# Patient Record
Sex: MALE | Race: BLACK OR AFRICAN AMERICAN | NOT HISPANIC OR LATINO | URBAN - METROPOLITAN AREA
[De-identification: names, ages, dates, MRNs, and addresses within clinical notes are randomized per-mention and may not be internally consistent; named-entity substitution may affect disease eponyms.]

---

## 2016-05-28 RX ORDER — ESCITALOPRAM OXALATE 10 MG/1
0 TABLET, FILM COATED ORAL
Qty: 30 | Refills: 0 | COMMUNITY
Start: 2016-05-28

## 2016-11-16 RX ORDER — AMLODIPINE BESYLATE 2.5 MG/1
0 TABLET ORAL
Qty: 90 | Refills: 0 | DISCHARGE
Start: 2016-11-16

## 2016-12-21 RX ORDER — APIXABAN 2.5 MG/1
0 TABLET, FILM COATED ORAL
Qty: 60 | Refills: 0 | COMMUNITY
Start: 2016-12-21

## 2017-01-13 RX ORDER — ATORVASTATIN CALCIUM 80 MG/1
0 TABLET, FILM COATED ORAL
Qty: 30 | Refills: 0 | DISCHARGE
Start: 2017-01-13

## 2017-01-20 RX ORDER — CLONAZEPAM 1 MG
0 TABLET ORAL
Qty: 120 | Refills: 0 | DISCHARGE
Start: 2017-01-20

## 2017-01-22 ENCOUNTER — EMERGENCY (EMERGENCY)
Facility: HOSPITAL | Age: 77
LOS: 1 days | Discharge: HOME | End: 2017-01-22
Attending: EMERGENCY MEDICINE
Payer: MEDICARE

## 2017-01-22 VITALS
RESPIRATION RATE: 18 BRPM | DIASTOLIC BLOOD PRESSURE: 66 MMHG | WEIGHT: 195.11 LBS | HEART RATE: 84 BPM | SYSTOLIC BLOOD PRESSURE: 157 MMHG | TEMPERATURE: 98 F | HEIGHT: 71 IN | OXYGEN SATURATION: 99 %

## 2017-01-22 DIAGNOSIS — Z98.89 OTHER SPECIFIED POSTPROCEDURAL STATES: Chronic | ICD-10-CM

## 2017-01-22 DIAGNOSIS — E78.5 HYPERLIPIDEMIA, UNSPECIFIED: ICD-10-CM

## 2017-01-22 DIAGNOSIS — M79.604 PAIN IN RIGHT LEG: ICD-10-CM

## 2017-01-22 DIAGNOSIS — Z86.718 PERSONAL HISTORY OF OTHER VENOUS THROMBOSIS AND EMBOLISM: ICD-10-CM

## 2017-01-22 DIAGNOSIS — Z79.01 LONG TERM (CURRENT) USE OF ANTICOAGULANTS: ICD-10-CM

## 2017-01-22 PROCEDURE — 99284 EMERGENCY DEPT VISIT MOD MDM: CPT

## 2017-01-22 PROCEDURE — 93971 EXTREMITY STUDY: CPT | Mod: 26,RT

## 2017-01-22 PROCEDURE — 99284 EMERGENCY DEPT VISIT MOD MDM: CPT | Mod: 25

## 2017-01-22 PROCEDURE — 93971 EXTREMITY STUDY: CPT

## 2017-01-22 NOTE — ED PROVIDER NOTE - NS ED MD SCRIBE ATTENDING SCRIBE SECTIONS
VITAL SIGNS( Pullset)/HIV/PHYSICAL EXAM/HISTORY OF PRESENT ILLNESS/DISPOSITION/PAST MEDICAL/SURGICAL/SOCIAL HISTORY/REVIEW OF SYSTEMS

## 2017-01-22 NOTE — ED PROVIDER NOTE - MEDICAL DECISION MAKING DETAILS
Arterial bypass on L leg. Sx likely due to arterial insufficiency. Will f/u w/ Dr. Carrasco for further eval. Sx likely due to arterial insufficiency.  duplex negative for DVT. Will f/u w/ Dr. Carrasco for further eval.

## 2017-01-22 NOTE — ED PROVIDER NOTE - PHYSICAL EXAMINATION
1+ DP pulses on R leg. 1+ DP pulses on R leg. Vague tenderness to lateral aspect of R lower leg, no appreciable swelling.

## 2017-01-22 NOTE — ED PROVIDER NOTE - OBJECTIVE STATEMENT
77 y/o M w/ PMHx of PVD and DVT, taking Eliquis, and PSHx of bypass on L leg p/w R leg pain and stiffness onset 1 week, worsening at rest and lying down. Pt reports pain started in lower back and moved to R calf. Pt also reports R foot numbness. Pt states he feels less circulation when moving leg up. Pt has taken ibuprofen for pain. Pt denies injury/trauma, fever, tingling, or any other complaint. NKDA. Vasc Surgeon: Dr. Carrasco. 75 y/o M w/ PMHx of PVD and DVT, taking Eliquis, and PSHx of bypass on L leg p/w R leg pain and stiffness onset 1 week, worsening at rest and lying down. Pt reports pain started in lower back and moved to R calf. Pt also reports R foot tingling intermittent. Pt states he feels symptoms worse when he is laying down.  Pt has taken ibuprofen for pain. Pt denies injury/trauma, fever, tingling, or any other complaint. NKDA. Vasc Surgeon: Dr. Carrasco. history of DVTx2, on eliquis, also history arterial bypass on left leg.

## 2017-01-27 RX ORDER — TRAMADOL HYDROCHLORIDE 50 MG/1
0 TABLET ORAL
Qty: 100 | Refills: 0 | DISCHARGE
Start: 2017-01-27

## 2017-02-02 ENCOUNTER — INPATIENT (INPATIENT)
Facility: HOSPITAL | Age: 77
LOS: 5 days | Discharge: HOME | DRG: 552 | End: 2017-02-08
Attending: INTERNAL MEDICINE | Admitting: INTERNAL MEDICINE
Payer: MEDICARE

## 2017-02-02 VITALS
SYSTOLIC BLOOD PRESSURE: 181 MMHG | WEIGHT: 190.04 LBS | DIASTOLIC BLOOD PRESSURE: 82 MMHG | HEART RATE: 88 BPM | TEMPERATURE: 98 F | OXYGEN SATURATION: 98 % | RESPIRATION RATE: 18 BRPM | HEIGHT: 71 IN

## 2017-02-02 DIAGNOSIS — Z98.89 OTHER SPECIFIED POSTPROCEDURAL STATES: Chronic | ICD-10-CM

## 2017-02-02 DIAGNOSIS — I74.9 EMBOLISM AND THROMBOSIS OF UNSPECIFIED ARTERY: ICD-10-CM

## 2017-02-02 LAB
ALBUMIN SERPL ELPH-MCNC: 4.1 G/DL — SIGNIFICANT CHANGE UP (ref 3.5–5)
ALP SERPL-CCNC: 62 U/L — SIGNIFICANT CHANGE UP (ref 40–120)
ALT FLD-CCNC: 34 U/L DA — SIGNIFICANT CHANGE UP (ref 10–60)
ANION GAP SERPL CALC-SCNC: 6 MMOL/L — SIGNIFICANT CHANGE UP (ref 5–17)
AST SERPL-CCNC: 37 U/L — SIGNIFICANT CHANGE UP (ref 10–40)
BILIRUB SERPL-MCNC: 0.6 MG/DL — SIGNIFICANT CHANGE UP (ref 0.2–1.2)
BUN SERPL-MCNC: 19 MG/DL — HIGH (ref 7–18)
CALCIUM SERPL-MCNC: 9.3 MG/DL — SIGNIFICANT CHANGE UP (ref 8.4–10.5)
CHLORIDE SERPL-SCNC: 107 MMOL/L — SIGNIFICANT CHANGE UP (ref 96–108)
CO2 SERPL-SCNC: 28 MMOL/L — SIGNIFICANT CHANGE UP (ref 22–31)
CREAT SERPL-MCNC: 1.45 MG/DL — HIGH (ref 0.5–1.3)
D DIMER BLD IA.RAPID-MCNC: 290 NG/ML DDU — HIGH
GLUCOSE SERPL-MCNC: 97 MG/DL — SIGNIFICANT CHANGE UP (ref 70–99)
HCT VFR BLD CALC: 49.7 % — SIGNIFICANT CHANGE UP (ref 39–50)
HGB BLD-MCNC: 16.2 G/DL — SIGNIFICANT CHANGE UP (ref 13–17)
INR BLD: 0.96 RATIO — SIGNIFICANT CHANGE UP (ref 0.88–1.16)
MCHC RBC-ENTMCNC: 30.9 PG — SIGNIFICANT CHANGE UP (ref 27–34)
MCHC RBC-ENTMCNC: 32.6 GM/DL — SIGNIFICANT CHANGE UP (ref 32–36)
MCV RBC AUTO: 94.7 FL — SIGNIFICANT CHANGE UP (ref 80–100)
PLATELET # BLD AUTO: 192 K/UL — SIGNIFICANT CHANGE UP (ref 150–400)
POTASSIUM SERPL-MCNC: 4.2 MMOL/L — SIGNIFICANT CHANGE UP (ref 3.5–5.3)
POTASSIUM SERPL-SCNC: 4.2 MMOL/L — SIGNIFICANT CHANGE UP (ref 3.5–5.3)
PROT SERPL-MCNC: 7.8 G/DL — SIGNIFICANT CHANGE UP (ref 6–8.3)
PROTHROM AB SERPL-ACNC: 10.8 SEC — SIGNIFICANT CHANGE UP (ref 10–13.1)
RBC # BLD: 5.25 M/UL — SIGNIFICANT CHANGE UP (ref 4.2–5.8)
RBC # FLD: 12.4 % — SIGNIFICANT CHANGE UP (ref 10.3–14.5)
SODIUM SERPL-SCNC: 141 MMOL/L — SIGNIFICANT CHANGE UP (ref 135–145)
WBC # BLD: 12.2 K/UL — HIGH (ref 3.8–10.5)
WBC # FLD AUTO: 12.2 K/UL — HIGH (ref 3.8–10.5)

## 2017-02-02 PROCEDURE — 71020: CPT | Mod: 26

## 2017-02-02 PROCEDURE — 99284 EMERGENCY DEPT VISIT MOD MDM: CPT

## 2017-02-02 RX ORDER — IBUPROFEN 200 MG
600 TABLET ORAL ONCE
Qty: 0 | Refills: 0 | Status: COMPLETED | OUTPATIENT
Start: 2017-02-02 | End: 2017-02-02

## 2017-02-02 RX ORDER — AMLODIPINE BESYLATE 2.5 MG/1
10 TABLET ORAL ONCE
Qty: 0 | Refills: 0 | Status: COMPLETED | OUTPATIENT
Start: 2017-02-02 | End: 2017-02-03

## 2017-02-02 RX ADMIN — Medication 600 MILLIGRAM(S): at 20:32

## 2017-02-02 RX ADMIN — Medication 600 MILLIGRAM(S): at 20:02

## 2017-02-02 NOTE — ED PROVIDER NOTE - PMH
Anxiety    Claudication in peripheral vascular disease    DVT (deep venous thrombosis)  11/2013  Dyslipidemia    ED (erectile dysfunction)    Pedal edema    PVD (peripheral vascular disease)    Seasonal allergies

## 2017-02-02 NOTE — ED PROVIDER NOTE - NS ED MD SCRIBE ATTENDING SCRIBE SECTIONS
DISPOSITION/PAST MEDICAL/SURGICAL/SOCIAL HISTORY/VITAL SIGNS( Pullset)/HIV/PHYSICAL EXAM/HISTORY OF PRESENT ILLNESS/REVIEW OF SYSTEMS

## 2017-02-02 NOTE — ED ADULT NURSE NOTE - OBJECTIVE STATEMENT
AOX3 +ambulatory patient reports R lower leg pain x 3 days. Patient denies any shortness of breath no chest pain no numbness or tingling no trauma

## 2017-02-02 NOTE — ED PROVIDER NOTE - OBJECTIVE STATEMENT
77 y/o male with presents to the ED c/o R leg pain onset 3 days. Pt reports he has been having shoot R low leg pain, was seen by PMD, diagnosed with radiculopathy. Pt comes to ED for worsening pain. Pt denies numbness, tingling, weakness, or any other complaints. NKDA.

## 2017-02-02 NOTE — ED ADULT TRIAGE NOTE - CHIEF COMPLAINT QUOTE
C/o worsening right leg pain radiating from right waiste down outer right leg, seen by PMD 3 days ago and diagnosed with radiculopathy. Pt states pain is " twice as bad since then".

## 2017-02-03 DIAGNOSIS — Z41.8 ENCOUNTER FOR OTHER PROCEDURES FOR PURPOSES OTHER THAN REMEDYING HEALTH STATE: ICD-10-CM

## 2017-02-03 DIAGNOSIS — I73.9 PERIPHERAL VASCULAR DISEASE, UNSPECIFIED: ICD-10-CM

## 2017-02-03 DIAGNOSIS — I10 ESSENTIAL (PRIMARY) HYPERTENSION: ICD-10-CM

## 2017-02-03 DIAGNOSIS — I82.409 ACUTE EMBOLISM AND THROMBOSIS OF UNSPECIFIED DEEP VEINS OF UNSPECIFIED LOWER EXTREMITY: ICD-10-CM

## 2017-02-03 LAB
ANION GAP SERPL CALC-SCNC: 8 MMOL/L — SIGNIFICANT CHANGE UP (ref 5–17)
APTT BLD: 31 SEC — SIGNIFICANT CHANGE UP (ref 27.5–37.4)
BUN SERPL-MCNC: 18 MG/DL — SIGNIFICANT CHANGE UP (ref 7–18)
CALCIUM SERPL-MCNC: 9 MG/DL — SIGNIFICANT CHANGE UP (ref 8.4–10.5)
CHLORIDE SERPL-SCNC: 107 MMOL/L — SIGNIFICANT CHANGE UP (ref 96–108)
CHOLEST SERPL-MCNC: 124 MG/DL — SIGNIFICANT CHANGE UP (ref 10–199)
CO2 SERPL-SCNC: 27 MMOL/L — SIGNIFICANT CHANGE UP (ref 22–31)
CREAT SERPL-MCNC: 1.34 MG/DL — HIGH (ref 0.5–1.3)
GLUCOSE SERPL-MCNC: 98 MG/DL — SIGNIFICANT CHANGE UP (ref 70–99)
HBA1C BLD-MCNC: 5.6 % — SIGNIFICANT CHANGE UP (ref 4–5.6)
HCT VFR BLD CALC: 46.6 % — SIGNIFICANT CHANGE UP (ref 39–50)
HDLC SERPL-MCNC: 48 MG/DL — SIGNIFICANT CHANGE UP (ref 40–125)
HGB BLD-MCNC: 15.8 G/DL — SIGNIFICANT CHANGE UP (ref 13–17)
INR BLD: 1.07 RATIO — SIGNIFICANT CHANGE UP (ref 0.88–1.16)
LIPID PNL WITH DIRECT LDL SERPL: 62 MG/DL — SIGNIFICANT CHANGE UP
MAGNESIUM SERPL-MCNC: 2.2 MG/DL — SIGNIFICANT CHANGE UP (ref 1.8–2.4)
MCHC RBC-ENTMCNC: 31.4 PG — SIGNIFICANT CHANGE UP (ref 27–34)
MCHC RBC-ENTMCNC: 34 GM/DL — SIGNIFICANT CHANGE UP (ref 32–36)
MCV RBC AUTO: 92.3 FL — SIGNIFICANT CHANGE UP (ref 80–100)
PHOSPHATE SERPL-MCNC: 2.2 MG/DL — LOW (ref 2.5–4.5)
PLATELET # BLD AUTO: 207 K/UL — SIGNIFICANT CHANGE UP (ref 150–400)
POTASSIUM SERPL-MCNC: 3.7 MMOL/L — SIGNIFICANT CHANGE UP (ref 3.5–5.3)
POTASSIUM SERPL-SCNC: 3.7 MMOL/L — SIGNIFICANT CHANGE UP (ref 3.5–5.3)
PROTHROM AB SERPL-ACNC: 12 SEC — SIGNIFICANT CHANGE UP (ref 10–13.1)
RBC # BLD: 5.05 M/UL — SIGNIFICANT CHANGE UP (ref 4.2–5.8)
RBC # FLD: 12.1 % — SIGNIFICANT CHANGE UP (ref 10.3–14.5)
SODIUM SERPL-SCNC: 142 MMOL/L — SIGNIFICANT CHANGE UP (ref 135–145)
TOTAL CHOLESTEROL/HDL RATIO MEASUREMENT: 2.6 RATIO — LOW (ref 3.4–9.6)
TRIGL SERPL-MCNC: 70 MG/DL — SIGNIFICANT CHANGE UP (ref 10–149)
TSH SERPL-MCNC: 1.15 UU/ML — SIGNIFICANT CHANGE UP (ref 0.34–4.82)
WBC # BLD: 11.8 K/UL — HIGH (ref 3.8–10.5)
WBC # FLD AUTO: 11.8 K/UL — HIGH (ref 3.8–10.5)

## 2017-02-03 PROCEDURE — 72110 X-RAY EXAM L-2 SPINE 4/>VWS: CPT | Mod: 26

## 2017-02-03 PROCEDURE — 93925 LOWER EXTREMITY STUDY: CPT | Mod: 26

## 2017-02-03 PROCEDURE — 73502 X-RAY EXAM HIP UNI 2-3 VIEWS: CPT | Mod: 26,RT

## 2017-02-03 PROCEDURE — 73610 X-RAY EXAM OF ANKLE: CPT | Mod: 26,RT

## 2017-02-03 RX ORDER — MIRTAZAPINE 45 MG/1
7.5 TABLET, ORALLY DISINTEGRATING ORAL DAILY
Qty: 0 | Refills: 0 | Status: DISCONTINUED | OUTPATIENT
Start: 2017-02-03 | End: 2017-02-08

## 2017-02-03 RX ORDER — METOPROLOL TARTRATE 50 MG
50 TABLET ORAL
Qty: 0 | Refills: 0 | Status: DISCONTINUED | OUTPATIENT
Start: 2017-02-03 | End: 2017-02-03

## 2017-02-03 RX ORDER — ASPIRIN/CALCIUM CARB/MAGNESIUM 324 MG
81 TABLET ORAL DAILY
Qty: 0 | Refills: 0 | Status: DISCONTINUED | OUTPATIENT
Start: 2017-02-03 | End: 2017-02-08

## 2017-02-03 RX ORDER — ESCITALOPRAM OXALATE 10 MG/1
5 TABLET, FILM COATED ORAL DAILY
Qty: 0 | Refills: 0 | Status: DISCONTINUED | OUTPATIENT
Start: 2017-02-03 | End: 2017-02-08

## 2017-02-03 RX ORDER — APIXABAN 2.5 MG/1
5 TABLET, FILM COATED ORAL
Qty: 0 | Refills: 0 | Status: DISCONTINUED | OUTPATIENT
Start: 2017-02-03 | End: 2017-02-03

## 2017-02-03 RX ORDER — ACETAMINOPHEN 500 MG
650 TABLET ORAL EVERY 6 HOURS
Qty: 0 | Refills: 0 | Status: DISCONTINUED | OUTPATIENT
Start: 2017-02-03 | End: 2017-02-08

## 2017-02-03 RX ORDER — SODIUM CHLORIDE 9 MG/ML
1000 INJECTION INTRAMUSCULAR; INTRAVENOUS; SUBCUTANEOUS
Qty: 0 | Refills: 0 | Status: DISCONTINUED | OUTPATIENT
Start: 2017-02-03 | End: 2017-02-07

## 2017-02-03 RX ORDER — HYDRALAZINE HCL 50 MG
100 TABLET ORAL THREE TIMES A DAY
Qty: 0 | Refills: 0 | Status: DISCONTINUED | OUTPATIENT
Start: 2017-02-03 | End: 2017-02-03

## 2017-02-03 RX ORDER — PANTOPRAZOLE SODIUM 20 MG/1
40 TABLET, DELAYED RELEASE ORAL
Qty: 0 | Refills: 0 | Status: DISCONTINUED | OUTPATIENT
Start: 2017-02-03 | End: 2017-02-08

## 2017-02-03 RX ORDER — ATORVASTATIN CALCIUM 80 MG/1
40 TABLET, FILM COATED ORAL AT BEDTIME
Qty: 0 | Refills: 0 | Status: DISCONTINUED | OUTPATIENT
Start: 2017-02-03 | End: 2017-02-08

## 2017-02-03 RX ORDER — ACETAMINOPHEN WITH CODEINE 300MG-30MG
1 TABLET ORAL EVERY 4 HOURS
Qty: 0 | Refills: 0 | Status: DISCONTINUED | OUTPATIENT
Start: 2017-02-03 | End: 2017-02-08

## 2017-02-03 RX ORDER — LOSARTAN POTASSIUM 100 MG/1
100 TABLET, FILM COATED ORAL DAILY
Qty: 0 | Refills: 0 | Status: DISCONTINUED | OUTPATIENT
Start: 2017-02-03 | End: 2017-02-08

## 2017-02-03 RX ORDER — HYDRALAZINE HCL 50 MG
12.5 TABLET ORAL DAILY
Qty: 0 | Refills: 0 | Status: DISCONTINUED | OUTPATIENT
Start: 2017-02-03 | End: 2017-02-03

## 2017-02-03 RX ORDER — HEPARIN SODIUM 5000 [USP'U]/ML
5000 INJECTION INTRAVENOUS; SUBCUTANEOUS EVERY 8 HOURS
Qty: 0 | Refills: 0 | Status: DISCONTINUED | OUTPATIENT
Start: 2017-02-03 | End: 2017-02-03

## 2017-02-03 RX ORDER — MORPHINE SULFATE 50 MG/1
2 CAPSULE, EXTENDED RELEASE ORAL EVERY 6 HOURS
Qty: 0 | Refills: 0 | Status: DISCONTINUED | OUTPATIENT
Start: 2017-02-03 | End: 2017-02-08

## 2017-02-03 RX ORDER — AMLODIPINE BESYLATE 2.5 MG/1
5 TABLET ORAL DAILY
Qty: 0 | Refills: 0 | Status: DISCONTINUED | OUTPATIENT
Start: 2017-02-03 | End: 2017-02-04

## 2017-02-03 RX ORDER — TRAMADOL HYDROCHLORIDE 50 MG/1
50 TABLET ORAL AT BEDTIME
Qty: 0 | Refills: 0 | Status: DISCONTINUED | OUTPATIENT
Start: 2017-02-03 | End: 2017-02-08

## 2017-02-03 RX ORDER — CLONAZEPAM 1 MG
1 TABLET ORAL AT BEDTIME
Qty: 0 | Refills: 0 | Status: DISCONTINUED | OUTPATIENT
Start: 2017-02-03 | End: 2017-02-08

## 2017-02-03 RX ORDER — APIXABAN 2.5 MG/1
5 TABLET, FILM COATED ORAL DAILY
Qty: 0 | Refills: 0 | Status: DISCONTINUED | OUTPATIENT
Start: 2017-02-03 | End: 2017-02-08

## 2017-02-03 RX ORDER — MORPHINE SULFATE 50 MG/1
2 CAPSULE, EXTENDED RELEASE ORAL EVERY 6 HOURS
Qty: 0 | Refills: 0 | Status: DISCONTINUED | OUTPATIENT
Start: 2017-02-03 | End: 2017-02-03

## 2017-02-03 RX ADMIN — AMLODIPINE BESYLATE 10 MILLIGRAM(S): 2.5 TABLET ORAL at 00:30

## 2017-02-03 RX ADMIN — APIXABAN 5 MILLIGRAM(S): 2.5 TABLET, FILM COATED ORAL at 12:12

## 2017-02-03 RX ADMIN — MIRTAZAPINE 7.5 MILLIGRAM(S): 45 TABLET, ORALLY DISINTEGRATING ORAL at 22:31

## 2017-02-03 RX ADMIN — LOSARTAN POTASSIUM 100 MILLIGRAM(S): 100 TABLET, FILM COATED ORAL at 13:07

## 2017-02-03 RX ADMIN — Medication 1 TABLET(S): at 04:22

## 2017-02-03 RX ADMIN — TRAMADOL HYDROCHLORIDE 50 MILLIGRAM(S): 50 TABLET ORAL at 23:18

## 2017-02-03 RX ADMIN — Medication 81 MILLIGRAM(S): at 12:12

## 2017-02-03 RX ADMIN — ESCITALOPRAM OXALATE 5 MILLIGRAM(S): 10 TABLET, FILM COATED ORAL at 12:12

## 2017-02-03 RX ADMIN — MORPHINE SULFATE 2 MILLIGRAM(S): 50 CAPSULE, EXTENDED RELEASE ORAL at 15:42

## 2017-02-03 RX ADMIN — AMLODIPINE BESYLATE 5 MILLIGRAM(S): 2.5 TABLET ORAL at 06:48

## 2017-02-03 RX ADMIN — APIXABAN 5 MILLIGRAM(S): 2.5 TABLET, FILM COATED ORAL at 06:10

## 2017-02-03 RX ADMIN — Medication 1 TABLET(S): at 03:38

## 2017-02-03 RX ADMIN — ATORVASTATIN CALCIUM 40 MILLIGRAM(S): 80 TABLET, FILM COATED ORAL at 22:32

## 2017-02-03 RX ADMIN — MORPHINE SULFATE 2 MILLIGRAM(S): 50 CAPSULE, EXTENDED RELEASE ORAL at 10:47

## 2017-02-03 RX ADMIN — SODIUM CHLORIDE 75 MILLILITER(S): 9 INJECTION INTRAMUSCULAR; INTRAVENOUS; SUBCUTANEOUS at 07:06

## 2017-02-03 RX ADMIN — Medication 1 MILLIGRAM(S): at 22:31

## 2017-02-03 RX ADMIN — PANTOPRAZOLE SODIUM 40 MILLIGRAM(S): 20 TABLET, DELAYED RELEASE ORAL at 06:10

## 2017-02-03 RX ADMIN — TRAMADOL HYDROCHLORIDE 50 MILLIGRAM(S): 50 TABLET ORAL at 22:32

## 2017-02-03 RX ADMIN — MORPHINE SULFATE 2 MILLIGRAM(S): 50 CAPSULE, EXTENDED RELEASE ORAL at 16:34

## 2017-02-03 RX ADMIN — MORPHINE SULFATE 2 MILLIGRAM(S): 50 CAPSULE, EXTENDED RELEASE ORAL at 09:59

## 2017-02-03 NOTE — H&P ADULT. - PROBLEM SELECTOR PLAN 1
-f/u arterial doppler, lipid profile and TSH  -Venous doppler negative  -on aspirin and statin. and BP control.   -vascular Dr Goodwin -f/u arterial doppler, lipid profile and TSH  -Venous doppler negative  -f/u lumbosacral, hip and ankle XR.  -on aspirin and statin. and BP control.   -vascular Dr Goodwin

## 2017-02-03 NOTE — H&P ADULT. - PROBLEM SELECTOR PLAN 2
-continue Eliquis 5mg daily home dose as per pt  -lower extremities doppler repeated and negative for DVT

## 2017-02-03 NOTE — H&P ADULT. - PROBLEM SELECTOR PLAN 3
-unknown home medication  -pt denied taking medication on record and said he was taking lisinopril unknown dose  -please check with pharmacy on record. at  [facility] pharmacy  -will continue on amlodipine 5mg daily home medication as per pt -continue amlodipine, HCTZ and losartan home dose  -BP monitor

## 2017-02-03 NOTE — H&P ADULT. - HISTORY OF PRESENT ILLNESS
76 year old male live with family walk independently at baseline with PMH of PVD s/p bypass on lt leg 5yr ago, DVT on Eliquis, HTN, came to ED due to right leg cramping pain for 2 week. Pt stated that the pain is 9/10, progressive, not relief with Tylenol started while walking and relief with rest. Pt stated that now the pain is getting better with Tylenol #3 and only located at right ankle. Pt also stated he came in today cos he can not get out of bed due to severe pain in AM after he woke up.   Social Hx; Pt quit smoking since 31 year old. Denied alcohol and illegal drug use.   On PE b/l LE pulse positive, no skin color change or no loss of hair seen but dry.

## 2017-02-04 LAB
FOLATE SERPL-MCNC: 9.6 NG/ML — SIGNIFICANT CHANGE UP (ref 4.8–24.2)
VIT B12 SERPL-MCNC: 436 PG/ML — SIGNIFICANT CHANGE UP (ref 243–894)

## 2017-02-04 RX ORDER — HYDRALAZINE HCL 50 MG
10 TABLET ORAL THREE TIMES A DAY
Qty: 0 | Refills: 0 | Status: DISCONTINUED | OUTPATIENT
Start: 2017-02-04 | End: 2017-02-05

## 2017-02-04 RX ORDER — AMLODIPINE BESYLATE 2.5 MG/1
5 TABLET ORAL ONCE
Qty: 0 | Refills: 0 | Status: DISCONTINUED | OUTPATIENT
Start: 2017-02-04 | End: 2017-02-04

## 2017-02-04 RX ORDER — MORPHINE SULFATE 50 MG/1
1 CAPSULE, EXTENDED RELEASE ORAL ONCE
Qty: 0 | Refills: 0 | Status: DISCONTINUED | OUTPATIENT
Start: 2017-02-04 | End: 2017-02-04

## 2017-02-04 RX ORDER — AMLODIPINE BESYLATE 2.5 MG/1
10 TABLET ORAL DAILY
Qty: 0 | Refills: 0 | Status: DISCONTINUED | OUTPATIENT
Start: 2017-02-04 | End: 2017-02-08

## 2017-02-04 RX ADMIN — Medication 650 MILLIGRAM(S): at 08:56

## 2017-02-04 RX ADMIN — MORPHINE SULFATE 2 MILLIGRAM(S): 50 CAPSULE, EXTENDED RELEASE ORAL at 08:30

## 2017-02-04 RX ADMIN — ESCITALOPRAM OXALATE 5 MILLIGRAM(S): 10 TABLET, FILM COATED ORAL at 13:20

## 2017-02-04 RX ADMIN — Medication 10 MILLIGRAM(S): at 22:06

## 2017-02-04 RX ADMIN — MORPHINE SULFATE 1 MILLIGRAM(S): 50 CAPSULE, EXTENDED RELEASE ORAL at 05:41

## 2017-02-04 RX ADMIN — TRAMADOL HYDROCHLORIDE 50 MILLIGRAM(S): 50 TABLET ORAL at 21:55

## 2017-02-04 RX ADMIN — Medication 1 MILLIGRAM(S): at 22:06

## 2017-02-04 RX ADMIN — MORPHINE SULFATE 2 MILLIGRAM(S): 50 CAPSULE, EXTENDED RELEASE ORAL at 01:01

## 2017-02-04 RX ADMIN — MORPHINE SULFATE 2 MILLIGRAM(S): 50 CAPSULE, EXTENDED RELEASE ORAL at 18:59

## 2017-02-04 RX ADMIN — MORPHINE SULFATE 1 MILLIGRAM(S): 50 CAPSULE, EXTENDED RELEASE ORAL at 06:03

## 2017-02-04 RX ADMIN — AMLODIPINE BESYLATE 5 MILLIGRAM(S): 2.5 TABLET ORAL at 05:41

## 2017-02-04 RX ADMIN — Medication 81 MILLIGRAM(S): at 13:20

## 2017-02-04 RX ADMIN — PANTOPRAZOLE SODIUM 40 MILLIGRAM(S): 20 TABLET, DELAYED RELEASE ORAL at 05:41

## 2017-02-04 RX ADMIN — MORPHINE SULFATE 2 MILLIGRAM(S): 50 CAPSULE, EXTENDED RELEASE ORAL at 19:16

## 2017-02-04 RX ADMIN — ATORVASTATIN CALCIUM 40 MILLIGRAM(S): 80 TABLET, FILM COATED ORAL at 22:05

## 2017-02-04 RX ADMIN — MORPHINE SULFATE 2 MILLIGRAM(S): 50 CAPSULE, EXTENDED RELEASE ORAL at 01:03

## 2017-02-04 RX ADMIN — LOSARTAN POTASSIUM 100 MILLIGRAM(S): 100 TABLET, FILM COATED ORAL at 05:41

## 2017-02-04 RX ADMIN — MIRTAZAPINE 7.5 MILLIGRAM(S): 45 TABLET, ORALLY DISINTEGRATING ORAL at 13:20

## 2017-02-04 RX ADMIN — APIXABAN 5 MILLIGRAM(S): 2.5 TABLET, FILM COATED ORAL at 13:20

## 2017-02-04 RX ADMIN — Medication 650 MILLIGRAM(S): at 09:25

## 2017-02-04 RX ADMIN — MORPHINE SULFATE 2 MILLIGRAM(S): 50 CAPSULE, EXTENDED RELEASE ORAL at 07:38

## 2017-02-05 PROCEDURE — 73700 CT LOWER EXTREMITY W/O DYE: CPT | Mod: 26,RT

## 2017-02-05 RX ORDER — HYDRALAZINE HCL 50 MG
25 TABLET ORAL THREE TIMES A DAY
Qty: 0 | Refills: 0 | Status: DISCONTINUED | OUTPATIENT
Start: 2017-02-05 | End: 2017-02-08

## 2017-02-05 RX ADMIN — MIRTAZAPINE 7.5 MILLIGRAM(S): 45 TABLET, ORALLY DISINTEGRATING ORAL at 11:14

## 2017-02-05 RX ADMIN — APIXABAN 5 MILLIGRAM(S): 2.5 TABLET, FILM COATED ORAL at 11:14

## 2017-02-05 RX ADMIN — TRAMADOL HYDROCHLORIDE 50 MILLIGRAM(S): 50 TABLET ORAL at 02:13

## 2017-02-05 RX ADMIN — ATORVASTATIN CALCIUM 40 MILLIGRAM(S): 80 TABLET, FILM COATED ORAL at 21:44

## 2017-02-05 RX ADMIN — MORPHINE SULFATE 2 MILLIGRAM(S): 50 CAPSULE, EXTENDED RELEASE ORAL at 06:32

## 2017-02-05 RX ADMIN — LOSARTAN POTASSIUM 100 MILLIGRAM(S): 100 TABLET, FILM COATED ORAL at 06:46

## 2017-02-05 RX ADMIN — AMLODIPINE BESYLATE 10 MILLIGRAM(S): 2.5 TABLET ORAL at 06:46

## 2017-02-05 RX ADMIN — MORPHINE SULFATE 2 MILLIGRAM(S): 50 CAPSULE, EXTENDED RELEASE ORAL at 02:21

## 2017-02-05 RX ADMIN — Medication 1 TABLET(S): at 17:53

## 2017-02-05 RX ADMIN — Medication 81 MILLIGRAM(S): at 11:14

## 2017-02-05 RX ADMIN — Medication 25 MILLIGRAM(S): at 21:45

## 2017-02-05 RX ADMIN — Medication 1 TABLET(S): at 08:51

## 2017-02-05 RX ADMIN — Medication 1 MILLIGRAM(S): at 21:44

## 2017-02-05 RX ADMIN — TRAMADOL HYDROCHLORIDE 50 MILLIGRAM(S): 50 TABLET ORAL at 21:45

## 2017-02-05 RX ADMIN — MORPHINE SULFATE 2 MILLIGRAM(S): 50 CAPSULE, EXTENDED RELEASE ORAL at 11:05

## 2017-02-05 RX ADMIN — Medication 1 TABLET(S): at 16:26

## 2017-02-05 RX ADMIN — Medication 10 MILLIGRAM(S): at 06:46

## 2017-02-05 RX ADMIN — ESCITALOPRAM OXALATE 5 MILLIGRAM(S): 10 TABLET, FILM COATED ORAL at 11:14

## 2017-02-05 RX ADMIN — PANTOPRAZOLE SODIUM 40 MILLIGRAM(S): 20 TABLET, DELAYED RELEASE ORAL at 06:47

## 2017-02-05 RX ADMIN — Medication 1 TABLET(S): at 08:00

## 2017-02-05 RX ADMIN — MORPHINE SULFATE 2 MILLIGRAM(S): 50 CAPSULE, EXTENDED RELEASE ORAL at 10:31

## 2017-02-06 ENCOUNTER — APPOINTMENT (OUTPATIENT)
Dept: VASCULAR SURGERY | Facility: CLINIC | Age: 77
End: 2017-02-06

## 2017-02-06 PROCEDURE — 72148 MRI LUMBAR SPINE W/O DYE: CPT | Mod: 26

## 2017-02-06 PROCEDURE — 99222 1ST HOSP IP/OBS MODERATE 55: CPT

## 2017-02-06 RX ORDER — PREGABALIN 225 MG/1
500 CAPSULE ORAL DAILY
Qty: 0 | Refills: 0 | Status: DISCONTINUED | OUTPATIENT
Start: 2017-02-06 | End: 2017-02-08

## 2017-02-06 RX ORDER — FOLIC ACID 0.8 MG
1 TABLET ORAL DAILY
Qty: 0 | Refills: 0 | Status: DISCONTINUED | OUTPATIENT
Start: 2017-02-06 | End: 2017-02-08

## 2017-02-06 RX ADMIN — Medication 81 MILLIGRAM(S): at 12:12

## 2017-02-06 RX ADMIN — Medication 1 TABLET(S): at 12:12

## 2017-02-06 RX ADMIN — Medication 1 TABLET(S): at 12:45

## 2017-02-06 RX ADMIN — Medication 1 MILLIGRAM(S): at 12:15

## 2017-02-06 RX ADMIN — APIXABAN 5 MILLIGRAM(S): 2.5 TABLET, FILM COATED ORAL at 12:16

## 2017-02-06 RX ADMIN — Medication 25 MILLIGRAM(S): at 21:07

## 2017-02-06 RX ADMIN — Medication 1 MILLIGRAM(S): at 21:07

## 2017-02-06 RX ADMIN — PANTOPRAZOLE SODIUM 40 MILLIGRAM(S): 20 TABLET, DELAYED RELEASE ORAL at 05:44

## 2017-02-06 RX ADMIN — ATORVASTATIN CALCIUM 40 MILLIGRAM(S): 80 TABLET, FILM COATED ORAL at 21:07

## 2017-02-06 RX ADMIN — ESCITALOPRAM OXALATE 5 MILLIGRAM(S): 10 TABLET, FILM COATED ORAL at 12:12

## 2017-02-06 RX ADMIN — TRAMADOL HYDROCHLORIDE 50 MILLIGRAM(S): 50 TABLET ORAL at 01:12

## 2017-02-06 RX ADMIN — PREGABALIN 500 MICROGRAM(S): 225 CAPSULE ORAL at 12:15

## 2017-02-06 RX ADMIN — MIRTAZAPINE 7.5 MILLIGRAM(S): 45 TABLET, ORALLY DISINTEGRATING ORAL at 12:12

## 2017-02-06 RX ADMIN — TRAMADOL HYDROCHLORIDE 50 MILLIGRAM(S): 50 TABLET ORAL at 21:09

## 2017-02-06 RX ADMIN — MORPHINE SULFATE 2 MILLIGRAM(S): 50 CAPSULE, EXTENDED RELEASE ORAL at 09:00

## 2017-02-06 RX ADMIN — LOSARTAN POTASSIUM 100 MILLIGRAM(S): 100 TABLET, FILM COATED ORAL at 05:43

## 2017-02-06 RX ADMIN — MORPHINE SULFATE 2 MILLIGRAM(S): 50 CAPSULE, EXTENDED RELEASE ORAL at 09:16

## 2017-02-06 RX ADMIN — Medication 1 TABLET(S): at 05:43

## 2017-02-06 RX ADMIN — TRAMADOL HYDROCHLORIDE 50 MILLIGRAM(S): 50 TABLET ORAL at 21:07

## 2017-02-06 RX ADMIN — Medication 25 MILLIGRAM(S): at 05:43

## 2017-02-06 NOTE — PHYSICAL THERAPY INITIAL EVALUATION ADULT - LEVEL OF INDEPENDENCE: STAIR NEGOTIATION, REHAB EVAL
Stairs not assessed at this time as pt. does not require stairs to access/negotiate home environment

## 2017-02-06 NOTE — PHYSICAL THERAPY INITIAL EVALUATION ADULT - GENERAL OBSERVATIONS, REHAB EVAL
Consult received, chart reviewed. Patient received supine in bed, NAD. Patient agreed to EVALUATION from Physical Therapist.

## 2017-02-06 NOTE — PHYSICAL THERAPY INITIAL EVALUATION ADULT - RANGE OF MOTION EXAMINATION, REHAB EVAL
Right UE ROM was WFL (within functional limits)/Left LE ROM was WFL (within functional limits)/Right LE ROM was WFL (within functional limits)/Left UE ROM was WFL (within functional limits)

## 2017-02-07 ENCOUNTER — TRANSCRIPTION ENCOUNTER (OUTPATIENT)
Age: 77
End: 2017-02-07

## 2017-02-07 LAB
ANION GAP SERPL CALC-SCNC: 7 MMOL/L — SIGNIFICANT CHANGE UP (ref 5–17)
ANION GAP SERPL CALC-SCNC: 8 MMOL/L — SIGNIFICANT CHANGE UP (ref 5–17)
BUN SERPL-MCNC: 22 MG/DL — HIGH (ref 7–18)
BUN SERPL-MCNC: 25 MG/DL — HIGH (ref 7–18)
CALCIUM SERPL-MCNC: 8.4 MG/DL — SIGNIFICANT CHANGE UP (ref 8.4–10.5)
CALCIUM SERPL-MCNC: 8.4 MG/DL — SIGNIFICANT CHANGE UP (ref 8.4–10.5)
CHLORIDE SERPL-SCNC: 105 MMOL/L — SIGNIFICANT CHANGE UP (ref 96–108)
CHLORIDE SERPL-SCNC: 106 MMOL/L — SIGNIFICANT CHANGE UP (ref 96–108)
CO2 SERPL-SCNC: 28 MMOL/L — SIGNIFICANT CHANGE UP (ref 22–31)
CO2 SERPL-SCNC: 29 MMOL/L — SIGNIFICANT CHANGE UP (ref 22–31)
CREAT SERPL-MCNC: 1.72 MG/DL — HIGH (ref 0.5–1.3)
CREAT SERPL-MCNC: 1.84 MG/DL — HIGH (ref 0.5–1.3)
GLUCOSE SERPL-MCNC: 104 MG/DL — HIGH (ref 70–99)
GLUCOSE SERPL-MCNC: 84 MG/DL — SIGNIFICANT CHANGE UP (ref 70–99)
HCT VFR BLD CALC: 45.1 % — SIGNIFICANT CHANGE UP (ref 39–50)
HGB BLD-MCNC: 14.8 G/DL — SIGNIFICANT CHANGE UP (ref 13–17)
MAGNESIUM SERPL-MCNC: 2.3 MG/DL — SIGNIFICANT CHANGE UP (ref 1.8–2.4)
MCHC RBC-ENTMCNC: 30.8 PG — SIGNIFICANT CHANGE UP (ref 27–34)
MCHC RBC-ENTMCNC: 32.8 GM/DL — SIGNIFICANT CHANGE UP (ref 32–36)
MCV RBC AUTO: 94.2 FL — SIGNIFICANT CHANGE UP (ref 80–100)
PHOSPHATE SERPL-MCNC: 4.5 MG/DL — SIGNIFICANT CHANGE UP (ref 2.5–4.5)
PLATELET # BLD AUTO: 164 K/UL — SIGNIFICANT CHANGE UP (ref 150–400)
POTASSIUM SERPL-MCNC: 3.7 MMOL/L — SIGNIFICANT CHANGE UP (ref 3.5–5.3)
POTASSIUM SERPL-MCNC: 4.1 MMOL/L — SIGNIFICANT CHANGE UP (ref 3.5–5.3)
POTASSIUM SERPL-SCNC: 3.7 MMOL/L — SIGNIFICANT CHANGE UP (ref 3.5–5.3)
POTASSIUM SERPL-SCNC: 4.1 MMOL/L — SIGNIFICANT CHANGE UP (ref 3.5–5.3)
RBC # BLD: 4.79 M/UL — SIGNIFICANT CHANGE UP (ref 4.2–5.8)
RBC # FLD: 12.1 % — SIGNIFICANT CHANGE UP (ref 10.3–14.5)
SODIUM SERPL-SCNC: 141 MMOL/L — SIGNIFICANT CHANGE UP (ref 135–145)
SODIUM SERPL-SCNC: 142 MMOL/L — SIGNIFICANT CHANGE UP (ref 135–145)
WBC # BLD: 11.2 K/UL — HIGH (ref 3.8–10.5)
WBC # FLD AUTO: 11.2 K/UL — HIGH (ref 3.8–10.5)

## 2017-02-07 RX ORDER — LOSARTAN POTASSIUM 100 MG/1
1 TABLET, FILM COATED ORAL
Qty: 0 | Refills: 0 | DISCHARGE
Start: 2017-02-07

## 2017-02-07 RX ORDER — ACETAMINOPHEN WITH CODEINE 300MG-30MG
1 TABLET ORAL
Qty: 24 | Refills: 0
Start: 2017-02-07 | End: 2017-02-11

## 2017-02-07 RX ORDER — SODIUM CHLORIDE 9 MG/ML
1000 INJECTION INTRAMUSCULAR; INTRAVENOUS; SUBCUTANEOUS
Qty: 0 | Refills: 0 | Status: DISCONTINUED | OUTPATIENT
Start: 2017-02-07 | End: 2017-02-08

## 2017-02-07 RX ORDER — HYDRALAZINE HCL 50 MG
1 TABLET ORAL
Qty: 0 | Refills: 0 | DISCHARGE
Start: 2017-02-07

## 2017-02-07 RX ORDER — ACETAMINOPHEN WITH CODEINE 300MG-30MG
1 TABLET ORAL
Qty: 60 | Refills: 0 | OUTPATIENT
Start: 2017-02-07 | End: 2017-02-17

## 2017-02-07 RX ADMIN — AMLODIPINE BESYLATE 10 MILLIGRAM(S): 2.5 TABLET ORAL at 05:25

## 2017-02-07 RX ADMIN — Medication 1 MILLIGRAM(S): at 22:03

## 2017-02-07 RX ADMIN — ATORVASTATIN CALCIUM 40 MILLIGRAM(S): 80 TABLET, FILM COATED ORAL at 22:03

## 2017-02-07 RX ADMIN — Medication 25 MILLIGRAM(S): at 05:25

## 2017-02-07 RX ADMIN — ESCITALOPRAM OXALATE 5 MILLIGRAM(S): 10 TABLET, FILM COATED ORAL at 11:32

## 2017-02-07 RX ADMIN — PANTOPRAZOLE SODIUM 40 MILLIGRAM(S): 20 TABLET, DELAYED RELEASE ORAL at 05:25

## 2017-02-07 RX ADMIN — Medication 25 MILLIGRAM(S): at 22:03

## 2017-02-07 RX ADMIN — MIRTAZAPINE 7.5 MILLIGRAM(S): 45 TABLET, ORALLY DISINTEGRATING ORAL at 11:32

## 2017-02-07 RX ADMIN — TRAMADOL HYDROCHLORIDE 50 MILLIGRAM(S): 50 TABLET ORAL at 22:03

## 2017-02-07 RX ADMIN — Medication 25 MILLIGRAM(S): at 14:16

## 2017-02-07 RX ADMIN — SODIUM CHLORIDE 75 MILLILITER(S): 9 INJECTION INTRAMUSCULAR; INTRAVENOUS; SUBCUTANEOUS at 10:20

## 2017-02-07 RX ADMIN — Medication 1 MILLIGRAM(S): at 11:32

## 2017-02-07 RX ADMIN — PREGABALIN 500 MICROGRAM(S): 225 CAPSULE ORAL at 11:32

## 2017-02-07 RX ADMIN — APIXABAN 5 MILLIGRAM(S): 2.5 TABLET, FILM COATED ORAL at 11:31

## 2017-02-07 RX ADMIN — Medication 1 TABLET(S): at 16:36

## 2017-02-07 RX ADMIN — Medication 1 TABLET(S): at 11:36

## 2017-02-07 RX ADMIN — Medication 1 TABLET(S): at 10:16

## 2017-02-07 RX ADMIN — LOSARTAN POTASSIUM 100 MILLIGRAM(S): 100 TABLET, FILM COATED ORAL at 05:25

## 2017-02-07 RX ADMIN — Medication 81 MILLIGRAM(S): at 11:32

## 2017-02-07 RX ADMIN — Medication 1 TABLET(S): at 15:21

## 2017-02-07 NOTE — DISCHARGE NOTE ADULT - HOSPITAL COURSE
76 year old male live with family walk independently at baseline with PMH of PVD s/p bypass on lt leg 5yr ago, DVT on Eliquis, HTN, came to ED due to right leg cramping pain for 2 week. Pt stated that the pain is 9/10, progressive, not relief with Tylenol started while walking and relief with rest. Pt stated that now the pain is getting better with Tylenol #3 and only located at right ankle. Pt also stated he came in today cos he can not get out of bed due to severe pain in AM after he woke up.   Social Hx; Pt quit smoking since 31 year old. Denied alcohol and illegal drug use.   On PE b/l LE pulse positive, no skin color change or no loss of hair seen but dry.    Patient had diagnostic NIKKI to rule out vascular insufficiency as the cause of pain. Vascular saw the patient and ruled out vascular insufficiency as the cause. A hip xray showed Osteoarthritis to the hip with joint space narrowing. An MRI was done of the lumbar spine and showed degenerative disk disease. An Ortho consult was obtained and outpatient therapy was recommended. Patient received a psych consult and it was recommended that he discontinue taking the lexapro and f/u up with a psychiatrist outpatient. Patient will need outpatient physical therapy as per PT. 76 year old male live with family walk independently at baseline with PMH of PVD s/p bypass on lt leg 5yr ago, DVT on Eliquis, HTN, came to ED due to right leg cramping pain for 2 week. Pt stated that the pain is 9/10, progressive, not relief with Tylenol started while walking and relief with rest. Pt stated that now the pain is getting better with Tylenol #3 and only located at right ankle. Pt also stated he came in today cos he can not get out of bed due to severe pain in AM after he woke up.   Social Hx; Pt quit smoking since 31 year old. Denied alcohol and illegal drug use.   On PE b/l LE pulse positive, no skin color change or no loss of hair seen but dry.    Patient had diagnostic NIKKI to rule out vascular insufficiency as the cause of pain. Vascular saw the patient and ruled out vascular insufficiency as the cause. A hip xray showed Osteoarthritis to the hip with joint space narrowing. An MRI was done of the lumbar spine and showed degenerative disk disease. An Ortho consult was obtained and outpatient therapy was recommended. Patient received a psych consult and it was recommended that he discontinue taking the lexapro and f/u up with a psychiatrist outpatient. Patient will need outpatient physical therapy as per PT.     med attend addendum on 2/8/17   pts creat about 1.8  i called pt and explained him about and ask to D?C hctz, plenty of fluidsand see pcp in 2 days and repeat BMP and see nephrology.

## 2017-02-07 NOTE — DISCHARGE NOTE ADULT - MEDICATION SUMMARY - MEDICATIONS TO TAKE
I will START or STAY ON the medications listed below when I get home from the hospital:    aspirin 81 mg oral delayed release tablet  -- 1 tab(s) by mouth once a day  -- Indication: For anticoag    TRAMADOL HCL 50 MG TABLET  -- Indication: For Pain    Tylenol with Codeine #3 oral tablet  -- 1 tab(s) by mouth every 4 hours, As Needed MDD:please do not take more than 6 tabs per day  -- Indication: For Prn pain    losartan 100 mg oral tablet  -- 1 tab(s) by mouth once a day  -- Indication: For HTN (hypertension)    CLONAZEPAM 1 MG TABLET  -- Indication: For Depression    mirtazapine 7.5 mg oral tablet  -- 1 tab(s) by mouth once a day (before a meal)  -- Indication: For Depression    ATORVASTATIN 40 MG TABLET  -- Indication: For Hld    AMLODIPINE BESYLATE 5 MG TAB  -- Indication: For HTN (hypertension)    hydroCHLOROthiazide 12.5 mg oral capsule  -- 1 cap(s) by mouth every 48 hours  -- Indication: For HTN (hypertension)    hydrALAZINE 25 mg oral tablet  -- 1 tab(s) by mouth 3 times a day  -- Indication: For HTN (hypertension)

## 2017-02-07 NOTE — DISCHARGE NOTE ADULT - PLAN OF CARE
resolution of pain and return to baseline activity patient to c/w meds and f/u with Dr. Carrasco c/w meds and f/u with pcp manage painful ambulation patient will need to f/u with ortho doctor outpatient and f/u with a neurologist to rule out nerve pain from the lumbar spine

## 2017-02-07 NOTE — DISCHARGE NOTE ADULT - PATIENT PORTAL LINK FT
“You can access the Follow[facility] Patient Portal, offered by [facility], by registering with the following website: [email or web address]”

## 2017-02-07 NOTE — DISCHARGE NOTE ADULT - CARE PLAN
Principal Discharge DX:	Claudication of right lower extremity  Goal:	resolution of pain and return to baseline activity  Instructions for follow-up, activity and diet:	patient to c/w meds and f/u with Dr. Carrasco  Secondary Diagnosis:	PVD (peripheral vascular disease)  Goal:	patient to c/w meds and f/u with Dr. Carrasco  Secondary Diagnosis:	HTN (hypertension)  Goal:	c/w meds and f/u with pcp  Secondary Diagnosis:	Primary osteoarthritis of right hip  Goal:	manage painful ambulation  Instructions for follow-up, activity and diet:	patient will need to f/u with ortho doctor outpatient and f/u with a neurologist to rule out nerve pain from the lumbar spine

## 2017-02-08 VITALS
TEMPERATURE: 98 F | DIASTOLIC BLOOD PRESSURE: 61 MMHG | HEART RATE: 67 BPM | RESPIRATION RATE: 18 BRPM | SYSTOLIC BLOOD PRESSURE: 150 MMHG | OXYGEN SATURATION: 97 %

## 2017-02-08 PROCEDURE — 83735 ASSAY OF MAGNESIUM: CPT

## 2017-02-08 PROCEDURE — 99285 EMERGENCY DEPT VISIT HI MDM: CPT | Mod: 25

## 2017-02-08 PROCEDURE — 82746 ASSAY OF FOLIC ACID SERUM: CPT

## 2017-02-08 PROCEDURE — 80061 LIPID PANEL: CPT

## 2017-02-08 PROCEDURE — 82607 VITAMIN B-12: CPT

## 2017-02-08 PROCEDURE — 85610 PROTHROMBIN TIME: CPT

## 2017-02-08 PROCEDURE — 84100 ASSAY OF PHOSPHORUS: CPT

## 2017-02-08 PROCEDURE — 93925 LOWER EXTREMITY STUDY: CPT

## 2017-02-08 PROCEDURE — 73610 X-RAY EXAM OF ANKLE: CPT

## 2017-02-08 PROCEDURE — 83036 HEMOGLOBIN GLYCOSYLATED A1C: CPT

## 2017-02-08 PROCEDURE — 73700 CT LOWER EXTREMITY W/O DYE: CPT

## 2017-02-08 PROCEDURE — 80048 BASIC METABOLIC PNL TOTAL CA: CPT

## 2017-02-08 PROCEDURE — 71046 X-RAY EXAM CHEST 2 VIEWS: CPT

## 2017-02-08 PROCEDURE — 85027 COMPLETE CBC AUTOMATED: CPT

## 2017-02-08 PROCEDURE — 80053 COMPREHEN METABOLIC PANEL: CPT

## 2017-02-08 PROCEDURE — 85730 THROMBOPLASTIN TIME PARTIAL: CPT

## 2017-02-08 PROCEDURE — 72148 MRI LUMBAR SPINE W/O DYE: CPT

## 2017-02-08 PROCEDURE — 85379 FIBRIN DEGRADATION QUANT: CPT

## 2017-02-08 PROCEDURE — 93005 ELECTROCARDIOGRAM TRACING: CPT

## 2017-02-08 PROCEDURE — 72110 X-RAY EXAM L-2 SPINE 4/>VWS: CPT

## 2017-02-08 PROCEDURE — 84443 ASSAY THYROID STIM HORMONE: CPT

## 2017-02-08 PROCEDURE — 73502 X-RAY EXAM HIP UNI 2-3 VIEWS: CPT

## 2017-02-08 RX ADMIN — LOSARTAN POTASSIUM 100 MILLIGRAM(S): 100 TABLET, FILM COATED ORAL at 05:29

## 2017-02-08 RX ADMIN — APIXABAN 5 MILLIGRAM(S): 2.5 TABLET, FILM COATED ORAL at 11:57

## 2017-02-08 RX ADMIN — AMLODIPINE BESYLATE 10 MILLIGRAM(S): 2.5 TABLET ORAL at 05:29

## 2017-02-08 RX ADMIN — Medication 1 TABLET(S): at 12:04

## 2017-02-08 RX ADMIN — Medication 1 MILLIGRAM(S): at 11:56

## 2017-02-08 RX ADMIN — MORPHINE SULFATE 2 MILLIGRAM(S): 50 CAPSULE, EXTENDED RELEASE ORAL at 00:16

## 2017-02-08 RX ADMIN — Medication 81 MILLIGRAM(S): at 11:57

## 2017-02-08 RX ADMIN — ESCITALOPRAM OXALATE 5 MILLIGRAM(S): 10 TABLET, FILM COATED ORAL at 11:57

## 2017-02-08 RX ADMIN — MORPHINE SULFATE 2 MILLIGRAM(S): 50 CAPSULE, EXTENDED RELEASE ORAL at 10:07

## 2017-02-08 RX ADMIN — Medication 1 TABLET(S): at 06:33

## 2017-02-08 RX ADMIN — MORPHINE SULFATE 2 MILLIGRAM(S): 50 CAPSULE, EXTENDED RELEASE ORAL at 01:00

## 2017-02-08 RX ADMIN — MIRTAZAPINE 7.5 MILLIGRAM(S): 45 TABLET, ORALLY DISINTEGRATING ORAL at 11:57

## 2017-02-08 RX ADMIN — MORPHINE SULFATE 2 MILLIGRAM(S): 50 CAPSULE, EXTENDED RELEASE ORAL at 08:43

## 2017-02-08 RX ADMIN — Medication 1 TABLET(S): at 05:28

## 2017-02-08 RX ADMIN — PANTOPRAZOLE SODIUM 40 MILLIGRAM(S): 20 TABLET, DELAYED RELEASE ORAL at 05:29

## 2017-02-08 RX ADMIN — Medication 25 MILLIGRAM(S): at 05:29

## 2017-02-08 RX ADMIN — PREGABALIN 500 MICROGRAM(S): 225 CAPSULE ORAL at 11:57

## 2017-02-08 RX ADMIN — TRAMADOL HYDROCHLORIDE 50 MILLIGRAM(S): 50 TABLET ORAL at 00:11

## 2017-02-10 DIAGNOSIS — I73.9 PERIPHERAL VASCULAR DISEASE, UNSPECIFIED: ICD-10-CM

## 2017-02-10 DIAGNOSIS — K57.30 DIVERTICULOSIS OF LARGE INTESTINE WITHOUT PERFORATION OR ABSCESS WITHOUT BLEEDING: ICD-10-CM

## 2017-02-10 DIAGNOSIS — Z87.891 PERSONAL HISTORY OF NICOTINE DEPENDENCE: ICD-10-CM

## 2017-02-10 DIAGNOSIS — M16.11 UNILATERAL PRIMARY OSTEOARTHRITIS, RIGHT HIP: ICD-10-CM

## 2017-02-10 DIAGNOSIS — F43.23 ADJUSTMENT DISORDER WITH MIXED ANXIETY AND DEPRESSED MOOD: ICD-10-CM

## 2017-02-10 DIAGNOSIS — E78.5 HYPERLIPIDEMIA, UNSPECIFIED: ICD-10-CM

## 2017-02-10 DIAGNOSIS — Z79.01 LONG TERM (CURRENT) USE OF ANTICOAGULANTS: ICD-10-CM

## 2017-02-10 DIAGNOSIS — I10 ESSENTIAL (PRIMARY) HYPERTENSION: ICD-10-CM

## 2017-02-10 DIAGNOSIS — J30.2 OTHER SEASONAL ALLERGIC RHINITIS: ICD-10-CM

## 2017-02-10 DIAGNOSIS — Z86.718 PERSONAL HISTORY OF OTHER VENOUS THROMBOSIS AND EMBOLISM: ICD-10-CM

## 2017-02-10 DIAGNOSIS — M51.16 INTERVERTEBRAL DISC DISORDERS WITH RADICULOPATHY, LUMBAR REGION: ICD-10-CM

## 2017-02-16 ENCOUNTER — EMERGENCY (EMERGENCY)
Facility: HOSPITAL | Age: 77
LOS: 1 days | Discharge: HOME | End: 2017-02-16
Attending: EMERGENCY MEDICINE
Payer: MEDICARE

## 2017-02-16 VITALS
HEIGHT: 71 IN | OXYGEN SATURATION: 100 % | SYSTOLIC BLOOD PRESSURE: 123 MMHG | HEART RATE: 93 BPM | WEIGHT: 184.97 LBS | TEMPERATURE: 97 F | RESPIRATION RATE: 16 BRPM | DIASTOLIC BLOOD PRESSURE: 65 MMHG

## 2017-02-16 DIAGNOSIS — F41.9 ANXIETY DISORDER, UNSPECIFIED: ICD-10-CM

## 2017-02-16 DIAGNOSIS — Z98.89 OTHER SPECIFIED POSTPROCEDURAL STATES: Chronic | ICD-10-CM

## 2017-02-16 DIAGNOSIS — I10 ESSENTIAL (PRIMARY) HYPERTENSION: ICD-10-CM

## 2017-02-16 DIAGNOSIS — M54.30 SCIATICA, UNSPECIFIED SIDE: ICD-10-CM

## 2017-02-16 DIAGNOSIS — Z87.891 PERSONAL HISTORY OF NICOTINE DEPENDENCE: ICD-10-CM

## 2017-02-16 DIAGNOSIS — Z79.01 LONG TERM (CURRENT) USE OF ANTICOAGULANTS: ICD-10-CM

## 2017-02-16 DIAGNOSIS — Z86.718 PERSONAL HISTORY OF OTHER VENOUS THROMBOSIS AND EMBOLISM: ICD-10-CM

## 2017-02-16 DIAGNOSIS — E78.5 HYPERLIPIDEMIA, UNSPECIFIED: ICD-10-CM

## 2017-02-16 PROCEDURE — 99283 EMERGENCY DEPT VISIT LOW MDM: CPT

## 2017-02-16 RX ORDER — GABAPENTIN 400 MG/1
1 CAPSULE ORAL
Qty: 30 | Refills: 0
Start: 2017-02-16 | End: 2017-03-18

## 2017-02-16 RX ORDER — MELOXICAM 15 MG/1
1 TABLET ORAL
Qty: 14 | Refills: 0
Start: 2017-02-16 | End: 2017-03-02

## 2017-02-16 RX ORDER — IBUPROFEN 200 MG
400 TABLET ORAL ONCE
Qty: 0 | Refills: 0 | Status: COMPLETED | OUTPATIENT
Start: 2017-02-16 | End: 2017-02-16

## 2017-02-16 RX ADMIN — Medication 400 MILLIGRAM(S): at 16:55

## 2017-02-16 RX ADMIN — Medication 40 MILLIGRAM(S): at 16:55

## 2017-02-16 NOTE — ED PROVIDER NOTE - PHYSICAL EXAMINATION
no calf tenderness, no lumbar spine tenderness, foot si not cold, no pain on passive extension, full ROM, could not definitively find dorsal pedal pulse however pt states pain is at baseline and unchanged from last visit

## 2017-02-16 NOTE — ED PROVIDER NOTE - PROGRESS NOTE DETAILS
pt feels somewhat better, will take meds as prescribed and follow up with pain management/ortho/primary care

## 2017-02-16 NOTE — ED ADULT TRIAGE NOTE - CHIEF COMPLAINT QUOTE
R. lower back pain radiating to r. leg since last week, was seen for same complaint last week in er per patient.

## 2017-02-16 NOTE — ED PROVIDER NOTE - ATTENDING CONTRIBUTION TO CARE
sciatic pain, unresponsive to tylenol3/tramadol, recent w/u to r/o venous insufficiency; wll give mobic, prednisone and start gabapentin, f/u with pain management; pt counseld gabapetin may make him drowsy .fu pain management

## 2017-02-16 NOTE — ED PROVIDER NOTE - MEDICAL DECISION MAKING DETAILS
sciatic pain, unresponsive to tylenol3/tramadol, recent w/u to r/o venous insufficiency; wll give mobic, prednisone and start gabapentin, f/u with pain management; pt counseld gabapetin may make him drowsy so be careful and don't drive/operate machinery while on it

## 2017-02-16 NOTE — ED PROVIDER NOTE - OBJECTIVE STATEMENT
76 year old male live with family walk independently at baseline with PMH of PVD s/p bypass on lt leg 5yr ago, DVT on Eliquis, HTN, recently admit for r leg pain - had ABIs and eval by vascular surgery and orthopedics- not insufficient blood flow, rec'd f/u with pain managmenet and ortho as outpatient for sciatica, pt has been using tylenol 3 and tramadol without relief; pt is back for same pain, no trauma, no falls. no weakness in leg or calf pain; no cp/sob/n/v/d/dysuria/f/c/cough/cold; pt would like referral to pain management;

## 2018-05-25 ENCOUNTER — APPOINTMENT (OUTPATIENT)
Dept: VASCULAR SURGERY | Facility: CLINIC | Age: 78
End: 2018-05-25
Payer: MEDICARE

## 2018-05-25 VITALS
HEIGHT: 70.5 IN | SYSTOLIC BLOOD PRESSURE: 179 MMHG | WEIGHT: 198 LBS | BODY MASS INDEX: 28.03 KG/M2 | DIASTOLIC BLOOD PRESSURE: 69 MMHG | HEART RATE: 88 BPM

## 2018-05-25 DIAGNOSIS — M79.89 PAIN IN LEFT LEG: ICD-10-CM

## 2018-05-25 DIAGNOSIS — M79.605 PAIN IN LEFT LEG: ICD-10-CM

## 2018-05-25 PROCEDURE — 93970 EXTREMITY STUDY: CPT

## 2018-05-25 PROCEDURE — 93923 UPR/LXTR ART STDY 3+ LVLS: CPT

## 2018-05-25 PROCEDURE — 99214 OFFICE O/P EST MOD 30 MIN: CPT

## 2018-05-25 RX ORDER — LOSARTAN POTASSIUM AND HYDROCHLOROTHIAZIDE 12.5; 1 MG/1; MG/1
100-12.5 TABLET ORAL
Qty: 90 | Refills: 0 | Status: ACTIVE | COMMUNITY
Start: 2018-02-05

## 2018-05-30 ENCOUNTER — FORM ENCOUNTER (OUTPATIENT)
Age: 78
End: 2018-05-30

## 2018-05-31 ENCOUNTER — APPOINTMENT (OUTPATIENT)
Dept: ENDOVASCULAR SURGERY | Facility: CLINIC | Age: 78
End: 2018-05-31
Payer: MEDICARE

## 2018-05-31 PROCEDURE — 37197Z: CUSTOM | Mod: 59

## 2018-05-31 PROCEDURE — 37221Z: CUSTOM

## 2018-05-31 PROCEDURE — 37223Z: CUSTOM | Mod: 59

## 2018-06-08 ENCOUNTER — APPOINTMENT (OUTPATIENT)
Dept: VASCULAR SURGERY | Facility: CLINIC | Age: 78
End: 2018-06-08
Payer: MEDICARE

## 2018-06-08 VITALS
WEIGHT: 195 LBS | HEART RATE: 76 BPM | SYSTOLIC BLOOD PRESSURE: 168 MMHG | BODY MASS INDEX: 27.61 KG/M2 | HEIGHT: 70.5 IN | DIASTOLIC BLOOD PRESSURE: 78 MMHG

## 2018-06-08 PROCEDURE — 99214 OFFICE O/P EST MOD 30 MIN: CPT

## 2018-06-08 PROCEDURE — 93923 UPR/LXTR ART STDY 3+ LVLS: CPT

## 2018-06-22 ENCOUNTER — APPOINTMENT (OUTPATIENT)
Dept: VASCULAR SURGERY | Facility: CLINIC | Age: 78
End: 2018-06-22
Payer: MEDICARE

## 2018-06-22 VITALS
DIASTOLIC BLOOD PRESSURE: 70 MMHG | HEART RATE: 77 BPM | WEIGHT: 195 LBS | SYSTOLIC BLOOD PRESSURE: 155 MMHG | HEIGHT: 70.5 IN | BODY MASS INDEX: 27.61 KG/M2

## 2018-06-22 DIAGNOSIS — Z09 ENCOUNTER FOR FOLLOW-UP EXAMINATION AFTER COMPLETED TREATMENT FOR CONDITIONS OTHER THAN MALIGNANT NEOPLASM: ICD-10-CM

## 2018-06-22 PROCEDURE — 99214 OFFICE O/P EST MOD 30 MIN: CPT

## 2018-08-03 ENCOUNTER — APPOINTMENT (OUTPATIENT)
Dept: VASCULAR SURGERY | Facility: CLINIC | Age: 78
End: 2018-08-03
Payer: MEDICARE

## 2018-08-03 PROCEDURE — 93979 VASCULAR STUDY: CPT

## 2018-08-03 PROCEDURE — 99213 OFFICE O/P EST LOW 20 MIN: CPT

## 2018-08-31 NOTE — ED ADULT NURSE NOTE - AGGRAVATING FACTORS
Message was left by Dr. Ovalles for the patient to return his call today.  Will wait to contact the patient again next week if she has not called back to Dr. Ovalles by that time.  Lynette Nissen RN     positional change

## 2018-11-09 ENCOUNTER — APPOINTMENT (OUTPATIENT)
Dept: VASCULAR SURGERY | Facility: CLINIC | Age: 78
End: 2018-11-09

## 2018-12-03 ENCOUNTER — APPOINTMENT (OUTPATIENT)
Dept: VASCULAR SURGERY | Facility: CLINIC | Age: 78
End: 2018-12-03

## 2018-12-04 NOTE — ED ADULT NURSE NOTE - NS ED NOTE  TALK SOMEONE YN
----- Message from Lon Esparza MD sent at 12/4/2018  4:42 PM CST -----  Triage  Let parent know xray of foot is normal per radiology  To continue with plan as discussed in office  rtc prn any questions or concerns   no

## 2018-12-21 ENCOUNTER — APPOINTMENT (OUTPATIENT)
Dept: VASCULAR SURGERY | Facility: CLINIC | Age: 78
End: 2018-12-21
Payer: MEDICARE

## 2018-12-21 LAB
ALBUMIN SERPL ELPH-MCNC: 4.5 G/DL
ALP BLD-CCNC: 73 U/L
ALT SERPL-CCNC: 28 U/L
ANION GAP SERPL CALC-SCNC: 12 MMOL/L
APTT BLD: 31.2 SEC
AST SERPL-CCNC: 27 U/L
BASOPHILS # BLD AUTO: 0.01 K/UL
BASOPHILS NFR BLD AUTO: 0.1 %
BILIRUB SERPL-MCNC: 0.4 MG/DL
BUN SERPL-MCNC: 18 MG/DL
CALCIUM SERPL-MCNC: 9.5 MG/DL
CHLORIDE SERPL-SCNC: 102 MMOL/L
CO2 SERPL-SCNC: 26 MMOL/L
CREAT SERPL-MCNC: 1.38 MG/DL
EOSINOPHIL # BLD AUTO: 0.31 K/UL
EOSINOPHIL NFR BLD AUTO: 4 %
GLUCOSE SERPL-MCNC: 94 MG/DL
HCT VFR BLD CALC: 42.6 %
HGB BLD-MCNC: 14.3 G/DL
IMM GRANULOCYTES NFR BLD AUTO: 0.3 %
INR PPP: 0.94 RATIO
LYMPHOCYTES # BLD AUTO: 2.01 K/UL
LYMPHOCYTES NFR BLD AUTO: 26 %
MAN DIFF?: NORMAL
MCHC RBC-ENTMCNC: 31.8 PG
MCHC RBC-ENTMCNC: 33.6 GM/DL
MCV RBC AUTO: 94.9 FL
MONOCYTES # BLD AUTO: 1.08 K/UL
MONOCYTES NFR BLD AUTO: 14 %
NEUTROPHILS # BLD AUTO: 4.29 K/UL
NEUTROPHILS NFR BLD AUTO: 55.6 %
PLATELET # BLD AUTO: 188 K/UL
POTASSIUM SERPL-SCNC: 4.3 MMOL/L
PROT SERPL-MCNC: 7 G/DL
PT BLD: 10.7 SEC
RBC # BLD: 4.49 M/UL
RBC # FLD: 13.3 %
SODIUM SERPL-SCNC: 140 MMOL/L
WBC # FLD AUTO: 7.72 K/UL

## 2018-12-21 PROCEDURE — 93979 VASCULAR STUDY: CPT

## 2018-12-21 PROCEDURE — 99214 OFFICE O/P EST MOD 30 MIN: CPT

## 2018-12-21 PROCEDURE — 93923 UPR/LXTR ART STDY 3+ LVLS: CPT

## 2018-12-28 ENCOUNTER — APPOINTMENT (OUTPATIENT)
Dept: VASCULAR SURGERY | Facility: CLINIC | Age: 78
End: 2018-12-28
Payer: MEDICARE

## 2018-12-28 VITALS
HEART RATE: 80 BPM | HEIGHT: 70.5 IN | DIASTOLIC BLOOD PRESSURE: 76 MMHG | SYSTOLIC BLOOD PRESSURE: 190 MMHG | WEIGHT: 195 LBS | BODY MASS INDEX: 27.61 KG/M2

## 2018-12-28 PROCEDURE — 99213 OFFICE O/P EST LOW 20 MIN: CPT

## 2019-01-08 ENCOUNTER — FORM ENCOUNTER (OUTPATIENT)
Age: 79
End: 2019-01-08

## 2019-01-08 PROBLEM — I82.402 LEFT LEG DVT: Status: ACTIVE | Noted: 2019-01-08

## 2019-01-08 NOTE — PAST MEDICAL HISTORY
[No therapy indicated for cases scheduled for less than one hour] : No therapy indicated for cases scheduled for less than one hour. [FreeTextEntry1] : Mr. ZEYNEP LUCIO denies family history of unexpected death following Anesthesia or Exercise.\par Denies Family history of Malignant Hyperthermia, Muscle or Neuromuscular disorder and High Temperature following exercise.\par \par Mr. ZEYNEP LUCIO denies history of Muscle Spasm, Dark or Chocolate - Colored urine and Unanticipated fever immediately following anesthesia or serious exercise. \par Mr. LUCIO also denies bleeding tendencies/ Risks of Bleeding.\par

## 2019-01-09 ENCOUNTER — LABORATORY RESULT (OUTPATIENT)
Age: 79
End: 2019-01-09

## 2019-01-09 ENCOUNTER — APPOINTMENT (OUTPATIENT)
Dept: ENDOVASCULAR SURGERY | Facility: CLINIC | Age: 79
End: 2019-01-09
Payer: MEDICARE

## 2019-01-09 VITALS
DIASTOLIC BLOOD PRESSURE: 89 MMHG | HEART RATE: 70 BPM | SYSTOLIC BLOOD PRESSURE: 177 MMHG | TEMPERATURE: 97.4 F | OXYGEN SATURATION: 97 % | RESPIRATION RATE: 18 BRPM

## 2019-01-09 DIAGNOSIS — I82.402 ACUTE EMBOLISM AND THROMBOSIS OF UNSPECIFIED DEEP VEINS OF LEFT LOWER EXTREMITY: ICD-10-CM

## 2019-01-09 DIAGNOSIS — I10 ESSENTIAL (PRIMARY) HYPERTENSION: ICD-10-CM

## 2019-01-09 PROCEDURE — 75625 CONTRAST EXAM ABDOMINL AORTA: CPT

## 2019-01-09 PROCEDURE — 37225Z: CUSTOM

## 2019-01-09 RX ORDER — MECLIZINE HYDROCHLORIDE 12.5 MG/1
12.5 TABLET ORAL
Refills: 0 | Status: DISCONTINUED | COMMUNITY
End: 2019-01-09

## 2019-01-09 NOTE — HISTORY OF PRESENT ILLNESS
[FreeTextEntry1] : Po 1.38 12/21/2018\par pt alert and oriented [FreeTextEntry5] : 1/8/2019 [FreeTextEntry6] : Dr. Burgos

## 2019-01-09 NOTE — REASON FOR VISIT
[Other ___] : a [unfilled] visit for [Other: _____] : [unfilled] [FreeTextEntry2] : right 2nd toe ischemic changes with rest pain

## 2019-01-25 ENCOUNTER — APPOINTMENT (OUTPATIENT)
Dept: VASCULAR SURGERY | Facility: CLINIC | Age: 79
End: 2019-01-25
Payer: MEDICARE

## 2019-01-25 PROCEDURE — 99213 OFFICE O/P EST LOW 20 MIN: CPT

## 2019-01-25 PROCEDURE — 93926 LOWER EXTREMITY STUDY: CPT

## 2019-01-25 NOTE — PHYSICAL EXAM
[JVD] : no jugular venous distention  [Normal Breath Sounds] : Normal breath sounds [Normal Heart Sounds] : normal heart sounds [2+] : left 2+ [Ankle Swelling (On Exam)] : present [Ankle Swelling On The Left] : moderate [Varicose Veins Of Lower Extremities] : not present [] : not present [Abdomen Masses] : No abdominal masses [Skin Ulcer] : no ulcer [Oriented to Place] : oriented to place [de-identified] : Right second toe with ischemic lesion on the plantar aspect, unchanged from last week.

## 2019-01-25 NOTE — ASSESSMENT
[FreeTextEntry1] : Status post right common femoral and profundal atherectomy and angioplasty. Patient reports significant improvement of pain in the right first and second toe with improvement of the ulceration at the plantar aspect of the right second toe.\par Repeat arterial Dopplers in 2-3 weeks. Repeat duplex of the right femoral artery 3 months. Continue antiplatelet therapy.

## 2019-03-22 ENCOUNTER — APPOINTMENT (OUTPATIENT)
Dept: VASCULAR SURGERY | Facility: CLINIC | Age: 79
End: 2019-03-22
Payer: MEDICARE

## 2019-03-22 PROCEDURE — 93923 UPR/LXTR ART STDY 3+ LVLS: CPT

## 2019-03-22 PROCEDURE — 99214 OFFICE O/P EST MOD 30 MIN: CPT

## 2019-03-22 NOTE — HISTORY OF PRESENT ILLNESS
[FreeTextEntry1] : Status post right common femoral and profundal atherectomy and angioplasty. Patient reports significant improvement of pain in the right first and second toe with improvement of the ulceration at the plantar aspect of the right second toe.\par Patient complaining of left calf claudication. No rest pain

## 2019-03-22 NOTE — PHYSICAL EXAM
[JVD] : no jugular venous distention  [Normal Breath Sounds] : Normal breath sounds [Normal Heart Sounds] : normal heart sounds [2+] : left 2+ [Ankle Swelling (On Exam)] : present [Ankle Swelling On The Left] : moderate [Varicose Veins Of Lower Extremities] : not present [] : not present [Abdomen Masses] : No abdominal masses [Skin Ulcer] : no ulcer [Oriented to Place] : oriented to place [de-identified] : Right second toe with ischemic lesion on the plantar aspect, unchanged from last week.

## 2019-03-22 NOTE — ASSESSMENT
[FreeTextEntry1] : Status post right common femoral and profundal atherectomy and angioplasty. Patient reports significant improvement of pain in the right first and second toe with improvement of the ulceration at the plantar aspect of the right second toe.\par Dopplers show significant improvement of flow.\par \par Patient complaining of left calf claudication. Patient would need a tibial bypass. At this point patient does not have limb threatening ischemia. Will hold off surgical management at this time. Continue conservative management.

## 2019-04-16 ENCOUNTER — APPOINTMENT (OUTPATIENT)
Dept: ENDOVASCULAR SURGERY | Facility: CLINIC | Age: 79
End: 2019-04-16

## 2019-04-16 ENCOUNTER — FORM ENCOUNTER (OUTPATIENT)
Age: 79
End: 2019-04-16

## 2019-04-16 ENCOUNTER — APPOINTMENT (OUTPATIENT)
Dept: VASCULAR SURGERY | Facility: CLINIC | Age: 79
End: 2019-04-16
Payer: MEDICARE

## 2019-04-16 PROCEDURE — 93924 LWR XTR VASC STDY BILAT: CPT

## 2019-04-17 ENCOUNTER — LABORATORY RESULT (OUTPATIENT)
Age: 79
End: 2019-04-17

## 2019-04-17 ENCOUNTER — APPOINTMENT (OUTPATIENT)
Dept: ENDOVASCULAR SURGERY | Facility: CLINIC | Age: 79
End: 2019-04-17
Payer: MEDICARE

## 2019-04-17 PROCEDURE — 37227Z: CUSTOM | Mod: LT

## 2019-04-17 PROCEDURE — 75625 CONTRAST EXAM ABDOMINL AORTA: CPT | Mod: 59

## 2019-04-17 PROCEDURE — 37220Z: CUSTOM | Mod: LT

## 2019-04-17 PROCEDURE — 37197Z: CUSTOM

## 2019-04-29 ENCOUNTER — APPOINTMENT (OUTPATIENT)
Dept: VASCULAR SURGERY | Facility: CLINIC | Age: 79
End: 2019-04-29

## 2019-05-03 ENCOUNTER — APPOINTMENT (OUTPATIENT)
Dept: VASCULAR SURGERY | Facility: CLINIC | Age: 79
End: 2019-05-03

## 2019-05-10 ENCOUNTER — APPOINTMENT (OUTPATIENT)
Dept: VASCULAR SURGERY | Facility: CLINIC | Age: 79
End: 2019-05-10
Payer: MEDICARE

## 2019-05-10 VITALS
HEIGHT: 70.5 IN | SYSTOLIC BLOOD PRESSURE: 160 MMHG | WEIGHT: 195 LBS | DIASTOLIC BLOOD PRESSURE: 80 MMHG | HEART RATE: 72 BPM | BODY MASS INDEX: 27.61 KG/M2 | TEMPERATURE: 98 F

## 2019-05-10 PROCEDURE — 93971 EXTREMITY STUDY: CPT

## 2019-05-10 PROCEDURE — 93979 VASCULAR STUDY: CPT

## 2019-05-10 PROCEDURE — 99214 OFFICE O/P EST MOD 30 MIN: CPT

## 2019-05-10 NOTE — PHYSICAL EXAM
[JVD] : no jugular venous distention  [Normal Breath Sounds] : Normal breath sounds [Normal Heart Sounds] : normal heart sounds [2+] : right 2+ [Ankle Swelling (On Exam)] : present [Ankle Swelling On The Left] : moderate [Abdomen Masses] : No abdominal masses [Skin Ulcer] : no ulcer [Oriented to Place] : oriented to place

## 2019-05-10 NOTE — ASSESSMENT
[FreeTextEntry1] : Patient with severe peripheral last visit disease with disabling claudication of less than one block. Patient currently is on anticoagulation and antiplatelet therapy. I will try adding Pletal to the regimen for one more week and see if there is any improvement of symptoms. Patient would need femoral to posterior tibial bypass with PTFE graft. Will followup next week.

## 2019-05-10 NOTE — HISTORY OF PRESENT ILLNESS
[FreeTextEntry1] : Patient is a 78-year-old male with past medical history significant for severe peripheral vascular disease, status post cardiac stent placement, status post failed left femoral posterior tibial artery bypass. Patient complains of severe left lower extremity disabling claudication with no significant rest pain. No recent tissue loss. Patient currently on antiplatelet and anticoagulation.

## 2019-05-17 ENCOUNTER — APPOINTMENT (OUTPATIENT)
Dept: VASCULAR SURGERY | Facility: CLINIC | Age: 79
End: 2019-05-17
Payer: MEDICARE

## 2019-05-17 VITALS
OXYGEN SATURATION: 97 % | WEIGHT: 195 LBS | BODY MASS INDEX: 27.61 KG/M2 | SYSTOLIC BLOOD PRESSURE: 159 MMHG | HEIGHT: 70.5 IN | HEART RATE: 87 BPM | DIASTOLIC BLOOD PRESSURE: 73 MMHG

## 2019-05-17 PROCEDURE — 99214 OFFICE O/P EST MOD 30 MIN: CPT

## 2019-05-17 NOTE — HISTORY OF PRESENT ILLNESS
[FreeTextEntry1] : Patient is a 78-year-old male with past medical history significant for severe peripheral vascular disease, status post cardiac stent placement, status post failed left femoral posterior tibial artery bypass. Patient complains of severe left lower extremity disabling claudication with no significant rest pain. No recent tissue loss. Patient currently on antiplatelet and anticoagulation. None improved with conservative management for the past would be

## 2019-05-17 NOTE — ASSESSMENT
[FreeTextEntry1] : Patient with severe peripheral last visit disease with disabling claudication of less than one block. Patient currently is on anticoagulation and antiplatelet therapy. I will Increase Pletal to the regimen and see if there is any improvement of symptoms. Patient would need femoral to posterior tibial bypass with PTFE graft.

## 2019-05-24 ENCOUNTER — APPOINTMENT (OUTPATIENT)
Dept: VASCULAR SURGERY | Facility: CLINIC | Age: 79
End: 2019-05-24

## 2019-05-24 ENCOUNTER — OUTPATIENT (OUTPATIENT)
Dept: OUTPATIENT SERVICES | Facility: HOSPITAL | Age: 79
LOS: 1 days | End: 2019-05-24
Payer: MEDICARE

## 2019-05-24 VITALS
RESPIRATION RATE: 16 BRPM | TEMPERATURE: 98 F | HEART RATE: 72 BPM | WEIGHT: 197.98 LBS | OXYGEN SATURATION: 96 % | SYSTOLIC BLOOD PRESSURE: 140 MMHG | HEIGHT: 70.5 IN | DIASTOLIC BLOOD PRESSURE: 62 MMHG

## 2019-05-24 DIAGNOSIS — I10 ESSENTIAL (PRIMARY) HYPERTENSION: ICD-10-CM

## 2019-05-24 DIAGNOSIS — Z98.89 OTHER SPECIFIED POSTPROCEDURAL STATES: Chronic | ICD-10-CM

## 2019-05-24 DIAGNOSIS — Z98.890 OTHER SPECIFIED POSTPROCEDURAL STATES: Chronic | ICD-10-CM

## 2019-05-24 DIAGNOSIS — I73.9 PERIPHERAL VASCULAR DISEASE, UNSPECIFIED: ICD-10-CM

## 2019-05-24 DIAGNOSIS — Z01.818 ENCOUNTER FOR OTHER PREPROCEDURAL EXAMINATION: ICD-10-CM

## 2019-05-24 DIAGNOSIS — Z29.9 ENCOUNTER FOR PROPHYLACTIC MEASURES, UNSPECIFIED: ICD-10-CM

## 2019-05-24 LAB
ANION GAP SERPL CALC-SCNC: 14 MMOL/L — SIGNIFICANT CHANGE UP (ref 5–17)
BLD GP AB SCN SERPL QL: NEGATIVE — SIGNIFICANT CHANGE UP
BUN SERPL-MCNC: 18 MG/DL — SIGNIFICANT CHANGE UP (ref 7–23)
CALCIUM SERPL-MCNC: 9.5 MG/DL — SIGNIFICANT CHANGE UP (ref 8.4–10.5)
CHLORIDE SERPL-SCNC: 101 MMOL/L — SIGNIFICANT CHANGE UP (ref 96–108)
CO2 SERPL-SCNC: 25 MMOL/L — SIGNIFICANT CHANGE UP (ref 22–31)
CREAT SERPL-MCNC: 1.52 MG/DL — HIGH (ref 0.5–1.3)
GLUCOSE SERPL-MCNC: 74 MG/DL — SIGNIFICANT CHANGE UP (ref 70–99)
HCT VFR BLD CALC: 38 % — LOW (ref 39–50)
HGB BLD-MCNC: 12.7 G/DL — LOW (ref 13–17)
MCHC RBC-ENTMCNC: 32.2 PG — SIGNIFICANT CHANGE UP (ref 27–34)
MCHC RBC-ENTMCNC: 33.4 GM/DL — SIGNIFICANT CHANGE UP (ref 32–36)
MCV RBC AUTO: 96.2 FL — SIGNIFICANT CHANGE UP (ref 80–100)
PLATELET # BLD AUTO: 229 K/UL — SIGNIFICANT CHANGE UP (ref 150–400)
POTASSIUM SERPL-MCNC: 3.8 MMOL/L — SIGNIFICANT CHANGE UP (ref 3.5–5.3)
POTASSIUM SERPL-SCNC: 3.8 MMOL/L — SIGNIFICANT CHANGE UP (ref 3.5–5.3)
RBC # BLD: 3.95 M/UL — LOW (ref 4.2–5.8)
RBC # FLD: 13 % — SIGNIFICANT CHANGE UP (ref 10.3–14.5)
RH IG SCN BLD-IMP: POSITIVE — SIGNIFICANT CHANGE UP
SODIUM SERPL-SCNC: 140 MMOL/L — SIGNIFICANT CHANGE UP (ref 135–145)
WBC # BLD: 9.07 K/UL — SIGNIFICANT CHANGE UP (ref 3.8–10.5)
WBC # FLD AUTO: 9.07 K/UL — SIGNIFICANT CHANGE UP (ref 3.8–10.5)

## 2019-05-24 PROCEDURE — 80048 BASIC METABOLIC PNL TOTAL CA: CPT

## 2019-05-24 PROCEDURE — 86850 RBC ANTIBODY SCREEN: CPT

## 2019-05-24 PROCEDURE — 86901 BLOOD TYPING SEROLOGIC RH(D): CPT

## 2019-05-24 PROCEDURE — 85027 COMPLETE CBC AUTOMATED: CPT

## 2019-05-24 PROCEDURE — 86900 BLOOD TYPING SEROLOGIC ABO: CPT

## 2019-05-24 PROCEDURE — G0463: CPT

## 2019-05-24 RX ORDER — SODIUM CHLORIDE 9 MG/ML
3 INJECTION INTRAMUSCULAR; INTRAVENOUS; SUBCUTANEOUS EVERY 8 HOURS
Refills: 0 | Status: DISCONTINUED | OUTPATIENT
Start: 2019-05-30 | End: 2019-05-30

## 2019-05-24 RX ORDER — CEFAZOLIN SODIUM 1 G
2000 VIAL (EA) INJECTION ONCE
Refills: 0 | Status: DISCONTINUED | OUTPATIENT
Start: 2019-05-30 | End: 2019-06-02

## 2019-05-24 RX ORDER — CHLORHEXIDINE GLUCONATE 213 G/1000ML
1 SOLUTION TOPICAL DAILY
Refills: 0 | Status: DISCONTINUED | OUTPATIENT
Start: 2019-05-30 | End: 2019-05-30

## 2019-05-24 RX ORDER — LIDOCAINE HCL 20 MG/ML
0.2 VIAL (ML) INJECTION ONCE
Refills: 0 | Status: DISCONTINUED | OUTPATIENT
Start: 2019-05-30 | End: 2019-05-30

## 2019-05-24 NOTE — H&P PST ADULT - HEMATOLOGY/LYMPHATICS COMMENTS
Patient on Eliquis once a day for 2-3 years as per patient for LE circulation and DVT prophylaxis, patient will follow up PCP for the verification of  Eliquis dosing/ frequency.

## 2019-05-24 NOTE — H&P PST ADULT - NEUROLOGICAL DETAILS
alert and oriented x 3/sensation intact/cranial nerves intact/normal strength alert and oriented x 3/no spontaneous movement

## 2019-05-24 NOTE — H&P PST ADULT - MUSCULOSKELETAL
details… detailed exam no joint swelling/no calf tenderness/normal strength/ROM intact/no joint erythema/no joint warmth no calf tenderness/no joint warmth

## 2019-05-24 NOTE — H&P PST ADULT - HISTORY OF PRESENT ILLNESS
79 y/o male with h/o PVD with claudication and a left lower extremity DVT in 11/2013.  Patient offering c/o "constant left lower extremity swelling" and claudication "causing me to stop every few minutes when I walk" s/p DVT in 11/2013.  Patient presents to [facility] today for preop evaluation for Left Lower Extremity Angiogram and Possible Stent Placement scheduled to be done on 7/7/2014.          **** denies any hx of cardiac disease or stents in his heart. 77 y/o male with PMH of PVD with claudication and a left lower extremity DVT in 11/2013 (on Eliquis 5mg once a day) s/p stent placement to left LE 2014 s/p left femoral tibial artery bypass from 2-3 years ago failed planned for left femoral to posterior tibial bypass with polytetrafluorethylene on 5/30/19.        **** [employer] with documented cardiac stent hx, patient denies any hx of cardiac disease or stents in his heart, only has one stent to his leg as per pt. will obtain recent cardiac eval.      **** called spoke to Evelyn from Dr. Carrasco's office, Ok to continue Eliquis once a day for DVT prophylaxis and PAD, If any change in the plan Dr. Carrasco will call patient on Monday.     ***** called [place] pharmacy for medication reconciliation

## 2019-05-24 NOTE — H&P PST ADULT - NSICDXPROBLEM_GEN_ALL_CORE_FT
PROBLEM DIAGNOSES  Problem: PVD (peripheral vascular disease) with claudication  Assessment and Plan: planned for left femoral to posterior tibial bypass with polytetrafluorethylene on 5/30/19.    PST labs send  preprocedure instructions discussed   continue Eliquis once a day for now ( as per anh from Dr. Carrasco's office).    Problem: Hypertension  Assessment and Plan: continue antihypertensive meds  will obtain recent EKG/ cardiac eval     Problem: Need for prophylactic measure  Assessment and Plan: The Caprini score indicates that this patient is at high risk for a VTE event (score 6 or greater). Surgical patients in this group will benefit from both pharmacologic prophylaxis and intermittent compression devices.  The surgical team will determine the balance between VTE risk and bleeding risk, and other clinical considerations PROBLEM DIAGNOSES  Problem: PVD (peripheral vascular disease) with claudication  Assessment and Plan: planned for left femoral to posterior tibial bypass with polytetrafluorethylene on 5/30/19.    PST labs send  preprocedure instructions discussed   continue Eliquis ( as per anh from Dr. Carrasco's office).    Problem: Hypertension  Assessment and Plan: continue antihypertensive meds  will obtain recent EKG/ cardiac eval     Problem: Need for prophylactic measure  Assessment and Plan: The Caprini score indicates that this patient is at high risk for a VTE event (score 6 or greater). Surgical patients in this group will benefit from both pharmacologic prophylaxis and intermittent compression devices.  The surgical team will determine the balance between VTE risk and bleeding risk, and other clinical considerations PROBLEM DIAGNOSES  Problem: PVD (peripheral vascular disease) with claudication  Assessment and Plan: planned for left femoral to posterior tibial bypass with polytetrafluorethylene on 5/30/19.    PST labs send  preprocedure instructions discussed   continue Eliquis ( as per anh from Dr. Carrasco's office).  MAPAPP 903 entered    Problem: Hypertension  Assessment and Plan: continue antihypertensive meds  will obtain recent EKG/ cardiac eval     Problem: Need for prophylactic measure  Assessment and Plan: The Caprini score indicates that this patient is at high risk for a VTE event (score 6 or greater). Surgical patients in this group will benefit from both pharmacologic prophylaxis and intermittent compression devices.  The surgical team will determine the balance between VTE risk and bleeding risk, and other clinical considerations

## 2019-05-24 NOTE — H&P PST ADULT - PRIMARY CARE PROVIDER
Dr. Lynch [phone] PCP/ Cardiology Dr. Lynch [phone] PCP/ Cardiology last week last visit

## 2019-05-24 NOTE — H&P PST ADULT - NSICDXPASTSURGICALHX_GEN_ALL_CORE_FT
PAST SURGICAL HISTORY:  H/O endarterectomy Right Femoral, 2007    H/O shoulder surgery left    S/P femoral-tibial bypass 8140-8123 as per pt PAST SURGICAL HISTORY:  H/O endarterectomy Right Femoral, 2007    H/O shoulder surgery left    S/P femoral-tibial bypass [id number] as per pt PAST SURGICAL HISTORY:  H/O endarterectomy Right Femoral, 2007    H/O shoulder surgery left    S/P femoral-tibial bypass [id number] as per pt

## 2019-05-24 NOTE — H&P PST ADULT - ASSESSMENT
ROSSII VTE 2.0 SCORE [CLOT updated 2019]    AGE RELATED RISK FACTORS                                                       MOBILITY RELATED FACTORS  [ ] Age 41-60 years                                            (1 Point)                    [ ] Bed rest                                                        (1 Point)  [ ] Age: 61-74 years                                           (2 Points)                  [ ] Plaster cast                                                   (2 Points)  [x ] Age= 75 years                                              (3 Points)                    [ ] Bed bound for more than 72 hours                 (2 Points)    DISEASE RELATED RISK FACTORS                                               GENDER SPECIFIC FACTORS  [ x] Edema in the lower extremities                       (1 Point)              [ ] Pregnancy                                                     (1 Point)  [ ] Varicose veins                                               (1 Point)                     [ ] Post-partum < 6 weeks                                   (1 Point)             [x ] BMI > 25 Kg/m2                                            (1 Point)                     [ ] Hormonal therapy  or oral contraception          (1 Point)                 [ ] Sepsis (in the previous month)                        (1 Point)               [ ] History of pregnancy complications                 (1 point)  [ ] Pneumonia or serious lung disease                                               [ ] Unexplained or recurrent                     (1 Point)           (in the previous month)                               (1 Point)  [ ] Abnormal pulmonary function test                     (1 Point)                 SURGERY RELATED RISK FACTORS  [ ] Acute myocardial infarction                              (1 Point)               [ ]  Section                                             (1 Point)  [ ] Congestive heart failure (in the previous month)  (1 Point)      [ ] Minor surgery                                                  (1 Point)   [ ] Inflammatory bowel disease                             (1 Point)               [ ] Arthroscopic surgery                                        (2 Points)  [ ] Central venous access                                      (2 Points)                [x ] General surgery lasting more than 45 minutes (2 points)  [ ] Malignancy- Present or previous                   (2 Points)                [ ] Elective arthroplasty                                         (5 points)    [ ] Stroke (in the previous month)                          (5 Points)                                                                                                                                                           HEMATOLOGY RELATED FACTORS                                                 TRAUMA RELATED RISK FACTORS  [ x] Prior episodes of VTE                                     (3 Points)                [ ] Fracture of the hip, pelvis, or leg                       (5 Points)  [ ] Positive family history for VTE                         (3 Points)             [ ] Acute spinal cord injury (in the previous month)  (5 Points)  [ ] Prothrombin 56129 A                                     (3 Points)               [ ] Paralysis  (less than 1 month)                             (5 Points)  [ ] Factor V Leiden                                             (3 Points)                  [ ] Multiple Trauma within 1 month                        (5 Points)  [ ] Lupus anticoagulants                                     (3 Points)                                                           [ ] Anticardiolipin antibodies                               (3 Points)                                                       [ ] High homocysteine in the blood                      (3 Points)                                             [ ] Other congenital or acquired thrombophilia      (3 Points)                                                [ ] Heparin induced thrombocytopenia                  (3 Points)                                     Total Score [  10        ]

## 2019-05-24 NOTE — H&P PST ADULT - NSICDXPASTMEDICALHX_GEN_ALL_CORE_FT
PAST MEDICAL HISTORY:  Anxiety     Claudication in peripheral vascular disease     DVT (deep venous thrombosis) 11/2013, left    Dyslipidemia     ED (erectile dysfunction)     Pedal edema     PVD (peripheral vascular disease)     Seasonal allergies PAST MEDICAL HISTORY:  Anxiety     Claudication in peripheral vascular disease     DVT (deep venous thrombosis) 11/2013, left    Dyslipidemia     ED (erectile dysfunction)     H/O gastroesophageal reflux (GERD)     Hypertension     Pedal edema     Seasonal allergies PAST MEDICAL HISTORY:  Anxiety     Claudication in peripheral vascular disease on Eliquis once a day for DVT prophylaxis and for LE blood flow as per pt    DVT (deep venous thrombosis) 11/2013, left    Dyslipidemia     ED (erectile dysfunction)     H/O gastroesophageal reflux (GERD)     Hypertension     Pedal edema     Seasonal allergies

## 2019-05-24 NOTE — H&P PST ADULT - GASTROINTESTINAL DETAILS
no guarding/bowel sounds normal/no distention/soft/no rebound tenderness/no rigidity/no organomegaly/nontender/no bruit/no masses palpable no rebound tenderness/bowel sounds normal/nontender/soft

## 2019-05-24 NOTE — H&P PST ADULT - NSANTHOSAYNRD_GEN_A_CORE
No. VIOLETTA screening performed.  STOP BANG Legend: 0-2 = LOW Risk; 3-4 = INTERMEDIATE Risk; 5-8 = HIGH Risk

## 2019-05-29 ENCOUNTER — TRANSCRIPTION ENCOUNTER (OUTPATIENT)
Age: 79
End: 2019-05-29

## 2019-05-30 ENCOUNTER — APPOINTMENT (OUTPATIENT)
Dept: VASCULAR SURGERY | Facility: HOSPITAL | Age: 79
End: 2019-05-30
Payer: MEDICARE

## 2019-05-30 ENCOUNTER — INPATIENT (INPATIENT)
Facility: HOSPITAL | Age: 79
LOS: 2 days | Discharge: HOME | DRG: 254 | End: 2019-06-02
Attending: SURGERY | Admitting: SURGERY
Payer: MEDICARE

## 2019-05-30 VITALS
RESPIRATION RATE: 18 BRPM | SYSTOLIC BLOOD PRESSURE: 130 MMHG | WEIGHT: 195.11 LBS | HEART RATE: 69 BPM | DIASTOLIC BLOOD PRESSURE: 62 MMHG | HEIGHT: 70.5 IN | OXYGEN SATURATION: 97 % | TEMPERATURE: 98 F

## 2019-05-30 DIAGNOSIS — Z98.89 OTHER SPECIFIED POSTPROCEDURAL STATES: Chronic | ICD-10-CM

## 2019-05-30 DIAGNOSIS — Z98.890 OTHER SPECIFIED POSTPROCEDURAL STATES: Chronic | ICD-10-CM

## 2019-05-30 DIAGNOSIS — I73.9 PERIPHERAL VASCULAR DISEASE, UNSPECIFIED: ICD-10-CM

## 2019-05-30 LAB
ANION GAP SERPL CALC-SCNC: 17 MMOL/L — SIGNIFICANT CHANGE UP (ref 5–17)
APTT BLD: 29.3 SEC — SIGNIFICANT CHANGE UP (ref 27.5–36.3)
BUN SERPL-MCNC: 17 MG/DL — SIGNIFICANT CHANGE UP (ref 7–23)
CALCIUM SERPL-MCNC: 8.9 MG/DL — SIGNIFICANT CHANGE UP (ref 8.4–10.5)
CHLORIDE SERPL-SCNC: 100 MMOL/L — SIGNIFICANT CHANGE UP (ref 96–108)
CO2 SERPL-SCNC: 21 MMOL/L — LOW (ref 22–31)
CREAT SERPL-MCNC: 1.33 MG/DL — HIGH (ref 0.5–1.3)
GLUCOSE SERPL-MCNC: 112 MG/DL — HIGH (ref 70–99)
HCT VFR BLD CALC: 40.6 % — SIGNIFICANT CHANGE UP (ref 39–50)
HGB BLD-MCNC: 14.4 G/DL — SIGNIFICANT CHANGE UP (ref 13–17)
INR BLD: 0.89 RATIO — SIGNIFICANT CHANGE UP (ref 0.88–1.16)
MCHC RBC-ENTMCNC: 34.5 PG — HIGH (ref 27–34)
MCHC RBC-ENTMCNC: 35.5 GM/DL — SIGNIFICANT CHANGE UP (ref 32–36)
MCV RBC AUTO: 97.3 FL — SIGNIFICANT CHANGE UP (ref 80–100)
PLATELET # BLD AUTO: 238 K/UL — SIGNIFICANT CHANGE UP (ref 150–400)
POTASSIUM SERPL-MCNC: 4.4 MMOL/L — SIGNIFICANT CHANGE UP (ref 3.5–5.3)
POTASSIUM SERPL-SCNC: 4.4 MMOL/L — SIGNIFICANT CHANGE UP (ref 3.5–5.3)
PROTHROM AB SERPL-ACNC: 10.2 SEC — SIGNIFICANT CHANGE UP (ref 10–12.9)
RBC # BLD: 4.17 M/UL — LOW (ref 4.2–5.8)
RBC # FLD: 12 % — SIGNIFICANT CHANGE UP (ref 10.3–14.5)
RH IG SCN BLD-IMP: POSITIVE — SIGNIFICANT CHANGE UP
SODIUM SERPL-SCNC: 138 MMOL/L — SIGNIFICANT CHANGE UP (ref 135–145)
WBC # BLD: 15.1 K/UL — HIGH (ref 3.8–10.5)
WBC # FLD AUTO: 15.1 K/UL — HIGH (ref 3.8–10.5)

## 2019-05-30 PROCEDURE — 35372 RECHANNELING OF ARTERY: CPT | Mod: 59

## 2019-05-30 PROCEDURE — 35666 BPG FEM-ANT TIB PST TIB/PRNL: CPT | Mod: LT

## 2019-05-30 PROCEDURE — 35305 RECHANNELING OF ARTERY: CPT | Mod: 59

## 2019-05-30 PROCEDURE — 35305 RECHANNELING OF ARTERY: CPT | Mod: AS,59

## 2019-05-30 PROCEDURE — 35372 RECHANNELING OF ARTERY: CPT | Mod: AS,59

## 2019-05-30 PROCEDURE — 35721: CPT | Mod: AS,59

## 2019-05-30 PROCEDURE — 35666 BPG FEM-ANT TIB PST TIB/PRNL: CPT | Mod: AS,LT

## 2019-05-30 PROCEDURE — 35721: CPT | Mod: 59

## 2019-05-30 RX ORDER — HYDROCHLOROTHIAZIDE 25 MG
12.5 TABLET ORAL DAILY
Refills: 0 | Status: DISCONTINUED | OUTPATIENT
Start: 2019-05-30 | End: 2019-06-02

## 2019-05-30 RX ORDER — ACETAMINOPHEN 500 MG
1000 TABLET ORAL ONCE
Refills: 0 | Status: DISCONTINUED | OUTPATIENT
Start: 2019-05-30 | End: 2019-05-31

## 2019-05-30 RX ORDER — OXYCODONE AND ACETAMINOPHEN 5; 325 MG/1; MG/1
1 TABLET ORAL EVERY 4 HOURS
Refills: 0 | Status: DISCONTINUED | OUTPATIENT
Start: 2019-05-30 | End: 2019-06-02

## 2019-05-30 RX ORDER — ONDANSETRON 8 MG/1
4 TABLET, FILM COATED ORAL ONCE
Refills: 0 | Status: DISCONTINUED | OUTPATIENT
Start: 2019-05-30 | End: 2019-05-31

## 2019-05-30 RX ORDER — FENTANYL CITRATE 50 UG/ML
25 INJECTION INTRAVENOUS
Refills: 0 | Status: DISCONTINUED | OUTPATIENT
Start: 2019-05-30 | End: 2019-05-31

## 2019-05-30 RX ORDER — LABETALOL HCL 100 MG
10 TABLET ORAL ONCE
Refills: 0 | Status: COMPLETED | OUTPATIENT
Start: 2019-05-30 | End: 2019-05-30

## 2019-05-30 RX ORDER — ATORVASTATIN CALCIUM 80 MG/1
40 TABLET, FILM COATED ORAL AT BEDTIME
Refills: 0 | Status: DISCONTINUED | OUTPATIENT
Start: 2019-05-30 | End: 2019-06-02

## 2019-05-30 RX ORDER — ASPIRIN/CALCIUM CARB/MAGNESIUM 324 MG
81 TABLET ORAL DAILY
Refills: 0 | Status: DISCONTINUED | OUTPATIENT
Start: 2019-05-31 | End: 2019-06-02

## 2019-05-30 RX ORDER — CLONAZEPAM 1 MG
2 TABLET ORAL AT BEDTIME
Refills: 0 | Status: DISCONTINUED | OUTPATIENT
Start: 2019-05-30 | End: 2019-06-02

## 2019-05-30 RX ORDER — AMLODIPINE BESYLATE 2.5 MG/1
10 TABLET ORAL DAILY
Refills: 0 | Status: DISCONTINUED | OUTPATIENT
Start: 2019-05-30 | End: 2019-06-02

## 2019-05-30 RX ORDER — HYDROMORPHONE HYDROCHLORIDE 2 MG/ML
0.5 INJECTION INTRAMUSCULAR; INTRAVENOUS; SUBCUTANEOUS
Refills: 0 | Status: DISCONTINUED | OUTPATIENT
Start: 2019-05-30 | End: 2019-05-31

## 2019-05-30 RX ORDER — FAMOTIDINE 10 MG/ML
40 INJECTION INTRAVENOUS DAILY
Refills: 0 | Status: DISCONTINUED | OUTPATIENT
Start: 2019-05-30 | End: 2019-06-02

## 2019-05-30 RX ORDER — BUMETANIDE 0.25 MG/ML
1 INJECTION INTRAMUSCULAR; INTRAVENOUS
Refills: 0 | Status: DISCONTINUED | OUTPATIENT
Start: 2019-05-30 | End: 2019-06-02

## 2019-05-30 RX ORDER — SODIUM CHLORIDE 9 MG/ML
1000 INJECTION INTRAMUSCULAR; INTRAVENOUS; SUBCUTANEOUS
Refills: 0 | Status: DISCONTINUED | OUTPATIENT
Start: 2019-05-30 | End: 2019-05-31

## 2019-05-30 RX ORDER — LOSARTAN POTASSIUM 100 MG/1
100 TABLET, FILM COATED ORAL DAILY
Refills: 0 | Status: DISCONTINUED | OUTPATIENT
Start: 2019-05-30 | End: 2019-06-02

## 2019-05-30 RX ADMIN — Medication 10 MILLIGRAM(S): at 22:51

## 2019-05-30 RX ADMIN — ATORVASTATIN CALCIUM 40 MILLIGRAM(S): 80 TABLET, FILM COATED ORAL at 22:01

## 2019-05-30 NOTE — PRE-ANESTHESIA EVALUATION ADULT - NSANTHPMHFT_GEN_ALL_CORE
9 y/o male with PMH of PVD with claudication and a left lower extremity DVT in 11/2013 (on Eliquis 5mg once a day) s/p stent placement to left LE 2014 s/p left femoral tibial artery bypass from 2-3 years ago failed planned for left femoral to posterior tibial bypass with polytetrafluorethylene.  Cardiac clearance obtained, further testing/therapy/delay unlikely to reduce risk.

## 2019-05-30 NOTE — BRIEF OPERATIVE NOTE - NSICDXBRIEFPROCEDURE_GEN_ALL_CORE_FT
PROCEDURES:  Creation of femoral-posterior tibial artery bypass with non-vein graft 30-May-2019 17:16:09  Portia Brunner

## 2019-05-30 NOTE — PRE-ANESTHESIA EVALUATION ADULT - NSANTHADDINFOFT_GEN_ALL_CORE
Patient ID'd, chart & Hx reviewed, Pt seen & examined.  Plan for GA w/ routine monitors & art line, 2 x PIV, PACU post-op discussed with the patient; risks/benefits including death, CVA & MI explained.  I answered all questions and presented other anesthetic options.  The patient provided informed consent and expressed a willingness to proceed.

## 2019-05-30 NOTE — CHART NOTE - NSCHARTNOTEFT_GEN_A_CORE
Post-operative Check    SUBJECTIVE: No acute events in the immediate post-operative period. Pain well controlled.        OBJECTIVE:  T(C): 36.2 (05-30-19 @ 20:00), Max: 36.8 (05-30-19 @ 10:55)  HR: 84 (05-30-19 @ 22:00) (69 - 88)  BP: 152/74 (05-30-19 @ 22:00) (129/62 - 157/67)  RR: 16 (05-30-19 @ 22:00) (16 - 18)  SpO2: 99% (05-30-19 @ 22:00) (94% - 99%)      05-30-19 @ 07:01  -  05-30-19 @ 22:11  --------------------------------------------------------  IN: 225 mL / OUT: 400 mL / NET: -175 mL        Physical Exam:   General: well developed, well nourished, NAD  Neuro: alert and oriented, no focal deficits, moves all extremities spontaneously  HEENT: NCAT, EOMI, anicteric, mucosa moist  Respiratory: airway patent, respirations unlabored  CVS: regular rate and rhythm   Abdomen: soft, nontender, nondistended  : Carter in place, draining yellow urine  Extremities:    LLE: wrapped in ACE wrap, PT signal, CLARI w/SS output    RLE: no edema, sensation and movement grossly intact  Skin: warm, dry, appropriate color    ASSESSMENT:   ZEYNEP LUCIO is a 78y Male POD#0 from s/p left profunda to PT bypass with PTFE    PLAN:  - Pain management  - Follow UOP  - monitor CLARI output  - H/H stable  - f/u AM labs  - start 500u/h heparin ggt   - Appropriate for transfer to the floor

## 2019-05-31 LAB
ANION GAP SERPL CALC-SCNC: 12 MMOL/L — SIGNIFICANT CHANGE UP (ref 5–17)
APTT BLD: 28 SEC — SIGNIFICANT CHANGE UP (ref 27.5–36.3)
APTT BLD: 29.9 SEC — SIGNIFICANT CHANGE UP (ref 27.5–36.3)
APTT BLD: 46.8 SEC — HIGH (ref 27.5–36.3)
BUN SERPL-MCNC: 20 MG/DL — SIGNIFICANT CHANGE UP (ref 7–23)
CALCIUM SERPL-MCNC: 8.6 MG/DL — SIGNIFICANT CHANGE UP (ref 8.4–10.5)
CHLORIDE SERPL-SCNC: 102 MMOL/L — SIGNIFICANT CHANGE UP (ref 96–108)
CO2 SERPL-SCNC: 24 MMOL/L — SIGNIFICANT CHANGE UP (ref 22–31)
CREAT SERPL-MCNC: 1.4 MG/DL — HIGH (ref 0.5–1.3)
GLUCOSE SERPL-MCNC: 126 MG/DL — HIGH (ref 70–99)
HCT VFR BLD CALC: 39 % — SIGNIFICANT CHANGE UP (ref 39–50)
HGB BLD-MCNC: 13.4 G/DL — SIGNIFICANT CHANGE UP (ref 13–17)
INR BLD: 0.94 RATIO — SIGNIFICANT CHANGE UP (ref 0.88–1.16)
MCHC RBC-ENTMCNC: 33.4 PG — SIGNIFICANT CHANGE UP (ref 27–34)
MCHC RBC-ENTMCNC: 34.3 GM/DL — SIGNIFICANT CHANGE UP (ref 32–36)
MCV RBC AUTO: 97.5 FL — SIGNIFICANT CHANGE UP (ref 80–100)
PLATELET # BLD AUTO: 228 K/UL — SIGNIFICANT CHANGE UP (ref 150–400)
POTASSIUM SERPL-MCNC: 4.3 MMOL/L — SIGNIFICANT CHANGE UP (ref 3.5–5.3)
POTASSIUM SERPL-SCNC: 4.3 MMOL/L — SIGNIFICANT CHANGE UP (ref 3.5–5.3)
PROTHROM AB SERPL-ACNC: 10.8 SEC — SIGNIFICANT CHANGE UP (ref 10–12.9)
RBC # BLD: 4 M/UL — LOW (ref 4.2–5.8)
RBC # FLD: 12.1 % — SIGNIFICANT CHANGE UP (ref 10.3–14.5)
SODIUM SERPL-SCNC: 138 MMOL/L — SIGNIFICANT CHANGE UP (ref 135–145)
WBC # BLD: 13.4 K/UL — HIGH (ref 3.8–10.5)
WBC # FLD AUTO: 13.4 K/UL — HIGH (ref 3.8–10.5)

## 2019-05-31 RX ORDER — HEPARIN SODIUM 5000 [USP'U]/ML
1600 INJECTION INTRAVENOUS; SUBCUTANEOUS
Qty: 25000 | Refills: 0 | Status: DISCONTINUED | OUTPATIENT
Start: 2019-05-31 | End: 2019-06-01

## 2019-05-31 RX ORDER — HEPARIN SODIUM 5000 [USP'U]/ML
500 INJECTION INTRAVENOUS; SUBCUTANEOUS
Qty: 25000 | Refills: 0 | Status: DISCONTINUED | OUTPATIENT
Start: 2019-05-31 | End: 2019-05-31

## 2019-05-31 RX ADMIN — HEPARIN SODIUM 9 UNIT(S)/HR: 5000 INJECTION INTRAVENOUS; SUBCUTANEOUS at 07:37

## 2019-05-31 RX ADMIN — ATORVASTATIN CALCIUM 40 MILLIGRAM(S): 80 TABLET, FILM COATED ORAL at 22:23

## 2019-05-31 RX ADMIN — BUMETANIDE 1 MILLIGRAM(S): 0.25 INJECTION INTRAMUSCULAR; INTRAVENOUS at 10:03

## 2019-05-31 RX ADMIN — AMLODIPINE BESYLATE 10 MILLIGRAM(S): 2.5 TABLET ORAL at 05:36

## 2019-05-31 RX ADMIN — Medication 81 MILLIGRAM(S): at 11:46

## 2019-05-31 RX ADMIN — HEPARIN SODIUM 10 UNIT(S)/HR: 5000 INJECTION INTRAVENOUS; SUBCUTANEOUS at 07:59

## 2019-05-31 RX ADMIN — LOSARTAN POTASSIUM 100 MILLIGRAM(S): 100 TABLET, FILM COATED ORAL at 05:36

## 2019-05-31 RX ADMIN — HEPARIN SODIUM 14 UNIT(S)/HR: 5000 INJECTION INTRAVENOUS; SUBCUTANEOUS at 16:10

## 2019-05-31 RX ADMIN — Medication 2 MILLIGRAM(S): at 22:24

## 2019-05-31 RX ADMIN — HEPARIN SODIUM 16 UNIT(S)/HR: 5000 INJECTION INTRAVENOUS; SUBCUTANEOUS at 23:41

## 2019-05-31 RX ADMIN — Medication 12.5 MILLIGRAM(S): at 05:36

## 2019-05-31 RX ADMIN — HEPARIN SODIUM 5 UNIT(S)/HR: 5000 INJECTION INTRAVENOUS; SUBCUTANEOUS at 01:00

## 2019-05-31 RX ADMIN — SODIUM CHLORIDE 50 MILLILITER(S): 9 INJECTION INTRAMUSCULAR; INTRAVENOUS; SUBCUTANEOUS at 01:00

## 2019-05-31 RX ADMIN — FAMOTIDINE 40 MILLIGRAM(S): 10 INJECTION INTRAVENOUS at 11:50

## 2019-05-31 NOTE — PROGRESS NOTE ADULT - SUBJECTIVE AND OBJECTIVE BOX
Vascular Surgery Progress Note    Subjective:   Pt seen & examined at bedside. Pain is well controlled. No new complaints. No F/C, N/V, CP/SOB.    Medications:  ceFAZolin   IVPB 2000  amLODIPine   Tablet 10  aspirin enteric coated 81  buMETAnide 1  ceFAZolin   IVPB 2000  heparin  Infusion 500  hydrochlorothiazide 12.5  losartan 100      Vital Signs Last 24 Hrs  T(C): 36.4 (31 May 2019 08:00), Max: 36.8 (30 May 2019 10:55)  T(F): 97.6 (31 May 2019 08:00), Max: 98.2 (30 May 2019 10:55)  HR: 75 (31 May 2019 08:00) (69 - 93)  BP: 138/65 (31 May 2019 08:00) (129/62 - 167/74)  BP(mean): 93 (31 May 2019 08:00) (89 - 107)  RR: 14 (31 May 2019 08:00) (14 - 18)  SpO2: 100% (31 May 2019 08:00) (94% - 100%)    I&O's Summary    30 May 2019 07:01  -  31 May 2019 07:00  --------------------------------------------------------  IN: 655 mL / OUT: 1515 mL / NET: -860 mL    31 May 2019 07:01  -  31 May 2019 08:56  --------------------------------------------------------  IN: 60 mL / OUT: 150 mL / NET: -90 mL        Physical Exam:  General: NAD  Respiratory: airway patent, respirations unlabored  : Carter in place, draining yellow urine  Extremities:    LLE: wrapped in ACE wrap, +DP/PT signal, CLARI w/ SS output   RLE: no edema, sensation and movement grossly intact  Skin: warm, dry, appropriate color    LABS:                        13.4   13.4  )-----------( 228      ( 31 May 2019 06:44 )             39.0     05-31    138  |  102  |  20  ----------------------------<  126<H>  4.3   |  24  |  1.40<H>    Ca    8.6      31 May 2019 06:43      PT/INR - ( 31 May 2019 06:44 )   PT: 10.8 sec;   INR: 0.94 ratio         PTT - ( 31 May 2019 06:44 )  PTT:28.0 sec

## 2019-05-31 NOTE — PROGRESS NOTE ADULT - ASSESSMENT
ZEYNEP LUCIO is a 78y Male POD#1 from s/p left profunda to PT bypass with PTFE, recovering in PACU.    PLAN:  - Pain management  - DC sigala, follow up void  - monitor CLARI output  - H/H stable  - increased hep gtt to 10mL/h, follow up ptt in 6h  - Appropriate for transfer to the floor

## 2019-06-01 LAB
ANION GAP SERPL CALC-SCNC: 13 MMOL/L — SIGNIFICANT CHANGE UP (ref 5–17)
APTT BLD: 71.4 SEC — HIGH (ref 27.5–36.3)
APTT BLD: 75.2 SEC — HIGH (ref 27.5–36.3)
BUN SERPL-MCNC: 17 MG/DL — SIGNIFICANT CHANGE UP (ref 7–23)
CALCIUM SERPL-MCNC: 9.3 MG/DL — SIGNIFICANT CHANGE UP (ref 8.4–10.5)
CHLORIDE SERPL-SCNC: 101 MMOL/L — SIGNIFICANT CHANGE UP (ref 96–108)
CO2 SERPL-SCNC: 27 MMOL/L — SIGNIFICANT CHANGE UP (ref 22–31)
CREAT SERPL-MCNC: 1.3 MG/DL — SIGNIFICANT CHANGE UP (ref 0.5–1.3)
GLUCOSE SERPL-MCNC: 104 MG/DL — HIGH (ref 70–99)
HCT VFR BLD CALC: 40.3 % — SIGNIFICANT CHANGE UP (ref 39–50)
HCT VFR BLD CALC: 43.3 % — SIGNIFICANT CHANGE UP (ref 39–50)
HGB BLD-MCNC: 13.3 G/DL — SIGNIFICANT CHANGE UP (ref 13–17)
HGB BLD-MCNC: 14.7 G/DL — SIGNIFICANT CHANGE UP (ref 13–17)
MAGNESIUM SERPL-MCNC: 2.1 MG/DL — SIGNIFICANT CHANGE UP (ref 1.6–2.6)
MCHC RBC-ENTMCNC: 32.3 PG — SIGNIFICANT CHANGE UP (ref 27–34)
MCHC RBC-ENTMCNC: 33 GM/DL — SIGNIFICANT CHANGE UP (ref 32–36)
MCHC RBC-ENTMCNC: 33.2 PG — SIGNIFICANT CHANGE UP (ref 27–34)
MCHC RBC-ENTMCNC: 34 GM/DL — SIGNIFICANT CHANGE UP (ref 32–36)
MCV RBC AUTO: 97.8 FL — SIGNIFICANT CHANGE UP (ref 80–100)
MCV RBC AUTO: 98.1 FL — SIGNIFICANT CHANGE UP (ref 80–100)
PHOSPHATE SERPL-MCNC: 2.7 MG/DL — SIGNIFICANT CHANGE UP (ref 2.5–4.5)
PLATELET # BLD AUTO: 203 K/UL — SIGNIFICANT CHANGE UP (ref 150–400)
PLATELET # BLD AUTO: 214 K/UL — SIGNIFICANT CHANGE UP (ref 150–400)
POTASSIUM SERPL-MCNC: 3.7 MMOL/L — SIGNIFICANT CHANGE UP (ref 3.5–5.3)
POTASSIUM SERPL-SCNC: 3.7 MMOL/L — SIGNIFICANT CHANGE UP (ref 3.5–5.3)
RBC # BLD: 4.1 M/UL — LOW (ref 4.2–5.8)
RBC # BLD: 4.43 M/UL — SIGNIFICANT CHANGE UP (ref 4.2–5.8)
RBC # FLD: 12.3 % — SIGNIFICANT CHANGE UP (ref 10.3–14.5)
RBC # FLD: 12.3 % — SIGNIFICANT CHANGE UP (ref 10.3–14.5)
SODIUM SERPL-SCNC: 141 MMOL/L — SIGNIFICANT CHANGE UP (ref 135–145)
WBC # BLD: 13.7 K/UL — HIGH (ref 3.8–10.5)
WBC # BLD: 15.5 K/UL — HIGH (ref 3.8–10.5)
WBC # FLD AUTO: 13.7 K/UL — HIGH (ref 3.8–10.5)
WBC # FLD AUTO: 15.5 K/UL — HIGH (ref 3.8–10.5)

## 2019-06-01 RX ORDER — APIXABAN 2.5 MG/1
5 TABLET, FILM COATED ORAL ONCE
Refills: 0 | Status: COMPLETED | OUTPATIENT
Start: 2019-06-01 | End: 2019-06-01

## 2019-06-01 RX ORDER — APIXABAN 2.5 MG/1
5 TABLET, FILM COATED ORAL EVERY 12 HOURS
Refills: 0 | Status: DISCONTINUED | OUTPATIENT
Start: 2019-06-02 | End: 2019-06-02

## 2019-06-01 RX ORDER — APIXABAN 2.5 MG/1
5 TABLET, FILM COATED ORAL
Refills: 0 | Status: DISCONTINUED | OUTPATIENT
Start: 2019-06-01 | End: 2019-06-01

## 2019-06-01 RX ADMIN — OXYCODONE AND ACETAMINOPHEN 1 TABLET(S): 5; 325 TABLET ORAL at 21:30

## 2019-06-01 RX ADMIN — Medication 81 MILLIGRAM(S): at 11:37

## 2019-06-01 RX ADMIN — HEPARIN SODIUM 16 UNIT(S)/HR: 5000 INJECTION INTRAVENOUS; SUBCUTANEOUS at 14:01

## 2019-06-01 RX ADMIN — APIXABAN 5 MILLIGRAM(S): 2.5 TABLET, FILM COATED ORAL at 17:08

## 2019-06-01 RX ADMIN — ATORVASTATIN CALCIUM 40 MILLIGRAM(S): 80 TABLET, FILM COATED ORAL at 20:47

## 2019-06-01 RX ADMIN — AMLODIPINE BESYLATE 10 MILLIGRAM(S): 2.5 TABLET ORAL at 05:23

## 2019-06-01 RX ADMIN — OXYCODONE AND ACETAMINOPHEN 1 TABLET(S): 5; 325 TABLET ORAL at 20:47

## 2019-06-01 RX ADMIN — Medication 12.5 MILLIGRAM(S): at 05:23

## 2019-06-01 RX ADMIN — HEPARIN SODIUM 16 UNIT(S)/HR: 5000 INJECTION INTRAVENOUS; SUBCUTANEOUS at 06:42

## 2019-06-01 RX ADMIN — FAMOTIDINE 40 MILLIGRAM(S): 10 INJECTION INTRAVENOUS at 11:38

## 2019-06-01 RX ADMIN — Medication 2 MILLIGRAM(S): at 22:42

## 2019-06-01 RX ADMIN — LOSARTAN POTASSIUM 100 MILLIGRAM(S): 100 TABLET, FILM COATED ORAL at 05:23

## 2019-06-01 NOTE — PROGRESS NOTE ADULT - ASSESSMENT
ZEYNEP LUCIO is a 78y Male POD#2 from s/p left profunda to PT bypass with PTFE.      PLAN:  - Hold pletal, cont aspirin  - Pain management  - monitor CLARI output, likely will take out tomorrow  - c/w hep gtt  - H/H stable  - FU repeat CBC/ptt at 12pm - if stable, will restart on home eliquis  - Dispo: home, likely tomorrow

## 2019-06-01 NOTE — PROGRESS NOTE ADULT - SUBJECTIVE AND OBJECTIVE BOX
Vascular Surgery Progress Note    Subjective:   Pt seen & examined at bedside. Pain is well controlled. No new complaints. No F/C, N/V, CP/SOB.    Medications:  ceFAZolin   IVPB 2000  amLODIPine   Tablet 10  aspirin enteric coated 81  buMETAnide 1  ceFAZolin   IVPB 2000  heparin  Infusion 1600  hydrochlorothiazide 12.5  losartan 100      Vital Signs Last 24 Hrs  T(C): 36.5 (01 Jun 2019 11:07), Max: 37.7 (01 Jun 2019 01:28)  T(F): 97.7 (01 Jun 2019 11:07), Max: 99.8 (01 Jun 2019 01:28)  HR: 93 (01 Jun 2019 11:07) (70 - 93)  BP: 153/78 (01 Jun 2019 11:07) (141/65 - 188/93)  BP(mean): 93 (31 May 2019 13:00) (93 - 93)  RR: 17 (01 Jun 2019 11:07) (16 - 18)  SpO2: 93% (01 Jun 2019 11:07) (92% - 100%)    I&O's Summary    31 May 2019 07:01  -  01 Jun 2019 07:00  --------------------------------------------------------  IN: 2498 mL / OUT: 4355 mL / NET: -1857 mL    01 Jun 2019 07:01  -  01 Jun 2019 11:20  --------------------------------------------------------  IN: 0 mL / OUT: 200 mL / NET: -200 mL      Physical Exam:  General: NAD  Respiratory: airway patent, respirations unlabored  : Carter removed, voiding  Extremities:    LLE: wrapped in ACE wrap, +DP/PT signal, CLARI w/ SS output.  Ace wrap removed.  incision c/d/i.  Groin dressing c/d/i.   RLE: no edema, sensation and movement grossly intact  Skin: warm, dry, appropriate color    LABS:                        13.3   13.7  )-----------( 203      ( 01 Jun 2019 06:10 )             40.3     06-01    141  |  101  |  17  ----------------------------<  104<H>  3.7   |  27  |  1.30    Ca    9.3      01 Jun 2019 06:10  Phos  2.7     06-01  Mg     2.1     06-01      PT/INR - ( 31 May 2019 06:44 )   PT: 10.8 sec;   INR: 0.94 ratio         PTT - ( 01 Jun 2019 06:10 )  PTT:71.4 sec

## 2019-06-02 ENCOUNTER — TRANSCRIPTION ENCOUNTER (OUTPATIENT)
Age: 79
End: 2019-06-02

## 2019-06-02 VITALS
RESPIRATION RATE: 18 BRPM | SYSTOLIC BLOOD PRESSURE: 144 MMHG | TEMPERATURE: 99 F | OXYGEN SATURATION: 95 % | HEART RATE: 86 BPM | DIASTOLIC BLOOD PRESSURE: 80 MMHG

## 2019-06-02 LAB
ANION GAP SERPL CALC-SCNC: 13 MMOL/L — SIGNIFICANT CHANGE UP (ref 5–17)
APTT BLD: 29 SEC — SIGNIFICANT CHANGE UP (ref 27.5–36.3)
BUN SERPL-MCNC: 17 MG/DL — SIGNIFICANT CHANGE UP (ref 7–23)
CALCIUM SERPL-MCNC: 9.5 MG/DL — SIGNIFICANT CHANGE UP (ref 8.4–10.5)
CHLORIDE SERPL-SCNC: 99 MMOL/L — SIGNIFICANT CHANGE UP (ref 96–108)
CO2 SERPL-SCNC: 26 MMOL/L — SIGNIFICANT CHANGE UP (ref 22–31)
CREAT SERPL-MCNC: 1.34 MG/DL — HIGH (ref 0.5–1.3)
GLUCOSE SERPL-MCNC: 108 MG/DL — HIGH (ref 70–99)
HCT VFR BLD CALC: 42.1 % — SIGNIFICANT CHANGE UP (ref 39–50)
HGB BLD-MCNC: 13.6 G/DL — SIGNIFICANT CHANGE UP (ref 13–17)
MAGNESIUM SERPL-MCNC: 2 MG/DL — SIGNIFICANT CHANGE UP (ref 1.6–2.6)
MCHC RBC-ENTMCNC: 31.6 PG — SIGNIFICANT CHANGE UP (ref 27–34)
MCHC RBC-ENTMCNC: 32.3 GM/DL — SIGNIFICANT CHANGE UP (ref 32–36)
MCV RBC AUTO: 97.7 FL — SIGNIFICANT CHANGE UP (ref 80–100)
PHOSPHATE SERPL-MCNC: 3.2 MG/DL — SIGNIFICANT CHANGE UP (ref 2.5–4.5)
PLATELET # BLD AUTO: 192 K/UL — SIGNIFICANT CHANGE UP (ref 150–400)
POTASSIUM SERPL-MCNC: 3.6 MMOL/L — SIGNIFICANT CHANGE UP (ref 3.5–5.3)
POTASSIUM SERPL-SCNC: 3.6 MMOL/L — SIGNIFICANT CHANGE UP (ref 3.5–5.3)
RBC # BLD: 4.31 M/UL — SIGNIFICANT CHANGE UP (ref 4.2–5.8)
RBC # FLD: 12.2 % — SIGNIFICANT CHANGE UP (ref 10.3–14.5)
SODIUM SERPL-SCNC: 138 MMOL/L — SIGNIFICANT CHANGE UP (ref 135–145)
WBC # BLD: 14.9 K/UL — HIGH (ref 3.8–10.5)
WBC # FLD AUTO: 14.9 K/UL — HIGH (ref 3.8–10.5)

## 2019-06-02 PROCEDURE — 83735 ASSAY OF MAGNESIUM: CPT

## 2019-06-02 PROCEDURE — 85027 COMPLETE CBC AUTOMATED: CPT

## 2019-06-02 PROCEDURE — 86900 BLOOD TYPING SEROLOGIC ABO: CPT

## 2019-06-02 PROCEDURE — C1768: CPT

## 2019-06-02 PROCEDURE — 85610 PROTHROMBIN TIME: CPT

## 2019-06-02 PROCEDURE — 84100 ASSAY OF PHOSPHORUS: CPT

## 2019-06-02 PROCEDURE — 86901 BLOOD TYPING SEROLOGIC RH(D): CPT

## 2019-06-02 PROCEDURE — 85730 THROMBOPLASTIN TIME PARTIAL: CPT

## 2019-06-02 PROCEDURE — 80048 BASIC METABOLIC PNL TOTAL CA: CPT

## 2019-06-02 PROCEDURE — 97161 PT EVAL LOW COMPLEX 20 MIN: CPT

## 2019-06-02 RX ORDER — APIXABAN 2.5 MG/1
0 TABLET, FILM COATED ORAL
Qty: 0 | Refills: 0 | DISCHARGE

## 2019-06-02 RX ORDER — APIXABAN 2.5 MG/1
1 TABLET, FILM COATED ORAL
Qty: 60 | Refills: 2
Start: 2019-06-02 | End: 2019-08-30

## 2019-06-02 RX ORDER — CILOSTAZOL 100 MG/1
1 TABLET ORAL
Qty: 0 | Refills: 0 | DISCHARGE

## 2019-06-02 RX ADMIN — Medication 12.5 MILLIGRAM(S): at 05:26

## 2019-06-02 RX ADMIN — AMLODIPINE BESYLATE 10 MILLIGRAM(S): 2.5 TABLET ORAL at 05:26

## 2019-06-02 RX ADMIN — BUMETANIDE 1 MILLIGRAM(S): 0.25 INJECTION INTRAMUSCULAR; INTRAVENOUS at 11:12

## 2019-06-02 RX ADMIN — FAMOTIDINE 40 MILLIGRAM(S): 10 INJECTION INTRAVENOUS at 11:12

## 2019-06-02 RX ADMIN — Medication 81 MILLIGRAM(S): at 11:12

## 2019-06-02 RX ADMIN — APIXABAN 5 MILLIGRAM(S): 2.5 TABLET, FILM COATED ORAL at 05:26

## 2019-06-02 RX ADMIN — LOSARTAN POTASSIUM 100 MILLIGRAM(S): 100 TABLET, FILM COATED ORAL at 05:26

## 2019-06-02 NOTE — PHYSICAL THERAPY INITIAL EVALUATION ADULT - ADDITIONAL COMMENTS
Pt lives with his son, daughter-in-law, & grandchildren in a high rise building, no entry steps, + elevator access. Pt states prior to admission he was independent with all functional mobility including community ambulation without a device. Pt does not own any DMEs. Pt is right hand dominant, drives, wears eye glasses for reading, & is retired. Goal of therapy: return home & return to prior level of function ASAP.

## 2019-06-02 NOTE — DISCHARGE NOTE PROVIDER - CARE PROVIDER_API CALL
Gilbert Carrasco)  Vascular Surgery  [address], [address]  [place] [address]  Phone: [phone]  Fax: [phone]  Follow Up Time:

## 2019-06-02 NOTE — PHYSICAL THERAPY INITIAL EVALUATION ADULT - PERTINENT HX OF CURRENT PROBLEM, REHAB EVAL
Pt is a 79yo M admitted with L leg pain. Imaging/work-up revealed PAD. Pt is now s/p L profunda to PT bypass with PTFE on 5/30 with no post-op complications.

## 2019-06-02 NOTE — DISCHARGE NOTE NURSING/CASE MANAGEMENT/SOCIAL WORK - NSDCDPATPORTLINK_GEN_ALL_CORE
You can access the [facility] Patient Portal, offered by [facility], by registering with the following website: [email or web address]

## 2019-06-02 NOTE — DISCHARGE NOTE NURSING/CASE MANAGEMENT/SOCIAL WORK - NSDCPNINST_GEN_ALL_CORE
If you have fever greater than 100.3, drainage from your incision or nausea and vomiting contact MD office and return to ER.

## 2019-06-02 NOTE — DISCHARGE NOTE PROVIDER - NSDCCPCAREPLAN_GEN_ALL_CORE_FT
PRINCIPAL DISCHARGE DIAGNOSIS  Diagnosis: PVD (peripheral vascular disease) with claudication  Assessment and Plan of Treatment:

## 2019-06-02 NOTE — DISCHARGE NOTE PROVIDER - HOSPITAL COURSE
79 y/o male with PMH of PVD with claudication and a left lower extremity DVT in 11/2013 (on Eliquis 5mg once a day) s/p stent placement to left LE 2014 s/p left femoral tibial artery bypass from 2-3 years ago failed.  Presented to the hospital for planned left femoral to posterior tibial bypass with polytetrafluorethylene on 5/30/19.  The patient underwent the surgery without issue and was sent to PACU in stable condition, was noted to have +PT doppler signal postoperatively.  He was then transferred to a surgical floor on postoperative day 1.  Pain was controlled with PO medications.  The patient was started on a heparin drip and then transitioned to anticoagulation dose of Eliquis.  On postop day 3 the patient was deemed medically stable for discharge home.  The patient was in agreement with the plan and all questions were answered.

## 2019-06-02 NOTE — PROGRESS NOTE ADULT - SUBJECTIVE AND OBJECTIVE BOX
Vascular Surgery Progress Note    Subjective:   Pt seen & examined at bedside. Pain is well controlled. No new complaints. No F/C, N/V, CP/SOB.    Medications:  ceFAZolin   IVPB 2000  amLODIPine   Tablet 10  apixaban 5  aspirin enteric coated 81  buMETAnide 1  ceFAZolin   IVPB 2000  hydrochlorothiazide 12.5  losartan 100      Vital Signs Last 24 Hrs  T(C): 37.2 (02 Jun 2019 08:24), Max: 37.3 (01 Jun 2019 18:50)  T(F): 99 (02 Jun 2019 08:24), Max: 99.1 (01 Jun 2019 18:50)  HR: 86 (02 Jun 2019 08:24) (76 - 98)  BP: 144/80 (02 Jun 2019 08:24) (132/79 - 165/84)  BP(mean): --  RR: 18 (02 Jun 2019 08:24) (17 - 18)  SpO2: 95% (02 Jun 2019 08:24) (95% - 100%)    I&O's Summary    01 Jun 2019 07:01  -  02 Jun 2019 07:00  --------------------------------------------------------  IN: 1040 mL / OUT: 1410 mL / NET: -370 mL    02 Jun 2019 07:01  -  02 Jun 2019 13:04  --------------------------------------------------------  IN: 240 mL / OUT: 0 mL / NET: 240 mL        Physical Exam:  General: NAD  Respiratory: airway patent, respirations unlabored  : Carter removed, voiding  Extremities:    LLE: wrapped in ACE wrap, +DP/PT signal, CLARI w/ SS output.  Ace wrap removed.  incision c/d/i.  Groin dressing c/d/i.   RLE: no edema, sensation and movement grossly intact  Skin: warm, dry, appropriate color    LABS:                        13.6   14.9  )-----------( 192      ( 02 Jun 2019 07:00 )             42.1     06-02    138  |  99  |  17  ----------------------------<  108<H>  3.6   |  26  |  1.34<H>    Ca    9.5      02 Jun 2019 07:00  Phos  3.2     06-02  Mg     2.0     06-02      PTT - ( 02 Jun 2019 07:00 )  PTT:29.0 sec

## 2019-06-02 NOTE — DISCHARGE NOTE PROVIDER - NSDCFUADDINST_GEN_ALL_CORE_FT
Follow up with Dr. Carrasco in his outpatient office within 1-2 weeks of discharge, call for appointment [phone]  You are prescribed a new dosing schedule of Eliquis, you will now take it twice per day instead of once per day.  A new prescription has been sent to your pharmacy.  You may shower, allow water to run over incisions but do not submerge in water and do not scrub.  Pat dry.  Call Dr. Carrasco's office if you experience: uncontrolled bleeding from incisions, pain not controlled with over the counter medications, change in appearance or drastic change in temperature of lower leg/foot, numbness.

## 2019-06-02 NOTE — PHYSICAL THERAPY INITIAL EVALUATION ADULT - BALANCE TRAINING, PT EVAL
GOAL: pt will be able to independently ambulate 300ft without assistive device without gait deviation or loss of balance within 2 weeks

## 2019-06-02 NOTE — PROGRESS NOTE ADULT - ASSESSMENT
ZEYNEP LUCIO is a 78y Male POD#3 from s/p left profunda to PT bypass with PTFE.      PLAN:  - Hold pletal, cont aspirin  - cont eliquis 5mg BID as outpatient  - Pain management  - CLARI pulled at bedside  - H/H stable  - Dispo: home today

## 2019-06-02 NOTE — DISCHARGE NOTE PROVIDER - NSDCCPTREATMENT_GEN_ALL_CORE_FT
PRINCIPAL PROCEDURE  Procedure: Creation of femoral-posterior tibial artery bypass with non-vein graft  Findings and Treatment:

## 2019-06-14 ENCOUNTER — APPOINTMENT (OUTPATIENT)
Dept: VASCULAR SURGERY | Facility: CLINIC | Age: 79
End: 2019-06-14
Payer: MEDICARE

## 2019-06-14 VITALS
WEIGHT: 195 LBS | TEMPERATURE: 97.8 F | SYSTOLIC BLOOD PRESSURE: 148 MMHG | BODY MASS INDEX: 27.92 KG/M2 | DIASTOLIC BLOOD PRESSURE: 75 MMHG | HEIGHT: 70 IN | HEART RATE: 69 BPM

## 2019-06-14 PROBLEM — I10 ESSENTIAL (PRIMARY) HYPERTENSION: Chronic | Status: ACTIVE | Noted: 2019-05-24

## 2019-06-14 PROBLEM — Z87.19 PERSONAL HISTORY OF OTHER DISEASES OF THE DIGESTIVE SYSTEM: Chronic | Status: ACTIVE | Noted: 2019-05-24

## 2019-06-14 PROCEDURE — 99024 POSTOP FOLLOW-UP VISIT: CPT

## 2019-06-14 NOTE — REASON FOR VISIT
[de-identified] : Status post left femoral to posterior tibial artery bypass with PTFE. Patient is extremely happy with no further rest pain or claudication symptoms. Incisions are clean. Foot is warm but swollen. Half of the staples are removed. Followup in 2 weeks to remove the rest of the staples and schedule duplex. Recommend Plavix and Eliquis.

## 2019-06-19 NOTE — PATIENT PROFILE ADULT - ANY IMPAIRED UPPER EXTREMITY FUNCTION WITHIN A WEEK PRIOR TO ADMISSION RELATED TO?
CERTIFICATE OF RETURN TO WORK      June 19, 2019      Re: Ora Monique  [address] [facility] [address]        This is to certify that Ora Monique has been under my care and can return to regular work on 6/20/19.      RESTRICTIONS: None.              SIGNATURE:___________________________________________,   6/19/2019                                                   Dr. Josh Antonio  [facility] MEDICAL OFFICE [facility] [address] [address]  [address]  [address]  [facility] [address]  [phone]                                                    no

## 2019-06-28 ENCOUNTER — APPOINTMENT (OUTPATIENT)
Dept: VASCULAR SURGERY | Facility: CLINIC | Age: 79
End: 2019-06-28
Payer: MEDICARE

## 2019-06-28 VITALS
SYSTOLIC BLOOD PRESSURE: 140 MMHG | HEIGHT: 70 IN | BODY MASS INDEX: 28.06 KG/M2 | TEMPERATURE: 97.9 F | HEART RATE: 72 BPM | DIASTOLIC BLOOD PRESSURE: 78 MMHG | WEIGHT: 196 LBS

## 2019-06-28 PROCEDURE — 93926 LOWER EXTREMITY STUDY: CPT

## 2019-06-28 PROCEDURE — 99024 POSTOP FOLLOW-UP VISIT: CPT

## 2019-06-28 NOTE — REASON FOR VISIT
[de-identified] : Status post left femoral to posterior tibial artery bypass with PTFE. Patient is extremely happy with no further rest pain or claudication symptoms. Incisions are clean. Foot is warm but swollen. staples are removed. Duplex with no significant stenosis. Continue antiplatelet therapy and anticoagulation. Followup in 3 months for repeat duplex.

## 2019-09-02 PROBLEM — Z09 FOLLOW UP: Status: ACTIVE | Noted: 2018-06-08

## 2019-10-11 ENCOUNTER — APPOINTMENT (OUTPATIENT)
Dept: VASCULAR SURGERY | Facility: CLINIC | Age: 79
End: 2019-10-11
Payer: MEDICARE

## 2019-10-11 LAB
ALBUMIN SERPL ELPH-MCNC: 4.6 G/DL
ALP BLD-CCNC: 79 U/L
ALT SERPL-CCNC: 16 U/L
ANION GAP SERPL CALC-SCNC: 14 MMOL/L
APTT BLD: 32.1 SEC
AST SERPL-CCNC: 24 U/L
BASOPHILS # BLD AUTO: 0.03 K/UL
BASOPHILS NFR BLD AUTO: 0.3 %
BILIRUB SERPL-MCNC: 0.4 MG/DL
BUN SERPL-MCNC: 18 MG/DL
CALCIUM SERPL-MCNC: 9.8 MG/DL
CHLORIDE SERPL-SCNC: 104 MMOL/L
CO2 SERPL-SCNC: 25 MMOL/L
CREAT SERPL-MCNC: 1.24 MG/DL
EOSINOPHIL # BLD AUTO: 0.16 K/UL
EOSINOPHIL NFR BLD AUTO: 1.8 %
GLUCOSE SERPL-MCNC: 89 MG/DL
HCT VFR BLD CALC: 46.2 %
HGB BLD-MCNC: 15.4 G/DL
IMM GRANULOCYTES NFR BLD AUTO: 0.2 %
INR PPP: 1.03 RATIO
LYMPHOCYTES # BLD AUTO: 1.36 K/UL
LYMPHOCYTES NFR BLD AUTO: 14.9 %
MAN DIFF?: NORMAL
MCHC RBC-ENTMCNC: 32.4 PG
MCHC RBC-ENTMCNC: 33.3 GM/DL
MCV RBC AUTO: 97.3 FL
MONOCYTES # BLD AUTO: 0.85 K/UL
MONOCYTES NFR BLD AUTO: 9.3 %
NEUTROPHILS # BLD AUTO: 6.7 K/UL
NEUTROPHILS NFR BLD AUTO: 73.5 %
PLATELET # BLD AUTO: 165 K/UL
POTASSIUM SERPL-SCNC: 4.2 MMOL/L
PROT SERPL-MCNC: 7.2 G/DL
PT BLD: 11.6 SEC
RBC # BLD: 4.75 M/UL
RBC # FLD: 13.5 %
SODIUM SERPL-SCNC: 143 MMOL/L
WBC # FLD AUTO: 9.12 K/UL

## 2019-10-11 PROCEDURE — 99213 OFFICE O/P EST LOW 20 MIN: CPT

## 2019-10-11 PROCEDURE — 93979 VASCULAR STUDY: CPT

## 2019-10-11 PROCEDURE — 93926 LOWER EXTREMITY STUDY: CPT

## 2019-10-11 NOTE — HISTORY OF PRESENT ILLNESS
[FreeTextEntry1] : Patient with severe peripheral vascular disease, status post left lower extremity tibial bypass with PTFE. Patient had no complaints of significant claudication or rest pain. Patient is very happy with the results of the bypass.

## 2019-10-11 NOTE — ASSESSMENT
[FreeTextEntry1] : Patient with severe peripheral vascular disease, status post left lower extremity bypass. 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 Recommend left leg angiogram and iliac angioplasty. This was all discussed with the patient in detail.

## 2019-10-11 NOTE — PHYSICAL EXAM
[JVD] : no jugular venous distention  [Normal Breath Sounds] : Normal breath sounds [Normal Heart Sounds] : normal heart sounds [2+] : left 2+ [Ankle Swelling (On Exam)] : present [Ankle Swelling On The Left] : moderate [Varicose Veins Of Lower Extremities] : not present [] : not present [Abdomen Masses] : No abdominal masses [Skin Ulcer] : no ulcer [Alert] : alert [Oriented to Person] : oriented to person [Oriented to Place] : oriented to place

## 2019-10-13 NOTE — PATIENT PROFILE ADULT. - NS PRO OT REFERRAL QUES 2 YN
Pt skin under eyebrows less irritated and red after Hydrocortisone 1% cream applied 1 x 10/11/19 and 1 x today 10/12/19. Peds IM suggested using Aquaphor (tubed from SPD and in pt med bin) PRN on affected areas to promote healing. Also, continue to educate/remind pt to avoid eye area when applying Clindamycin.   no

## 2019-10-29 ENCOUNTER — FORM ENCOUNTER (OUTPATIENT)
Age: 79
End: 2019-10-29

## 2019-10-30 ENCOUNTER — APPOINTMENT (OUTPATIENT)
Dept: ENDOVASCULAR SURGERY | Facility: CLINIC | Age: 79
End: 2019-10-30
Payer: MEDICARE

## 2019-10-30 ENCOUNTER — LABORATORY RESULT (OUTPATIENT)
Age: 79
End: 2019-10-30

## 2019-10-30 VITALS
DIASTOLIC BLOOD PRESSURE: 68 MMHG | SYSTOLIC BLOOD PRESSURE: 135 MMHG | BODY MASS INDEX: 28.06 KG/M2 | RESPIRATION RATE: 16 BRPM | HEIGHT: 70 IN | WEIGHT: 196 LBS | TEMPERATURE: 98.3 F | OXYGEN SATURATION: 98 % | HEART RATE: 60 BPM

## 2019-10-30 PROCEDURE — 37225Z: CUSTOM | Mod: LT

## 2019-10-30 PROCEDURE — 37225Z: CUSTOM | Mod: 82

## 2019-10-30 PROCEDURE — 75625 CONTRAST EXAM ABDOMINL AORTA: CPT

## 2019-10-30 RX ORDER — PREDNISONE 50 MG/1
50 TABLET ORAL
Qty: 5 | Refills: 0 | Status: DISCONTINUED | COMMUNITY
Start: 2018-08-13 | End: 2019-10-30

## 2019-10-30 RX ORDER — FAMOTIDINE 40 MG/1
40 TABLET, FILM COATED ORAL
Qty: 30 | Refills: 0 | Status: DISCONTINUED | COMMUNITY
Start: 2018-07-16 | End: 2019-10-30

## 2019-10-30 NOTE — PAST MEDICAL HISTORY
[Increasing age ( >40 years old)] : Increasing age ( >40 years old) [Previous history of DVT or PE] : Previous history of DVT or PE [No therapy indicated for cases scheduled for less than one hour] : No therapy indicated for cases scheduled for less than one hour. [FreeTextEntry1] : \par \par Malignant Hyperthermis (MH) Screening Tool and Risk of Bleeding Assessement\par Mr. ZEYNEP LUCIO  denies family history of unexpected death following Anesthesia or Exercise.\par Denies Family history of Malignant Hyperthermia, Muscle or Neuromuscular disorder and High Temperature following exercise.\par \par Mr. ZEYNEP LUCIO denies history of Muscle Spasm, Dark or Chocolate - Colored urine and Unanticipated fever immediately following anesthesia or serious exercise. \par Mr. LUCIO  also denies bleeding tendencies/ Risks of Bleeding\par

## 2019-10-30 NOTE — HISTORY OF PRESENT ILLNESS
[FreeTextEntry1] : alert and oriented\par accompnaied by karol Hardwick [phone]\par feels ok\par no falls\par took BP meds this morning\par last pradaxa 2 days ago\par Cr 1.24 10/11/2019 [FreeTextEntry5] : 5pm 10/29/2019 [FreeTextEntry6] : Dr Burgos

## 2019-10-30 NOTE — PHYSICAL EXAM
[Normal Breath Sounds] : Normal breath sounds [Normal Heart Sounds] : normal heart sounds [2+] : left 2+ [Ankle Swelling (On Exam)] : present [Ankle Swelling On The Left] : moderate [Alert] : alert [Oriented to Person] : oriented to person [Oriented to Place] : oriented to place [JVD] : no jugular venous distention  [Varicose Veins Of Lower Extremities] : not present [] : not present [Abdomen Masses] : No abdominal masses [Skin Ulcer] : no ulcer

## 2019-10-30 NOTE — PROCEDURE
[D/C IV on discharge] : D/C IV on discharge [Resume diet] : resume diet [FreeTextEntry1] : Aortagram/left leg angiogram/angioplasty/athrectomy

## 2019-11-15 ENCOUNTER — APPOINTMENT (OUTPATIENT)
Dept: VASCULAR SURGERY | Facility: CLINIC | Age: 79
End: 2019-11-15
Payer: MEDICARE

## 2019-11-15 PROCEDURE — 93926 LOWER EXTREMITY STUDY: CPT

## 2019-11-15 PROCEDURE — 99213 OFFICE O/P EST LOW 20 MIN: CPT

## 2019-11-15 PROCEDURE — 93978 VASCULAR STUDY: CPT

## 2019-11-15 NOTE — PHYSICAL EXAM
[Normal Breath Sounds] : Normal breath sounds [JVD] : no jugular venous distention  [Normal Heart Sounds] : normal heart sounds [2+] : left 2+ [Ankle Swelling (On Exam)] : present [Varicose Veins Of Lower Extremities] : not present [Ankle Swelling On The Left] : moderate [] : not present [Abdomen Masses] : No abdominal masses [Skin Ulcer] : no ulcer [Oriented to Person] : oriented to person [Alert] : alert [Oriented to Place] : oriented to place

## 2019-11-15 NOTE — HISTORY OF PRESENT ILLNESS
[FreeTextEntry1] : Patient with severe peripheral vascular disease, status post left lower extremity tibial bypass with PTFE. Patient had no complaints of significant claudication or rest pain. Patient is very happy with the results of the bypass.Status post atherectomy and angioplasty. Doing well.

## 2019-11-15 NOTE — ASSESSMENT
[FreeTextEntry1] : Patient with severe peripheral vascular disease, status post left lower extremity bypass.Status post atherectomy and angioplasty. Doing well. Followup in 3 months

## 2020-01-10 ENCOUNTER — APPOINTMENT (OUTPATIENT)
Dept: VASCULAR SURGERY | Facility: CLINIC | Age: 80
End: 2020-01-10

## 2020-03-13 ENCOUNTER — APPOINTMENT (OUTPATIENT)
Dept: VASCULAR SURGERY | Facility: CLINIC | Age: 80
End: 2020-03-13
Payer: MEDICARE

## 2020-03-13 DIAGNOSIS — I74.4 EMBOLISM AND THROMBOSIS OF ARTERIES OF EXTREMITIES, UNSPECIFIED: ICD-10-CM

## 2020-03-13 PROCEDURE — 93979 VASCULAR STUDY: CPT

## 2020-03-13 PROCEDURE — 93926 LOWER EXTREMITY STUDY: CPT

## 2020-03-13 PROCEDURE — 99213 OFFICE O/P EST LOW 20 MIN: CPT

## 2020-03-13 NOTE — PHYSICAL EXAM
[JVD] : no jugular venous distention  [Normal Breath Sounds] : Normal breath sounds [Normal Heart Sounds] : normal heart sounds [2+] : left 2+ [Ankle Swelling (On Exam)] : present [Ankle Swelling On The Left] : moderate [Varicose Veins Of Lower Extremities] : not present [] : not present [Abdomen Masses] : No abdominal masses

## 2020-03-13 NOTE — HISTORY OF PRESENT ILLNESS
[FreeTextEntry1] : Patient with severe peripheral vascular disease, status post iliac angioplasty and stent placement, status post left posterior tibial artery bypass using PTFE, complaining of 2 week history of left leg claudication. Symptoms are new. No rest pain. No history of tissue loss.

## 2020-03-23 ENCOUNTER — FORM ENCOUNTER (OUTPATIENT)
Age: 80
End: 2020-03-23

## 2020-03-24 ENCOUNTER — APPOINTMENT (OUTPATIENT)
Dept: ENDOVASCULAR SURGERY | Facility: CLINIC | Age: 80
End: 2020-03-24
Payer: MEDICARE

## 2020-03-24 VITALS
RESPIRATION RATE: 15 BRPM | HEIGHT: 70 IN | HEART RATE: 60 BPM | DIASTOLIC BLOOD PRESSURE: 81 MMHG | BODY MASS INDEX: 28.06 KG/M2 | TEMPERATURE: 98.2 F | SYSTOLIC BLOOD PRESSURE: 180 MMHG | OXYGEN SATURATION: 96 % | WEIGHT: 196 LBS

## 2020-03-24 PROCEDURE — 37221Z: CUSTOM

## 2020-03-24 PROCEDURE — 75625 CONTRAST EXAM ABDOMINL AORTA: CPT

## 2020-04-13 ENCOUNTER — APPOINTMENT (OUTPATIENT)
Dept: VASCULAR SURGERY | Facility: CLINIC | Age: 80
End: 2020-04-13
Payer: MEDICARE

## 2020-04-13 PROCEDURE — 99213 OFFICE O/P EST LOW 20 MIN: CPT

## 2020-04-13 PROCEDURE — 93979 VASCULAR STUDY: CPT

## 2020-04-13 NOTE — HISTORY OF PRESENT ILLNESS
[FreeTextEntry1] : Patient with severe peripheral vascular disease, status post iliac angioplasty and stent placement, status post left posterior tibial artery bypass using PTFE, feel better. No history of tissue loss.

## 2020-04-13 NOTE — ASSESSMENT
[FreeTextEntry1] : Patient underwent duplex which shows patent stents.  Patient states he feels better.  At this time no intervention.  Follow-up in 3 months

## 2020-04-15 ENCOUNTER — APPOINTMENT (OUTPATIENT)
Dept: VASCULAR SURGERY | Facility: CLINIC | Age: 80
End: 2020-04-15

## 2020-04-23 ENCOUNTER — FORM ENCOUNTER (OUTPATIENT)
Age: 80
End: 2020-04-23

## 2020-05-01 NOTE — HISTORY OF PRESENT ILLNESS
[FreeTextEntry1] : alert and oriented\par accompanied by son Ronen [phone]\par feels ok\par no falls\par took BP meds this morning\par last eliquis 2 days ago\par Cr 1.46 3/13/2020 [FreeTextEntry5] : yesterday  8 pm [FreeTextEntry6] : Dr Burgos

## 2020-05-01 NOTE — PAST MEDICAL HISTORY
[Increasing age ( >40 years old)] : Increasing age ( >40 years old) [Previous history of DVT or PE] : Previous history of DVT or PE [No therapy indicated for cases scheduled for less than one hour] : No therapy indicated for cases scheduled for less than one hour. [FreeTextEntry1] : Malignant Hyperthermis (MH) Screening Tool and Risk of Bleeding Assessement\par \par Mr. ZEYNEP LUCIO  denies family history of unexpected death following Anesthesia or Exercise.\par Denies Family history of Malignant Hyperthermia, Muscle or Neuromuscular disorder and High Temperature following exercise.\par \par Mr. ZEYNEP LUCIO denies history of Muscle Spasm, Dark or Chocolate - Colored urine and Unanticipated fever immediately following anesthesia or serious exercise. \par Mr. LUCIO  also denies bleeding tendencies/ Risks of Bleeding\par

## 2020-05-01 NOTE — PROCEDURE
[D/C IV on discharge] : D/C IV on discharge [Resume diet] : resume diet [FreeTextEntry1] : Aortagram/left leg angiogram/angioplasty/stent

## 2020-05-06 NOTE — ED ADULT NURSE NOTE - PSYCHOSOCIAL WDL
Pt exposed to Covid 19 + patient not in isolation approximately 5/1/20. Currently asymptomatic, will begin sx diary today 5/6/20 -5/20/20. Pt will alert EH if sx develop.   
Alert and oriented x 3, normal mood and affect, no apparent risk to self or others.

## 2020-06-01 NOTE — PRE-OP CHECKLIST - LATEX ALLERGY
"         [facility]  Anesthesia Pre-Operative Evaluation       Patient Name: Anitra Chaney  YOB: 1979  MRN: [id number]    SUBJECTIVE:     Anitra Chaney is a 41 y.o. female [id number] at 31w4d presents complaining of headache that is "band-like" and blurry vision. Admitted for preE work up.    PMHx of LTCS x 4, MO, AMA, CVA, atypical preeclampsia in previous pregnancy, tobacco use, of gastric sleeve surgery.    Denies previous issues with neuraxial anesthesia.    Denies previous issues with general anesthesia.    Denies family history of issues with anesthesia.    Denies hx of seizures, strokes, HTN, heart failure, smoking, asthma, COPD, acid reflux, liver disease, kidney disease, bleeding disorders, taking blood thinners, back surgery.  Lab Results   Component Value Date     2020       OB History    Para Term  AB Living   5 3 2 1 1 3   SAB TAB Ectopic Multiple Live Births   1     0 3      # Outcome Date GA Lbr Yuval/2nd Weight Sex Delivery Anes PTL Lv   5 Current            4  19 34w3d   M CS-LTranv Spinal  JIGAR   3 SAB 18 10w0d          2 Term 09 37w0d  2.722 kg (6 lb) F CS-Unspec EPI  JIGAR   1 Term 05 37w0d  3.345 kg (7 lb 6 oz) M CS-Unspec EPI  JIGAR       Past Surgical History:   Procedure Laterality Date    ANKLE FRACTURE SURGERY       SECTION N/A 2019    Procedure:  SECTION;  Surgeon: Gianna Flowers MD;  Location: [facility] L&D;  Service: OB/GYN;  Laterality: N/A;     SECTION, CLASSIC  2005/2009/2019    x3    CHOLECYSTECTOMY  3/2002    lap maribel    FOOT SURGERY      gastric sleeve  2017        Patient Active Problem List   Diagnosis    Migraine syndrome    Neurogenic claudication    Ankle arthritis    CHTN on no medications    Lower GI bleed    Migraine    Lumbar radiculopathy    Chronic bilateral low back pain with bilateral sciatica    Fibromyalgia muscle pain    Class 2 " severe obesity due to excess calories with serious comorbidity and body mass index (BMI) of 37.0 to 37.9 in adult    Rh negative state in antepartum period    Pregnancy, supervision, high-risk    Obesity complicating pregnancy    History of  section complicating pregnancy    History of wound infection    Hx of bariatric surgery    Cerebrovascular accident (CVA) due to thrombosis    Multigravida of advanced maternal age in second trimester    History of pre-eclampsia- leading to delivery at 34 weeks    Unwanted fertility- tubal papers signed    History of diabetes mellitus- resolved after gastric bypass; last A1c 4.9    Left ventricular hypertrophy    Hypertension, essential    Migraine aura, persistent, intractable, with status migrainosus    Intractable headache    31 weeks gestation of pregnancy    Anemia       Review of patient's allergies indicates:   Allergen Reactions    Ibuprofen Nausea And Vomiting     Other reaction(s): gastric    Tramadol Nausea And Vomiting       Social History     Socioeconomic History    Marital status:      Spouse name: Not on file    Number of children: Not on file    Years of education: Not on file    Highest education level: Not on file   Occupational History     Employer: [employer]   Social Needs    Financial resource strain: Hard    Food insecurity:     Worry: Sometimes true     Inability: Sometimes true    Transportation needs:     Medical: No     Non-medical: No   Tobacco Use    Smoking status: Current Every Day Smoker     Packs/day: 0.50     Years: 18.00     Pack years: 9.00     Types: Cigarettes    Smokeless tobacco: Never Used    Tobacco comment: cutting to 2 cigarettes per day   Substance and Sexual Activity    Alcohol use: No     Alcohol/week: 0.0 standard drinks     Frequency: Never     Drinks per session: Patient refused     Binge frequency: Patient refused    Drug use: No    Sexual activity: Not  Currently     Partners: Male   Lifestyle    Physical activity:     Days per week: 0 days     Minutes per session: 0 min    Stress: Very much   Relationships    Social connections:     Talks on phone: Twice a week     Gets together: Never     Attends Zoroastrian service: Not on file     Active member of club or organization: No     Attends meetings of clubs or organizations: Never     Relationship status:    Other Topics Concern    Caffeine Use Not Asked    Financial Status: Disabled Not Asked    Legal: Involved in criminal litigation Not Asked    Caffeine Use: Frequent Not Asked    Financial Status: Employed Not Asked    Legal: Other Not Asked    Caffeine Use: Moderate Not Asked    Financial Status: Unemployed Not Asked    Leisure: Exercise Not Asked    Caffeine Use: Substantial Not Asked    Financial Status: Other Not Asked    Leisure: Fishing Not Asked    Childhood History: Adopted Not Asked    Firearms: Does patient have access to a firearm? Not Asked    Leisure: Hunting Not Asked    Childhood History: Early trauma Not Asked    Home situation: homeless Not Asked    Leisure: Movie Watching Not Asked    Childhood History: Raised by parents Not Asked    Home situation: lives alone Not Asked    Leisure: Shopping Not Asked    Childhood History: Uneventful Not Asked    Home situation: lives in group home Not Asked    Leisure: Sports Not Asked    Childhood History: Other Not Asked    Home situation: lives in nursing home Not Asked    Leisure: Time with family Not Asked    Education: Unfinished High School Not Asked    Home situation: lives in shelter Not Asked     Service Not Asked    Education: High School Graduate Not Asked    Home situation: lives with family Not Asked    Spirituality: Active Participation Not Asked    Education: Unfinished college Not Asked    Home situation: lives with friends Not Asked    Spirituality: Organized Jainism Not Asked    Education:  Trade School Not Asked    Home situation: lives with significant other Not Asked    Spirituality: Private Participation Not Asked    Education: Associate's Degree Not Asked    Home situation: lives with spouse Not Asked    Patient feels they ought to cut down on drinking/drug use Not Asked    Education: Bachelor's Degree Not Asked    Legal consequences of chemical use Not Asked    Patient annoyed by others criticizing their drinking/drug use Not Asked    Education: More than one Bachelor's or Professional Not Asked    Legal: Arrest history Not Asked    Patient has felt bad or guilty about drinking/drug use Not Asked    Education: Master's, PhD Not Asked    Legal: Involved in civil litigation Not Asked    Patient has had a drink/used drugs as an eye opener in the AM Not Asked   Social History Narrative    Not on file       OBJECTIVE:     Weight:  Wt Readings from Last 4 Encounters:   06/01/20 116.6 kg (257 lb)   05/28/20 118.9 kg (262 lb 2 oz)   05/21/20 117.2 kg (258 lb 6.1 oz)   05/19/20 117.9 kg (260 lb)     Body mass index is 37.41 kg/m².    Outpatient Medications:  No current facility-administered medications on file prior to encounter.      Current Outpatient Medications on File Prior to Encounter   Medication Sig Dispense Refill    butalbital-acetaminophen-caffeine -40 mg (FIORICET, ESGIC) -40 mg per tablet       cholecalciferol, vitamin D3, (VITAMIN D3) 25 mcg (1,000 unit) capsule Take 2 capsules (2,000 Units total) by mouth once daily. 720 capsule 0    ferrous sulfate (FEOSOL) 325 mg (65 mg iron) Tab tablet Take 1 tablet (325 mg total) by mouth once daily. 30 tablet 8    folic acid (FOLVITE) 400 MCG tablet Take 2 tablets (800 mcg total) by mouth once daily. 100 tablet 3    HYDROcodone-acetaminophen (NORCO) 7.5-325 mg per tablet       hydrOXYzine HCl (ATARAX) 10 MG Tab Take 1 tablet (10 mg total) by mouth 2 (two) times daily. 60 tablet 3    magnesium oxide 500 mg Tab Take 1 g  by mouth 2 (two) times a day.  0    omeprazole (PRILOSEC OTC) 20 MG tablet Take 1 tablet (20 mg total) by mouth once daily. 28 tablet 1    omeprazole (PRILOSEC) 20 MG capsule       prenatal vit37/iron/folic acid (PRENATA ORAL) Take by mouth.      prochlorperazine (COMPAZINE) 10 MG tablet Take 1 tablet (10 mg total) by mouth 2 (two) times daily as needed (Headaches or nausea). 45 tablet 1    terconazole (TERAZOL 7) 0.4 % Crea Place 1 applicator vaginally once daily. 1 Tube 0        Current Inpatient Medications:   [START ON 6/2/2020] betamethasone acetate-betamethasone sodium phosphate  12 mg Intramuscular Daily    ferrous sulfate  325 mg Oral Daily    magnesium oxide  400 mg Oral BID    pantoprazole  40 mg Oral Daily    prenatal vitamin  1 tablet Oral Daily       BP Readings from Last 3 Encounters:   06/01/20 113/67   05/28/20 130/72   05/25/20 119/89         Chemistry        Component Value Date/Time     05/31/2020 2215    K 3.6 05/31/2020 2215     05/31/2020 2215    CO2 20 (L) 05/31/2020 2215    BUN 6 05/31/2020 2215    CREATININE 0.6 05/31/2020 2215    GLU 89 05/31/2020 2215        Component Value Date/Time    CALCIUM 8.9 05/31/2020 2215    ALKPHOS 104 05/31/2020 2215    AST 9 (L) 05/31/2020 2215    ALT 9 (L) 05/31/2020 2215    BILITOT 0.1 05/31/2020 2215    ESTGFRAFRICA >60 05/31/2020 2215    EGFRNONAA >60 05/31/2020 2215            Lab Results   Component Value Date    WBC 11.59 05/31/2020    HGB 9.2 (L) 05/31/2020    HCT 29.7 (L) 05/31/2020    MCV 76 (L) 05/31/2020     05/31/2020       No results for input(s): PT, INR, PROTIME, APTT in the last 72 hours.                                      Pre-op Assessment    I have reviewed the Patient Summary Reports.     I have reviewed the Nursing Notes.    I have reviewed the Medications.     Review of Systems  Anesthesia Hx:  No problems with previous Anesthesia  History of prior surgery of interest to airway management or planning: Denies  Family Hx of Anesthesia complications.   Denies Personal Hx of Anesthesia complications.   Social:  Smoker    Hematology/Oncology:  Hematology Normal   Oncology Normal     EENT/Dental:EENT/Dental Normal   Cardiovascular:   Hypertension    Renal/:  Renal/ Normal     Hepatic/GI:   GERD    Neurological:   CVA Headaches               no

## 2020-07-24 ENCOUNTER — APPOINTMENT (OUTPATIENT)
Dept: VASCULAR SURGERY | Facility: CLINIC | Age: 80
End: 2020-07-24

## 2020-10-23 ENCOUNTER — APPOINTMENT (OUTPATIENT)
Dept: VASCULAR SURGERY | Facility: CLINIC | Age: 80
End: 2020-10-23
Payer: MEDICARE

## 2020-10-23 VITALS — TEMPERATURE: 96.9 F

## 2020-10-23 PROCEDURE — 99072 ADDL SUPL MATRL&STAF TM PHE: CPT

## 2020-10-23 PROCEDURE — 99213 OFFICE O/P EST LOW 20 MIN: CPT

## 2020-10-23 PROCEDURE — 93926 LOWER EXTREMITY STUDY: CPT

## 2020-10-23 PROCEDURE — 93979 VASCULAR STUDY: CPT

## 2020-10-23 NOTE — ASSESSMENT
[FreeTextEntry1] : Patient with severe peripheral vascular disease, status post revascularization procedures in the past.  Current duplex shows distal left external iliac, left common femoral artery stenosis proximal to the bypass.  Plan for left leg angiogram and possible intervention.

## 2020-10-23 NOTE — HISTORY OF PRESENT ILLNESS
[FreeTextEntry1] : Patient with severe peripheral vascular disease, status post left iliac stent placement with left femoral to tibial bypass with PTFE.  Patient has been doing well until about 3 months ago.  Since then the patient has started having symptoms of claudication of less than 10 minutes of walking.  Patient is unhappy about it since he exercises a lot and he would like to continue to do so.  No rest pain.  No history of tissue loss.

## 2020-11-04 DIAGNOSIS — Z01.818 ENCOUNTER FOR OTHER PREPROCEDURAL EXAMINATION: ICD-10-CM

## 2020-11-05 NOTE — PATIENT PROFILE ADULT - FLU SEASON?
Message sent to CTS team advising patient's mother is at the bedside and has questions regarding the surgery before her daughter is taken down, permit is not signed at this time.       Vikas Ga RN No

## 2020-11-06 ENCOUNTER — APPOINTMENT (OUTPATIENT)
Dept: DISASTER EMERGENCY | Facility: CLINIC | Age: 80
End: 2020-11-06

## 2020-11-06 VITALS — TEMPERATURE: 97.1 F

## 2020-11-08 LAB — SARS-COV-2 N GENE NPH QL NAA+PROBE: NOT DETECTED

## 2020-11-10 ENCOUNTER — FORM ENCOUNTER (OUTPATIENT)
Age: 80
End: 2020-11-10

## 2020-11-11 ENCOUNTER — LABORATORY RESULT (OUTPATIENT)
Age: 80
End: 2020-11-11

## 2020-11-11 ENCOUNTER — APPOINTMENT (OUTPATIENT)
Dept: ENDOVASCULAR SURGERY | Facility: CLINIC | Age: 80
End: 2020-11-11
Payer: MEDICARE

## 2020-11-11 VITALS
HEART RATE: 71 BPM | RESPIRATION RATE: 16 BRPM | HEIGHT: 70 IN | BODY MASS INDEX: 28.06 KG/M2 | DIASTOLIC BLOOD PRESSURE: 72 MMHG | WEIGHT: 196 LBS | SYSTOLIC BLOOD PRESSURE: 164 MMHG | OXYGEN SATURATION: 98 % | TEMPERATURE: 98.1 F

## 2020-11-11 PROCEDURE — 37225Z: CUSTOM | Mod: LT

## 2020-11-11 PROCEDURE — 75625 CONTRAST EXAM ABDOMINL AORTA: CPT

## 2020-11-11 PROCEDURE — 99072 ADDL SUPL MATRL&STAF TM PHE: CPT

## 2020-11-11 PROCEDURE — 37220Z: CUSTOM | Mod: 59,LT

## 2020-11-11 RX ORDER — CLOPIDOGREL BISULFATE 75 MG/1
75 TABLET, FILM COATED ORAL DAILY
Qty: 90 | Refills: 3 | Status: DISCONTINUED | COMMUNITY
Start: 2019-06-14 | End: 2020-11-11

## 2020-11-11 NOTE — HISTORY OF PRESENT ILLNESS
[FreeTextEntry1] : alert and oriented\par accompanied by daughter Gaby Caruso [phone]\par feels ok\par no falls\par took\par last eliquis Monday 11/9/2020\par Cr 1.26 11/6/2020\par covid not detected 11/6/2020 [FreeTextEntry5] : yesterday  at 6pm [FreeTextEntry6] : Dr Burgos

## 2020-11-11 NOTE — REASON FOR VISIT
[Other ___] : a [unfilled] visit for [FreeTextEntry2] : PAD/left leg pain and stenosis on duplex of left common femoral and external iliac artery

## 2020-11-11 NOTE — PROCEDURE
[D/C IV on discharge] : D/C IV on discharge [Resume diet] : resume diet [FreeTextEntry1] : Aortogram, left leg angiogram/athrectomy/angioplasty

## 2020-12-04 ENCOUNTER — APPOINTMENT (OUTPATIENT)
Dept: VASCULAR SURGERY | Facility: CLINIC | Age: 80
End: 2020-12-04
Payer: MEDICARE

## 2020-12-04 VITALS — TEMPERATURE: 97.7 F

## 2020-12-04 PROCEDURE — 99072 ADDL SUPL MATRL&STAF TM PHE: CPT

## 2020-12-04 PROCEDURE — 93926 LOWER EXTREMITY STUDY: CPT

## 2020-12-04 PROCEDURE — 99213 OFFICE O/P EST LOW 20 MIN: CPT

## 2021-03-12 ENCOUNTER — APPOINTMENT (OUTPATIENT)
Dept: VASCULAR SURGERY | Facility: CLINIC | Age: 81
End: 2021-03-12
Payer: MEDICARE

## 2021-03-12 VITALS — TEMPERATURE: 98 F

## 2021-03-12 PROCEDURE — 99213 OFFICE O/P EST LOW 20 MIN: CPT

## 2021-03-12 PROCEDURE — 99072 ADDL SUPL MATRL&STAF TM PHE: CPT

## 2021-03-12 PROCEDURE — 93926 LOWER EXTREMITY STUDY: CPT

## 2021-04-15 LAB
ALBUMIN SERPL ELPH-MCNC: 4.5 G/DL
ALBUMIN SERPL ELPH-MCNC: 4.5 G/DL
ALP BLD-CCNC: 71 U/L
ALP BLD-CCNC: 76 U/L
ALT SERPL-CCNC: 20 U/L
ALT SERPL-CCNC: 20 U/L
ANION GAP SERPL CALC-SCNC: 12 MMOL/L
ANION GAP SERPL CALC-SCNC: 13 MMOL/L
APTT BLD: 32.6 SEC
APTT BLD: 33 SEC
AST SERPL-CCNC: 25 U/L
AST SERPL-CCNC: 28 U/L
BASOPHILS # BLD AUTO: 0.01 K/UL
BASOPHILS # BLD AUTO: 0.02 K/UL
BASOPHILS NFR BLD AUTO: 0.1 %
BASOPHILS NFR BLD AUTO: 0.2 %
BILIRUB SERPL-MCNC: 0.4 MG/DL
BILIRUB SERPL-MCNC: 0.7 MG/DL
BUN SERPL-MCNC: 14 MG/DL
BUN SERPL-MCNC: 18 MG/DL
CALCIUM SERPL-MCNC: 9.1 MG/DL
CALCIUM SERPL-MCNC: 9.4 MG/DL
CHLORIDE SERPL-SCNC: 103 MMOL/L
CHLORIDE SERPL-SCNC: 103 MMOL/L
CO2 SERPL-SCNC: 25 MMOL/L
CO2 SERPL-SCNC: 26 MMOL/L
CREAT SERPL-MCNC: 1.26 MG/DL
CREAT SERPL-MCNC: 1.46 MG/DL
EOSINOPHIL # BLD AUTO: 0.1 K/UL
EOSINOPHIL # BLD AUTO: 0.21 K/UL
EOSINOPHIL NFR BLD AUTO: 1.3 %
EOSINOPHIL NFR BLD AUTO: 2.4 %
GLUCOSE SERPL-MCNC: 101 MG/DL
GLUCOSE SERPL-MCNC: 95 MG/DL
HCT VFR BLD CALC: 43.4 %
HCT VFR BLD CALC: 44.1 %
HGB BLD-MCNC: 14.3 G/DL
HGB BLD-MCNC: 14.6 G/DL
IMM GRANULOCYTES NFR BLD AUTO: 0.2 %
IMM GRANULOCYTES NFR BLD AUTO: 0.3 %
INR PPP: 0.97 RATIO
INR PPP: 1.01 RATIO
LYMPHOCYTES # BLD AUTO: 1.34 K/UL
LYMPHOCYTES # BLD AUTO: 1.98 K/UL
LYMPHOCYTES NFR BLD AUTO: 16.9 %
LYMPHOCYTES NFR BLD AUTO: 23 %
MAN DIFF?: NORMAL
MAN DIFF?: NORMAL
MCHC RBC-ENTMCNC: 32.8 PG
MCHC RBC-ENTMCNC: 32.9 GM/DL
MCHC RBC-ENTMCNC: 33.1 GM/DL
MCHC RBC-ENTMCNC: 33.2 PG
MCV RBC AUTO: 100.2 FL
MCV RBC AUTO: 99.5 FL
MONOCYTES # BLD AUTO: 0.76 K/UL
MONOCYTES # BLD AUTO: 0.91 K/UL
MONOCYTES NFR BLD AUTO: 10.6 %
MONOCYTES NFR BLD AUTO: 9.6 %
NEUTROPHILS # BLD AUTO: 5.48 K/UL
NEUTROPHILS # BLD AUTO: 5.7 K/UL
NEUTROPHILS NFR BLD AUTO: 63.6 %
NEUTROPHILS NFR BLD AUTO: 71.8 %
PLATELET # BLD AUTO: 150 K/UL
PLATELET # BLD AUTO: 157 K/UL
POTASSIUM SERPL-SCNC: 3.9 MMOL/L
POTASSIUM SERPL-SCNC: 4.1 MMOL/L
PROT SERPL-MCNC: 6.8 G/DL
PROT SERPL-MCNC: 6.9 G/DL
PT BLD: 11 SEC
PT BLD: 11.9 SEC
RBC # BLD: 4.36 M/UL
RBC # BLD: 4.4 M/UL
RBC # FLD: 13.1 %
RBC # FLD: 13.1 %
SODIUM SERPL-SCNC: 141 MMOL/L
SODIUM SERPL-SCNC: 142 MMOL/L
WBC # FLD AUTO: 7.93 K/UL
WBC # FLD AUTO: 8.62 K/UL

## 2021-05-14 ENCOUNTER — APPOINTMENT (OUTPATIENT)
Dept: VASCULAR SURGERY | Facility: CLINIC | Age: 81
End: 2021-05-14
Payer: MEDICARE

## 2021-05-14 ENCOUNTER — INPATIENT (INPATIENT)
Facility: HOSPITAL | Age: 81
LOS: 5 days | Discharge: HOME | DRG: 253 | End: 2021-05-20
Attending: SURGERY | Admitting: SURGERY
Payer: MEDICARE

## 2021-05-14 VITALS
TEMPERATURE: 98 F | DIASTOLIC BLOOD PRESSURE: 62 MMHG | WEIGHT: 196 LBS | HEART RATE: 67 BPM | SYSTOLIC BLOOD PRESSURE: 162 MMHG | HEIGHT: 70 IN | OXYGEN SATURATION: 99 % | BODY MASS INDEX: 28.06 KG/M2

## 2021-05-14 VITALS
WEIGHT: 197.98 LBS | TEMPERATURE: 98 F | HEIGHT: 70.5 IN | SYSTOLIC BLOOD PRESSURE: 163 MMHG | RESPIRATION RATE: 18 BRPM | OXYGEN SATURATION: 97 % | DIASTOLIC BLOOD PRESSURE: 78 MMHG | HEART RATE: 73 BPM

## 2021-05-14 DIAGNOSIS — Z98.890 OTHER SPECIFIED POSTPROCEDURAL STATES: Chronic | ICD-10-CM

## 2021-05-14 DIAGNOSIS — Z98.89 OTHER SPECIFIED POSTPROCEDURAL STATES: Chronic | ICD-10-CM

## 2021-05-14 DIAGNOSIS — I99.8 OTHER DISORDER OF CIRCULATORY SYSTEM: ICD-10-CM

## 2021-05-14 LAB
ALBUMIN SERPL ELPH-MCNC: 4.3 G/DL — SIGNIFICANT CHANGE UP (ref 3.3–5)
ALBUMIN SERPL ELPH-MCNC: 4.6 G/DL
ALP BLD-CCNC: 75 U/L
ALP SERPL-CCNC: 72 U/L — SIGNIFICANT CHANGE UP (ref 40–120)
ALT FLD-CCNC: 19 U/L — SIGNIFICANT CHANGE UP (ref 10–45)
ALT SERPL-CCNC: 17 U/L
ANION GAP SERPL CALC-SCNC: 11 MMOL/L
ANION GAP SERPL CALC-SCNC: 13 MMOL/L — SIGNIFICANT CHANGE UP (ref 5–17)
APTT BLD: 30.2 SEC — SIGNIFICANT CHANGE UP (ref 27.5–35.5)
APTT BLD: 31.4 SEC
AST SERPL-CCNC: 22 U/L — SIGNIFICANT CHANGE UP (ref 10–40)
AST SERPL-CCNC: 26 U/L
BASOPHILS # BLD AUTO: 0.01 K/UL
BASOPHILS # BLD AUTO: 0.02 K/UL — SIGNIFICANT CHANGE UP (ref 0–0.2)
BASOPHILS NFR BLD AUTO: 0.1 %
BASOPHILS NFR BLD AUTO: 0.2 % — SIGNIFICANT CHANGE UP (ref 0–2)
BILIRUB SERPL-MCNC: 0.4 MG/DL
BILIRUB SERPL-MCNC: 0.5 MG/DL — SIGNIFICANT CHANGE UP (ref 0.2–1.2)
BUN SERPL-MCNC: 14 MG/DL
BUN SERPL-MCNC: 15 MG/DL — SIGNIFICANT CHANGE UP (ref 7–23)
CALCIUM SERPL-MCNC: 9.2 MG/DL — SIGNIFICANT CHANGE UP (ref 8.4–10.5)
CALCIUM SERPL-MCNC: 9.7 MG/DL
CHLORIDE SERPL-SCNC: 103 MMOL/L
CHLORIDE SERPL-SCNC: 103 MMOL/L — SIGNIFICANT CHANGE UP (ref 96–108)
CO2 SERPL-SCNC: 22 MMOL/L — SIGNIFICANT CHANGE UP (ref 22–31)
CO2 SERPL-SCNC: 24 MMOL/L
CREAT SERPL-MCNC: 1.23 MG/DL
CREAT SERPL-MCNC: 1.31 MG/DL — HIGH (ref 0.5–1.3)
EOSINOPHIL # BLD AUTO: 0.04 K/UL — SIGNIFICANT CHANGE UP (ref 0–0.5)
EOSINOPHIL # BLD AUTO: 0.08 K/UL
EOSINOPHIL NFR BLD AUTO: 0.5 % — SIGNIFICANT CHANGE UP (ref 0–6)
EOSINOPHIL NFR BLD AUTO: 1.1 %
GLUCOSE SERPL-MCNC: 85 MG/DL
GLUCOSE SERPL-MCNC: 96 MG/DL — SIGNIFICANT CHANGE UP (ref 70–99)
HCT VFR BLD CALC: 42.8 % — SIGNIFICANT CHANGE UP (ref 39–50)
HCT VFR BLD CALC: 46.9 %
HGB BLD-MCNC: 14.5 G/DL — SIGNIFICANT CHANGE UP (ref 13–17)
HGB BLD-MCNC: 15.7 G/DL
IMM GRANULOCYTES NFR BLD AUTO: 0.1 %
IMM GRANULOCYTES NFR BLD AUTO: 0.2 % — SIGNIFICANT CHANGE UP (ref 0–1.5)
INR BLD: 0.95 RATIO — SIGNIFICANT CHANGE UP (ref 0.88–1.16)
INR PPP: 0.9 RATIO
LYMPHOCYTES # BLD AUTO: 1.17 K/UL — SIGNIFICANT CHANGE UP (ref 1–3.3)
LYMPHOCYTES # BLD AUTO: 1.23 K/UL
LYMPHOCYTES # BLD AUTO: 14.3 % — SIGNIFICANT CHANGE UP (ref 13–44)
LYMPHOCYTES NFR BLD AUTO: 16.4 %
MAN DIFF?: NORMAL
MCHC RBC-ENTMCNC: 33.3 PG
MCHC RBC-ENTMCNC: 33.5 GM/DL
MCHC RBC-ENTMCNC: 33.5 PG — SIGNIFICANT CHANGE UP (ref 27–34)
MCHC RBC-ENTMCNC: 33.9 GM/DL — SIGNIFICANT CHANGE UP (ref 32–36)
MCV RBC AUTO: 98.8 FL — SIGNIFICANT CHANGE UP (ref 80–100)
MCV RBC AUTO: 99.4 FL
MONOCYTES # BLD AUTO: 0.77 K/UL — SIGNIFICANT CHANGE UP (ref 0–0.9)
MONOCYTES # BLD AUTO: 0.85 K/UL
MONOCYTES NFR BLD AUTO: 11.3 %
MONOCYTES NFR BLD AUTO: 9.4 % — SIGNIFICANT CHANGE UP (ref 2–14)
NEUTROPHILS # BLD AUTO: 5.32 K/UL
NEUTROPHILS # BLD AUTO: 6.19 K/UL — SIGNIFICANT CHANGE UP (ref 1.8–7.4)
NEUTROPHILS NFR BLD AUTO: 71 %
NEUTROPHILS NFR BLD AUTO: 75.4 % — SIGNIFICANT CHANGE UP (ref 43–77)
NRBC # BLD: 0 /100 WBCS — SIGNIFICANT CHANGE UP (ref 0–0)
PLATELET # BLD AUTO: 184 K/UL
PLATELET # BLD AUTO: 198 K/UL — SIGNIFICANT CHANGE UP (ref 150–400)
POTASSIUM SERPL-MCNC: 4.1 MMOL/L — SIGNIFICANT CHANGE UP (ref 3.5–5.3)
POTASSIUM SERPL-SCNC: 4.1 MMOL/L — SIGNIFICANT CHANGE UP (ref 3.5–5.3)
POTASSIUM SERPL-SCNC: 4.7 MMOL/L
PROT SERPL-MCNC: 7.2 G/DL
PROT SERPL-MCNC: 7.3 G/DL — SIGNIFICANT CHANGE UP (ref 6–8.3)
PROTHROM AB SERPL-ACNC: 11.4 SEC — SIGNIFICANT CHANGE UP (ref 10.6–13.6)
PT BLD: 10.7 SEC
RBC # BLD: 4.33 M/UL — SIGNIFICANT CHANGE UP (ref 4.2–5.8)
RBC # BLD: 4.72 M/UL
RBC # FLD: 12.9 % — SIGNIFICANT CHANGE UP (ref 10.3–14.5)
RBC # FLD: 13 %
SODIUM SERPL-SCNC: 138 MMOL/L
SODIUM SERPL-SCNC: 138 MMOL/L — SIGNIFICANT CHANGE UP (ref 135–145)
WBC # BLD: 8.21 K/UL — SIGNIFICANT CHANGE UP (ref 3.8–10.5)
WBC # FLD AUTO: 7.5 K/UL
WBC # FLD AUTO: 8.21 K/UL — SIGNIFICANT CHANGE UP (ref 3.8–10.5)

## 2021-05-14 PROCEDURE — 99072 ADDL SUPL MATRL&STAF TM PHE: CPT

## 2021-05-14 PROCEDURE — 93010 ELECTROCARDIOGRAM REPORT: CPT

## 2021-05-14 PROCEDURE — 75635 CT ANGIO ABDOMINAL ARTERIES: CPT | Mod: 26,MA

## 2021-05-14 PROCEDURE — 99214 OFFICE O/P EST MOD 30 MIN: CPT

## 2021-05-14 PROCEDURE — 93925 LOWER EXTREMITY STUDY: CPT

## 2021-05-14 PROCEDURE — 99285 EMERGENCY DEPT VISIT HI MDM: CPT

## 2021-05-14 RX ORDER — HEPARIN SODIUM 5000 [USP'U]/ML
7000 INJECTION INTRAVENOUS; SUBCUTANEOUS EVERY 6 HOURS
Refills: 0 | Status: DISCONTINUED | OUTPATIENT
Start: 2021-05-14 | End: 2021-05-14

## 2021-05-14 RX ORDER — HEPARIN SODIUM 5000 [USP'U]/ML
6500 INJECTION INTRAVENOUS; SUBCUTANEOUS EVERY 6 HOURS
Refills: 0 | Status: DISCONTINUED | OUTPATIENT
Start: 2021-05-14 | End: 2021-05-15

## 2021-05-14 RX ORDER — HEPARIN SODIUM 5000 [USP'U]/ML
INJECTION INTRAVENOUS; SUBCUTANEOUS
Qty: 25000 | Refills: 0 | Status: DISCONTINUED | OUTPATIENT
Start: 2021-05-14 | End: 2021-05-15

## 2021-05-14 RX ORDER — FAMOTIDINE 10 MG/ML
1 INJECTION INTRAVENOUS
Qty: 0 | Refills: 0 | DISCHARGE

## 2021-05-14 RX ORDER — HEPARIN SODIUM 5000 [USP'U]/ML
7000 INJECTION INTRAVENOUS; SUBCUTANEOUS ONCE
Refills: 0 | Status: DISCONTINUED | OUTPATIENT
Start: 2021-05-14 | End: 2021-05-14

## 2021-05-14 RX ORDER — HEPARIN SODIUM 5000 [USP'U]/ML
3500 INJECTION INTRAVENOUS; SUBCUTANEOUS EVERY 6 HOURS
Refills: 0 | Status: DISCONTINUED | OUTPATIENT
Start: 2021-05-14 | End: 2021-05-14

## 2021-05-14 RX ORDER — LOSARTAN/HYDROCHLOROTHIAZIDE 100MG-25MG
1 TABLET ORAL
Qty: 0 | Refills: 0 | DISCHARGE

## 2021-05-14 RX ORDER — HEPARIN SODIUM 5000 [USP'U]/ML
6500 INJECTION INTRAVENOUS; SUBCUTANEOUS ONCE
Refills: 0 | Status: COMPLETED | OUTPATIENT
Start: 2021-05-14 | End: 2021-05-14

## 2021-05-14 RX ORDER — BUMETANIDE 0.25 MG/ML
0 INJECTION INTRAMUSCULAR; INTRAVENOUS
Qty: 0 | Refills: 0 | DISCHARGE

## 2021-05-14 RX ORDER — HEPARIN SODIUM 5000 [USP'U]/ML
3000 INJECTION INTRAVENOUS; SUBCUTANEOUS EVERY 6 HOURS
Refills: 0 | Status: DISCONTINUED | OUTPATIENT
Start: 2021-05-14 | End: 2021-05-15

## 2021-05-14 RX ORDER — AMLODIPINE BESYLATE 2.5 MG/1
1 TABLET ORAL
Qty: 0 | Refills: 0 | DISCHARGE

## 2021-05-14 RX ORDER — ATORVASTATIN CALCIUM 80 MG/1
0 TABLET, FILM COATED ORAL
Qty: 0 | Refills: 0 | DISCHARGE

## 2021-05-14 RX ORDER — CLONAZEPAM 1 MG
2 TABLET ORAL
Qty: 0 | Refills: 0 | DISCHARGE

## 2021-05-14 RX ORDER — POTASSIUM CHLORIDE 20 MEQ
0 PACKET (EA) ORAL
Qty: 0 | Refills: 0 | DISCHARGE

## 2021-05-14 RX ORDER — TRAMADOL HYDROCHLORIDE 50 MG/1
1 TABLET ORAL
Qty: 0 | Refills: 0 | DISCHARGE

## 2021-05-14 RX ORDER — HEPARIN SODIUM 5000 [USP'U]/ML
INJECTION INTRAVENOUS; SUBCUTANEOUS
Qty: 25000 | Refills: 0 | Status: DISCONTINUED | OUTPATIENT
Start: 2021-05-14 | End: 2021-05-14

## 2021-05-14 RX ADMIN — HEPARIN SODIUM 6500 UNIT(S): 5000 INJECTION INTRAVENOUS; SUBCUTANEOUS at 20:05

## 2021-05-14 RX ADMIN — HEPARIN SODIUM 1500 UNIT(S)/HR: 5000 INJECTION INTRAVENOUS; SUBCUTANEOUS at 20:06

## 2021-05-14 NOTE — ED ADULT NURSE NOTE - PSH
H/O endarterectomy  Right Femoral, 2007  H/O shoulder surgery  left  S/P femoral-tibial bypass  [phone] as per pt

## 2021-05-14 NOTE — ED PROVIDER NOTE - OBJECTIVE STATEMENT
81 y/o M w/ PMHx of HTN, HLD, PVD s/p iliac stent and left femoral to posterior tibial bypass using PTFE on Eliquis p/w severe 10/10 L calf pain on exertion x3 days. Pt notes that after surgery he had been able to run greater than 2 miles a day but over the past 3 days began having severe calf pain after walking a few blocks. Pain is relieved w/ rest. Otherwise denies any chest pain, SOB, N/V, numbness, weakness. Pt follow w/ Dr. Gilbert Carrasco

## 2021-05-14 NOTE — ED ADULT TRIAGE NOTE - CHIEF COMPLAINT QUOTE
Chronic DVT's in left leg, scheduled for stent procedure Monday but has worsening pain and vascular MD told him to go to the ED

## 2021-05-14 NOTE — ED PROVIDER NOTE - INTERPRETATION
[address] [facility]   Phone: [phone]   Fax: [phone]      Physical Therapy Daily Treatment Note    Date: 2020  Patient Name: Vincent Dejesus  : 2006   MRN: [id number]  DOInjury: 3 weeks   DOSx: NA  Referring Provider: Brandon Riggs, [address] [facility] [address] [place] [address], [address] [facility]     Medical Diagnosis:     U56.582A (ICD-10-CM) - Strain of right hamstring muscle, subsequent encounter     Outcome Measure: LEFS 51%    S: Patient reports to therapy after 3 weeks of not attending. Patient reports she has been feeling a little better. Patient reports she is back to running during basketball but she can't sprint. States she is back to doing jump shots as well. Reports she started progression on Monday the 20. O:   Time 800-840     Visit 3/8 Repeat outcome measure at mid point and end. Pain 4/10     Strength Right: Hip: 4-/5 globally, Knee: Flexion 4-/5,  Extension 4-/5  Left: Hip: 4+/5 globally, Knee: Flexion 4+/5,  Extension 4+/5     Palpation Tender to palpation HS tendon into origin, distal glut max insertion.       ROM Right:   AROM: 125° Flexion,  0° Extension     Left:   AROM: 145° Flexion,  0° Extension           Modality        US    NC   Stretch      Glute  3x30s R HEP TE   IT band supine 3x30s R HEP TE   HS supine 3x30s R HEP TE   Quad Prone 3x30s R HEP TE         Exercise      Bike 5 mins   TE     TE    TE    TE   Toe Raises       Prone HS Curl  3 x 10 3s holds  YTB TE   Bridges  3 x 10 3s holds   TE   Prone hip ext 3 x 10 3s holds   TE   Prone hip ext/glute ext  3 x 10 3s holds   TE   Supine SB Bridges 3 x 10 3s holds   TE   SL Bridges       TG squats 3 x 10 3s holds   TA         Lunge Walking       SL balance       Supine bent knee bridge walk outs       Prone HS curls                                 NMR To improve balance for safe community and home ambulation    Resisted walk      FWD      BKWD      lat      March      Side stepping      Retro walk      Heel to toe      A:  Tolerated well. Progressed patient c light HS strengthening this session. Reports she has started conditioning activities c her basketball team again. She tolerates session good without any increase in pain levels. Added to I HEPs this session c good understanding of how to perform.      P: Continue with rehab plan  Hattie Castellano, PTA [id number]    Treatment Charges: Mins Units   Initial Evaluation     Re-Evaluation     Ther Exercise         TE 40 3   Manual Therapy     MT     Ther Activities        TA     Gait Training          GT     Neuro Re-education NR     Modalities     Non-Billable Service Time     Other     Total Time/Units 40 3 abnormal

## 2021-05-14 NOTE — ED PROVIDER NOTE - PHYSICAL EXAMINATION
GENERAL: NAD, lying in bed comfortably  HEAD:  Atraumatic, normocephalic  EYES: EOMI, PERRLA, conjunctiva and sclera clear  ENT: Moist mucous membranes  NECK: Supple, no JVD  HEART: Regular rate and rhythm, no murmurs, rubs, or gallops  LUNGS: Unlabored respirations.  Clear to auscultation bilaterally, no crackles, wheezing, or rhonchi  ABDOMEN: Soft, nontender, nondistended, +BS  EXTREMITIES: LLE - No cyanosis or edema. Good capillary refill, warm to touch, unable to palpate DP/PT pulses  NERVOUS SYSTEM:  A&Ox3, no focal deficits   SKIN: No rashes or lesions

## 2021-05-14 NOTE — ED PROVIDER NOTE - CLINICAL SUMMARY MEDICAL DECISION MAKING FREE TEXT BOX
Dr. Garcia (Attending Physician)  Pt. with ho PAD s/p L Fem-PT bypass pw exertional left calf pain, normal calf appearance, nontender to palpation, unable to palpate dp/pt pulses but cap refill is equal on right and left lower extremity. will doppler, check labs, cta LLE, consult vascular, likely admit.

## 2021-05-14 NOTE — HISTORY OF PRESENT ILLNESS
[FreeTextEntry1] : Patient is a 80-year-old gentleman with past medical history significant for peripheral vascular disease, status post iliac stent and left femoral to posterior tibial bypass using PTFE on anticoagulation who after surgery has been able to walk more than 2 miles a day.  For the past 2 to 3 days patient has not been able to complete more than 1-2 blocks without significant left calf pain.  No complaint of significant rest pain.

## 2021-05-14 NOTE — ED PROVIDER NOTE - PSH
H/O endarterectomy  Right Femoral, 2007  H/O shoulder surgery  left  S/P femoral-tibial bypass  [phone] as per pt

## 2021-05-14 NOTE — ASSESSMENT
[FreeTextEntry1] : Patient with left leg ischemia.  Left iliac occlusion proximal to the left femoral to PT bypass.  Recommend admission to the hospital for IV heparin and a CAT scan.  Patient probably will need a right femoral to left femoral bypass.\par \par I spoke to the patient and the patient's daughter and explained risks benefits and alternative modes of treatment.

## 2021-05-14 NOTE — PHYSICAL EXAM
[Normal Breath Sounds] : Normal breath sounds [Normal Heart Sounds] : normal heart sounds [0] : left 0 [Ankle Swelling (On Exam)] : present [Ankle Swelling On The Left] : moderate [Alert] : alert [Oriented to Person] : oriented to person [Oriented to Place] : oriented to place [JVD] : no jugular venous distention  [Varicose Veins Of Lower Extremities] : not present [] : not present [Abdomen Masses] : No abdominal masses [Skin Ulcer] : no ulcer

## 2021-05-14 NOTE — ED PROVIDER NOTE - PMH
Anxiety    Claudication in peripheral vascular disease  on Eliquis once a day for DVT prophylaxis and for LE blood flow as per pt  DVT (deep venous thrombosis)  11/2013, left  Dyslipidemia    ED (erectile dysfunction)    H/O gastroesophageal reflux (GERD)    Hypertension    Pedal edema    Seasonal allergies

## 2021-05-14 NOTE — ED PROVIDER NOTE - ATTENDING CONTRIBUTION TO CARE
Dr. Garcia (Attending Physician)  I performed a history and physical exam of the patient and discussed their management with the resident. I reviewed the resident's note and agree with the documented findings and plan of care. My medical decision making and observations are found above.

## 2021-05-14 NOTE — ED ADULT NURSE NOTE - OBJECTIVE STATEMENT
79 y/o Male c/o severe left leg/ calf  pain upon walking x 3 days.  Pt reports he has history of left leg bypass, on Eliquis  Pt reports he has been able to run greater than 2 miles a day after surgery but over the past 3 days he began having severe calf pain after walking a few blocks.  Pain is relieved with rest.  Pt denies SOB, chest pain, SOB, numbness, weakness, n/v/d and leg swelling.  Extremities mobile.  Left leg (+) pulses, pt able to move his leg.  No acute respiratory distress noted.

## 2021-05-14 NOTE — ED PROVIDER NOTE - PROGRESS NOTE DETAILS
Vascular surgery consulted. Vascular aware of patient and will see him. Recommend starting heparin given concern of iliac stent thrombosis Tristan Jackson, PGY 3: Received sign out on patient. pending CTA and will admit. patient is comfortable in the room.

## 2021-05-14 NOTE — ED PROVIDER NOTE - NS ED ROS FT
CONSTITUTIONAL: No weakness, fevers or chills  EYES/ENT: No visual changes  RESPIRATORY: No cough, wheezing, hemoptysis; No shortness of breath  CARDIOVASCULAR: No chest pain or palpitations  GASTROINTESTINAL: No abdominal or epigastric pain. No nausea, vomiting, or hematemesis; No diarrhea or constipation. No melena or hematochezia.  GENITOURINARY: No dysuria, frequency or hematuria  MUSCULOSKELETAL: (+) LLE pain  NEUROLOGICAL: No numbness or weakness  SKIN: No itching, rashes

## 2021-05-15 DIAGNOSIS — I10 ESSENTIAL (PRIMARY) HYPERTENSION: ICD-10-CM

## 2021-05-15 DIAGNOSIS — I74.3 EMBOLISM AND THROMBOSIS OF ARTERIES OF THE LOWER EXTREMITIES: ICD-10-CM

## 2021-05-15 LAB
ANION GAP SERPL CALC-SCNC: 14 MMOL/L — SIGNIFICANT CHANGE UP (ref 5–17)
APTT BLD: 193.4 SEC — CRITICAL HIGH (ref 27.5–35.5)
APTT BLD: 89.7 SEC — HIGH (ref 27.5–35.5)
APTT BLD: 96.8 SEC — HIGH (ref 27.5–35.5)
BUN SERPL-MCNC: 13 MG/DL — SIGNIFICANT CHANGE UP (ref 7–23)
CALCIUM SERPL-MCNC: 9.2 MG/DL — SIGNIFICANT CHANGE UP (ref 8.4–10.5)
CHLORIDE SERPL-SCNC: 102 MMOL/L — SIGNIFICANT CHANGE UP (ref 96–108)
CO2 SERPL-SCNC: 23 MMOL/L — SIGNIFICANT CHANGE UP (ref 22–31)
CREAT SERPL-MCNC: 1.22 MG/DL — SIGNIFICANT CHANGE UP (ref 0.5–1.3)
GLUCOSE SERPL-MCNC: 82 MG/DL — SIGNIFICANT CHANGE UP (ref 70–99)
HCT VFR BLD CALC: 41.1 % — SIGNIFICANT CHANGE UP (ref 39–50)
HCT VFR BLD CALC: 42.4 % — SIGNIFICANT CHANGE UP (ref 39–50)
HGB BLD-MCNC: 14.2 G/DL — SIGNIFICANT CHANGE UP (ref 13–17)
HGB BLD-MCNC: 14.4 G/DL — SIGNIFICANT CHANGE UP (ref 13–17)
INR BLD: 0.93 RATIO — SIGNIFICANT CHANGE UP (ref 0.88–1.16)
INR BLD: 0.95 RATIO — SIGNIFICANT CHANGE UP (ref 0.88–1.16)
INR BLD: 1.02 RATIO — SIGNIFICANT CHANGE UP (ref 0.88–1.16)
MAGNESIUM SERPL-MCNC: 1.9 MG/DL — SIGNIFICANT CHANGE UP (ref 1.6–2.6)
MCHC RBC-ENTMCNC: 32.7 PG — SIGNIFICANT CHANGE UP (ref 27–34)
MCHC RBC-ENTMCNC: 33.3 PG — SIGNIFICANT CHANGE UP (ref 27–34)
MCHC RBC-ENTMCNC: 34 GM/DL — SIGNIFICANT CHANGE UP (ref 32–36)
MCHC RBC-ENTMCNC: 34.5 GM/DL — SIGNIFICANT CHANGE UP (ref 32–36)
MCV RBC AUTO: 96.4 FL — SIGNIFICANT CHANGE UP (ref 80–100)
MCV RBC AUTO: 96.5 FL — SIGNIFICANT CHANGE UP (ref 80–100)
NRBC # BLD: 0 /100 WBCS — SIGNIFICANT CHANGE UP (ref 0–0)
NRBC # BLD: 0 /100 WBCS — SIGNIFICANT CHANGE UP (ref 0–0)
PHOSPHATE SERPL-MCNC: 3.3 MG/DL — SIGNIFICANT CHANGE UP (ref 2.5–4.5)
PLATELET # BLD AUTO: 180 K/UL — SIGNIFICANT CHANGE UP (ref 150–400)
PLATELET # BLD AUTO: 184 K/UL — SIGNIFICANT CHANGE UP (ref 150–400)
POTASSIUM SERPL-MCNC: 3.5 MMOL/L — SIGNIFICANT CHANGE UP (ref 3.5–5.3)
POTASSIUM SERPL-SCNC: 3.5 MMOL/L — SIGNIFICANT CHANGE UP (ref 3.5–5.3)
PROTHROM AB SERPL-ACNC: 11.2 SEC — SIGNIFICANT CHANGE UP (ref 10.6–13.6)
PROTHROM AB SERPL-ACNC: 11.4 SEC — SIGNIFICANT CHANGE UP (ref 10.6–13.6)
PROTHROM AB SERPL-ACNC: 12.2 SEC — SIGNIFICANT CHANGE UP (ref 10.6–13.6)
RBC # BLD: 4.26 M/UL — SIGNIFICANT CHANGE UP (ref 4.2–5.8)
RBC # BLD: 4.4 M/UL — SIGNIFICANT CHANGE UP (ref 4.2–5.8)
RBC # FLD: 12.9 % — SIGNIFICANT CHANGE UP (ref 10.3–14.5)
RBC # FLD: 13 % — SIGNIFICANT CHANGE UP (ref 10.3–14.5)
SARS-COV-2 N GENE NPH QL NAA+PROBE: NOT DETECTED
SARS-COV-2 RNA SPEC QL NAA+PROBE: SIGNIFICANT CHANGE UP
SODIUM SERPL-SCNC: 139 MMOL/L — SIGNIFICANT CHANGE UP (ref 135–145)
WBC # BLD: 9.22 K/UL — SIGNIFICANT CHANGE UP (ref 3.8–10.5)
WBC # BLD: 9.94 K/UL — SIGNIFICANT CHANGE UP (ref 3.8–10.5)
WBC # FLD AUTO: 9.22 K/UL — SIGNIFICANT CHANGE UP (ref 3.8–10.5)
WBC # FLD AUTO: 9.94 K/UL — SIGNIFICANT CHANGE UP (ref 3.8–10.5)

## 2021-05-15 RX ORDER — HYDRALAZINE HCL 50 MG
25 TABLET ORAL
Refills: 0 | Status: DISCONTINUED | OUTPATIENT
Start: 2021-05-15 | End: 2021-05-18

## 2021-05-15 RX ORDER — AMLODIPINE BESYLATE 2.5 MG/1
10 TABLET ORAL DAILY
Refills: 0 | Status: DISCONTINUED | OUTPATIENT
Start: 2021-05-15 | End: 2021-05-20

## 2021-05-15 RX ORDER — HEPARIN SODIUM 5000 [USP'U]/ML
1400 INJECTION INTRAVENOUS; SUBCUTANEOUS
Qty: 25000 | Refills: 0 | Status: DISCONTINUED | OUTPATIENT
Start: 2021-05-15 | End: 2021-05-17

## 2021-05-15 RX ORDER — LOSARTAN POTASSIUM 100 MG/1
100 TABLET, FILM COATED ORAL DAILY
Refills: 0 | Status: DISCONTINUED | OUTPATIENT
Start: 2021-05-15 | End: 2021-05-20

## 2021-05-15 RX ORDER — ACETAMINOPHEN 500 MG
975 TABLET ORAL EVERY 6 HOURS
Refills: 0 | Status: DISCONTINUED | OUTPATIENT
Start: 2021-05-15 | End: 2021-05-20

## 2021-05-15 RX ORDER — HYDROCHLOROTHIAZIDE 25 MG
12.5 TABLET ORAL DAILY
Refills: 0 | Status: DISCONTINUED | OUTPATIENT
Start: 2021-05-15 | End: 2021-05-20

## 2021-05-15 RX ORDER — ATORVASTATIN CALCIUM 80 MG/1
40 TABLET, FILM COATED ORAL AT BEDTIME
Refills: 0 | Status: DISCONTINUED | OUTPATIENT
Start: 2021-05-15 | End: 2021-05-20

## 2021-05-15 RX ORDER — FAMOTIDINE 10 MG/ML
40 INJECTION INTRAVENOUS DAILY
Refills: 0 | Status: DISCONTINUED | OUTPATIENT
Start: 2021-05-15 | End: 2021-05-20

## 2021-05-15 RX ADMIN — Medication 25 MILLIGRAM(S): at 23:09

## 2021-05-15 RX ADMIN — AMLODIPINE BESYLATE 10 MILLIGRAM(S): 2.5 TABLET ORAL at 05:13

## 2021-05-15 RX ADMIN — HEPARIN SODIUM 14 UNIT(S)/HR: 5000 INJECTION INTRAVENOUS; SUBCUTANEOUS at 00:35

## 2021-05-15 RX ADMIN — HEPARIN SODIUM 13 UNIT(S)/HR: 5000 INJECTION INTRAVENOUS; SUBCUTANEOUS at 17:47

## 2021-05-15 RX ADMIN — Medication 12.5 MILLIGRAM(S): at 05:12

## 2021-05-15 RX ADMIN — FAMOTIDINE 40 MILLIGRAM(S): 10 INJECTION INTRAVENOUS at 09:46

## 2021-05-15 RX ADMIN — HEPARIN SODIUM 13 UNIT(S)/HR: 5000 INJECTION INTRAVENOUS; SUBCUTANEOUS at 09:47

## 2021-05-15 RX ADMIN — LOSARTAN POTASSIUM 100 MILLIGRAM(S): 100 TABLET, FILM COATED ORAL at 05:12

## 2021-05-15 RX ADMIN — HEPARIN SODIUM 15 UNIT(S)/HR: 5000 INJECTION INTRAVENOUS; SUBCUTANEOUS at 01:04

## 2021-05-15 RX ADMIN — HEPARIN SODIUM 13 UNIT(S)/HR: 5000 INJECTION INTRAVENOUS; SUBCUTANEOUS at 03:16

## 2021-05-15 RX ADMIN — ATORVASTATIN CALCIUM 40 MILLIGRAM(S): 80 TABLET, FILM COATED ORAL at 21:50

## 2021-05-15 NOTE — CONSULT NOTE ADULT - PROBLEM SELECTOR RECOMMENDATION 9
Heparin gtt   Plan for angiogram and bypass monday   Acceptable cardiac risk to proceed   METS > 4, ( he exercises every day and walks 2 miles daily)   No anginal symptoms

## 2021-05-15 NOTE — H&P ADULT - NSICDXPASTMEDICALHX_GEN_ALL_CORE_FT
PAST MEDICAL HISTORY:  Anxiety     Claudication in peripheral vascular disease on Eliquis once a day for DVT prophylaxis and for LE blood flow as per pt    DVT (deep venous thrombosis) 11/2013, left    Dyslipidemia     ED (erectile dysfunction)     H/O gastroesophageal reflux (GERD)     Hypertension     Pedal edema     Seasonal allergies

## 2021-05-15 NOTE — H&P ADULT - HISTORY OF PRESENT ILLNESS
80 year old man with PMH of HTN, HLD, PVD s/p iliac stent and L femoral to PT bypass with PTFE presenting from Dr Franco office due to 4 days of severe L calf pain with exertion. He reports he used to walk 2 miles a day and now he can only walk a block until he develops severe pain in his calf. No pain at rest, no numbness or tingling or decreased range of motion. No fevers, chills, chest pain. Presented to Dr Franco office and was found to have patent graft via duplex. Sent into ED from the office    In the ED, he was hemodynamically normal and afebrile. Had CTA showing occlusion of L iliac stent with reconstitution. Additionally occlusion of R femoral artery with reconstitution

## 2021-05-15 NOTE — CONSULT NOTE ADULT - SUBJECTIVE AND OBJECTIVE BOX
CHIEF COMPLAINT:    HISTORY OF PRESENT ILLNESS:  80 year old man with PMH of HTN, HLD, PVD s/p iliac stent and L femoral to PT bypass with PTFE presenting from Dr matthew Franco office due to 4 days of severe L calf pain with exertion. He reports he used to walk 2 miles a day and now he can only walk a block until he develops severe pain in his calf. No pain at rest, no numbness or tingling or decreased range of motion. No fevers, chills, chest pain. Presented to Dr Franco office and was found to have patent graft via duplex. Sent into ED from the office    In the ED, he was hemodynamically normal and afebrile. Had CTA showing occlusion of L iliac stent with reconstitution. Additionally occlusion of R femoral artery with reconstitution     No chest pain, shortness of breath or palpitations.        PAST MEDICAL & SURGICAL HISTORY:  Pedal edema    Claudication in peripheral vascular disease  on Eliquis once a day for DVT prophylaxis and for LE blood flow as per pt    Anxiety    ED (erectile dysfunction)    DVT (deep venous thrombosis)  11/2013, left    Seasonal allergies    Dyslipidemia    Hypertension    H/O gastroesophageal reflux (GERD)    H/O shoulder surgery  left    H/O endarterectomy  Right Femoral, 2007    S/P femoral-tibial bypass  [id number] as per pt            MEDICATIONS:  amLODIPine   Tablet 10 milliGRAM(s) Oral daily  heparin  Infusion 1400 Unit(s)/Hr IV Continuous <Continuous>  hydrochlorothiazide 12.5 milliGRAM(s) Oral daily  losartan 100 milliGRAM(s) Oral daily        acetaminophen   Tablet .. 975 milliGRAM(s) Oral every 6 hours PRN    famotidine  Oral Tab/Cap - Peds 40 milliGRAM(s) Oral daily    atorvastatin 40 milliGRAM(s) Oral at bedtime        FAMILY HISTORY:      SOCIAL HISTORY:    [ ] Non-smoker  [ ] Smoker  [ ] Alcohol    Allergies    meclizine (Rash)    Intolerances    	    REVIEW OF SYSTEMS:  CONSTITUTIONAL: No fever, weight loss, +fatigue  EYES: No eye pain, visual disturbances, or discharge  ENMT:  No difficulty hearing, tinnitus, vertigo; No sinus or throat pain  NECK: No pain or stiffness  RESPIRATORY: No cough, wheezing, chills or hemoptysis; No Shortness of Breath  CARDIOVASCULAR: no chest pain, palpitations, passing out, dizziness, or leg swelling  GASTROINTESTINAL: No abdominal or epigastric pain. No nausea, vomiting, or hematemesis; No diarrhea or constipation. No melena or hematochezia.  GENITOURINARY: No dysuria, frequency, hematuria, or incontinence  NEUROLOGICAL: No headaches, memory loss, loss of strength, numbness, or tremors  SKIN: No itching, burning, rashes, or lesions   LYMPH Nodes: No enlarged glands  ENDOCRINE: No heat or cold intolerance; No hair loss  MUSCULOSKELETAL: N+ joint pain or swelling; No muscle, back, or extremity pain  PSYCHIATRIC: No depression, anxiety, mood swings, or difficulty sleeping  HEME/LYMPH: No easy bruising, or bleeding gums  ALLERY AND IMMUNOLOGIC: No hives or eczema	    [ ] All others negative	  [ ] Unable to obtain    PHYSICAL EXAM:  T(C): 36.9 (05-15-21 @ 21:28), Max: 37.1 (05-14-21 @ 22:56)  HR: 77 (05-15-21 @ 21:28) (56 - 77)  BP: 174/78 (05-15-21 @ 21:28) (147/62 - 183/79)  RR: 18 (05-15-21 @ 21:28) (15 - 18)  SpO2: 98% (05-15-21 @ 21:28) (95% - 100%)  Wt(kg): --  I&O's Summary    14 May 2021 07:01  -  15 May 2021 07:00  --------------------------------------------------------  IN: 106 mL / OUT: 250 mL / NET: -144 mL    15 May 2021 07:01  -  15 May 2021 22:22  --------------------------------------------------------  IN: 876 mL / OUT: 475 mL / NET: 401 mL        Appearance: NAD	  HEENT:   Dry oral mucosa, PERRL, EOMI	  Lymphatic: No lymphadenopathy  Cardiovascular: Normal S1 S2, No JVD, No murmurs, No edema  Respiratory: Decreased bs   Psychiatry: A & O x 3, Mood & affect appropriate  Gastrointestinal:  Soft, Non-tender, + BS	  Skin: No rashes, No ecchymoses, No cyanosis	  Neurologic: Non-focal  Extremities: Normal range of motion, No clubbing, cyanosis or edema  Vascular: Peripheral pulses palpable 2+ bilaterally    TELEMETRY: 	    ECG:  	  RADIOLOGY:  < from: CT Angio Abd Aorta w/run-off w/ IV Cont (05.14.21 @ 21:16) >    EXAM:  CT ANGIO ABD AOR W RUN(W)AW IC                            PROCEDURE DATE:  05/14/2021            INTERPRETATION:  CLINICAL INFORMATION: History of femoropopliteal bypass with left calf pain.    COMPARISON: None.    CONTRAST/COMPLICATIONS:  IV Contrast: Omnipaque 350, 125 cc injected and 25 cc discarded.  Oral Contrast: None.  Complications: None reported at time of study completion.    CT ANGIOGRAM ABDOMEN, PELVIS, AND LOWER EXTREMITIES:    PROCEDURE:  Initially, nonenhanced CT was obtained through the calves. Then, following the rapid administration of intravenous contrast, CT angiography was performed through the abdomen, pelvis, and lower extremities down to the toes.  Delayed images through the calves were also obtained. [place] coronal reformats as well as 3D reconstructions were performed.    FINDINGS:    CENTRAL ARTERIAL SYSTEM:    Extensive atherosclerotic changes in the aorta and its branches. The celiac artery is patent with standard hepatic arterial anatomy. The superior and inferior mesenteric artery are patent. There is mild ostial narrowing of both renal arteries.    The left common iliac artery is thrombosed, with occlusion of a distal left common and external iliac arterial stent.    There is mild multifocal narrowing of the right common, external and internal iliac arteries.    RIGHT LOWER EXTREMITY:    The right common femoral artery is patent with mild disease. The common femoral artery is diffusely occluded. The profunda femoral artery is patent. There is reconstitution of flow in the popliteal artery, which demonstrates moderate narrowing. The tibioperoneal trunk is heavily calcified but grossly patent. The anterior tibial and peroneal arteries are heavily calcified but grossly patent. The posterior tibial artery is patent.    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 The profunda femoral artery is patent with scattered calcifications.    Left superficial femoral-posterior tibial artery bypass graft is patent with mild to moderate areas of stenosis. The native superficial femoral, popliteal artery and tibioperoneal trunk are occluded. The posterior tibial artery is patent. The anterior tibial is occluded. The peroneal artery is heavily calcified and appears occluded.    ADDITIONAL FINDINGS: Atrophic right kidney. Bilateral renal cysts. No hydronephrosis. Colonic diverticulosis. Small umbilical hernia. Spinal degenerative changes. Small left popliteal cyst. Left lateral thigh intramuscular lipoma.    IMPRESSION:  Right pelvis/lower extremity: Diffuse right common femoral artery occlusion with reconstitution of flow in a moderately narrowed popliteal artery. Gross three-vessel runoff to the right foot, although the anterior tibial and posterior tibial arteries are heavily calcified.    Left pelvis/lower extremity: Thrombosed native left common iliac artery, and occluded distal left common/external iliac artery stent. 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 Patent left superficial femoral to posterior tibial artery bypass graft with mild to moderate stenosis. One vessel runoff to the left foot via the posterior tibial artery.    Atrophic right kidney.    Preliminary report was provided by [facility] radiologist Dr. Ortega on 5/14/2021.                SERJIO GREENWOOD MD; Attending Radiologist  This document has been electronically signed. May 15 2021  3:49PM    < end of copied text >    OTHER: 	  	  LABS:	 	    CARDIAC MARKERS:                                  14.4   9.94  )-----------( 180      ( 15 May 2021 06:58 )             42.4     05-15    139  |  102  |  13  ----------------------------<  82  3.5   |  23  |  1.22    Ca    9.2      15 May 2021 06:52  Phos  3.3     05-15  Mg     1.9     05-15    TPro  7.3  /  Alb  4.3  /  TBili  0.5  /  DBili  x   /  AST  22  /  ALT  19  /  AlkPhos  72  05-14    proBNP:   Lipid Profile:   HgA1c:   TSH:

## 2021-05-15 NOTE — H&P ADULT - NSHPLABSRESULTS_GEN_ALL_CORE
14.5   8.21  )-----------( 198      ( 14 May 2021 18:39 )             42.8     05-14    138  |  103  |  15  ----------------------------<  96  4.1   |  22  |  1.31<H>    Ca    9.2      14 May 2021 18:39    TPro  7.3  /  Alb  4.3  /  TBili  0.5  /  DBili  x   /  AST  22  /  ALT  19  /  AlkPhos  72  05-14    PT/INR - ( 14 May 2021 18:39 )   PT: 11.4 sec;   INR: 0.95 ratio         PTT - ( 14 May 2021 18:39 )  PTT:30.2 sec      ct< from: CT Angio Abd Aorta w/run-off w/ IV Cont (05.14.21 @ 21:16) >    FINDINGS:  Aorta: No aortic aneurysm. No aortic dissection.  Celiac trunk and mesenteric arteries: No occlusion or significant stenosis.  Renal arteries: No occlusion or significant stenosis.  Right iliac arteries: No occlusion or significant stenosis.  Right femoral/popliteal arteries: Occlusion of the native right-sided common  femoral artery through proximal popliteal artery. There is reconstitution of  the right-sided popliteal artery above the knee.  Right infrapopliteal arteries: Trace three-vessel runoff to the right lower  extremity.  Left iliac arteries: Occlusion of the left-sided common and external iliac  artery with a stent in place.  Left femoral/popliteal arteries: There is reconstitution of the common femoral  artery. Occlusion of the left-sided superficial artery and popliteal artery.  Left infrapopliteal arteries: Left-sided stent that extends from the common  femoral artery to the posterior tibial artery with severe diffuse  atherosclerotic disease and areas of stenosis measuring up to 50%. There is  one-vessel runoff to the left lower extremity.    Liver: No mass.  Gallbladder and bile ducts: Unremarkable. No calcified stones. No ductal  dilation.  Pancreas: Unremarkable. No mass. No ductal dilation.  Spleen: Normal. No splenomegaly.  Adrenals: Normal. No mass.  Kidneys and ureters: Multiple bilateral renal cysts. No further follow-up is  needed. The kidneys are otherwise unremarkable.  Stomach and bowel: Unremarkable. No obstruction. No mucosal thickening.  Appendix: No evidence of appendicitis.  Bladder: Unremarkable. No mass.  Reproductive: Unremarkable as visualized.  Intraperitoneal space: Unremarkable. No free air. No significant fluid  collection.  Lymph nodes: No lymphadenopathy.  Bones/joints: No acute fracture. No dislocation.  Soft tissues: Unremarkable.    IMPRESSION:  1. Occlusion of the native right-sided common femoral artery through proximal  popliteal artery.  2. Trace three-vessel runoff to the right lower extremity.  3. Occlusion of the left-sided common and external iliac artery with a stent in  place.  4. Occlusion of the left-sided superficial artery and popliteal artery.  5. Left-sided stent that extends from the common femoral artery to the  posterior tibial artery with severe diffuse atherosclerotic disease and areas  of stenosis measuring up to 50%. There is one-vessel runoff to the left lower  extremity.  6. The remainder of the examination is unremarkable.    < end of copied text >

## 2021-05-15 NOTE — H&P ADULT - NSICDXPASTSURGICALHX_GEN_ALL_CORE_FT
PAST SURGICAL HISTORY:  H/O endarterectomy Right Femoral, 2007    H/O shoulder surgery left    S/P femoral-tibial bypass [id number] as per pt

## 2021-05-15 NOTE — H&P ADULT - ASSESSMENT
80 year old man with history of PAD s/p iliac stent and L femoral to PT bypass presenting with worsening claudication symptoms. Found to have occlusion of L iliac on CTA    Plan:  - Admit to Dr Carrasco  - Heparin bolus and gtt  - Plan for angio next week    Discussed with fellow, Dr Humphrey, on behalf of attending    Vascular [id number]

## 2021-05-15 NOTE — PROGRESS NOTE ADULT - ASSESSMENT
Assessment:   80 year old man with history of PAD s/p iliac stent and L femoral to PT bypass presenting with worsening claudication symptoms. Found to have occlusion of L iliac on CTA    Plan:  - Heparin bolus and gtt  - Plan for angio next week  - pain control  - reg diet  - FU am labs, replete prn      Vascular Surgery  [id number]

## 2021-05-15 NOTE — H&P ADULT - NSHPPHYSICALEXAM_GEN_ALL_CORE
General: alert and oriented, NAD  Resp: airway patent, respirations unlabored  CVS: regular rate and rhythm  Abdomen: soft, nontender, nondistended  Extremities: no edema, dopplerable PT and DP bilaterally, strong signal in graft, weak but palpable femoral bilaterally   Skin: warm, dry, appropriate color

## 2021-05-15 NOTE — H&P ADULT - REASON FOR ADMISSION
Diabetes    Subjective   Claire Lux is a 72 y.o. female is here today for follow-up.    History of Present Illness   Here for a follow u on her HLP, DM2, and hypercalcemia wit elevated Vitamin D.  Denies any muscle spasms, or irritability, pain    Current Outpatient Medications:   •  ACCU-CHEK TINO PLUS test strip, USE TO TEST DAILY, Disp: 100 each, Rfl: 1  •  amlodipine-olmesartan (JENNI) 10-40 MG per tablet, Take 1 tablet by mouth Daily. for blood pressure, Disp: 90 tablet, Rfl: 1  •  CONTRAVE 8-90 MG tablet, Take 2 tablets by mouth 2 (Two) Times a Day., Disp: , Rfl: 5  •  glucose blood (ACCU-CHEK TINO PLUS) test strip, Accu-Chek Tino Plus In Vitro Strip; Patient Sig: Accu-Chek Tino Plus In Vitro Strip USE TO TEST DAILY; 1; 0; 09-Nov-2012; Active, Disp: , Rfl:   •  metFORMIN ER (GLUCOPHAGE-XR) 500 MG 24 hr tablet, Take 1 tablet by mouth Daily With Breakfast., Disp: 90 tablet, Rfl: 1  •  metoprolol succinate XL (TOPROL-XL) 100 MG 24 hr tablet, Take 1 tablet by mouth Daily., Disp: 90 tablet, Rfl: 1  •  Multiple Vitamins-Minerals (MULTI VITAMIN/MINERALS PO), Take 1 tablet by mouth daily., Disp: , Rfl:   •  niacin 500 MG tablet, Take 2 tablets by mouth 2 (two) times a day., Disp: , Rfl:   •  Omega-3 Fatty Acids (EQL FISH OIL) 1000 MG capsule, Take  by mouth daily., Disp: , Rfl:   •  pravastatin (PRAVACHOL) 40 MG tablet, Take 1 tablet by mouth Daily., Disp: 90 tablet, Rfl: 1  •  psyllium (METAMUCIL) 0.52 G capsule, Take 3 capsules by mouth 2 (two) times a day., Disp: , Rfl:   •  ursodiol (ACTIGALL) 300 MG capsule, Take 2 capsules by mouth 2 (two) times a day., Disp: , Rfl:   •  Vitamin E 400 UNITS tablet, Take 1 tablet by mouth daily., Disp: , Rfl:       The following portions of the patient's history were reviewed and updated as appropriate: allergies, current medications, past family history, past medical history, past social history, past surgical history and problem list.    Review of Systems  left lower extremity ischemia "  Constitutional: Negative.  Negative for chills and fever.   HENT: Negative for ear discharge, ear pain, sinus pressure and sore throat.    Respiratory: Negative for cough, chest tightness and shortness of breath.    Cardiovascular: Negative for chest pain, palpitations and leg swelling.   Gastrointestinal: Negative for diarrhea, nausea and vomiting.   Musculoskeletal: Negative for arthralgias, back pain and myalgias.   Neurological: Negative for dizziness, syncope and headaches.   Psychiatric/Behavioral: Negative for confusion and sleep disturbance.       Objective   /88   Pulse 76   Ht 152.4 cm (60\")   Wt 89 kg (196 lb 3.2 oz)   SpO2 98% Comment: ra  BMI 38.32 kg/m²   Physical Exam   Constitutional: She is oriented to person, place, and time. She appears well-developed and well-nourished.   HENT:   Head: Normocephalic and atraumatic.   Right Ear: External ear normal.   Left Ear: External ear normal.   Mouth/Throat: No oropharyngeal exudate.   Eyes: Conjunctivae are normal. Pupils are equal, round, and reactive to light.   Neck: Neck supple. No thyromegaly present.   Cardiovascular: Normal rate and regular rhythm.   Pulmonary/Chest: Effort normal and breath sounds normal.   Abdominal: Soft. Bowel sounds are normal. She exhibits no distension. There is no tenderness.   Musculoskeletal: She exhibits no edema.   Neurological: She is alert and oriented to person, place, and time. No cranial nerve deficit.   Skin: Skin is warm and dry.   Psychiatric: She has a normal mood and affect. Judgment normal.   Nursing note and vitals reviewed.        Results for orders placed or performed in visit on 12/04/19   Calcium, Ionized   Result Value Ref Range    Ionized Calcium 1.30 1.15 - 1.35 mmol/L    Ionized Calcium 5.2 4.6 - 5.4 mg/dL   Comprehensive Metabolic Panel   Result Value Ref Range    Glucose 120 (H) 65 - 99 mg/dL    BUN 17 8 - 23 mg/dL    Creatinine 0.88 0.57 - 1.00 mg/dL    Sodium 142 136 - 145 mmol/L    " Potassium 4.6 3.5 - 5.2 mmol/L    Chloride 100 98 - 107 mmol/L    CO2 26.1 22.0 - 29.0 mmol/L    Calcium 10.1 8.6 - 10.5 mg/dL    Total Protein 7.6 6.0 - 8.5 g/dL    Albumin 4.80 3.50 - 5.20 g/dL    ALT (SGPT) 22 1 - 33 U/L    AST (SGOT) 23 1 - 32 U/L    Alkaline Phosphatase 66 39 - 117 U/L    Total Bilirubin 0.5 0.2 - 1.2 mg/dL    eGFR Non African Amer 63 >60 mL/min/1.73    Globulin 2.8 gm/dL    A/G Ratio 1.7 g/dL    BUN/Creatinine Ratio 19.3 7.0 - 25.0    Anion Gap 15.9 (H) 5.0 - 15.0 mmol/L   Vitamin D 25 Hydroxy   Result Value Ref Range    25 Hydroxy, Vitamin D 74.7 30.0 - 100.0 ng/ml   PTH, Intact   Result Value Ref Range    PTH, Intact 22.1 15.0 - 65.0 pg/mL   POCT Glucose   Result Value Ref Range    Glucose 172 (A) 70 - 130 mg/dL   POC Glycosylated Hemoglobin (Hb A1C)   Result Value Ref Range    Hemoglobin A1C 5.7 %             Assessment/Plan   Diagnoses and all orders for this visit:    Type 2 diabetes mellitus with other specified complication, without long-term current use of insulin (CMS/[facility])  -     POCT Glucose  -     POC Glycosylated Hemoglobin (Hb A1C)  -     Comprehensive Metabolic Panel    Poisoning by vitamin D, undetermined intent, subsequent encounter  -     Vitamin D 25 Hydroxy    Hypercalcemia  -     Calcium, Ionized  -     PTH, Intact    Avitaminosis D  -     Vitamin D 25 Hydroxy    Other orders  -     Fluad Quad >65 years ([id number])                 Return for Next scheduled follow up.

## 2021-05-15 NOTE — PROGRESS NOTE ADULT - SUBJECTIVE AND OBJECTIVE BOX
SUBJECTIVE:   Seen and examined at bedside during AM ronds. No acute events overnight.     OBJECTIVE: T(C): 36.8 (05-15-21 @ 10:23), Max: 37.1 (05-14-21 @ 22:56)  HR: 62 (05-15-21 @ 10:23) (62 - 77)  BP: 147/62 (05-15-21 @ 10:23) (147/62 - 183/79)  RR: 18 (05-15-21 @ 10:23) (15 - 18)  SpO2: 97% (05-15-21 @ 10:23) (95% - 100%)  Wt(kg): --  I&O's Summary    14 May 2021 07:01  -  15 May 2021 07:00  --------------------------------------------------------  IN: 106 mL / OUT: 250 mL / NET: -144 mL    15 May 2021 07:01  -  15 May 2021 11:53  --------------------------------------------------------  IN: 279 mL / OUT: 175 mL / NET: 104 mL      I&O's Detail    14 May 2021 07:01  -  15 May 2021 07:00  --------------------------------------------------------  IN:    Heparin: 26 mL    Oral Fluid: 80 mL  Total IN: 106 mL    OUT:    Voided (mL): 250 mL  Total OUT: 250 mL    Total NET: -144 mL      15 May 2021 07:01  -  15 May 2021 11:53  --------------------------------------------------------  IN:    Heparin: 39 mL    Oral Fluid: 240 mL  Total IN: 279 mL    OUT:    Voided (mL): 175 mL  Total OUT: 175 mL    Total NET: 104 mL      Physical Exam:   General: alert and oriented, NAD  Resp: airway patent, respirations unlabored  CVS: regular rate and rhythm  Abdomen: soft, nontender, nondistended  Extremities: no sandy, strong signal in graft, weak but palpable femoral bilaterally   Skin: warm, dry, appropriate color    MEDICATIONS  (STANDING):  amLODIPine   Tablet 10 milliGRAM(s) Oral daily  atorvastatin 40 milliGRAM(s) Oral at bedtime  famotidine  Oral Tab/Cap - Peds 40 milliGRAM(s) Oral daily  heparin  Infusion 1400 Unit(s)/Hr (13 mL/Hr) IV Continuous <Continuous>  hydrochlorothiazide 12.5 milliGRAM(s) Oral daily  losartan 100 milliGRAM(s) Oral daily    MEDICATIONS  (PRN):  acetaminophen   Tablet .. 975 milliGRAM(s) Oral every 6 hours PRN Mild Pain (1 - 3), Moderate Pain (4 - 6)      LABS:                        14.4   9.94  )-----------( 180      ( 15 May 2021 06:58 )             42.4     05-15    139  |  102  |  13  ----------------------------<  82  3.5   |  23  |  1.22    Ca    9.2      15 May 2021 06:52  Phos  3.3     05-15  Mg     1.9     05-15    TPro  7.3  /  Alb  4.3  /  TBili  0.5  /  DBili  x   /  AST  22  /  ALT  19  /  AlkPhos  72  05-14    PT/INR - ( 15 May 2021 08:55 )   PT: 11.4 sec;   INR: 0.95 ratio         PTT - ( 15 May 2021 08:55 )  PTT:89.7 sec

## 2021-05-15 NOTE — ED ADULT NURSE REASSESSMENT NOTE - NS ED NURSE REASSESS COMMENT FT1
MD Bowman contacted about elevate PTT results, orders received and followed through to decrease heparin drip by two

## 2021-05-15 NOTE — CONSULT NOTE ADULT - ASSESSMENT
80 year old man with history of PAD s/p iliac stent and L femoral to PT bypass presenting with worsening claudication symptoms. Found to have occlusion of L iliac on CTA

## 2021-05-16 ENCOUNTER — TRANSCRIPTION ENCOUNTER (OUTPATIENT)
Age: 81
End: 2021-05-16

## 2021-05-16 LAB
ANION GAP SERPL CALC-SCNC: 15 MMOL/L — SIGNIFICANT CHANGE UP (ref 5–17)
APTT BLD: 72 SEC — HIGH (ref 27.5–35.5)
BLD GP AB SCN SERPL QL: NEGATIVE — SIGNIFICANT CHANGE UP
BUN SERPL-MCNC: 12 MG/DL — SIGNIFICANT CHANGE UP (ref 7–23)
CALCIUM SERPL-MCNC: 9.2 MG/DL — SIGNIFICANT CHANGE UP (ref 8.4–10.5)
CHLORIDE SERPL-SCNC: 101 MMOL/L — SIGNIFICANT CHANGE UP (ref 96–108)
CO2 SERPL-SCNC: 23 MMOL/L — SIGNIFICANT CHANGE UP (ref 22–31)
COVID-19 SPIKE DOMAIN AB INTERP: POSITIVE
COVID-19 SPIKE DOMAIN ANTIBODY RESULT: >250 U/ML — HIGH
CREAT SERPL-MCNC: 1.29 MG/DL — SIGNIFICANT CHANGE UP (ref 0.5–1.3)
GLUCOSE SERPL-MCNC: 88 MG/DL — SIGNIFICANT CHANGE UP (ref 70–99)
HCT VFR BLD CALC: 44.2 % — SIGNIFICANT CHANGE UP (ref 39–50)
HGB BLD-MCNC: 14.9 G/DL — SIGNIFICANT CHANGE UP (ref 13–17)
INR BLD: 0.94 RATIO — SIGNIFICANT CHANGE UP (ref 0.88–1.16)
MAGNESIUM SERPL-MCNC: 1.9 MG/DL — SIGNIFICANT CHANGE UP (ref 1.6–2.6)
MCHC RBC-ENTMCNC: 32.7 PG — SIGNIFICANT CHANGE UP (ref 27–34)
MCHC RBC-ENTMCNC: 33.7 GM/DL — SIGNIFICANT CHANGE UP (ref 32–36)
MCV RBC AUTO: 96.9 FL — SIGNIFICANT CHANGE UP (ref 80–100)
NRBC # BLD: 0 /100 WBCS — SIGNIFICANT CHANGE UP (ref 0–0)
PHOSPHATE SERPL-MCNC: 3.4 MG/DL — SIGNIFICANT CHANGE UP (ref 2.5–4.5)
PLATELET # BLD AUTO: 187 K/UL — SIGNIFICANT CHANGE UP (ref 150–400)
POTASSIUM SERPL-MCNC: 3.3 MMOL/L — LOW (ref 3.5–5.3)
POTASSIUM SERPL-SCNC: 3.3 MMOL/L — LOW (ref 3.5–5.3)
PROTHROM AB SERPL-ACNC: 11.3 SEC — SIGNIFICANT CHANGE UP (ref 10.6–13.6)
RBC # BLD: 4.56 M/UL — SIGNIFICANT CHANGE UP (ref 4.2–5.8)
RBC # FLD: 12.9 % — SIGNIFICANT CHANGE UP (ref 10.3–14.5)
RH IG SCN BLD-IMP: POSITIVE — SIGNIFICANT CHANGE UP
SARS-COV-2 IGG+IGM SERPL QL IA: >250 U/ML — HIGH
SARS-COV-2 IGG+IGM SERPL QL IA: POSITIVE
SARS-COV-2 RNA SPEC QL NAA+PROBE: SIGNIFICANT CHANGE UP
SODIUM SERPL-SCNC: 139 MMOL/L — SIGNIFICANT CHANGE UP (ref 135–145)
WBC # BLD: 8.16 K/UL — SIGNIFICANT CHANGE UP (ref 3.8–10.5)
WBC # FLD AUTO: 8.16 K/UL — SIGNIFICANT CHANGE UP (ref 3.8–10.5)

## 2021-05-16 PROCEDURE — 93010 ELECTROCARDIOGRAM REPORT: CPT | Mod: 76

## 2021-05-16 RX ORDER — SODIUM CHLORIDE 9 MG/ML
1000 INJECTION, SOLUTION INTRAVENOUS
Refills: 0 | Status: DISCONTINUED | OUTPATIENT
Start: 2021-05-16 | End: 2021-05-17

## 2021-05-16 RX ORDER — POTASSIUM CHLORIDE 20 MEQ
20 PACKET (EA) ORAL
Refills: 0 | Status: COMPLETED | OUTPATIENT
Start: 2021-05-16 | End: 2021-05-16

## 2021-05-16 RX ADMIN — Medication 12.5 MILLIGRAM(S): at 05:34

## 2021-05-16 RX ADMIN — Medication 975 MILLIGRAM(S): at 06:30

## 2021-05-16 RX ADMIN — HEPARIN SODIUM 13 UNIT(S)/HR: 5000 INJECTION INTRAVENOUS; SUBCUTANEOUS at 08:12

## 2021-05-16 RX ADMIN — Medication 25 MILLIGRAM(S): at 11:12

## 2021-05-16 RX ADMIN — Medication 20 MILLIEQUIVALENT(S): at 11:12

## 2021-05-16 RX ADMIN — AMLODIPINE BESYLATE 10 MILLIGRAM(S): 2.5 TABLET ORAL at 05:33

## 2021-05-16 RX ADMIN — Medication 975 MILLIGRAM(S): at 21:54

## 2021-05-16 RX ADMIN — Medication 20 MILLIEQUIVALENT(S): at 15:46

## 2021-05-16 RX ADMIN — Medication 975 MILLIGRAM(S): at 05:31

## 2021-05-16 RX ADMIN — Medication 25 MILLIGRAM(S): at 22:01

## 2021-05-16 RX ADMIN — ATORVASTATIN CALCIUM 40 MILLIGRAM(S): 80 TABLET, FILM COATED ORAL at 21:24

## 2021-05-16 RX ADMIN — FAMOTIDINE 40 MILLIGRAM(S): 10 INJECTION INTRAVENOUS at 11:12

## 2021-05-16 RX ADMIN — Medication 975 MILLIGRAM(S): at 21:24

## 2021-05-16 RX ADMIN — LOSARTAN POTASSIUM 100 MILLIGRAM(S): 100 TABLET, FILM COATED ORAL at 05:34

## 2021-05-16 NOTE — PROGRESS NOTE ADULT - SUBJECTIVE AND OBJECTIVE BOX
SUBJECTIVE:   Seen and examined at bedside. No acute events overnight. Denies chest pain, SOB.     OBJECTIVE: T(C): 36.9 (05-16-21 @ 00:52), Max: 36.9 (05-15-21 @ 13:33)  HR: 74 (05-16-21 @ 00:52) (56 - 77)  BP: 158/86 (05-16-21 @ 00:52) (147/62 - 174/78)  RR: 18 (05-16-21 @ 00:52) (16 - 18)  SpO2: 96% (05-16-21 @ 00:52) (95% - 100%)  Wt(kg): --  I&O's Summary    14 May 2021 07:01  -  15 May 2021 07:00  --------------------------------------------------------  IN: 106 mL / OUT: 250 mL / NET: -144 mL    15 May 2021 07:01  -  16 May 2021 03:03  --------------------------------------------------------  IN: 1080 mL / OUT: 475 mL / NET: 605 mL      I&O's Detail    14 May 2021 07:01  -  15 May 2021 07:00  --------------------------------------------------------  IN:    Heparin: 26 mL    Oral Fluid: 80 mL  Total IN: 106 mL    OUT:    Voided (mL): 250 mL  Total OUT: 250 mL    Total NET: -144 mL      15 May 2021 07:01  -  16 May 2021 03:03  --------------------------------------------------------  IN:    Heparin: 260 mL    Oral Fluid: 820 mL  Total IN: 1080 mL    OUT:    Voided (mL): 475 mL  Total OUT: 475 mL    Total NET: 605 mL        Physical Exam:   General: alert and oriented, NAD  Resp: airway patent, respirations unlabored  CVS: regular rate and rhythm  Abdomen: soft, nontender, nondistended  Extremities: no sandy, strong signal in graft, weak but palpable femoral bilaterally   Skin: warm, dry, appropriate color    MEDICATIONS  (STANDING):  amLODIPine   Tablet 10 milliGRAM(s) Oral daily  atorvastatin 40 milliGRAM(s) Oral at bedtime  famotidine  Oral Tab/Cap - Peds 40 milliGRAM(s) Oral daily  heparin  Infusion 1400 Unit(s)/Hr (13 mL/Hr) IV Continuous <Continuous>  hydrALAZINE 25 milliGRAM(s) Oral two times a day  hydrochlorothiazide 12.5 milliGRAM(s) Oral daily  losartan 100 milliGRAM(s) Oral daily    MEDICATIONS  (PRN):  acetaminophen   Tablet .. 975 milliGRAM(s) Oral every 6 hours PRN Mild Pain (1 - 3), Moderate Pain (4 - 6)      LABS:                        14.4   9.94  )-----------( 180      ( 15 May 2021 06:58 )             42.4     05-15    139  |  102  |  13  ----------------------------<  82  3.5   |  23  |  1.22    Ca    9.2      15 May 2021 06:52  Phos  3.3     05-15  Mg     1.9     05-15    TPro  7.3  /  Alb  4.3  /  TBili  0.5  /  DBili  x   /  AST  22  /  ALT  19  /  AlkPhos  72  05-14    PT/INR - ( 15 May 2021 17:13 )   PT: 11.2 sec;   INR: 0.93 ratio         PTT - ( 15 May 2021 17:13 )  PTT:96.8 sec         SUBJECTIVE:   Seen and examined at bedside. No acute events overnight. Denies chest pain, SOB.     OBJECTIVE:   Vital Signs Last 24 Hrs  T(C): 36.8 (16 May 2021 09:21), Max: 36.9 (15 May 2021 13:33)  T(F): 98.3 (16 May 2021 09:21), Max: 98.5 (16 May 2021 00:52)  HR: 74 (16 May 2021 09:21) (56 - 77)  BP: 151/72 (16 May 2021 09:21) (147/62 - 174/78)  BP(mean): --  RR: 18 (16 May 2021 09:21) (18 - 18)  SpO2: 98% (16 May 2021 09:21) (95% - 98%)    I&O's Summary    15 May 2021 07:01  -  16 May 2021 07:00  --------------------------------------------------------  IN:    Heparin: 312 mL    Oral Fluid: 920 mL  Total IN: 1232 mL    OUT:    Voided (mL): 725 mL  Total OUT: 725 mL    Total NET: 507 mL      16 May 2021 07:01  -  16 May 2021 09:24  --------------------------------------------------------  IN:    Oral Fluid: 200 mL  Total IN: 200 mL    OUT:    Voided (mL): 425 mL  Total OUT: 425 mL    Total NET: -225 mL        Physical Exam:   General: alert and oriented, NAD  Resp: airway patent, respirations unlabored  CVS: regular rate and rhythm  Abdomen: soft, nontender, nondistended  Extremities: no sandy, strong signal in graft, weak but palpable femoral bilaterally   Skin: warm, dry, appropriate color      MEDICATIONS  (STANDING):  amLODIPine   Tablet 10 milliGRAM(s) Oral daily  atorvastatin 40 milliGRAM(s) Oral at bedtime  famotidine  Oral Tab/Cap - Peds 40 milliGRAM(s) Oral daily  heparin  Infusion 1400 Unit(s)/Hr (13 mL/Hr) IV Continuous <Continuous>  hydrALAZINE 25 milliGRAM(s) Oral two times a day  hydrochlorothiazide 12.5 milliGRAM(s) Oral daily  losartan 100 milliGRAM(s) Oral daily    MEDICATIONS  (PRN):  acetaminophen   Tablet .. 975 milliGRAM(s) Oral every 6 hours PRN Mild Pain (1 - 3), Moderate Pain (4 - 6)      LABS:                                     14.9   8.16  )-----------( 187      ( 16 May 2021 07:04 )             44.2       05-16    139  |  101  |  12  ----------------------------<  88  3.3<L>   |  23  |  1.29    Ca    9.2      16 May 2021 07:03  Phos  3.4     05-16  Mg     1.9     05-16    TPro  7.3  /  Alb  4.3  /  TBili  0.5  /  DBili  x   /  AST  22  /  ALT  19  /  AlkPhos  72  05-14          PT/INR - ( 16 May 2021 07:04 )   PT: 11.3 sec;   INR: 0.94 ratio         PTT - ( 16 May 2021 07:04 )  PTT:72.0 sec          CAPILLARY BLOOD GLUCOSE

## 2021-05-16 NOTE — PROGRESS NOTE ADULT - ASSESSMENT
Assessment:   80 year old man with history of PAD s/p iliac stent and L femoral to PT bypass presenting with worsening claudication symptoms. Found to have occlusion of L iliac on CTA    Plan:  - Heparin bolus and gtt  - Plan for angio next week  - pain control  - reg diet  - FU am labs, replete prn  - appreciate Cardiology recommendations      Vascular Surgery  [id number]   Assessment:   80 year old man with history of PAD s/p iliac stent and L femoral to PT bypass presenting with worsening claudication symptoms. Found to have occlusion of L iliac on CTA    Plan:  - Heparin bolus and gtt  - Plan for angio tomorrow  - pain control  - reg diet, NPO at midnight  - FU am labs, replete prn  - appreciate Cardiology recommendations      Vascular Surgery  [id number]

## 2021-05-16 NOTE — PROGRESS NOTE ADULT - SUBJECTIVE AND OBJECTIVE BOX
Subjective: Patient seen and examined. No new events except as noted.   resting comfortably       REVIEW OF SYSTEMS:    CONSTITUTIONAL: No weakness, fevers or chills  EYES/ENT: No visual changes;  No vertigo or throat pain   NECK: No pain or stiffness  RESPIRATORY: No cough, wheezing, hemoptysis; No shortness of breath  CARDIOVASCULAR: No chest pain or palpitations  GASTROINTESTINAL: No abdominal or epigastric pain. No nausea, vomiting, or hematemesis; No diarrhea or constipation. No melena or hematochezia.  GENITOURINARY: No dysuria, frequency or hematuria  NEUROLOGICAL: No numbness or weakness  SKIN: No itching, burning, rashes, or lesions   All other review of systems is negative unless indicated above.    MEDICATIONS:  MEDICATIONS  (STANDING):  amLODIPine   Tablet 10 milliGRAM(s) Oral daily  atorvastatin 40 milliGRAM(s) Oral at bedtime  famotidine  Oral Tab/Cap - Peds 40 milliGRAM(s) Oral daily  heparin  Infusion 1400 Unit(s)/Hr (13 mL/Hr) IV Continuous <Continuous>  hydrALAZINE 25 milliGRAM(s) Oral two times a day  hydrochlorothiazide 12.5 milliGRAM(s) Oral daily  losartan 100 milliGRAM(s) Oral daily      PHYSICAL EXAM:  T(C): 36.8 (05-16-21 @ 05:27), Max: 36.9 (05-15-21 @ 13:33)  HR: 76 (05-16-21 @ 05:27) (56 - 77)  BP: 173/87 (05-16-21 @ 05:27) (147/62 - 174/78)  RR: 18 (05-16-21 @ 05:27) (18 - 18)  SpO2: 95% (05-16-21 @ 05:27) (95% - 98%)  Wt(kg): --  I&O's Summary    15 May 2021 07:01  -  16 May 2021 07:00  --------------------------------------------------------  IN: 1232 mL / OUT: 725 mL / NET: 507 mL    16 May 2021 07:01  -  16 May 2021 08:47  --------------------------------------------------------  IN: 0 mL / OUT: 425 mL / NET: -425 mL          Appearance: Normal	  HEENT:   Normal oral mucosa, PERRL, EOMI	  Lymphatic: No lymphadenopathy , no edema  Cardiovascular: Normal S1 S2, No JVD, No murmurs , Peripheral pulses palpable 2+ bilaterally  Respiratory: Lungs clear to auscultation, normal effort 	  Gastrointestinal:  Soft, Non-tender, + BS	  Skin: No rashes, No ecchymoses, No cyanosis, warm to touch  Musculoskeletal: Normal range of motion, normal strength  Psychiatry:  Mood & affect appropriate  Ext: No edema  vascular: strong signal in graft, weak but palpable femoral bilaterally    LABS:    CARDIAC MARKERS:                                14.9   8.16  )-----------( 187      ( 16 May 2021 07:04 )             44.2     05-16    139  |  101  |  12  ----------------------------<  88  3.3<L>   |  23  |  1.29    Ca    9.2      16 May 2021 07:03  Phos  3.4     05-16  Mg     1.9     05-16    TPro  7.3  /  Alb  4.3  /  TBili  0.5  /  DBili  x   /  AST  22  /  ALT  19  /  AlkPhos  72  05-14    proBNP:   Lipid Profile:   HgA1c:   TSH:             TELEMETRY: 	    ECG:  	  RADIOLOGY:   DIAGNOSTIC TESTING:  [ ] Echocardiogram:  [ ]  Catheterization:  [ ] Stress Test:    OTHER:

## 2021-05-17 ENCOUNTER — APPOINTMENT (OUTPATIENT)
Dept: ENDOVASCULAR SURGERY | Facility: CLINIC | Age: 81
End: 2021-05-17

## 2021-05-17 LAB
ANION GAP SERPL CALC-SCNC: 15 MMOL/L — SIGNIFICANT CHANGE UP (ref 5–17)
APTT BLD: 64.9 SEC — HIGH (ref 27.5–35.5)
BUN SERPL-MCNC: 12 MG/DL — SIGNIFICANT CHANGE UP (ref 7–23)
CALCIUM SERPL-MCNC: 9.2 MG/DL — SIGNIFICANT CHANGE UP (ref 8.4–10.5)
CHLORIDE SERPL-SCNC: 100 MMOL/L — SIGNIFICANT CHANGE UP (ref 96–108)
CO2 SERPL-SCNC: 22 MMOL/L — SIGNIFICANT CHANGE UP (ref 22–31)
CREAT SERPL-MCNC: 1.29 MG/DL — SIGNIFICANT CHANGE UP (ref 0.5–1.3)
GLUCOSE SERPL-MCNC: 83 MG/DL — SIGNIFICANT CHANGE UP (ref 70–99)
HCT VFR BLD CALC: 46.2 % — SIGNIFICANT CHANGE UP (ref 39–50)
HGB BLD-MCNC: 15.3 G/DL — SIGNIFICANT CHANGE UP (ref 13–17)
INR BLD: 0.95 RATIO — SIGNIFICANT CHANGE UP (ref 0.88–1.16)
MAGNESIUM SERPL-MCNC: 1.8 MG/DL — SIGNIFICANT CHANGE UP (ref 1.6–2.6)
MCHC RBC-ENTMCNC: 32.9 PG — SIGNIFICANT CHANGE UP (ref 27–34)
MCHC RBC-ENTMCNC: 33.1 GM/DL — SIGNIFICANT CHANGE UP (ref 32–36)
MCV RBC AUTO: 99.4 FL — SIGNIFICANT CHANGE UP (ref 80–100)
NRBC # BLD: 0 /100 WBCS — SIGNIFICANT CHANGE UP (ref 0–0)
PHOSPHATE SERPL-MCNC: 3.3 MG/DL — SIGNIFICANT CHANGE UP (ref 2.5–4.5)
PLATELET # BLD AUTO: 161 K/UL — SIGNIFICANT CHANGE UP (ref 150–400)
POTASSIUM SERPL-MCNC: 3.4 MMOL/L — LOW (ref 3.5–5.3)
POTASSIUM SERPL-SCNC: 3.4 MMOL/L — LOW (ref 3.5–5.3)
PROTHROM AB SERPL-ACNC: 11.4 SEC — SIGNIFICANT CHANGE UP (ref 10.6–13.6)
RBC # BLD: 4.65 M/UL — SIGNIFICANT CHANGE UP (ref 4.2–5.8)
RBC # FLD: 12.9 % — SIGNIFICANT CHANGE UP (ref 10.3–14.5)
SODIUM SERPL-SCNC: 137 MMOL/L — SIGNIFICANT CHANGE UP (ref 135–145)
WBC # BLD: 8.91 K/UL — SIGNIFICANT CHANGE UP (ref 3.8–10.5)
WBC # FLD AUTO: 8.91 K/UL — SIGNIFICANT CHANGE UP (ref 3.8–10.5)

## 2021-05-17 RX ORDER — FENTANYL CITRATE 50 UG/ML
50 INJECTION INTRAVENOUS
Refills: 0 | Status: DISCONTINUED | OUTPATIENT
Start: 2021-05-17 | End: 2021-05-17

## 2021-05-17 RX ORDER — ONDANSETRON 8 MG/1
8 TABLET, FILM COATED ORAL ONCE
Refills: 0 | Status: DISCONTINUED | OUTPATIENT
Start: 2021-05-17 | End: 2021-05-17

## 2021-05-17 RX ORDER — SODIUM CHLORIDE 9 MG/ML
1000 INJECTION, SOLUTION INTRAVENOUS
Refills: 0 | Status: DISCONTINUED | OUTPATIENT
Start: 2021-05-17 | End: 2021-05-17

## 2021-05-17 RX ORDER — MAGNESIUM SULFATE 500 MG/ML
2 VIAL (ML) INJECTION ONCE
Refills: 0 | Status: COMPLETED | OUTPATIENT
Start: 2021-05-17 | End: 2021-05-17

## 2021-05-17 RX ORDER — RIVAROXABAN 15 MG-20MG
2.5 KIT ORAL
Refills: 0 | Status: DISCONTINUED | OUTPATIENT
Start: 2021-05-17 | End: 2021-05-20

## 2021-05-17 RX ORDER — LIDOCAINE HCL 20 MG/ML
5 VIAL (ML) INJECTION ONCE
Refills: 0 | Status: DISCONTINUED | OUTPATIENT
Start: 2021-05-17 | End: 2021-05-17

## 2021-05-17 RX ORDER — POTASSIUM CHLORIDE 20 MEQ
20 PACKET (EA) ORAL
Refills: 0 | Status: COMPLETED | OUTPATIENT
Start: 2021-05-17 | End: 2021-05-18

## 2021-05-17 RX ORDER — FENTANYL CITRATE 50 UG/ML
25 INJECTION INTRAVENOUS
Refills: 0 | Status: DISCONTINUED | OUTPATIENT
Start: 2021-05-17 | End: 2021-05-17

## 2021-05-17 RX ORDER — POTASSIUM CHLORIDE 20 MEQ
10 PACKET (EA) ORAL
Refills: 0 | Status: DISCONTINUED | OUTPATIENT
Start: 2021-05-17 | End: 2021-05-17

## 2021-05-17 RX ORDER — ASPIRIN/CALCIUM CARB/MAGNESIUM 324 MG
81 TABLET ORAL DAILY
Refills: 0 | Status: DISCONTINUED | OUTPATIENT
Start: 2021-05-17 | End: 2021-05-20

## 2021-05-17 RX ORDER — SODIUM CHLORIDE 9 MG/ML
1000 INJECTION INTRAMUSCULAR; INTRAVENOUS; SUBCUTANEOUS
Refills: 0 | Status: DISCONTINUED | OUTPATIENT
Start: 2021-05-17 | End: 2021-05-17

## 2021-05-17 RX ADMIN — RIVAROXABAN 2.5 MILLIGRAM(S): KIT at 21:30

## 2021-05-17 RX ADMIN — ATORVASTATIN CALCIUM 40 MILLIGRAM(S): 80 TABLET, FILM COATED ORAL at 21:30

## 2021-05-17 RX ADMIN — SODIUM CHLORIDE 75 MILLILITER(S): 9 INJECTION, SOLUTION INTRAVENOUS at 18:30

## 2021-05-17 RX ADMIN — Medication 100 MILLIEQUIVALENT(S): at 09:22

## 2021-05-17 RX ADMIN — Medication 50 GRAM(S): at 18:32

## 2021-05-17 RX ADMIN — Medication 20 MILLIEQUIVALENT(S): at 21:30

## 2021-05-17 RX ADMIN — AMLODIPINE BESYLATE 10 MILLIGRAM(S): 2.5 TABLET ORAL at 05:25

## 2021-05-17 RX ADMIN — Medication 81 MILLIGRAM(S): at 18:52

## 2021-05-17 RX ADMIN — HEPARIN SODIUM 13 UNIT(S)/HR: 5000 INJECTION INTRAVENOUS; SUBCUTANEOUS at 09:20

## 2021-05-17 RX ADMIN — Medication 25 MILLIGRAM(S): at 18:45

## 2021-05-17 RX ADMIN — FAMOTIDINE 40 MILLIGRAM(S): 10 INJECTION INTRAVENOUS at 18:45

## 2021-05-17 RX ADMIN — Medication 12.5 MILLIGRAM(S): at 05:25

## 2021-05-17 RX ADMIN — SODIUM CHLORIDE 100 MILLILITER(S): 9 INJECTION, SOLUTION INTRAVENOUS at 00:24

## 2021-05-17 RX ADMIN — LOSARTAN POTASSIUM 100 MILLIGRAM(S): 100 TABLET, FILM COATED ORAL at 05:25

## 2021-05-17 NOTE — PROGRESS NOTE ADULT - ASSESSMENT
a/p 80 year old man with history of PAD s/p iliac stent and L femoral to PT bypass presenting with worsening claudication symptoms. Found to have occlusion of L iliac on CTA s/p fem-fem crossover bypass with synthetic graft.     - Diet: regular diet.   - Activity: OOB as tolerated  - Labs: f/u AM labs  - Pain medication: tylenol and oxycodone prn   - started Xarelto and ASA    Vascular Surgery   [id number]

## 2021-05-17 NOTE — BRIEF OPERATIVE NOTE - OPERATION/FINDINGS
high grade stenosis profunda on the right leg, Outflow over the previous bypass guo on the left side

## 2021-05-17 NOTE — BRIEF OPERATIVE NOTE - NSICDXBRIEFPOSTOP_GEN_ALL_CORE_FT
POST-OP DIAGNOSIS:  Peripheral vascular disease with pain at rest 17-May-2021 15:13:28  Shahzad Jordan

## 2021-05-17 NOTE — PROGRESS NOTE ADULT - ASSESSMENT
80 year old man with history of PAD s/p iliac stent and L femoral to PT bypass presenting with worsening claudication symptoms. Found to have occlusion of L iliac on CTA    Plan:  - Hold Heparin at 10 AM  - Plan for fem-fem bypass today  - pain control  - NPO  - FU am labs, replete prn  - appreciate Cardiology recommendations      Vascular Surgery  [id number]

## 2021-05-17 NOTE — PROGRESS NOTE ADULT - SUBJECTIVE AND OBJECTIVE BOX
Interval Events:  - No acute events overnight    S: Patient seen and examined at bedside and doing well. Denies fevers, chills, nausea, emesis, chest pain, SOB.    O: Vital Signs  T(C): 36.6 (05-17 @ 05:15), Max: 37.2 (05-16 @ 17:58)  HR: 77 (05-17 @ 05:15) (74 - 79)  BP: 170/79 (05-17 @ 05:15) (151/72 - 170/79)  RR: 18 (05-17 @ 05:15) (18 - 18)  SpO2: 95% (05-17 @ 05:15) (95% - 99%)  05-16-21 @ 07:01  -  05-17-21 @ 07:00  --------------------------------------------------------  IN:  Total IN: 0 mL    OUT:    Voided (mL): 1175 mL  Total OUT: 1175 mL    Total NET: -1175 mL        General: alert and oriented, NAD  Resp: airway patent, respirations unlabored  CVS: regular rate and rhythm  Abdomen: soft, nontender, nondistended  Extremities: no sandy, strong signal in graft, weak but palpable femoral bilaterally   Skin: warm, dry, appropriate color                          15.3   8.91  )-----------( 161      ( 17 May 2021 07:20 )             46.2   05-17    137  |  100  |  12  ----------------------------<  83  3.4<L>   |  22  |  1.29    Ca    9.2      17 May 2021 07:21  Phos  3.3     05-17  Mg     1.8     05-17

## 2021-05-17 NOTE — BRIEF OPERATIVE NOTE - NSICDXBRIEFPROCEDURE_GEN_ALL_CORE_FT
PROCEDURES:  Femoro-femoral crossover bypass with synthetic graft 17-May-2021 15:12:51  Shahzad Jordan

## 2021-05-17 NOTE — BRIEF OPERATIVE NOTE - NSICDXBRIEFPREOP_GEN_ALL_CORE_FT
PRE-OP DIAGNOSIS:  Peripheral vascular disease with pain at rest 17-May-2021 15:13:24  Shahzad Jordan

## 2021-05-17 NOTE — PROGRESS NOTE ADULT - SUBJECTIVE AND OBJECTIVE BOX
Vascular Surgery Post-op Note  STATUS POST:  fem to fem crossover bypass with synthetic graft  POST OPERATIVE DAY #: 0    Patient seen and examined at bedside. tolerating regular diet. pain is well controlled. sigala in situ. denies dizziness, SOB, CP or palpitations.     Vital Signs Last 24 Hrs  T(C): 36.7 (17 May 2021 21:33), Max: 37 (17 May 2021 19:30)  T(F): 98 (17 May 2021 21:33), Max: 98.6 (17 May 2021 19:30)  HR: 84 (17 May 2021 21:33) (58 - 84)  BP: 150/74 (17 May 2021 21:33) (117/57 - 181/78)  BP(mean): 98 (17 May 2021 18:00) (79 - 100)  RR: 18 (17 May 2021 21:33) (14 - 19)  SpO2: 98% (17 May 2021 21:33) (95% - 100%)  I&O's Summary    16 May 2021 07:01  -  17 May 2021 07:00  --------------------------------------------------------  IN: 1992 mL / OUT: 1175 mL / NET: 817 mL    17 May 2021 07:01  -  17 May 2021 22:27  --------------------------------------------------------  IN: 300 mL / OUT: 300 mL / NET: 0 mL      I&O's Detail    16 May 2021 07:01  -  17 May 2021 07:00  --------------------------------------------------------  IN:    Heparin: 312 mL    Lactated Ringers: 700 mL    Oral Fluid: 980 mL  Total IN: 1992 mL    OUT:    Voided (mL): 1175 mL  Total OUT: 1175 mL    Total NET: 817 mL      17 May 2021 07:01  -  17 May 2021 22:27  --------------------------------------------------------  IN:    Lactated Ringers: 300 mL  Total IN: 300 mL    OUT:    Bulb (mL): 40 mL    Bulb (mL): 10 mL    Indwelling Catheter - Urethral (mL): 250 mL  Total OUT: 300 mL    Total NET: 0 mL          MEDICATIONS  (STANDING):  amLODIPine   Tablet 10 milliGRAM(s) Oral daily  aspirin enteric coated 81 milliGRAM(s) Oral daily  atorvastatin 40 milliGRAM(s) Oral at bedtime  famotidine  Oral Tab/Cap - Peds 40 milliGRAM(s) Oral daily  hydrALAZINE 25 milliGRAM(s) Oral two times a day  hydrochlorothiazide 12.5 milliGRAM(s) Oral daily  losartan 100 milliGRAM(s) Oral daily  potassium chloride    Tablet ER 20 milliEquivalent(s) Oral every 2 hours  rivaroxaban 2.5 milliGRAM(s) Oral two times a day    MEDICATIONS  (PRN):  acetaminophen   Tablet .. 975 milliGRAM(s) Oral every 6 hours PRN Mild Pain (1 - 3), Moderate Pain (4 - 6)      LABS:                        15.3   8.91  )-----------( 161      ( 17 May 2021 07:20 )             46.2     05-17    137  |  100  |  12  ----------------------------<  83  3.4<L>   |  22  |  1.29    Ca    9.2      17 May 2021 07:21  Phos  3.3     05-17  Mg     1.8     05-17      PT/INR - ( 17 May 2021 07:20 )   PT: 11.4 sec;   INR: 0.95 ratio         PTT - ( 17 May 2021 07:20 )  PTT:64.9 sec      RADIOLOGY & ADDITIONAL STUDIES:    PHYSICAL EXAM:      Constitutional: NAD, A&O x 4  Respiratory: non-labor breathing   Cardiovascular: RRR  Gastrointestinal: soft, ND/NT  Extremities: groins soft, no active bleeding or induration. drains in place. palpable femoral pulses b/l. +DP/PT b/l.

## 2021-05-18 LAB
ANION GAP SERPL CALC-SCNC: 15 MMOL/L — SIGNIFICANT CHANGE UP (ref 5–17)
BUN SERPL-MCNC: 15 MG/DL — SIGNIFICANT CHANGE UP (ref 7–23)
CALCIUM SERPL-MCNC: 9 MG/DL — SIGNIFICANT CHANGE UP (ref 8.4–10.5)
CHLORIDE SERPL-SCNC: 101 MMOL/L — SIGNIFICANT CHANGE UP (ref 96–108)
CO2 SERPL-SCNC: 21 MMOL/L — LOW (ref 22–31)
CREAT SERPL-MCNC: 1.33 MG/DL — HIGH (ref 0.5–1.3)
GLUCOSE SERPL-MCNC: 90 MG/DL — SIGNIFICANT CHANGE UP (ref 70–99)
HCT VFR BLD CALC: 41.3 % — SIGNIFICANT CHANGE UP (ref 39–50)
HGB BLD-MCNC: 13.8 G/DL — SIGNIFICANT CHANGE UP (ref 13–17)
MAGNESIUM SERPL-MCNC: 2.1 MG/DL — SIGNIFICANT CHANGE UP (ref 1.6–2.6)
MCHC RBC-ENTMCNC: 32.6 PG — SIGNIFICANT CHANGE UP (ref 27–34)
MCHC RBC-ENTMCNC: 33.4 GM/DL — SIGNIFICANT CHANGE UP (ref 32–36)
MCV RBC AUTO: 97.6 FL — SIGNIFICANT CHANGE UP (ref 80–100)
NRBC # BLD: 0 /100 WBCS — SIGNIFICANT CHANGE UP (ref 0–0)
PHOSPHATE SERPL-MCNC: 2.9 MG/DL — SIGNIFICANT CHANGE UP (ref 2.5–4.5)
PLATELET # BLD AUTO: 169 K/UL — SIGNIFICANT CHANGE UP (ref 150–400)
POTASSIUM SERPL-MCNC: 4.4 MMOL/L — SIGNIFICANT CHANGE UP (ref 3.5–5.3)
POTASSIUM SERPL-SCNC: 4.4 MMOL/L — SIGNIFICANT CHANGE UP (ref 3.5–5.3)
RBC # BLD: 4.23 M/UL — SIGNIFICANT CHANGE UP (ref 4.2–5.8)
RBC # FLD: 13 % — SIGNIFICANT CHANGE UP (ref 10.3–14.5)
SODIUM SERPL-SCNC: 137 MMOL/L — SIGNIFICANT CHANGE UP (ref 135–145)
WBC # BLD: 14.22 K/UL — HIGH (ref 3.8–10.5)
WBC # FLD AUTO: 14.22 K/UL — HIGH (ref 3.8–10.5)

## 2021-05-18 RX ORDER — HYDRALAZINE HCL 50 MG
25 TABLET ORAL THREE TIMES A DAY
Refills: 0 | Status: DISCONTINUED | OUTPATIENT
Start: 2021-05-18 | End: 2021-05-20

## 2021-05-18 RX ADMIN — Medication 25 MILLIGRAM(S): at 17:24

## 2021-05-18 RX ADMIN — RIVAROXABAN 2.5 MILLIGRAM(S): KIT at 05:18

## 2021-05-18 RX ADMIN — ATORVASTATIN CALCIUM 40 MILLIGRAM(S): 80 TABLET, FILM COATED ORAL at 21:18

## 2021-05-18 RX ADMIN — Medication 975 MILLIGRAM(S): at 00:22

## 2021-05-18 RX ADMIN — LOSARTAN POTASSIUM 100 MILLIGRAM(S): 100 TABLET, FILM COATED ORAL at 05:18

## 2021-05-18 RX ADMIN — Medication 975 MILLIGRAM(S): at 21:18

## 2021-05-18 RX ADMIN — Medication 25 MILLIGRAM(S): at 05:18

## 2021-05-18 RX ADMIN — Medication 20 MILLIEQUIVALENT(S): at 00:23

## 2021-05-18 RX ADMIN — FAMOTIDINE 40 MILLIGRAM(S): 10 INJECTION INTRAVENOUS at 12:54

## 2021-05-18 RX ADMIN — Medication 975 MILLIGRAM(S): at 00:52

## 2021-05-18 RX ADMIN — RIVAROXABAN 2.5 MILLIGRAM(S): KIT at 17:23

## 2021-05-18 RX ADMIN — Medication 975 MILLIGRAM(S): at 21:48

## 2021-05-18 RX ADMIN — Medication 25 MILLIGRAM(S): at 22:45

## 2021-05-18 RX ADMIN — Medication 12.5 MILLIGRAM(S): at 05:18

## 2021-05-18 RX ADMIN — AMLODIPINE BESYLATE 10 MILLIGRAM(S): 2.5 TABLET ORAL at 05:18

## 2021-05-18 RX ADMIN — Medication 81 MILLIGRAM(S): at 12:54

## 2021-05-18 NOTE — PHYSICAL THERAPY INITIAL EVALUATION ADULT - ADDITIONAL COMMENTS
As per patient, he lives with HIS SON AND DGR IN [place]- in 11TH  FLOOR OF APT WITH ELEVATOR and is independent with ADLs. CTA Abd Aorta 5/14/21 IMPRESSION: Right pelvis/lower extremity: Diffuse right common femoral artery occlusion with reconstitution of flow in a moderately narrowed popliteal artery. Gross three-vessel runoff to the right foot, although the anterior tibial and posterior tibial arteries are heavily calcified. Left pelvis/lower extremity: Thrombosed native left common iliac artery, and occluded distal left common/external iliac artery stent. 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 Patent left superficial femoral to posterior tibial artery bypass graft with mild to moderate stenosis. One vessel runoff to the left foot via the posterior tibial artery. Atrophic right kidney.

## 2021-05-18 NOTE — PHYSICAL THERAPY INITIAL EVALUATION ADULT - DIAGNOSIS, PT EVAL
Pt presents with mild impairments in strength, balance and endurance impacting ability to perform ADLs and functional mobility

## 2021-05-18 NOTE — PROGRESS NOTE ADULT - ASSESSMENT
80 year old man with history of PAD s/p iliac stent and L femoral to PT bypass presenting with worsening claudication symptoms. Found to have occlusion of L iliac on CTA. Now POD1 s/p fem-fem bypass.    Plan:  - pain control  - regular diet  - FU am labs, replete prn  - appreciate Cardiology recommendations  - ANGELINE MATTSON      Vascular Surgery  [id number]

## 2021-05-18 NOTE — PHYSICAL THERAPY INITIAL EVALUATION ADULT - GENERAL OBSERVATIONS, REHAB EVAL
Pt received semi-supine in bed in NAD, +IVF [disconnected for session by YUN Faye], +CLARI drain, VSS, agreeable to participate in therapy at this time

## 2021-05-18 NOTE — PROGRESS NOTE ADULT - SUBJECTIVE AND OBJECTIVE BOX
Interval Events:  - No acute events overnight    S: Patient seen and examined at bedside and doing well. Denies fevers, chills, nausea, emesis, chest pain, SOB.    O: Vital Signs  T(C): 36.7 (05-18 @ 08:45), Max: 37 (05-17 @ 19:30)  HR: 81 (05-18 @ 11:32) (58 - 84)  BP: 176/79 (05-18 @ 11:32) (114/61 - 176/79)  RR: 18 (05-18 @ 08:45) (14 - 19)  SpO2: 95% (05-18 @ 11:32) (95% - 100%)  05-17-21 @ 07:01  -  05-18-21 @ 07:00  --------------------------------------------------------  IN:  Total IN: 0 mL    OUT:    Bulb (mL): 70 mL    Bulb (mL): 25 mL    Indwelling Catheter - Urethral (mL): 900 mL  Total OUT: 995 mL    Total NET: -995 mL        General: alert and oriented, NAD  Resp: airway patent, respirations unlabored  CVS: regular rate and rhythm  Abdomen: soft, nontender, nondistended  Extremities: no sandy, strong signal in graft, weak but palpable femoral bilaterally   Skin: warm, dry, appropriate color                          13.8   14.22 )-----------( 169      ( 18 May 2021 07:26 )             41.3   05-18    137  |  101  |  15  ----------------------------<  90  4.4   |  21<L>  |  1.33<H>    Ca    9.0      18 May 2021 07:24  Phos  2.9     05-18  Mg     2.1     05-18

## 2021-05-18 NOTE — PHYSICAL THERAPY INITIAL EVALUATION ADULT - BALANCE TRAINING, PT EVAL
GOAL: Pt will improve static/dynamic sitting and standing balance by at least 1/2 grade to decrease fall risk during functional mobility within 2 weeks.

## 2021-05-18 NOTE — PHYSICAL THERAPY INITIAL EVALUATION ADULT - LIVES WITH, PROFILE
Pt resides in apartment with son and DIL, 11th floor with elevator access and no stairs to negotiate. Pt reports being functionally independent in all aspects of functional mobility and self care PTA without DME/AD. Pt states his son drives places as needed and reports his son and DIL work from home and are able to assist as needed./children

## 2021-05-18 NOTE — PHYSICAL THERAPY INITIAL EVALUATION ADULT - PRECAUTIONS/LIMITATIONS, REHAB EVAL
No pain at rest, no numbness or tingling or decreased range of motion. No fevers, chills, chest pain. Presented to Dr Franco office and was found to have patent graft via duplex. Sent into ED from the office. In the ED, he was hemodynamically normal and afebrile. Had CTA showing occlusion of L iliac stent with reconstitution. Additionally occlusion of R femoral artery with reconstitution. Pt admittetd to [address] [place] [address] for management of L LE ischemia/PAD, plan for possible angio next week./fall precautions No pain at rest, no numbness or tingling or decreased range of motion. No fevers, chills, chest pain. Presented to Dr Franco office and was found to have patent graft via duplex. Sent into ED from the office. In the ED, he was hemodynamically normal and afebrile. Had CTA showing occlusion of L iliac stent with reconstitution. Additionally occlusion of R femoral artery with reconstitution. Pt admittetd to [address] [place] [address] for management of L LE ischemia/PAD, plan for possible angio next week. Pt now presents POD#1 s/p fem-fem crossover bypass with synthetic graft with Vascular Surgery Team on 5/17/21./fall precautions

## 2021-05-18 NOTE — PHYSICAL THERAPY INITIAL EVALUATION ADULT - PERTINENT HX OF CURRENT PROBLEM, REHAB EVAL
79 yo M with PMHx of HTN, HLD, PVD s/p iliac stent and L femoral to PT bypass with PTFE presenting [facility] from Dr Franco office due to 4 days of severe L calf pain with exertion. He reports he used to walk 2 miles a day and now he can only walk a block until he develops severe pain in his calf. CONTINUED:

## 2021-05-18 NOTE — PROGRESS NOTE ADULT - SUBJECTIVE AND OBJECTIVE BOX
Subjective: Patient seen and examined. No new events except as noted.   Now POD1 s/p fem-fem bypass.  pain controlled     REVIEW OF SYSTEMS:    CONSTITUTIONAL: + weakness, fevers or chills  EYES/ENT: No visual changes;  No vertigo or throat pain   NECK: No pain or stiffness  RESPIRATORY: No cough, wheezing, hemoptysis; No shortness of breath  CARDIOVASCULAR: No chest pain or palpitations  GASTROINTESTINAL: No abdominal or epigastric pain. No nausea, vomiting, or hematemesis; No diarrhea or constipation. No melena or hematochezia.  GENITOURINARY: No dysuria, frequency or hematuria  NEUROLOGICAL: No numbness or weakness  SKIN: No itching, burning, rashes, or lesions   All other review of systems is negative unless indicated above.    MEDICATIONS:  MEDICATIONS  (STANDING):  amLODIPine   Tablet 10 milliGRAM(s) Oral daily  aspirin enteric coated 81 milliGRAM(s) Oral daily  atorvastatin 40 milliGRAM(s) Oral at bedtime  famotidine  Oral Tab/Cap - Peds 40 milliGRAM(s) Oral daily  hydrALAZINE 25 milliGRAM(s) Oral two times a day  hydrochlorothiazide 12.5 milliGRAM(s) Oral daily  losartan 100 milliGRAM(s) Oral daily  rivaroxaban 2.5 milliGRAM(s) Oral two times a day      PHYSICAL EXAM:  T(C): 36.4 (05-18-21 @ 21:13), Max: 37.2 (05-18-21 @ 17:03)  HR: 88 (05-18-21 @ 21:13) (74 - 88)  BP: 162/66 (05-18-21 @ 21:13) (114/61 - 176/79)  RR: 18 (05-18-21 @ 21:13) (18 - 18)  SpO2: 98% (05-18-21 @ 21:13) (95% - 100%)  Wt(kg): --  I&O's Summary    17 May 2021 07:01  -  18 May 2021 07:00  --------------------------------------------------------  IN: 300 mL / OUT: 995 mL / NET: -695 mL    18 May 2021 07:01  -  18 May 2021 22:34  --------------------------------------------------------  IN: 720 mL / OUT: 630 mL / NET: 90 mL          Appearance: Normal	  HEENT:   Normal oral mucosa, PERRL, EOMI	  Lymphatic: No lymphadenopathy , no edema  Cardiovascular: Normal S1 S2, No JVD, No murmurs , Peripheral pulses palpable 2+ bilaterally  Respiratory: Lungs clear to auscultation, normal effort 	  Gastrointestinal:  Soft, Non-tender, + BS	  Skin: No rashes, No ecchymoses, No cyanosis, warm to touch  Musculoskeletal: Normal range of motion, normal strength  Psychiatry:  Mood & affect appropriate  Extremities: no sandy, strong signal in graft, weak but palpable femoral bilaterally   Skin: warm, dry, appropriate color        LABS:    CARDIAC MARKERS:                                13.8   14.22 )-----------( 169      ( 18 May 2021 07:26 )             41.3     05-18    137  |  101  |  15  ----------------------------<  90  4.4   |  21<L>  |  1.33<H>    Ca    9.0      18 May 2021 07:24  Phos  2.9     05-18  Mg     2.1     05-18      proBNP:   Lipid Profile:   HgA1c:   TSH:             TELEMETRY: 	    ECG:  	  RADIOLOGY:   DIAGNOSTIC TESTING:  [ ] Echocardiogram:  [ ]  Catheterization:  [ ] Stress Test:    OTHER:

## 2021-05-18 NOTE — PHYSICAL THERAPY INITIAL EVALUATION ADULT - GAIT TRAINING, PT EVAL
GOAL: Pt will ambulate at least 350' independently as needed  in 3-4wks. GOAL: Pt will ambulate at least 350' independently with appropriate branden and Good balance in 2wks.

## 2021-05-19 ENCOUNTER — TRANSCRIPTION ENCOUNTER (OUTPATIENT)
Age: 81
End: 2021-05-19

## 2021-05-19 LAB
ANION GAP SERPL CALC-SCNC: 14 MMOL/L — SIGNIFICANT CHANGE UP (ref 5–17)
APTT BLD: 28.7 SEC — SIGNIFICANT CHANGE UP (ref 27.5–35.5)
BUN SERPL-MCNC: 14 MG/DL — SIGNIFICANT CHANGE UP (ref 7–23)
CALCIUM SERPL-MCNC: 9.4 MG/DL — SIGNIFICANT CHANGE UP (ref 8.4–10.5)
CHLORIDE SERPL-SCNC: 101 MMOL/L — SIGNIFICANT CHANGE UP (ref 96–108)
CO2 SERPL-SCNC: 24 MMOL/L — SIGNIFICANT CHANGE UP (ref 22–31)
CREAT SERPL-MCNC: 1.34 MG/DL — HIGH (ref 0.5–1.3)
GLUCOSE SERPL-MCNC: 88 MG/DL — SIGNIFICANT CHANGE UP (ref 70–99)
HCT VFR BLD CALC: 40.8 % — SIGNIFICANT CHANGE UP (ref 39–50)
HGB BLD-MCNC: 13.8 G/DL — SIGNIFICANT CHANGE UP (ref 13–17)
INR BLD: 1.05 RATIO — SIGNIFICANT CHANGE UP (ref 0.88–1.16)
MAGNESIUM SERPL-MCNC: 1.9 MG/DL — SIGNIFICANT CHANGE UP (ref 1.6–2.6)
MCHC RBC-ENTMCNC: 33.4 PG — SIGNIFICANT CHANGE UP (ref 27–34)
MCHC RBC-ENTMCNC: 33.8 GM/DL — SIGNIFICANT CHANGE UP (ref 32–36)
MCV RBC AUTO: 98.8 FL — SIGNIFICANT CHANGE UP (ref 80–100)
NRBC # BLD: 0 /100 WBCS — SIGNIFICANT CHANGE UP (ref 0–0)
PHOSPHATE SERPL-MCNC: 2.8 MG/DL — SIGNIFICANT CHANGE UP (ref 2.5–4.5)
PLATELET # BLD AUTO: 156 K/UL — SIGNIFICANT CHANGE UP (ref 150–400)
POTASSIUM SERPL-MCNC: 3.8 MMOL/L — SIGNIFICANT CHANGE UP (ref 3.5–5.3)
POTASSIUM SERPL-SCNC: 3.8 MMOL/L — SIGNIFICANT CHANGE UP (ref 3.5–5.3)
PROTHROM AB SERPL-ACNC: 12.6 SEC — SIGNIFICANT CHANGE UP (ref 10.6–13.6)
RBC # BLD: 4.13 M/UL — LOW (ref 4.2–5.8)
RBC # FLD: 12.9 % — SIGNIFICANT CHANGE UP (ref 10.3–14.5)
SODIUM SERPL-SCNC: 139 MMOL/L — SIGNIFICANT CHANGE UP (ref 135–145)
WBC # BLD: 12.45 K/UL — HIGH (ref 3.8–10.5)
WBC # FLD AUTO: 12.45 K/UL — HIGH (ref 3.8–10.5)

## 2021-05-19 RX ORDER — MECLIZINE HCL 12.5 MG
12.5 TABLET ORAL ONCE
Refills: 0 | Status: DISCONTINUED | OUTPATIENT
Start: 2021-05-19 | End: 2021-05-19

## 2021-05-19 RX ORDER — SCOPALAMINE 1 MG/3D
1 PATCH, EXTENDED RELEASE TRANSDERMAL ONCE
Refills: 0 | Status: DISCONTINUED | OUTPATIENT
Start: 2021-05-19 | End: 2021-05-20

## 2021-05-19 RX ORDER — SODIUM,POTASSIUM PHOSPHATES 278-250MG
1 POWDER IN PACKET (EA) ORAL ONCE
Refills: 0 | Status: COMPLETED | OUTPATIENT
Start: 2021-05-19 | End: 2021-05-19

## 2021-05-19 RX ADMIN — Medication 975 MILLIGRAM(S): at 17:47

## 2021-05-19 RX ADMIN — AMLODIPINE BESYLATE 10 MILLIGRAM(S): 2.5 TABLET ORAL at 05:14

## 2021-05-19 RX ADMIN — RIVAROXABAN 2.5 MILLIGRAM(S): KIT at 05:14

## 2021-05-19 RX ADMIN — Medication 975 MILLIGRAM(S): at 05:17

## 2021-05-19 RX ADMIN — Medication 975 MILLIGRAM(S): at 05:47

## 2021-05-19 RX ADMIN — FAMOTIDINE 40 MILLIGRAM(S): 10 INJECTION INTRAVENOUS at 11:46

## 2021-05-19 RX ADMIN — Medication 12.5 MILLIGRAM(S): at 05:14

## 2021-05-19 RX ADMIN — Medication 25 MILLIGRAM(S): at 05:16

## 2021-05-19 RX ADMIN — Medication 25 MILLIGRAM(S): at 15:53

## 2021-05-19 RX ADMIN — Medication 1 PACKET(S): at 08:58

## 2021-05-19 RX ADMIN — LOSARTAN POTASSIUM 100 MILLIGRAM(S): 100 TABLET, FILM COATED ORAL at 05:14

## 2021-05-19 RX ADMIN — RIVAROXABAN 2.5 MILLIGRAM(S): KIT at 17:17

## 2021-05-19 RX ADMIN — Medication 975 MILLIGRAM(S): at 17:17

## 2021-05-19 RX ADMIN — Medication 81 MILLIGRAM(S): at 11:45

## 2021-05-19 RX ADMIN — Medication 25 MILLIGRAM(S): at 21:12

## 2021-05-19 RX ADMIN — ATORVASTATIN CALCIUM 40 MILLIGRAM(S): 80 TABLET, FILM COATED ORAL at 21:12

## 2021-05-19 NOTE — DISCHARGE NOTE PROVIDER - NSDCFUSCHEDAPPT_GEN_ALL_CORE_FT
ZEYNEP LUCIO ; 06/18/2021 ; [facility] [address]  ZEYNEP LUCIO ; 06/18/2021 ; [facility] [address]  ZEYNEP LUCIO ; 06/18/2021 ; [facility] [address]  ZEYNEP LUCIO ; 06/18/2021 ; [facility] [address]

## 2021-05-19 NOTE — DISCHARGE NOTE PROVIDER - NSDCCPCAREPLAN_GEN_ALL_CORE_FT
PRINCIPAL DISCHARGE DIAGNOSIS  Diagnosis: Femoral thrombosis  Assessment and Plan of Treatment: Continue baby aspirin and xarelto.  WOUND CARE: Staples will be removed at follow up office visit.  You will be discharged with CLARI drains. You will need to empty them and record outputs accurately. This will be taught to you by the nursing staff. Please do not remove the CLARI drains. They will be removed in the office. Please bring to the office accurate records of output.   BATHING: Please do not submerge wound underwater. You may shower and/or sponge bathe.  ACTIVITY: No heavy lifting anything more than 10-15lbs or straining. Otherwise, you may return to your usual level of physical activity. If you are taking narcotic pain medication (such as Percocet), do NOT drive a car, operate machinery or make important decisions.  NOTIFY YOUR SURGEON IF: You have any bleeding that does not stop, any pus draining from your wound, any fever (over 100.4 F) or chills, persistent nausea/vomiting with inability to tolerate food or liquids, persistent diarrhea, or if your pain is not controlled on your discharge pain medications.  FOLLOW-UP:  1. Please call to make a follow-up appointment within one to two weeks of discharge with Dr. Carrasco.  2. Please follow up with your primary care physician in one week regarding your hospitalization.       PRINCIPAL DISCHARGE DIAGNOSIS  Diagnosis: Femoral thrombosis  Assessment and Plan of Treatment: Continue baby aspirin and xarelto.  WOUND CARE: Staples will be removed at follow up office visit.  Keep incision clean/dry/intact  BATHING: Please do not submerge wound underwater. You may shower and/or sponge bathe.  ACTIVITY: No heavy lifting anything more than 10-15lbs or straining. Otherwise, you may return to your usual level of physical activity. If you are taking narcotic pain medication (such as Percocet), do NOT drive a car, operate machinery or make important decisions.  NOTIFY YOUR SURGEON IF: You have any bleeding that does not stop, any pus draining from your wound, any fever (over 100.4 F) or chills, persistent nausea/vomiting with inability to tolerate food or liquids, persistent diarrhea, or if your pain is not controlled on your discharge pain medications.  FOLLOW-UP:  1. Please call to make a follow-up appointment within one to two weeks of discharge with Dr. Carrasco.  2. Please follow up with your primary care physician in one week regarding your hospitalization.       PRINCIPAL DISCHARGE DIAGNOSIS  Diagnosis: Femoral thrombosis  Assessment and Plan of Treatment: Continue baby aspirin 81 mg daily with eliquis 2.5mg 2x  day.  WOUND CARE: Staples will be removed at follow up office visit.  Keep incision clean/dry/intact  BATHING: Please do not submerge wound underwater. You may shower and/or sponge bathe.  ACTIVITY: No heavy lifting anything more than 10-15lbs or straining. Otherwise, you may return to your usual level of physical activity. If you are taking narcotic pain medication (such as Percocet), do NOT drive a car, operate machinery or make important decisions.  NOTIFY YOUR SURGEON IF: You have any bleeding that does not stop, any pus draining from your wound, any fever (over 100.4 F) or chills, persistent nausea/vomiting with inability to tolerate food or liquids, persistent diarrhea, or if your pain is not controlled on your discharge pain medications.  FOLLOW-UP:  1. Please call to make a follow-up appointment within one to two weeks of discharge with Dr. Carrasco.  2. Please follow up with your primary care physician in one week regarding your hospitalization.

## 2021-05-19 NOTE — PROVIDER CONTACT NOTE (OTHER) - ASSESSMENT
pt c/o vertigo, with visible sweats, stated he woke up and the room Is spinning, state he had this once before and went to his doctor and receive medication for it which helped, no other symptoms, /86, HR92, T 98.1, RR 18, O2 99% on room air

## 2021-05-19 NOTE — DISCHARGE NOTE PROVIDER - CARE PROVIDER_API CALL
Gilbert Carrasco)  Vascular Surgery  [address] [facility] [address] [address], [address]  [place] [address]  Phone: [phone]  Fax: [phone]  Follow Up Time: 2 weeks

## 2021-05-19 NOTE — PROGRESS NOTE ADULT - SUBJECTIVE AND OBJECTIVE BOX
Interval Events:  - No acute events overnight  - Passed TOV    S: Patient seen and examined at bedside and doing well. Endorses OOB and ambulating hallways x5. Denies fevers, chills, nausea, emesis, chest pain, SOB.    O: Vital Signs  T(C): 36.9 (05-19 @ 05:08), Max: 37.3 (05-19 @ 01:14)  HR: 74 (05-19 @ 05:08) (74 - 88)  BP: 160/76 (05-19 @ 05:08) (137/64 - 176/79)  RR: 18 (05-19 @ 05:08) (18 - 18)  SpO2: 98% (05-19 @ 05:08) (95% - 98%)  05-18-21 @ 07:01  -  05-19-21 @ 07:00  --------------------------------------------------------  IN:  Total IN: 0 mL    OUT:    Bulb (mL): 115 mL    Bulb (mL): 95 mL    Voided (mL): 500 mL  Total OUT: 710 mL    Total NET: -710 mL        General: alert and oriented, NAD  Resp: airway patent, respirations unlabored  CVS: regular rate and rhythm  Abdomen: soft, nontender, nondistended  Extremities: no sandy, strong signal in graft, weak but palpable femoral bilaterally   Skin: warm, dry, appropriate color                          13.8   12.45 )-----------( 156      ( 19 May 2021 07:05 )             40.8   05-19    139  |  101  |  14  ----------------------------<  88  3.8   |  24  |  1.34<H>    Ca    9.4      19 May 2021 07:00  Phos  2.8     05-19  Mg     1.9     05-19

## 2021-05-19 NOTE — PROGRESS NOTE ADULT - SUBJECTIVE AND OBJECTIVE BOX
Subjective: Patient seen and examined. No new events except as noted.   resting comfortably     REVIEW OF SYSTEMS:    CONSTITUTIONAL: + weakness, fevers or chills  EYES/ENT: No visual changes;  No vertigo or throat pain   NECK: No pain or stiffness  RESPIRATORY: No cough, wheezing, hemoptysis; No shortness of breath  CARDIOVASCULAR: No chest pain or palpitations  GASTROINTESTINAL: No abdominal or epigastric pain. No nausea, vomiting, or hematemesis; No diarrhea or constipation. No melena or hematochezia.  GENITOURINARY: No dysuria, frequency or hematuria  NEUROLOGICAL: No numbness or weakness  SKIN: No itching, burning, rashes, or lesions   All other review of systems is negative unless indicated above.    MEDICATIONS:  MEDICATIONS  (STANDING):  amLODIPine   Tablet 10 milliGRAM(s) Oral daily  aspirin enteric coated 81 milliGRAM(s) Oral daily  atorvastatin 40 milliGRAM(s) Oral at bedtime  famotidine  Oral Tab/Cap - Peds 40 milliGRAM(s) Oral daily  hydrALAZINE 25 milliGRAM(s) Oral three times a day  hydrochlorothiazide 12.5 milliGRAM(s) Oral daily  losartan 100 milliGRAM(s) Oral daily  rivaroxaban 2.5 milliGRAM(s) Oral two times a day      PHYSICAL EXAM:  T(C): 36.7 (05-19-21 @ 09:16), Max: 37.3 (05-19-21 @ 01:14)  HR: 80 (05-19-21 @ 09:16) (74 - 88)  BP: 163/79 (05-19-21 @ 09:16) (137/64 - 176/79)  RR: 18 (05-19-21 @ 09:16) (18 - 18)  SpO2: 98% (05-19-21 @ 09:16) (95% - 98%)  Wt(kg): --  I&O's Summary    18 May 2021 07:01  -  19 May 2021 07:00  --------------------------------------------------------  IN: 720 mL / OUT: 710 mL / NET: 10 mL          Appearance: NAD  HEENT:  Dry  oral mucosa, PERRL, EOMI	  Lymphatic: No lymphadenopathy , no edema  Cardiovascular: Normal S1 S2, No JVD, No murmurs , Peripheral pulses palpable 2+ bilaterally  Respiratory: Lungs clear to auscultation, normal effort 	  Gastrointestinal:  Soft, Non-tender, + BS	  Skin: No rashes, No ecchymoses, No cyanosis, warm to touch  Musculoskeletal: Normal range of motion, normal strength  Psychiatry:  Mood & affect appropriate  Ext: No edema      LABS:    CARDIAC MARKERS:                                13.8   12.45 )-----------( 156      ( 19 May 2021 07:05 )             40.8     05-19    139  |  101  |  14  ----------------------------<  88  3.8   |  24  |  1.34<H>    Ca    9.4      19 May 2021 07:00  Phos  2.8     05-19  Mg     1.9     05-19      proBNP:   Lipid Profile:   HgA1c:   TSH:             TELEMETRY: 	    ECG:  	  RADIOLOGY:   DIAGNOSTIC TESTING:  [ ] Echocardiogram:  [ ]  Catheterization:  [ ] Stress Test:    OTHER:

## 2021-05-19 NOTE — DISCHARGE NOTE PROVIDER - NSDCCPTREATMENT_GEN_ALL_CORE_FT
PRINCIPAL PROCEDURE  Procedure: Femoro-femoral crossover bypass with synthetic graft  Findings and Treatment: · Operative Findings	high grade stenosis profunda on the right leg, Outflow over the previous bypass guo on the left side

## 2021-05-19 NOTE — PROGRESS NOTE ADULT - ASSESSMENT
80 year old man with history of PAD s/p iliac stent and L femoral to PT bypass presenting with worsening claudication symptoms. Found to have occlusion of L iliac on CTA. Now POD2 s/p fem-fem bypass.    Plan:  - pain control  - regular diet  - FU am labs, replete prn  - appreciate Cardiology recommendations  - DC tomorrow vs Friday    Vascular Surgery  [id number]

## 2021-05-19 NOTE — CHART NOTE - NSCHARTNOTEFT_GEN_A_CORE
Patient having vertigo symptoms, room spinning. States he has had this in the past 3 months ago and took meclizine. He denies having a history of an allergy although there is one documented in the chart as a rash from may 24 2019. Patient confirms he has not had a reaction to the medication the last time he took it.    Plan:  Okay to give one time dose, monitor for reaction    Topher Travis  Vascular Surgery PGY-1  [id number]

## 2021-05-19 NOTE — DISCHARGE NOTE PROVIDER - NSDCMRMEDTOKEN_GEN_ALL_CORE_FT
amLODIPine 10 mg oral tablet: 1 tab(s) orally once a day  apixaban 5 mg oral tablet: 1 tab(s) orally every 12 hours MDD:2 tabs  Bumex 1 mg oral tablet: orally every 48 hours  Hyzaar 100 mg-12.5 mg oral tablet: 1 tab(s) orally once a day Am  Lipitor 40 mg oral tablet: orally once a day (at bedtime)  Pepcid 40 mg oral tablet: 1 tab(s) orally once a day   acetaminophen 325 mg oral tablet: 3 tab(s) orally every 6 hours, As needed, Mild Pain (1 - 3), Moderate Pain (4 - 6)  amLODIPine 10 mg oral tablet: 1 tab(s) orally once a day  apixaban 5 mg oral tablet: 1 tab(s) orally every 12 hours MDD:2 tabs  Bumex 1 mg oral tablet: orally every 48 hours  Hyzaar 100 mg-12.5 mg oral tablet: 1 tab(s) orally once a day Am  Lipitor 40 mg oral tablet: orally once a day (at bedtime)  Pepcid 40 mg oral tablet: 1 tab(s) orally once a day  physical therapy: 3-5x weekly x 6 weeks  dx: I74.3   acetaminophen 325 mg oral tablet: 3 tab(s) orally every 6 hours, As needed, Mild Pain (1 - 3), Moderate Pain (4 - 6)  amLODIPine 10 mg oral tablet: 1 tab(s) orally once a day  apixaban 5 mg oral tablet: 1 tab(s) orally every 12 hours MDD:2 tabs  aspirin 81 mg oral delayed release tablet: 1 tab(s) orally once a day  Bumex 1 mg oral tablet: orally every 48 hours  Hyzaar 100 mg-12.5 mg oral tablet: 1 tab(s) orally once a day Am  Lipitor 40 mg oral tablet: orally once a day (at bedtime)  Pepcid 40 mg oral tablet: 1 tab(s) orally once a day  physical therapy: 3-5x weekly x 6 weeks  dx: I74.3   acetaminophen 325 mg oral tablet: 3 tab(s) orally every 6 hours, As needed, Mild Pain (1 - 3), Moderate Pain (4 - 6)  amLODIPine 10 mg oral tablet: 1 tab(s) orally once a day  apixaban 5 mg oral tablet: 1 tab(s) orally every 12 hours MDD:2 tabs  aspirin 81 mg oral delayed release tablet: 1 tab(s) orally once a day  Bumex 1 mg oral tablet: orally every 48 hours  hydrALAZINE 25 mg oral tablet: 1 tab(s) orally 3 times a day  Hyzaar 100 mg-12.5 mg oral tablet: 1 tab(s) orally once a day Am  Lipitor 40 mg oral tablet: orally once a day (at bedtime)  Pepcid 40 mg oral tablet: 1 tab(s) orally once a day  physical therapy: 3-5x weekly x 6 weeks  dx: I74.3  scopolamine: 1 patch transdermal once behind ear as needed for vertigo   acetaminophen 325 mg oral tablet: 3 tab(s) orally every 6 hours, As needed, Mild Pain (1 - 3), Moderate Pain (4 - 6)  amLODIPine 10 mg oral tablet: 1 tab(s) orally once a day  aspirin 81 mg oral delayed release tablet: 1 tab(s) orally once a day  Bumex 1 mg oral tablet: orally every 48 hours  Eliquis 2.5 mg oral tablet: 1 tab(s) orally 2 times a day   hydrALAZINE 25 mg oral tablet: 1 tab(s) orally 3 times a day  Hyzaar 100 mg-12.5 mg oral tablet: 1 tab(s) orally once a day Am  Lipitor 40 mg oral tablet: orally once a day (at bedtime)  Pepcid 40 mg oral tablet: 1 tab(s) orally once a day  physical therapy: 3-5x weekly x 6 weeks  dx: I74.3  scopolamine: 1 patch transdermal once behind ear as needed for vertigo

## 2021-05-19 NOTE — DISCHARGE NOTE PROVIDER - HOSPITAL COURSE
80 year old man with PMH of HTN, HLD, PVD s/p iliac stent and L femoral to PT bypass with PTFE presenting from Dr Franco office due to 4 days of severe L calf pain with exertion. He reports he used to walk 2 miles a day and now he can only walk a block until he develops severe pain in his calf. No pain at rest, no numbness or tingling or decreased range of motion. No fevers, chills, chest pain. Presented to Dr Franco office and was found to have patent graft via duplex. Sent into ED from the office    In the ED, he was hemodynamically normal and afebrile. Had CTA showing occlusion of L iliac stent with reconstitution. Additionally occlusion of R femoral artery with reconstitution. Admit to Dr Carrasco.  Heparin bolus and gtt. Plan for angio next week.  Cards consulted and cleared for angio.  Pt underwent fem to fem crossover bypass with synthetic graft. Pt tolerated procedure well was extubated and sent to pacu stable then floor. POD1, PT eval: outpt PT.  Raul dc and passed TOV.  At the time of discharge, the patient was hemodynamically stable, was tolerating PO diet, was voiding urine and passing stool, was ambulating, and was comfortable with adequate pain control. The patient was instructed to follow up with Dr. Sam and PMD within 1-2 weeks after discharge from the hospital. The patient felt comfortable with discharge. The patient was discharged to home with outpt PT. The patient had no other issues.    80 year old man with PMH of HTN, HLD, PVD s/p iliac stent and L femoral to PT bypass with PTFE presenting from Dr Franco office due to 4 days of severe L calf pain with exertion. He reports he used to walk 2 miles a day and now he can only walk a block until he develops severe pain in his calf. No pain at rest, no numbness or tingling or decreased range of motion. No fevers, chills, chest pain. Presented to Dr Franco office and was found to have patent graft via duplex. Sent into ED from the office    In the ED, he was hemodynamically normal and afebrile. Had CTA showing occlusion of L iliac stent with reconstitution. Additionally occlusion of R femoral artery with reconstitution. Admit to Dr Carrasco.  Heparin bolus and gtt. Plan for angio next week.  Cards consulted and cleared for angio.  Pt underwent fem to fem crossover bypass with synthetic graft. Pt tolerated procedure well was extubated and sent to pacu stable then floor. POD1, PT eval: outpt PT.  Carter dc and passed TOV.  CLARI dc prior to dc. At the time of discharge, the patient was hemodynamically stable, was tolerating PO diet, was voiding urine and passing stool, was ambulating, and was comfortable with adequate pain control. The patient was instructed to follow up with Dr. Sam and PMD within 1-2 weeks after discharge from the hospital. The patient felt comfortable with discharge. The patient was discharged to home with outpt PT. The patient had no other issues.

## 2021-05-20 ENCOUNTER — TRANSCRIPTION ENCOUNTER (OUTPATIENT)
Age: 81
End: 2021-05-20

## 2021-05-20 VITALS
TEMPERATURE: 99 F | DIASTOLIC BLOOD PRESSURE: 74 MMHG | RESPIRATION RATE: 18 BRPM | OXYGEN SATURATION: 95 % | HEART RATE: 74 BPM | SYSTOLIC BLOOD PRESSURE: 136 MMHG

## 2021-05-20 LAB
ANION GAP SERPL CALC-SCNC: 16 MMOL/L — SIGNIFICANT CHANGE UP (ref 5–17)
BUN SERPL-MCNC: 15 MG/DL — SIGNIFICANT CHANGE UP (ref 7–23)
CALCIUM SERPL-MCNC: 9.7 MG/DL — SIGNIFICANT CHANGE UP (ref 8.4–10.5)
CHLORIDE SERPL-SCNC: 97 MMOL/L — SIGNIFICANT CHANGE UP (ref 96–108)
CO2 SERPL-SCNC: 23 MMOL/L — SIGNIFICANT CHANGE UP (ref 22–31)
CREAT SERPL-MCNC: 1.38 MG/DL — HIGH (ref 0.5–1.3)
GLUCOSE SERPL-MCNC: 84 MG/DL — SIGNIFICANT CHANGE UP (ref 70–99)
HCT VFR BLD CALC: 41.3 % — SIGNIFICANT CHANGE UP (ref 39–50)
HGB BLD-MCNC: 14.2 G/DL — SIGNIFICANT CHANGE UP (ref 13–17)
MAGNESIUM SERPL-MCNC: 1.8 MG/DL — SIGNIFICANT CHANGE UP (ref 1.6–2.6)
MCHC RBC-ENTMCNC: 33.4 PG — SIGNIFICANT CHANGE UP (ref 27–34)
MCHC RBC-ENTMCNC: 34.4 GM/DL — SIGNIFICANT CHANGE UP (ref 32–36)
MCV RBC AUTO: 97.2 FL — SIGNIFICANT CHANGE UP (ref 80–100)
NRBC # BLD: 0 /100 WBCS — SIGNIFICANT CHANGE UP (ref 0–0)
PHOSPHATE SERPL-MCNC: 3.9 MG/DL — SIGNIFICANT CHANGE UP (ref 2.5–4.5)
PLATELET # BLD AUTO: 171 K/UL — SIGNIFICANT CHANGE UP (ref 150–400)
POTASSIUM SERPL-MCNC: 3.5 MMOL/L — SIGNIFICANT CHANGE UP (ref 3.5–5.3)
POTASSIUM SERPL-SCNC: 3.5 MMOL/L — SIGNIFICANT CHANGE UP (ref 3.5–5.3)
RBC # BLD: 4.25 M/UL — SIGNIFICANT CHANGE UP (ref 4.2–5.8)
RBC # FLD: 12.7 % — SIGNIFICANT CHANGE UP (ref 10.3–14.5)
SODIUM SERPL-SCNC: 136 MMOL/L — SIGNIFICANT CHANGE UP (ref 135–145)
WBC # BLD: 11.09 K/UL — HIGH (ref 3.8–10.5)
WBC # FLD AUTO: 11.09 K/UL — HIGH (ref 3.8–10.5)

## 2021-05-20 PROCEDURE — 82330 ASSAY OF CALCIUM: CPT

## 2021-05-20 PROCEDURE — 85730 THROMBOPLASTIN TIME PARTIAL: CPT

## 2021-05-20 PROCEDURE — 82435 ASSAY OF BLOOD CHLORIDE: CPT

## 2021-05-20 PROCEDURE — 86850 RBC ANTIBODY SCREEN: CPT

## 2021-05-20 PROCEDURE — 99285 EMERGENCY DEPT VISIT HI MDM: CPT

## 2021-05-20 PROCEDURE — 83605 ASSAY OF LACTIC ACID: CPT

## 2021-05-20 PROCEDURE — U0003: CPT

## 2021-05-20 PROCEDURE — 82565 ASSAY OF CREATININE: CPT

## 2021-05-20 PROCEDURE — 80053 COMPREHEN METABOLIC PANEL: CPT

## 2021-05-20 PROCEDURE — 96374 THER/PROPH/DIAG INJ IV PUSH: CPT | Mod: XU

## 2021-05-20 PROCEDURE — U0005: CPT

## 2021-05-20 PROCEDURE — 86769 SARS-COV-2 COVID-19 ANTIBODY: CPT

## 2021-05-20 PROCEDURE — C1769: CPT

## 2021-05-20 PROCEDURE — 85027 COMPLETE CBC AUTOMATED: CPT

## 2021-05-20 PROCEDURE — 84132 ASSAY OF SERUM POTASSIUM: CPT

## 2021-05-20 PROCEDURE — 85610 PROTHROMBIN TIME: CPT

## 2021-05-20 PROCEDURE — 87635 SARS-COV-2 COVID-19 AMP PRB: CPT

## 2021-05-20 PROCEDURE — 82947 ASSAY GLUCOSE BLOOD QUANT: CPT

## 2021-05-20 PROCEDURE — 82803 BLOOD GASES ANY COMBINATION: CPT

## 2021-05-20 PROCEDURE — C1768: CPT

## 2021-05-20 PROCEDURE — 83735 ASSAY OF MAGNESIUM: CPT

## 2021-05-20 PROCEDURE — C9399: CPT

## 2021-05-20 PROCEDURE — 97530 THERAPEUTIC ACTIVITIES: CPT

## 2021-05-20 PROCEDURE — 75635 CT ANGIO ABDOMINAL ARTERIES: CPT

## 2021-05-20 PROCEDURE — 84295 ASSAY OF SERUM SODIUM: CPT

## 2021-05-20 PROCEDURE — 84100 ASSAY OF PHOSPHORUS: CPT

## 2021-05-20 PROCEDURE — C1889: CPT

## 2021-05-20 PROCEDURE — 80048 BASIC METABOLIC PNL TOTAL CA: CPT

## 2021-05-20 PROCEDURE — 86901 BLOOD TYPING SEROLOGIC RH(D): CPT

## 2021-05-20 PROCEDURE — 85025 COMPLETE CBC W/AUTO DIFF WBC: CPT

## 2021-05-20 PROCEDURE — 85014 HEMATOCRIT: CPT

## 2021-05-20 PROCEDURE — 85018 HEMOGLOBIN: CPT

## 2021-05-20 PROCEDURE — 97161 PT EVAL LOW COMPLEX 20 MIN: CPT

## 2021-05-20 PROCEDURE — 93005 ELECTROCARDIOGRAM TRACING: CPT

## 2021-05-20 PROCEDURE — 97116 GAIT TRAINING THERAPY: CPT

## 2021-05-20 PROCEDURE — 86900 BLOOD TYPING SEROLOGIC ABO: CPT

## 2021-05-20 RX ORDER — ACETAMINOPHEN 500 MG
3 TABLET ORAL
Qty: 0 | Refills: 0 | DISCHARGE
Start: 2021-05-20

## 2021-05-20 RX ORDER — SCOPALAMINE 1 MG/3D
1 PATCH, EXTENDED RELEASE TRANSDERMAL
Qty: 1 | Refills: 0
Start: 2021-05-20

## 2021-05-20 RX ORDER — APIXABAN 2.5 MG/1
1 TABLET, FILM COATED ORAL
Qty: 60 | Refills: 0
Start: 2021-05-20 | End: 2021-06-18

## 2021-05-20 RX ORDER — HYDRALAZINE HCL 50 MG
1 TABLET ORAL
Qty: 90 | Refills: 0
Start: 2021-05-20 | End: 2021-06-18

## 2021-05-20 RX ORDER — ASPIRIN/CALCIUM CARB/MAGNESIUM 324 MG
1 TABLET ORAL
Qty: 0 | Refills: 0 | DISCHARGE
Start: 2021-05-20

## 2021-05-20 RX ADMIN — RIVAROXABAN 2.5 MILLIGRAM(S): KIT at 05:10

## 2021-05-20 RX ADMIN — AMLODIPINE BESYLATE 10 MILLIGRAM(S): 2.5 TABLET ORAL at 05:11

## 2021-05-20 RX ADMIN — FAMOTIDINE 40 MILLIGRAM(S): 10 INJECTION INTRAVENOUS at 11:55

## 2021-05-20 RX ADMIN — Medication 81 MILLIGRAM(S): at 11:54

## 2021-05-20 RX ADMIN — Medication 25 MILLIGRAM(S): at 13:05

## 2021-05-20 RX ADMIN — Medication 25 MILLIGRAM(S): at 05:10

## 2021-05-20 RX ADMIN — LOSARTAN POTASSIUM 100 MILLIGRAM(S): 100 TABLET, FILM COATED ORAL at 05:09

## 2021-05-20 RX ADMIN — Medication 12.5 MILLIGRAM(S): at 05:10

## 2021-05-20 NOTE — PROGRESS NOTE ADULT - REASON FOR ADMISSION
left lower extremity ischemia

## 2021-05-20 NOTE — PROGRESS NOTE ADULT - SUBJECTIVE AND OBJECTIVE BOX
Interval Events:  - HTN overnight    S: Patient seen and examined at bedside and doing well. Pain is well controlled. Vertigo overnight since resolved. Denies fevers, chills, nausea, emesis, chest pain, SOB.    O: Vital Signs  T(C): 37.1 (05-20 @ 05:08), Max: 37.1 (05-19 @ 13:51)  HR: 84 (05-20 @ 05:08) (78 - 92)  BP: 170/80 (05-20 @ 05:08) (155/73 - 184/86)  RR: 18 (05-20 @ 05:08) (18 - 18)  SpO2: 98% (05-20 @ 05:08) (95% - 99%)  05-19-21 @ 07:01  -  05-20-21 @ 07:00  --------------------------------------------------------  IN:  Total IN: 0 mL    OUT:    Bulb (mL): 80 mL    Bulb (mL): 75 mL    Voided (mL): 800 mL  Total OUT: 955 mL    Total NET: -955 mL        General: alert and oriented, NAD  Resp: airway patent, respirations unlabored  CVS: regular rate and rhythm  Abdomen: soft, nontender, nondistended  Extremities: no sandy, strong signal in graft, weak but palpable femoral bilaterally   Skin: warm, dry, appropriate color                          13.8   12.45 )-----------( 156      ( 19 May 2021 07:05 )             40.8   05-19    139  |  101  |  14  ----------------------------<  88  3.8   |  24  |  1.34<H>    Ca    9.4      19 May 2021 07:00  Phos  2.8     05-19  Mg     1.9     05-19

## 2021-05-20 NOTE — PROGRESS NOTE ADULT - NSICDXPILOT_GEN_ALL_CORE
[place]

## 2021-05-20 NOTE — DISCHARGE NOTE NURSING/CASE MANAGEMENT/SOCIAL WORK - PATIENT PORTAL LINK FT
You can access the FollowMyHealth Patient Portal offered by [facility] by registering at the following website: [email or web address]. By joining [email or web address]’s FollowMyHealth portal, you will also be able to view your health information using other applications (apps) compatible with our system.

## 2021-05-20 NOTE — PROGRESS NOTE ADULT - PROVIDER SPECIALTY LIST ADULT
Cardiology
Vascular Surgery
Cardiology
Cardiology
Vascular Surgery
Cardiology

## 2021-05-20 NOTE — PROGRESS NOTE ADULT - PROBLEM SELECTOR PLAN 1
Heparin gtt   Plan for angiogram and bypass monday   Acceptable cardiac risk to proceed   METS > 4, ( he exercises every day and walks 2 miles daily)   No anginal symptoms.
s/p fem-fem bypass.  pain control   vascular follow up   ASA and Xarelto

## 2021-05-20 NOTE — PROGRESS NOTE ADULT - SUBJECTIVE AND OBJECTIVE BOX
Subjective: Patient seen and examined. No new events except as noted.   Vertigo overnight since resolved  REVIEW OF SYSTEMS:    CONSTITUTIONAL: + weakness, fevers or chills  EYES/ENT: No visual changes;  No vertigo or throat pain   NECK: No pain or stiffness  RESPIRATORY: No cough, wheezing, hemoptysis; No shortness of breath  CARDIOVASCULAR: No chest pain or palpitations  GASTROINTESTINAL: No abdominal or epigastric pain. No nausea, vomiting, or hematemesis; No diarrhea or constipation. No melena or hematochezia.  GENITOURINARY: No dysuria, frequency or hematuria  NEUROLOGICAL: No numbness or weakness  SKIN: No itching, burning, rashes, or lesions   All other review of systems is negative unless indicated above.    MEDICATIONS:  MEDICATIONS  (STANDING):  amLODIPine   Tablet 10 milliGRAM(s) Oral daily  aspirin enteric coated 81 milliGRAM(s) Oral daily  atorvastatin 40 milliGRAM(s) Oral at bedtime  famotidine  Oral Tab/Cap - Peds 40 milliGRAM(s) Oral daily  hydrALAZINE 25 milliGRAM(s) Oral three times a day  hydrochlorothiazide 12.5 milliGRAM(s) Oral daily  losartan 100 milliGRAM(s) Oral daily  rivaroxaban 2.5 milliGRAM(s) Oral two times a day      PHYSICAL EXAM:  T(C): 37.1 (05-20-21 @ 05:08), Max: 37.1 (05-19-21 @ 13:51)  HR: 84 (05-20-21 @ 05:08) (78 - 92)  BP: 170/80 (05-20-21 @ 05:08) (155/73 - 184/86)  RR: 18 (05-20-21 @ 05:08) (18 - 18)  SpO2: 98% (05-20-21 @ 05:08) (95% - 99%)  Wt(kg): --  I&O's Summary    19 May 2021 07:01  -  20 May 2021 07:00  --------------------------------------------------------  IN: 960 mL / OUT: 955 mL / NET: 5 mL          Appearance: Normal	  HEENT:   Normal oral mucosa, PERRL, EOMI	  Lymphatic: No lymphadenopathy , no edema  Cardiovascular: Normal S1 S2, No JVD, No murmurs , Peripheral pulses palpable 2+ bilaterally  Respiratory: Lungs clear to auscultation, normal effort 	  Gastrointestinal:  Soft, Non-tender, + BS	  Skin: No rashes, No ecchymoses, No cyanosis, warm to touch  Musculoskeletal: Normal range of motion, normal strength  Psychiatry:  Mood & affect appropriate  Ext: No edema      LABS:    CARDIAC MARKERS:                                13.8   12.45 )-----------( 156      ( 19 May 2021 07:05 )             40.8     05-20    136  |  97  |  15  ----------------------------<  84  3.5   |  23  |  1.38<H>    Ca    9.7      20 May 2021 07:30  Phos  3.9     05-20  Mg     1.8     05-20      proBNP:   Lipid Profile:   HgA1c:   TSH:             TELEMETRY: 	    ECG:  	  RADIOLOGY:   DIAGNOSTIC TESTING:  [ ] Echocardiogram:  [ ]  Catheterization:  [ ] Stress Test:    OTHER:

## 2021-05-20 NOTE — PROGRESS NOTE ADULT - PROBLEM SELECTOR PLAN 2
Increased Hydralazine 25 tid. titrate to SBP < 150  Cont with rest of meds   pain control   Low salt diet.
remains Uncontrolled   But added Hydralazine 25 bid last  night. titrate to SBP < 150  Cont with rest of meds   pain control   Low salt diet.
remains labile  Increase Hydralazine 25 tid. titrate to SBP < 150  Cont with rest of meds   pain control   Low salt diet.
Increased Hydralazine 25 tid. titrate to SBP < 150  Cont with rest of meds   pain control   Low salt diet.

## 2021-05-20 NOTE — PROGRESS NOTE ADULT - ASSESSMENT
80 year old man with history of PAD s/p iliac stent and L femoral to PT bypass presenting with worsening claudication symptoms. Found to have occlusion of L iliac on CTA. Now POD3 s/p fem-fem bypass recovering well on the floors    Plan:  - pain control  - regular diet  - FU am labs, replete prn  - appreciate Cardiology recommendations  - DC drain and DC home today    Vascular Surgery  [id number]

## 2021-05-28 ENCOUNTER — APPOINTMENT (OUTPATIENT)
Dept: VASCULAR SURGERY | Facility: CLINIC | Age: 81
End: 2021-05-28
Payer: MEDICARE

## 2021-05-28 VITALS — DIASTOLIC BLOOD PRESSURE: 70 MMHG | HEART RATE: 76 BPM | SYSTOLIC BLOOD PRESSURE: 151 MMHG

## 2021-05-28 DIAGNOSIS — L03.90 CELLULITIS, UNSPECIFIED: ICD-10-CM

## 2021-05-28 PROCEDURE — 99024 POSTOP FOLLOW-UP VISIT: CPT

## 2021-05-28 NOTE — PHYSICAL EXAM
[Ankle Swelling (On Exam)] : present [Ankle Swelling On The Left] : of the left ankle [Varicose Veins Of Lower Extremities] : not present [] : not present [Alert] : alert [Calm] : calm [de-identified] : appears well  [de-identified] : mild erythema over the right groin incision site

## 2021-05-28 NOTE — REASON FOR VISIT
[de-identified] : fem - fem bypass [de-identified] : 5/17/21 [de-identified] : pt doing well without any complaints \par pt states that the pain in the legs has improved and his is able to ambulate without experiencing the pain \par pt denies any fevers or chills

## 2021-05-28 NOTE — DISCUSSION/SUMMARY
[FreeTextEntry1] : pt doing well incision clean and dry \par small area of erythema over the right groin incision site\par will start augmentin \par every other staple removed and steri strips placed \par pt to follow up in 1-2 weeks (pt going away next week) to remove the remaining staples and will complete duplex of the bypass and pvr at that time

## 2021-06-18 ENCOUNTER — APPOINTMENT (OUTPATIENT)
Dept: VASCULAR SURGERY | Facility: CLINIC | Age: 81
End: 2021-06-18
Payer: MEDICARE

## 2021-06-18 PROCEDURE — 99024 POSTOP FOLLOW-UP VISIT: CPT

## 2021-06-18 PROCEDURE — 93880 EXTRACRANIAL BILAT STUDY: CPT

## 2021-06-18 PROCEDURE — 93978 VASCULAR STUDY: CPT

## 2021-06-18 PROCEDURE — 93925 LOWER EXTREMITY STUDY: CPT

## 2021-06-18 NOTE — REASON FOR VISIT
[de-identified] : Status post femoral-femoral bypass for occluded left iliac system.  Patient reports no further claudication or rest pain.  Patient walks 2 miles a day.  Wounds are all healed.  Staples were removed.  Duplex shows widely patent bypass.  Follow-up in 3 months.  Continue Eliquis.

## 2021-09-24 ENCOUNTER — APPOINTMENT (OUTPATIENT)
Dept: VASCULAR SURGERY | Facility: CLINIC | Age: 81
End: 2021-09-24
Payer: MEDICARE

## 2021-09-24 VITALS — HEART RATE: 56 BPM | SYSTOLIC BLOOD PRESSURE: 160 MMHG | DIASTOLIC BLOOD PRESSURE: 76 MMHG

## 2021-09-24 PROCEDURE — 93925 LOWER EXTREMITY STUDY: CPT

## 2021-09-24 PROCEDURE — 99213 OFFICE O/P EST LOW 20 MIN: CPT

## 2021-09-24 RX ORDER — LATANOPROST/PF 0.005 %
0.01 DROPS OPHTHALMIC (EYE)
Qty: 2 | Refills: 0 | Status: ACTIVE | COMMUNITY
Start: 2021-06-17

## 2021-09-24 NOTE — HISTORY OF PRESENT ILLNESS
[FreeTextEntry1] : Patient with severe peripheral vascular disease.  Status post right femoral to left femoral bypass with left femoral to posterior tibial artery bypass.  Patient with no complaint of claudication or rest pain.  Patient walks 2 miles a day without any difficulty.  Patient currently on anticoagulation and antiplatelet therapy.

## 2021-09-24 NOTE — ASSESSMENT
[FreeTextEntry1] : Patient with severe peripheral vascular disease, status post left lower extremity revascularization procedure.  Patient doing very well.  Continue antiplatelet therapy and anticoagulation.  Follow-up in 3 months.

## 2022-04-01 ENCOUNTER — APPOINTMENT (OUTPATIENT)
Dept: VASCULAR SURGERY | Facility: CLINIC | Age: 82
End: 2022-04-01
Payer: MEDICARE

## 2022-04-01 VITALS
HEIGHT: 70 IN | OXYGEN SATURATION: 98 % | BODY MASS INDEX: 28.2 KG/M2 | HEART RATE: 76 BPM | WEIGHT: 197 LBS | DIASTOLIC BLOOD PRESSURE: 71 MMHG | SYSTOLIC BLOOD PRESSURE: 139 MMHG

## 2022-04-01 PROCEDURE — 99214 OFFICE O/P EST MOD 30 MIN: CPT

## 2022-04-01 PROCEDURE — 93880 EXTRACRANIAL BILAT STUDY: CPT

## 2022-04-01 PROCEDURE — 93978 VASCULAR STUDY: CPT

## 2022-04-01 PROCEDURE — 93925 LOWER EXTREMITY STUDY: CPT

## 2022-04-01 NOTE — ASSESSMENT
[FreeTextEntry1] : Patient with severe peripheral vascular disease, status post left lower extremity revascularization procedure.  Patient doing very well. \par \par Patient with no CVA or TIA.  Moderate asymptomatic carotid disease.\par \par Continue Plavix and Eliquis.  Follow-up in 3 months.

## 2022-04-07 ENCOUNTER — EMERGENCY (EMERGENCY)
Facility: HOSPITAL | Age: 82
LOS: 1 days | Discharge: HOME | End: 2022-04-07
Attending: STUDENT IN AN ORGANIZED HEALTH CARE EDUCATION/TRAINING PROGRAM
Payer: COMMERCIAL

## 2022-04-07 VITALS
HEIGHT: 70.5 IN | SYSTOLIC BLOOD PRESSURE: 137 MMHG | TEMPERATURE: 99 F | HEART RATE: 63 BPM | OXYGEN SATURATION: 95 % | DIASTOLIC BLOOD PRESSURE: 71 MMHG | WEIGHT: 179.9 LBS | RESPIRATION RATE: 20 BRPM

## 2022-04-07 VITALS
OXYGEN SATURATION: 97 % | TEMPERATURE: 98 F | DIASTOLIC BLOOD PRESSURE: 78 MMHG | HEART RATE: 63 BPM | SYSTOLIC BLOOD PRESSURE: 146 MMHG | RESPIRATION RATE: 20 BRPM

## 2022-04-07 DIAGNOSIS — Z98.89 OTHER SPECIFIED POSTPROCEDURAL STATES: Chronic | ICD-10-CM

## 2022-04-07 DIAGNOSIS — Z98.890 OTHER SPECIFIED POSTPROCEDURAL STATES: Chronic | ICD-10-CM

## 2022-04-07 PROCEDURE — 72192 CT PELVIS W/O DYE: CPT | Mod: 26,MA

## 2022-04-07 PROCEDURE — 96372 THER/PROPH/DIAG INJ SC/IM: CPT

## 2022-04-07 PROCEDURE — 99284 EMERGENCY DEPT VISIT MOD MDM: CPT

## 2022-04-07 PROCEDURE — 72192 CT PELVIS W/O DYE: CPT | Mod: MA

## 2022-04-07 PROCEDURE — 99284 EMERGENCY DEPT VISIT MOD MDM: CPT | Mod: 25

## 2022-04-07 RX ORDER — KETOROLAC TROMETHAMINE 30 MG/ML
15 SYRINGE (ML) INJECTION ONCE
Refills: 0 | Status: DISCONTINUED | OUTPATIENT
Start: 2022-04-07 | End: 2022-04-07

## 2022-04-07 RX ORDER — CYCLOBENZAPRINE HYDROCHLORIDE 10 MG/1
10 TABLET, FILM COATED ORAL ONCE
Refills: 0 | Status: COMPLETED | OUTPATIENT
Start: 2022-04-07 | End: 2022-04-07

## 2022-04-07 RX ORDER — ACETAMINOPHEN 500 MG
975 TABLET ORAL ONCE
Refills: 0 | Status: COMPLETED | OUTPATIENT
Start: 2022-04-07 | End: 2022-04-07

## 2022-04-07 RX ORDER — CYCLOBENZAPRINE HYDROCHLORIDE 10 MG/1
1 TABLET, FILM COATED ORAL
Qty: 15 | Refills: 0
Start: 2022-04-07 | End: 2022-04-11

## 2022-04-07 RX ADMIN — Medication 15 MILLIGRAM(S): at 18:02

## 2022-04-07 RX ADMIN — CYCLOBENZAPRINE HYDROCHLORIDE 10 MILLIGRAM(S): 10 TABLET, FILM COATED ORAL at 18:05

## 2022-04-07 RX ADMIN — CYCLOBENZAPRINE HYDROCHLORIDE 10 MILLIGRAM(S): 10 TABLET, FILM COATED ORAL at 21:12

## 2022-04-07 RX ADMIN — Medication 975 MILLIGRAM(S): at 18:03

## 2022-04-07 NOTE — ED PROVIDER NOTE - NSFOLLOWUPINSTRUCTIONS_ED_ALL_ED_FT
You were seen in the emergency department for: hip pain  Your results report is attached.  Please take Tylenol 1000mg every 6 hours as needed or Ibuprofen 600mg every 6 hours as needed - you can alternate every 3 hours as needed.     You may be contacted by the Emergency Department Referrals Coordinator to set up your follow-up appointment within 24-48 hours of your discharge, Monday to Friday. We recommend you follow up with: your primary care doctor    Please return to the Emergency Department if you experience any of the following symptoms:   - Shortness of breath or trouble breathing  - Pressure, pain or tightness in the chest  - Face drooping, arm weakness or speech difficulty  - Persistence of severe vomiting  - Head injury or loss of consciousness  - Nonstop bleeding or an open wound    (1) Follow up with your primary care physician within the next 24-48 hours as discussed. In addition, we did not find evidence of a life threatening illness on your testing here today, but listed below are the specialists that will be necessary to see as an outpatient to continue the workup.  Please call the numbers listed below or [phone] to set up the necessary appointments.  (2) Take Tylenol (up to 1000mg or 1 g)  and/or Motrin (up to 600mg) up to every 6 hours as needed for pain.   (3) If you had an IV (intravenous) line placed, it was removed. Sometimes, after IV removal, that area can be tender for a few days; if it develops redness and swelling, those could be signs of infection; in which case, return to the Emergency Department for assessment.  (4) Please continue taking all of your home medications as directed.

## 2022-04-07 NOTE — ED PROVIDER NOTE - NSFOLLOWUPCLINICS_GEN_ALL_ED_FT
[facility]  Orthopedics  [address] [place] [address]  Phone: [phone]  Fax: [phone]

## 2022-04-07 NOTE — ED PROVIDER NOTE - CLINICAL SUMMARY MEDICAL DECISION MAKING FREE TEXT BOX
81-year-old male hx of HTN, HLD, PVD s/p iliac stent and L femoral to PT bypass, presenting with atraumatic R hip/R lower back pain x 5 days, likely muscular pain, possible arthritis, low suspicion for acute fracture or dislocation. Will check CT pelvis, provide analgesia and reassess.

## 2022-04-07 NOTE — ED PROVIDER NOTE - OBJECTIVE STATEMENT
81-year-old male hx of HTN, HLD, PVD s/p iliac stent and L femoral to PT bypass, presenting with R hip/R lower back pain x 5 days. Denies fall or inciting trauma. Has been taking Tylenol without relief. Denies lower extremity weakness, numbness, tingling, bowel or bladder incontinence or retention. No other symptoms.

## 2022-04-07 NOTE — ED PROVIDER NOTE - PATIENT PORTAL LINK FT
You can access the FollowMyHealth Patient Portal offered by [facility] by registering at the following website: [email or web address]. By joining [employer]’s FollowMyHealth portal, you will also be able to view your health information using other applications (apps) compatible with our system.

## 2022-04-07 NOTE — ED PROVIDER NOTE - PROGRESS NOTE DETAILS
CT pelvis: Lower lumbar, pelvic and bilateral hip osteoarthrosis.  No acute fractures identified.  If there is persistent concern for an occult fracture, MRI can be   obtained.    Patient feels better after pain meds, will give 1 more dose of flexeril per pt request and send rx to pharmacy. Pt's daughter will accompany pt home. Patient feels improved, able to ambulate. Will discharge.

## 2022-04-12 PROBLEM — Z00.00 ENCOUNTER FOR PREVENTIVE HEALTH EXAMINATION: Status: ACTIVE | Noted: 2022-04-12

## 2022-04-13 ENCOUNTER — APPOINTMENT (OUTPATIENT)
Dept: ORTHOPEDIC SURGERY | Facility: CLINIC | Age: 82
End: 2022-04-13
Payer: MEDICARE

## 2022-04-13 VITALS — WEIGHT: 180 LBS | BODY MASS INDEX: 25.77 KG/M2 | HEIGHT: 70 IN

## 2022-04-13 DIAGNOSIS — M16.11 UNILATERAL PRIMARY OSTEOARTHRITIS, RIGHT HIP: ICD-10-CM

## 2022-04-13 DIAGNOSIS — M16.12 UNILATERAL PRIMARY OSTEOARTHRITIS, LEFT HIP: ICD-10-CM

## 2022-04-13 DIAGNOSIS — M47.817 SPONDYLOSIS W/OUT MYELOPATHY OR RADICULOPATHY, LUMBOSACRAL REGION: ICD-10-CM

## 2022-04-13 DIAGNOSIS — M51.36 OTHER INTERVERTEBRAL DISC DEGENERATION, LUMBAR REGION: ICD-10-CM

## 2022-04-13 DIAGNOSIS — M17.11 UNILATERAL PRIMARY OSTEOARTHRITIS, RIGHT KNEE: ICD-10-CM

## 2022-04-13 DIAGNOSIS — M17.12 UNILATERAL PRIMARY OSTEOARTHRITIS, LEFT KNEE: ICD-10-CM

## 2022-04-13 PROCEDURE — 99203 OFFICE O/P NEW LOW 30 MIN: CPT

## 2022-04-13 NOTE — PHYSICAL EXAM
[de-identified] : Constitutional:Well nourished , well developdevelopdevelopdevelopdevelopPsychiatric: Alert and oriented to time place and person.Appropriate affect \par Skin:Head, neck, arms and lower extremities:no lesions or discoloration\par HEENT: Normocephalic, EOM intact, Nasal septum midline,\par Respiratory: Unlabored respirations,no audible wheezing ,no tachypnea, no cyanosis\par Cardiovascular: no leg swelling  no ankle edema no JVD, pulse regular\par Vascular: no calf or thigh tenderness, \par Peripheral pulses; intact\par Lymphatics:No groin adenopathy,no lymphedema lower  or upper extremities\par Right and left hip pain-free passive range of motion right flexion 110 internal 15 external 40 left flexion 110 internal 15 external 45 [de-identified] : CAT scan pelvis reveals multilevel lumbar degenerative disc disease and degenerative changes right and left hip

## 2022-04-13 NOTE — DISCUSSION/SUMMARY
[de-identified] : Impression lumbar degenerative disc disease, osteoarthritis right and left hip\par Plan clinical presentation is more consistent with symptomatic lumbar degenerative disc disease notwithstanding CAT scan findings on plain x-ray.  But given absence of provocation of pain with passive motion hips I do not think at this time that he is symptomatic from his hips.\par Plan consultation orthopedic spine

## 2022-04-13 NOTE — HISTORY OF PRESENT ILLNESS
[de-identified] : This is an 81-year-old male retired as a manager of a dry-cleaning business.  1 month ago developed spontaneous onset chronic intermittent pain right posterior pelvis and across the low back.  Pain intermittent provoked by going from sitting to standing.  Reports marked aggravation by attempting to ambulate.  Underwent emergency room evaluation and was prescribed tramadol with some symptom improvement denies neurovascular symptoms lower extremity

## 2022-04-22 ENCOUNTER — APPOINTMENT (OUTPATIENT)
Dept: ORTHOPEDIC SURGERY | Facility: CLINIC | Age: 82
End: 2022-04-22
Payer: MEDICARE

## 2022-04-22 DIAGNOSIS — M48.07 SPINAL STENOSIS, LUMBOSACRAL REGION: ICD-10-CM

## 2022-04-22 PROCEDURE — 99214 OFFICE O/P EST MOD 30 MIN: CPT

## 2022-04-25 PROBLEM — M48.07 LUMBOSACRAL STENOSIS: Status: ACTIVE | Noted: 2022-04-25

## 2022-04-25 NOTE — PHYSICAL EXAM
[Antalgic] : antalgic [de-identified] : Examination of the lumbar spine reveals no midline tenderness palpation, step-offs, or skin lesions. Decreased range of motion with respect to flexion, extension, lateral bending, and rotation. No tenderness to palpation of the sciatic notch. No tenderness palpation of the bilateral greater trochanters. No pain with passive internal/external rotation of the hips. No instability of bilateral lower extremities.  Negative AMANDA. Negative straight leg raise bilaterally. No bowstring. Negative femoral stretch. 5 out of 5 iliopsoas, hip abductors, hips adductors, quadriceps, hamstrings, gastrocsoleus, tibialis anterior, extensor hallucis longus, peroneals. Grossly intact sensation to light touch bilateral lower extremities. 1+ patellar and Achilles reflexes. Downgoing Babinski. No clonus. Intact proprioception. Palpable pulses. No skin lesion and no edema on the right and left lower extremities. [de-identified] : CT scan demonstrates degenerative changes with stenosis.

## 2022-04-25 NOTE — DISCUSSION/SUMMARY
[de-identified] : We reviewed his imaging.  We discussed further treatment options.  He will try some physical therapy and the muscle relaxant.  MRI if not improved or worsen.

## 2022-04-25 NOTE — HISTORY OF PRESENT ILLNESS
[de-identified] : Mr. ZEYNEP LUCIO  is a 81 year old male who presents with 4 weeks of low back pain.  His pain can wrap around to his abdomen.   He can barely stand or walk secondary to pain.  He has fallen 4 times because of the pain.  He went to the ER and had imaging done.  Denies any dizziness.   Denies any LE radicular symptoms.  Normal bowel and bladder control.   Denies any recent fevers, chills, sweats, weight loss, or infection.\par \par The patients past medical history, past surgical history, medications, allergies, and social history were reviewed by me today with the patient and documented accordingly.  In addition, the patient's family history, which is noncontributory to their visit, was also reviewed.\par

## 2022-05-27 ENCOUNTER — APPOINTMENT (OUTPATIENT)
Dept: VASCULAR SURGERY | Facility: CLINIC | Age: 82
End: 2022-05-27
Payer: MEDICARE

## 2022-05-27 VITALS
HEART RATE: 58 BPM | DIASTOLIC BLOOD PRESSURE: 71 MMHG | SYSTOLIC BLOOD PRESSURE: 163 MMHG | BODY MASS INDEX: 24.48 KG/M2 | HEIGHT: 70 IN | WEIGHT: 171 LBS

## 2022-05-27 PROCEDURE — 93922 UPR/L XTREMITY ART 2 LEVELS: CPT

## 2022-05-27 PROCEDURE — 93971 EXTREMITY STUDY: CPT

## 2022-05-27 PROCEDURE — 99214 OFFICE O/P EST MOD 30 MIN: CPT

## 2022-05-27 RX ORDER — AMOXICILLIN AND CLAVULANATE POTASSIUM 875; 125 MG/1; MG/1
875-125 TABLET, COATED ORAL
Qty: 20 | Refills: 0 | Status: COMPLETED | COMMUNITY
Start: 2021-05-28 | End: 2022-05-27

## 2022-05-27 RX ORDER — CYCLOBENZAPRINE HYDROCHLORIDE 10 MG/1
10 TABLET, FILM COATED ORAL 3 TIMES DAILY
Qty: 60 | Refills: 0 | Status: COMPLETED | COMMUNITY
Start: 2022-04-22 | End: 2022-05-27

## 2022-05-27 NOTE — HISTORY OF PRESENT ILLNESS
[FreeTextEntry1] : 82 yo male with a history of left lower extremity dvt on eliquies, pad s/p fem - fem byp;ass and left fem-tib bypass presents for evaluation s/p recent ed visit for left lower extremity pain that started from the lower back down the leg and edema.  pt states that he was given muscle relaxent and pain killer but was unable to tolerate it.  pt states that now can walk with assistive devise like a walker.  pt denies any missed doses of the eliquis.  pt denies any history of cp/sob

## 2022-05-27 NOTE — PHYSICAL EXAM
[0] : left 0 [Ankle Swelling (On Exam)] : present [Ankle Swelling On The Left] : of the left ankle [Ankle Swelling Bilaterally] : severe [Alert] : alert [Calm] : calm [JVD] : no jugular venous distention  [Varicose Veins Of Lower Extremities] : not present [] : not present [de-identified] : appears well  [de-identified] : intact

## 2022-05-27 NOTE — ASSESSMENT
[FreeTextEntry1] : 80 yo male with a history of left lower extremity dvt on eliquies, pad s/p fem - fem byp;ass and left fem-tib bypass presents for evaluation s/p recent ed visit for left lower extremity pain that started from the lower back down the leg and edema\par \par venous duplex shows chronic left lower extremity dvt, no acute dvt \par \par marta/pvr shows marta of 0.9 of the left lower extremity \par \par last visit duplex shows patent bypass \par pt to continue with eliquis, lipitor \par pt to follow up in july for scheduled tests

## 2022-07-01 ENCOUNTER — APPOINTMENT (OUTPATIENT)
Dept: VASCULAR SURGERY | Facility: CLINIC | Age: 82
End: 2022-07-01

## 2022-07-01 VITALS
HEART RATE: 62 BPM | HEIGHT: 70 IN | WEIGHT: 171 LBS | SYSTOLIC BLOOD PRESSURE: 154 MMHG | BODY MASS INDEX: 24.48 KG/M2 | DIASTOLIC BLOOD PRESSURE: 55 MMHG

## 2022-07-01 PROCEDURE — 99214 OFFICE O/P EST MOD 30 MIN: CPT

## 2022-07-01 PROCEDURE — 93925 LOWER EXTREMITY STUDY: CPT

## 2022-07-01 NOTE — PHYSICAL EXAM
[JVD] : no jugular venous distention  [0] : left 0 [Ankle Swelling (On Exam)] : present [Ankle Swelling On The Left] : of the left ankle [Ankle Swelling Bilaterally] : severe [Varicose Veins Of Lower Extremities] : not present [] : not present [Alert] : alert [Calm] : calm [de-identified] : appears well  [de-identified] : intact

## 2022-07-01 NOTE — ASSESSMENT
[FreeTextEntry1] : 82 yo male with a history of left lower extremity dvt on Eliquis, pad s/p fem - fem bypass and left fem-tib bypass presents for evaluation s/p recent ed visit for for severe lower back pain with question of occult fracture currently being followed by spine surgery.  No vascular intervention necessary at this time.\par \par Continue Eliquis and aspirin.  Follow-up in 3 months.

## 2022-07-27 NOTE — ED ADULT TRIAGE NOTE - SOURCE OF INFORMATION
What Type Of Note Output Would You Prefer (Optional)?: Bullet Format
Has Your Skin Lesion Been Treated?: not been treated
Is This A New Presentation, Or A Follow-Up?: Skin Lesion
Patient

## 2022-10-07 ENCOUNTER — APPOINTMENT (OUTPATIENT)
Dept: VASCULAR SURGERY | Facility: CLINIC | Age: 82
End: 2022-10-07
Payer: MEDICARE

## 2022-10-07 VITALS
DIASTOLIC BLOOD PRESSURE: 76 MMHG | BODY MASS INDEX: 25.05 KG/M2 | HEART RATE: 54 BPM | HEIGHT: 70 IN | WEIGHT: 175 LBS | SYSTOLIC BLOOD PRESSURE: 171 MMHG

## 2022-10-07 PROCEDURE — 93880 EXTRACRANIAL BILAT STUDY: CPT

## 2022-10-07 PROCEDURE — 99214 OFFICE O/P EST MOD 30 MIN: CPT

## 2022-10-07 PROCEDURE — 93925 LOWER EXTREMITY STUDY: CPT

## 2022-10-07 NOTE — PHYSICAL EXAM
[JVD] : no jugular venous distention  [0] : left 0 [Ankle Swelling (On Exam)] : present [Ankle Swelling On The Left] : of the left ankle [Ankle Swelling Bilaterally] : severe [Varicose Veins Of Lower Extremities] : not present [Alert] : alert [] : not present [Calm] : calm [de-identified] : appears well  [de-identified] : intact

## 2022-10-07 NOTE — ASSESSMENT
[FreeTextEntry1] : 80 yo male with a history of left lower extremity dvt on Eliquis, pad s/p fem - fem bypass and left fem-tib bypass presents for evaluation s/p recent ed visit for for severe lower back pain with question of occult fracture currently being followed by spine surgery.  No vascular intervention necessary at this time.\par \par Continue Eliquis and aspirin.  Follow-up in 3 months.

## 2023-03-08 NOTE — ED ADULT NURSE NOTE - THOUGHTS OF HOMICIDE/VIOLENCE TOWARDS OTHERS YN, MLM
What Type Of Note Output Would You Prefer (Optional)?: Standard Output What Is The Reason For Today's Visit?: Full Body Skin Examination with No Concerns What Is The Reason For Today's Visit? (Being Monitored For X): concerning skin lesions on an annual basis No

## 2023-04-28 ENCOUNTER — APPOINTMENT (OUTPATIENT)
Dept: VASCULAR SURGERY | Facility: CLINIC | Age: 83
End: 2023-04-28
Payer: MEDICARE

## 2023-04-28 VITALS
HEIGHT: 70 IN | DIASTOLIC BLOOD PRESSURE: 77 MMHG | WEIGHT: 175 LBS | SYSTOLIC BLOOD PRESSURE: 179 MMHG | HEART RATE: 63 BPM | BODY MASS INDEX: 25.05 KG/M2

## 2023-04-28 PROCEDURE — 93880 EXTRACRANIAL BILAT STUDY: CPT

## 2023-04-28 PROCEDURE — 93926 LOWER EXTREMITY STUDY: CPT

## 2023-04-28 PROCEDURE — 99214 OFFICE O/P EST MOD 30 MIN: CPT

## 2023-04-28 NOTE — PHYSICAL EXAM
[JVD] : no jugular venous distention  [0] : left 0 [Ankle Swelling (On Exam)] : present [Ankle Swelling On The Left] : of the left ankle [Ankle Swelling Bilaterally] : severe [Varicose Veins Of Lower Extremities] : not present [] : not present [Alert] : alert [Calm] : calm [de-identified] : appears well  [de-identified] : intact

## 2023-04-28 NOTE — ASSESSMENT
[FreeTextEntry1] : 82 yo male with a history of left lower extremity dvt on Eliquis, pad s/p fem - fem bypass and left fem-tib bypass no complaint of claudication or rest pain.  No history of CVA or TIA in the recent past.  No vascular intervention necessary at this time.\par \par Continue Eliquis and aspirin.  Follow-up in 3 months.

## 2023-04-28 NOTE — HISTORY OF PRESENT ILLNESS
[FreeTextEntry1] : 82 yo male with a history of left lower extremity dvt on Eliquis, pad s/p fem - fem bypass and left fem-tib bypass no complaint of rest pain or claudication.  No complaint or history of CVA or TIA.

## 2023-08-11 ENCOUNTER — APPOINTMENT (OUTPATIENT)
Dept: VASCULAR SURGERY | Facility: CLINIC | Age: 83
End: 2023-08-11

## 2023-09-29 NOTE — ED ADULT TRIAGE NOTE - NS ED TRIAGE AVPU SCALE
Alert-The patient is alert, awake and responds to voice. The patient is oriented to time, place, and person. The triage nurse is able to obtain subjective information. Consent: Written consent obtained. Risks include but not limited to bruising, beading, irregular texture, ulceration, infection, allergic reaction, scar formation, incomplete augmentation, temporary nature, procedural pain.

## 2023-11-14 NOTE — PRE-OP CHECKLIST - ALLERGY BAND ON
[facility]      Jessi Vitale         [phone]  11/15/2023   Chief Complaint   Patient presents with    Follow-up     1yr ROBERT f/u w/[facility] download. Doing well. Needs script for PAP supplies. Pt of Dr. Epifania Paget     PAP Download:   Original or initial AHI: 10     Date of initial study: 1/29/2019     Compliant  100%     Noncompliant 0 %     PAP Type CPAP    Level  13 cmH2O   Avg Hrs/Day 7hrs 34mins  AHI: 0.4   Leaks : 95 th percentile: 13.4   Recorded compliance dates , 10/11/23  to 11/9/23     Machine/Mfg:   [x] ResMed    [] Respironics/Dreamstation   Interface:   [] Nasal    [x] Nasal pillows   [] FFM      Provider:      [] -NIRMALA     []Miguelito     [] Stacey    [x] Clinton Banks   [] Kristan               [] [facility]      [] Adaptive    [] [address] [facility] [address]:      [] Other    Neck Size: 19.5 inches  Mallampati 2  ESS:  2  SAQLI: 88    Here is a scan of the most recent download:              Presentation:   Kiet Infante presents for 1 yearsle medicine follow up for obstructive sleep apnea  Since the last visit, Kiet Infante is doing great with therapy, continues to get much benefit      Progress History:   Since last visit any new medical issues? No  Any trouble with Machine No  Any new sleep medicines? no  Trouble Falling Asleep No  Trouble Staying Asleep No  Snoring no    Equipment issues: The pressure is  acceptable, the mask is acceptable     Review of Systems -   Review of Systems   Constitutional:  Negative for activity change, appetite change, chills, fatigue, fever and unexpected weight change. HENT: Negative. Eyes: Negative. Respiratory:  Negative for cough, shortness of breath and wheezing. Cardiovascular:  Negative for chest pain, palpitations and leg swelling. Gastrointestinal:  Negative for abdominal pain, diarrhea, nausea and vomiting. Genitourinary: Negative. Musculoskeletal: Negative. Skin: Negative.     Allergic/Immunologic: done

## 2023-12-15 ENCOUNTER — APPOINTMENT (OUTPATIENT)
Dept: VASCULAR SURGERY | Facility: CLINIC | Age: 83
End: 2023-12-15
Payer: MEDICARE

## 2023-12-15 PROCEDURE — 93926 LOWER EXTREMITY STUDY: CPT

## 2023-12-15 PROCEDURE — 99214 OFFICE O/P EST MOD 30 MIN: CPT

## 2023-12-15 NOTE — ASSESSMENT
[FreeTextEntry1] : 80 yo male with a history of left lower extremity dvt on Eliquis, pad s/p fem - fem bypass and left fem-tib bypass no complaint of claudication or rest pain. No history of CVA or TIA in the recent past. No vascular intervention necessary at this time.    Continue Eliquis and aspirin. Follow-up in 3 months.

## 2023-12-15 NOTE — PHYSICAL EXAM
[JVD] : no jugular venous distention  [0] : left 0 [Ankle Swelling (On Exam)] : present [Ankle Swelling On The Left] : of the left ankle [Ankle Swelling Bilaterally] : severe [Varicose Veins Of Lower Extremities] : not present [] : not present [Alert] : alert [Calm] : calm [de-identified] : intact [de-identified] : appears well

## 2023-12-16 NOTE — PHYSICAL EXAM
[JVD] : no jugular venous distention  [Normal Heart Sounds] : normal heart sounds [Normal Breath Sounds] : Normal breath sounds [2+] : left 2+ [Ankle Swelling (On Exam)] : present [Abdomen Masses] : No abdominal masses [Ankle Swelling On The Left] : moderate [Skin Ulcer] : no ulcer [Oriented to Place] : oriented to place Labs/EKG/Imaging Studies/Medications

## 2024-05-03 ENCOUNTER — APPOINTMENT (OUTPATIENT)
Dept: VASCULAR SURGERY | Facility: CLINIC | Age: 84
End: 2024-05-03
Payer: MEDICARE

## 2024-05-03 PROCEDURE — 93925 LOWER EXTREMITY STUDY: CPT

## 2024-05-03 PROCEDURE — 99214 OFFICE O/P EST MOD 30 MIN: CPT | Mod: 25

## 2024-05-03 PROCEDURE — 93880 EXTRACRANIAL BILAT STUDY: CPT

## 2024-05-03 PROCEDURE — G0506: CPT

## 2024-05-05 NOTE — ASSESSMENT
[FreeTextEntry1] : 83-year-old male with history of peripheral arterial disease status post femorofemoral bypass status post left fem-tib bypass.  Duplex demonstrates decreased flow in the femorofemoral bypass.  There is adequate flow in the left fem-tib bypass.  Both are widely patent.  There is evidence of possible inflow stenosis to the femorofemoral bypass.  Recommend CT angio abdomen and pelvis with runoff to the toes.  Patient with history of left lower extremity DVT currently on Eliquis.  Patient to continue Eliquis.  Patient with carotid stenosis. Duplex demonstrates minimal stenosis in the right ICA.  The left ICA has moderate stenosis unchanged from previous study.  Patient to continue conservative management.  Follow-up 6 months with carotid duplex.

## 2024-05-05 NOTE — HISTORY OF PRESENT ILLNESS
[FreeTextEntry1] : Patient is an 83-year-old male with a history of left lower extremity DVT on Eliquis, peripheral arterial disease s/p fem - fem bypass and left fem-tib bypass who presents to the office today for follow-up.  Patient reports recurrence of rest pain and claudication symptoms primarily in the left lower extremity. Denies tissue loss.

## 2024-05-05 NOTE — PHYSICAL EXAM
[Normal Breath Sounds] : Normal breath sounds [Normal Rate and Rhythm] : normal rate and rhythm [0] : left 0 [Ankle Swelling (On Exam)] : present [Ankle Swelling On The Left] : of the left ankle [Ankle Swelling Bilaterally] : severe [No Rash or Lesion] : No rash or lesion [Alert] : alert [Calm] : calm [JVD] : no jugular venous distention  [Varicose Veins Of Lower Extremities] : not present [] : not present [de-identified] : appears well  [FreeTextEntry1] : Lower extremities are warm [de-identified] : intact

## 2024-05-10 NOTE — PHYSICAL THERAPY INITIAL EVALUATION ADULT - PHYSICAL ASSIST/NONPHYSICAL ASSIST: SUPINE/SIT, REHAB EVAL

## 2024-05-17 ENCOUNTER — APPOINTMENT (OUTPATIENT)
Dept: CT IMAGING | Facility: CLINIC | Age: 84
End: 2024-05-17
Payer: MEDICARE

## 2024-05-17 PROCEDURE — 75635 CT ANGIO ABDOMINAL ARTERIES: CPT

## 2024-05-31 ENCOUNTER — APPOINTMENT (OUTPATIENT)
Dept: VASCULAR SURGERY | Facility: CLINIC | Age: 84
End: 2024-05-31

## 2024-06-05 ENCOUNTER — APPOINTMENT (OUTPATIENT)
Dept: ENDOVASCULAR SURGERY | Facility: CLINIC | Age: 84
End: 2024-06-05
Payer: MEDICARE

## 2024-06-05 ENCOUNTER — RESULT REVIEW (OUTPATIENT)
Age: 84
End: 2024-06-05

## 2024-06-05 DIAGNOSIS — I73.9 PERIPHERAL VASCULAR DISEASE, UNSPECIFIED: ICD-10-CM

## 2024-06-05 PROCEDURE — 75625 CONTRAST EXAM ABDOMINL AORTA: CPT

## 2024-06-05 PROCEDURE — 37221Z: CUSTOM | Mod: RT

## 2024-06-05 PROCEDURE — 76937 US GUIDE VASCULAR ACCESS: CPT

## 2024-06-05 RX ORDER — CLONIDINE HYDROCHLORIDE 0.3 MG/1
TABLET ORAL
Refills: 0 | Status: ACTIVE | COMMUNITY

## 2024-06-05 RX ORDER — CILOSTAZOL 50 MG/1
50 TABLET ORAL
Qty: 60 | Refills: 3 | Status: DISCONTINUED | COMMUNITY
Start: 2019-05-10 | End: 2024-06-05

## 2024-06-05 RX ORDER — TRAMADOL HYDROCHLORIDE 50 MG/1
50 TABLET, COATED ORAL
Qty: 100 | Refills: 0 | Status: DISCONTINUED | COMMUNITY
Start: 2018-02-05 | End: 2024-06-05

## 2024-06-05 RX ORDER — OXYCODONE AND ACETAMINOPHEN 5; 325 MG/1; MG/1
5-325 TABLET ORAL EVERY 4 HOURS
Qty: 10 | Refills: 0 | Status: DISCONTINUED | COMMUNITY
Start: 2024-05-03 | End: 2024-06-05

## 2024-06-05 RX ORDER — AMLODIPINE BESYLATE 10 MG/1
10 TABLET ORAL
Qty: 90 | Refills: 0 | Status: DISCONTINUED | COMMUNITY
Start: 2018-02-22 | End: 2024-06-05

## 2024-06-05 RX ORDER — AMLODIPINE BESYLATE 5 MG/1
TABLET ORAL
Refills: 0 | Status: ACTIVE | COMMUNITY

## 2024-06-05 RX ORDER — CLONAZEPAM 1 MG/1
1 TABLET ORAL
Qty: 120 | Refills: 0 | Status: DISCONTINUED | COMMUNITY
Start: 2018-01-03 | End: 2024-06-05

## 2024-06-05 RX ORDER — POTASSIUM CHLORIDE 750 MG/1
10 TABLET, EXTENDED RELEASE ORAL
Qty: 30 | Refills: 0 | Status: DISCONTINUED | COMMUNITY
Start: 2019-03-08 | End: 2024-06-05

## 2024-06-10 LAB
ANION GAP SERPL CALC-SCNC: 14 MMOL/L
BUN SERPL-MCNC: 12 MG/DL
CALCIUM SERPL-MCNC: 9.3 MG/DL
CHLORIDE SERPL-SCNC: 104 MMOL/L
CO2 SERPL-SCNC: 22 MMOL/L
CREAT SERPL-MCNC: 1.32 MG/DL
EGFR: 54 ML/MIN/1.73M2
GLUCOSE SERPL-MCNC: 93 MG/DL
POTASSIUM SERPL-SCNC: 3.8 MMOL/L
SODIUM SERPL-SCNC: 140 MMOL/L

## 2024-06-24 ENCOUNTER — APPOINTMENT (OUTPATIENT)
Dept: VASCULAR SURGERY | Facility: CLINIC | Age: 84
End: 2024-06-24
Payer: MEDICARE

## 2024-06-24 PROCEDURE — 99214 OFFICE O/P EST MOD 30 MIN: CPT | Mod: 25

## 2024-06-24 PROCEDURE — G0506: CPT

## 2024-06-24 PROCEDURE — 93926 LOWER EXTREMITY STUDY: CPT | Mod: RT

## 2024-06-27 NOTE — HISTORY OF PRESENT ILLNESS
[FreeTextEntry1] : alert and oriented accompanied by granddaughter Guera [phone] feels ok no falls no medications taken this morning / gave aspirin at 925 last eliquis on Monday  Cr 1.35 6/4/24  [FreeTextEntry5] : yesterday  at 8pm  [FreeTextEntry6] : Dr Burgos

## 2024-06-27 NOTE — PROCEDURE
[D/C IV on discharge] : D/C IV on discharge [Resume diet] : resume diet [FreeTextEntry1] : Aortogram,right leg angiogram, angioplasty and stent

## 2024-07-29 NOTE — HISTORY OF PRESENT ILLNESS
[FreeTextEntry1] : Status post right common femoral and profundal atherectomy and angioplasty. Patient reports significant improvement of pain in the right first and second toe with improvement of the ulceration at the plantar aspect of the right second toe.
intact

## 2024-11-08 ENCOUNTER — APPOINTMENT (OUTPATIENT)
Dept: VASCULAR SURGERY | Facility: CLINIC | Age: 84
End: 2024-11-08
Payer: MEDICARE

## 2024-11-08 DIAGNOSIS — I74.4 EMBOLISM AND THROMBOSIS OF ARTERIES OF EXTREMITIES, UNSPECIFIED: ICD-10-CM

## 2024-11-08 PROCEDURE — 93925 LOWER EXTREMITY STUDY: CPT

## 2024-11-08 PROCEDURE — 93880 EXTRACRANIAL BILAT STUDY: CPT

## 2024-11-08 PROCEDURE — G2211 COMPLEX E/M VISIT ADD ON: CPT

## 2024-11-08 PROCEDURE — 99214 OFFICE O/P EST MOD 30 MIN: CPT

## 2024-11-09 LAB
ALBUMIN SERPL ELPH-MCNC: 4.3 G/DL
ALP BLD-CCNC: 84 U/L
ALT SERPL-CCNC: 13 U/L
ANION GAP SERPL CALC-SCNC: 18 MMOL/L
APTT BLD: 32.6 SEC
AST SERPL-CCNC: 22 U/L
BILIRUB SERPL-MCNC: 1 MG/DL
BUN SERPL-MCNC: 11 MG/DL
CALCIUM SERPL-MCNC: 9.4 MG/DL
CHLORIDE SERPL-SCNC: 94 MMOL/L
CO2 SERPL-SCNC: 21 MMOL/L
CREAT SERPL-MCNC: 1.18 MG/DL
EGFR: 61 ML/MIN/1.73M2
GLUCOSE SERPL-MCNC: 91 MG/DL
HCT VFR BLD CALC: 42.7 %
HGB BLD-MCNC: 14.7 G/DL
INR PPP: 1.12 RATIO
MCHC RBC-ENTMCNC: 33.4 PG
MCHC RBC-ENTMCNC: 34.4 G/DL
MCV RBC AUTO: 97 FL
PLATELET # BLD AUTO: 180 K/UL
POTASSIUM SERPL-SCNC: 4.2 MMOL/L
PROT SERPL-MCNC: 7 G/DL
PT BLD: 13.2 SEC
RBC # BLD: 4.4 M/UL
RBC # FLD: 13.7 %
SODIUM SERPL-SCNC: 133 MMOL/L
WBC # FLD AUTO: 7.7 K/UL

## 2024-11-13 ENCOUNTER — NON-APPOINTMENT (OUTPATIENT)
Age: 84
End: 2024-11-13

## 2024-11-13 ENCOUNTER — APPOINTMENT (OUTPATIENT)
Dept: ENDOVASCULAR SURGERY | Facility: CLINIC | Age: 84
End: 2024-11-13
Payer: MEDICARE

## 2024-11-13 ENCOUNTER — RESULT REVIEW (OUTPATIENT)
Age: 84
End: 2024-11-13

## 2024-11-13 VITALS
BODY MASS INDEX: 25.05 KG/M2 | OXYGEN SATURATION: 94 % | HEIGHT: 70 IN | RESPIRATION RATE: 16 BRPM | DIASTOLIC BLOOD PRESSURE: 69 MMHG | SYSTOLIC BLOOD PRESSURE: 133 MMHG | WEIGHT: 175 LBS | TEMPERATURE: 97.6 F | HEART RATE: 75 BPM

## 2024-11-13 DIAGNOSIS — I73.9 PERIPHERAL VASCULAR DISEASE, UNSPECIFIED: ICD-10-CM

## 2024-11-13 PROCEDURE — 76937 US GUIDE VASCULAR ACCESS: CPT

## 2024-11-13 PROCEDURE — 75625 CONTRAST EXAM ABDOMINL AORTA: CPT

## 2024-11-13 PROCEDURE — 37225Z: CUSTOM | Mod: RT

## 2024-11-20 NOTE — PHYSICAL THERAPY INITIAL EVALUATION ADULT - THERAPY FREQUENCY, PT EVAL
Copied from CRM #[id number]. Topic:  Schedule Appointment -  Schedule Adult Specialty  >> Nov 20, 2024 12:31 PM Analisa CARRILLO wrote:  Ally Johnson called to schedule a(n)  sports medicine appointment. Checked insurance. Looked for any active requests, recalls, service to orders, scheduling tickets prior to scheduling.  Located; Scheduled appointment.  Denied scheduling. Followed deny scheduling message instructions. Selected 'Wrap Up CRM' and created new Telephone Encounter after clicking 'Convert to Clinical Call'. Selected reason for call 'Appointment'. Sent Appointment template and routed as routine priority per Clinician KB page to appropriate clinician pool. Readback full message.-- DO NOT REPLY / DO NOT REPLY ALL --  -- This inbox is not monitored. If this was sent to the wrong provider or department, reroute message to P ECO Reroute pool. --  -- Message is from Engagement Center Operations (ECO) --      Patient called to schedule an appointment related to left  hip and the appt request requires review due to acute concern with has limited movement, new onset tingling or numbness or think you may have a broken/dislocated bone.    Alternative Phone Number: [phone]    Routing message to the Business Combined ([facility]) - P ORTHO [facility] RECEPT MSG POOL for further review.    Can a detailed message be left? Yes - Voicemail    Patient has been advised of a 2-3 business day response.               1-2x/week

## 2024-11-22 ENCOUNTER — APPOINTMENT (OUTPATIENT)
Dept: VASCULAR SURGERY | Facility: CLINIC | Age: 84
End: 2024-11-22
Payer: MEDICARE

## 2024-11-22 PROCEDURE — 93926 LOWER EXTREMITY STUDY: CPT | Mod: RT

## 2024-11-22 PROCEDURE — G2211 COMPLEX E/M VISIT ADD ON: CPT

## 2024-11-22 PROCEDURE — 99214 OFFICE O/P EST MOD 30 MIN: CPT

## 2024-12-06 ENCOUNTER — APPOINTMENT (OUTPATIENT)
Dept: VASCULAR SURGERY | Facility: CLINIC | Age: 84
End: 2024-12-06

## 2024-12-13 ENCOUNTER — APPOINTMENT (OUTPATIENT)
Dept: VASCULAR SURGERY | Facility: CLINIC | Age: 84
End: 2024-12-13
Payer: MEDICARE

## 2024-12-13 VITALS
HEIGHT: 70 IN | WEIGHT: 175 LBS | SYSTOLIC BLOOD PRESSURE: 166 MMHG | BODY MASS INDEX: 25.05 KG/M2 | DIASTOLIC BLOOD PRESSURE: 70 MMHG | HEART RATE: 72 BPM

## 2024-12-13 PROCEDURE — G2211 COMPLEX E/M VISIT ADD ON: CPT

## 2024-12-13 PROCEDURE — 99214 OFFICE O/P EST MOD 30 MIN: CPT

## 2024-12-20 ENCOUNTER — APPOINTMENT (OUTPATIENT)
Dept: VASCULAR SURGERY | Facility: CLINIC | Age: 84
End: 2024-12-20
Payer: MEDICARE

## 2024-12-20 PROCEDURE — 93926 LOWER EXTREMITY STUDY: CPT | Mod: RT

## 2024-12-20 PROCEDURE — G2211 COMPLEX E/M VISIT ADD ON: CPT

## 2024-12-20 PROCEDURE — 99215 OFFICE O/P EST HI 40 MIN: CPT

## 2024-12-22 ENCOUNTER — EMERGENCY (EMERGENCY)
Facility: HOSPITAL | Age: 84
LOS: 1 days | Discharge: HOME | End: 2024-12-22
Attending: STUDENT IN AN ORGANIZED HEALTH CARE EDUCATION/TRAINING PROGRAM | Admitting: STUDENT IN AN ORGANIZED HEALTH CARE EDUCATION/TRAINING PROGRAM
Payer: MEDICARE

## 2024-12-22 ENCOUNTER — INPATIENT (INPATIENT)
Facility: HOSPITAL | Age: 84
LOS: 6 days | Discharge: HOME | DRG: 253 | End: 2024-12-29
Attending: SURGERY | Admitting: SURGERY
Payer: COMMERCIAL

## 2024-12-22 VITALS
HEART RATE: 68 BPM | RESPIRATION RATE: 17 BRPM | OXYGEN SATURATION: 94 % | TEMPERATURE: 98 F | WEIGHT: 184.97 LBS | SYSTOLIC BLOOD PRESSURE: 177 MMHG | DIASTOLIC BLOOD PRESSURE: 80 MMHG

## 2024-12-22 VITALS
HEART RATE: 66 BPM | DIASTOLIC BLOOD PRESSURE: 84 MMHG | SYSTOLIC BLOOD PRESSURE: 168 MMHG | RESPIRATION RATE: 20 BRPM | WEIGHT: 179.9 LBS | HEIGHT: 69 IN | OXYGEN SATURATION: 96 % | TEMPERATURE: 98 F

## 2024-12-22 VITALS
SYSTOLIC BLOOD PRESSURE: 138 MMHG | OXYGEN SATURATION: 97 % | DIASTOLIC BLOOD PRESSURE: 69 MMHG | HEART RATE: 69 BPM | TEMPERATURE: 98 F | RESPIRATION RATE: 18 BRPM

## 2024-12-22 DIAGNOSIS — Z98.89 OTHER SPECIFIED POSTPROCEDURAL STATES: Chronic | ICD-10-CM

## 2024-12-22 DIAGNOSIS — Z98.890 OTHER SPECIFIED POSTPROCEDURAL STATES: Chronic | ICD-10-CM

## 2024-12-22 DIAGNOSIS — I73.9 PERIPHERAL VASCULAR DISEASE, UNSPECIFIED: ICD-10-CM

## 2024-12-22 LAB
ALBUMIN SERPL ELPH-MCNC: 4.3 G/DL — SIGNIFICANT CHANGE UP (ref 3.3–5)
ALBUMIN SERPL ELPH-MCNC: 4.4 G/DL — SIGNIFICANT CHANGE UP (ref 3.3–5)
ALP SERPL-CCNC: 106 U/L — SIGNIFICANT CHANGE UP (ref 40–120)
ALP SERPL-CCNC: 108 U/L — SIGNIFICANT CHANGE UP (ref 40–120)
ALT FLD-CCNC: 16 U/L — SIGNIFICANT CHANGE UP (ref 10–45)
ALT FLD-CCNC: 18 U/L — SIGNIFICANT CHANGE UP (ref 4–41)
ANION GAP SERPL CALC-SCNC: 14 MMOL/L — SIGNIFICANT CHANGE UP (ref 5–17)
ANION GAP SERPL CALC-SCNC: 16 MMOL/L — HIGH (ref 7–14)
ANISOCYTOSIS BLD QL: SLIGHT — SIGNIFICANT CHANGE UP
APTT BLD: 32.5 SEC — SIGNIFICANT CHANGE UP (ref 24.5–35.6)
APTT BLD: 33.9 SEC — SIGNIFICANT CHANGE UP (ref 24.5–35.6)
AST SERPL-CCNC: 23 U/L — SIGNIFICANT CHANGE UP (ref 10–40)
AST SERPL-CCNC: 25 U/L — SIGNIFICANT CHANGE UP (ref 4–40)
BASOPHILS # BLD AUTO: 0 K/UL — SIGNIFICANT CHANGE UP (ref 0–0.2)
BASOPHILS # BLD AUTO: 0.02 K/UL — SIGNIFICANT CHANGE UP (ref 0–0.2)
BASOPHILS NFR BLD AUTO: 0 % — SIGNIFICANT CHANGE UP (ref 0–2)
BASOPHILS NFR BLD AUTO: 0.2 % — SIGNIFICANT CHANGE UP (ref 0–2)
BILIRUB SERPL-MCNC: 0.8 MG/DL — SIGNIFICANT CHANGE UP (ref 0.2–1.2)
BILIRUB SERPL-MCNC: 0.8 MG/DL — SIGNIFICANT CHANGE UP (ref 0.2–1.2)
BLD GP AB SCN SERPL QL: NEGATIVE — SIGNIFICANT CHANGE UP
BLD GP AB SCN SERPL QL: NEGATIVE — SIGNIFICANT CHANGE UP
BUN SERPL-MCNC: 13 MG/DL — SIGNIFICANT CHANGE UP (ref 7–23)
BUN SERPL-MCNC: 13 MG/DL — SIGNIFICANT CHANGE UP (ref 7–23)
CALCIUM SERPL-MCNC: 10 MG/DL — SIGNIFICANT CHANGE UP (ref 8.4–10.5)
CALCIUM SERPL-MCNC: 10.3 MG/DL — SIGNIFICANT CHANGE UP (ref 8.4–10.5)
CHLORIDE SERPL-SCNC: 98 MMOL/L — SIGNIFICANT CHANGE UP (ref 96–108)
CHLORIDE SERPL-SCNC: 98 MMOL/L — SIGNIFICANT CHANGE UP (ref 98–107)
CO2 SERPL-SCNC: 23 MMOL/L — SIGNIFICANT CHANGE UP (ref 22–31)
CO2 SERPL-SCNC: 24 MMOL/L — SIGNIFICANT CHANGE UP (ref 22–31)
CREAT SERPL-MCNC: 0.99 MG/DL — SIGNIFICANT CHANGE UP (ref 0.5–1.3)
CREAT SERPL-MCNC: 1.08 MG/DL — SIGNIFICANT CHANGE UP (ref 0.5–1.3)
EGFR: 68 ML/MIN/1.73M2 — SIGNIFICANT CHANGE UP
EGFR: 76 ML/MIN/1.73M2 — SIGNIFICANT CHANGE UP
EOSINOPHIL # BLD AUTO: 0.1 K/UL — SIGNIFICANT CHANGE UP (ref 0–0.5)
EOSINOPHIL # BLD AUTO: 0.15 K/UL — SIGNIFICANT CHANGE UP (ref 0–0.5)
EOSINOPHIL NFR BLD AUTO: 0.9 % — SIGNIFICANT CHANGE UP (ref 0–6)
EOSINOPHIL NFR BLD AUTO: 1.6 % — SIGNIFICANT CHANGE UP (ref 0–6)
GLUCOSE SERPL-MCNC: 100 MG/DL — HIGH (ref 70–99)
GLUCOSE SERPL-MCNC: 94 MG/DL — SIGNIFICANT CHANGE UP (ref 70–99)
HCT VFR BLD CALC: 45.6 % — SIGNIFICANT CHANGE UP (ref 39–50)
HCT VFR BLD CALC: 46.1 % — SIGNIFICANT CHANGE UP (ref 39–50)
HGB BLD-MCNC: 15.8 G/DL — SIGNIFICANT CHANGE UP (ref 13–17)
HGB BLD-MCNC: 16.3 G/DL — SIGNIFICANT CHANGE UP (ref 13–17)
IANC: 8.56 K/UL — HIGH (ref 1.8–7.4)
IMM GRANULOCYTES NFR BLD AUTO: 0.3 % — SIGNIFICANT CHANGE UP (ref 0–0.9)
INR BLD: 1.08 RATIO — SIGNIFICANT CHANGE UP (ref 0.85–1.16)
INR BLD: 1.09 RATIO — SIGNIFICANT CHANGE UP (ref 0.85–1.16)
LYMPHOCYTES # BLD AUTO: 0.53 K/UL — LOW (ref 1–3.3)
LYMPHOCYTES # BLD AUTO: 0.73 K/UL — LOW (ref 1–3.3)
LYMPHOCYTES # BLD AUTO: 4.8 % — LOW (ref 13–44)
LYMPHOCYTES # BLD AUTO: 7.6 % — LOW (ref 13–44)
MACROCYTES BLD QL: SIGNIFICANT CHANGE UP
MANUAL SMEAR VERIFICATION: SIGNIFICANT CHANGE UP
MCHC RBC-ENTMCNC: 32.8 PG — SIGNIFICANT CHANGE UP (ref 27–34)
MCHC RBC-ENTMCNC: 33.7 PG — SIGNIFICANT CHANGE UP (ref 27–34)
MCHC RBC-ENTMCNC: 34.6 G/DL — SIGNIFICANT CHANGE UP (ref 32–36)
MCHC RBC-ENTMCNC: 35.4 G/DL — SIGNIFICANT CHANGE UP (ref 32–36)
MCV RBC AUTO: 94.8 FL — SIGNIFICANT CHANGE UP (ref 80–100)
MCV RBC AUTO: 95.4 FL — SIGNIFICANT CHANGE UP (ref 80–100)
MICROCYTES BLD QL: SLIGHT — SIGNIFICANT CHANGE UP
MONOCYTES # BLD AUTO: 1.18 K/UL — HIGH (ref 0–0.9)
MONOCYTES # BLD AUTO: 1.8 K/UL — HIGH (ref 0–0.9)
MONOCYTES NFR BLD AUTO: 12.3 % — SIGNIFICANT CHANGE UP (ref 2–14)
MONOCYTES NFR BLD AUTO: 16.2 % — HIGH (ref 2–14)
NEUTROPHILS # BLD AUTO: 7.52 K/UL — HIGH (ref 1.8–7.4)
NEUTROPHILS # BLD AUTO: 7.95 K/UL — HIGH (ref 1.8–7.4)
NEUTROPHILS NFR BLD AUTO: 70.5 % — SIGNIFICANT CHANGE UP (ref 43–77)
NEUTROPHILS NFR BLD AUTO: 78 % — HIGH (ref 43–77)
NEUTS BAND # BLD: 0.9 % — SIGNIFICANT CHANGE UP (ref 0–6)
NRBC # BLD: 0 /100 WBCS — SIGNIFICANT CHANGE UP (ref 0–0)
OVALOCYTES BLD QL SMEAR: SLIGHT — SIGNIFICANT CHANGE UP
PLAT MORPH BLD: NORMAL — SIGNIFICANT CHANGE UP
PLATELET # BLD AUTO: 305 K/UL — SIGNIFICANT CHANGE UP (ref 150–400)
PLATELET # BLD AUTO: 312 K/UL — SIGNIFICANT CHANGE UP (ref 150–400)
PLATELET COUNT - ESTIMATE: NORMAL — SIGNIFICANT CHANGE UP
POIKILOCYTOSIS BLD QL AUTO: SLIGHT — SIGNIFICANT CHANGE UP
POTASSIUM SERPL-MCNC: 3.7 MMOL/L — SIGNIFICANT CHANGE UP (ref 3.5–5.3)
POTASSIUM SERPL-MCNC: 4 MMOL/L — SIGNIFICANT CHANGE UP (ref 3.5–5.3)
POTASSIUM SERPL-SCNC: 3.7 MMOL/L — SIGNIFICANT CHANGE UP (ref 3.5–5.3)
POTASSIUM SERPL-SCNC: 4 MMOL/L — SIGNIFICANT CHANGE UP (ref 3.5–5.3)
PROT SERPL-MCNC: 7.8 G/DL — SIGNIFICANT CHANGE UP (ref 6–8.3)
PROT SERPL-MCNC: 8 G/DL — SIGNIFICANT CHANGE UP (ref 6–8.3)
PROTHROM AB SERPL-ACNC: 12.3 SEC — SIGNIFICANT CHANGE UP (ref 9.9–13.4)
PROTHROM AB SERPL-ACNC: 13 SEC — SIGNIFICANT CHANGE UP (ref 9.9–13.4)
RBC # BLD: 4.81 M/UL — SIGNIFICANT CHANGE UP (ref 4.2–5.8)
RBC # BLD: 4.83 M/UL — SIGNIFICANT CHANGE UP (ref 4.2–5.8)
RBC # FLD: 12.6 % — SIGNIFICANT CHANGE UP (ref 10.3–14.5)
RBC # FLD: 12.9 % — SIGNIFICANT CHANGE UP (ref 10.3–14.5)
RBC BLD AUTO: NORMAL — SIGNIFICANT CHANGE UP
RH IG SCN BLD-IMP: POSITIVE — SIGNIFICANT CHANGE UP
RH IG SCN BLD-IMP: POSITIVE — SIGNIFICANT CHANGE UP
ROULEAUX BLD QL SMEAR: PRESENT
SMUDGE CELLS # BLD: PRESENT — SIGNIFICANT CHANGE UP
SODIUM SERPL-SCNC: 136 MMOL/L — SIGNIFICANT CHANGE UP (ref 135–145)
SODIUM SERPL-SCNC: 137 MMOL/L — SIGNIFICANT CHANGE UP (ref 135–145)
VARIANT LYMPHS # BLD: 6.7 % — HIGH (ref 0–6)
WBC # BLD: 11.13 K/UL — HIGH (ref 3.8–10.5)
WBC # BLD: 9.63 K/UL — SIGNIFICANT CHANGE UP (ref 3.8–10.5)
WBC # FLD AUTO: 11.13 K/UL — HIGH (ref 3.8–10.5)
WBC # FLD AUTO: 9.63 K/UL — SIGNIFICANT CHANGE UP (ref 3.8–10.5)

## 2024-12-22 PROCEDURE — 99285 EMERGENCY DEPT VISIT HI MDM: CPT

## 2024-12-22 PROCEDURE — 99284 EMERGENCY DEPT VISIT MOD MDM: CPT

## 2024-12-22 PROCEDURE — 93010 ELECTROCARDIOGRAM REPORT: CPT

## 2024-12-22 RX ORDER — HEPARIN SODIUM 1000 [USP'U]/ML
INJECTION, SOLUTION INTRAVENOUS; SUBCUTANEOUS
Qty: 25000 | Refills: 0 | Status: DISCONTINUED | OUTPATIENT
Start: 2024-12-22 | End: 2024-12-22

## 2024-12-22 RX ORDER — INFLUENZA A VIRUS A/WISCONSIN/588/2019 (H1N1) RECOMBINANT HEMAGGLUTININ ANTIGEN, INFLUENZA A VIRUS A/DARWIN/6/2021 (H3N2) RECOMBINANT HEMAGGLUTININ ANTIGEN, INFLUENZA B VIRUS B/AUSTRIA/1359417/2021 RECOMBINANT HEMAGGLUTININ ANTIGEN, AND INFLUENZA B VIRUS B/PHUKET/3073/2013 RECOMBINANT HEMAGGLUTININ ANTIGEN 45; 45; 45; 45 UG/.5ML; UG/.5ML; UG/.5ML; UG/.5ML
0.5 INJECTION INTRAMUSCULAR ONCE
Refills: 0 | Status: DISCONTINUED | OUTPATIENT
Start: 2024-12-22 | End: 2024-12-29

## 2024-12-22 RX ORDER — HEPARIN SODIUM 1000 [USP'U]/ML
6500 INJECTION, SOLUTION INTRAVENOUS; SUBCUTANEOUS ONCE
Refills: 0 | Status: COMPLETED | OUTPATIENT
Start: 2024-12-22 | End: 2024-12-22

## 2024-12-22 RX ORDER — ASPIRIN 81 MG
81 TABLET, DELAYED RELEASE (ENTERIC COATED) ORAL DAILY
Refills: 0 | Status: DISCONTINUED | OUTPATIENT
Start: 2024-12-22 | End: 2024-12-24

## 2024-12-22 RX ORDER — HEPARIN SODIUM 1000 [USP'U]/ML
3000 INJECTION, SOLUTION INTRAVENOUS; SUBCUTANEOUS EVERY 6 HOURS
Refills: 0 | Status: DISCONTINUED | OUTPATIENT
Start: 2024-12-22 | End: 2024-12-22

## 2024-12-22 RX ORDER — HEPARIN SODIUM 1000 [USP'U]/ML
1400 INJECTION, SOLUTION INTRAVENOUS; SUBCUTANEOUS
Qty: 25000 | Refills: 0 | Status: DISCONTINUED | OUTPATIENT
Start: 2024-12-22 | End: 2024-12-24

## 2024-12-22 RX ORDER — PANTOPRAZOLE 40 MG/1
40 TABLET, DELAYED RELEASE ORAL
Refills: 0 | Status: DISCONTINUED | OUTPATIENT
Start: 2024-12-22 | End: 2024-12-24

## 2024-12-22 RX ORDER — HEPARIN SODIUM 1000 [USP'U]/ML
6500 INJECTION, SOLUTION INTRAVENOUS; SUBCUTANEOUS EVERY 6 HOURS
Refills: 0 | Status: DISCONTINUED | OUTPATIENT
Start: 2024-12-22 | End: 2024-12-22

## 2024-12-22 RX ORDER — APIXABAN 5 MG/1
2.5 TABLET, FILM COATED ORAL EVERY 12 HOURS
Refills: 0 | Status: DISCONTINUED | OUTPATIENT
Start: 2024-12-22 | End: 2024-12-23

## 2024-12-22 RX ADMIN — HEPARIN SODIUM 1400 UNIT(S)/HR: 1000 INJECTION, SOLUTION INTRAVENOUS; SUBCUTANEOUS at 18:58

## 2024-12-22 RX ADMIN — HEPARIN SODIUM 6500 UNIT(S): 1000 INJECTION, SOLUTION INTRAVENOUS; SUBCUTANEOUS at 18:56

## 2024-12-22 RX ADMIN — HEPARIN SODIUM 14 UNIT(S)/HR: 1000 INJECTION, SOLUTION INTRAVENOUS; SUBCUTANEOUS at 22:46

## 2024-12-22 NOTE — ED PROVIDER NOTE - OBJECTIVE STATEMENT
This is an 83 y/o M with PMHx PVD who was sent to the ED by [facility] surgeon Dr. Carrasco for LLE bypass. Patient was seen in clinic and recommended for inpatient cards eval and subsequent L axillary to femoral bypass. Denied any fever, chills This is an 85 y/o M with PMHx PVD who was sent to the ED by [facility] surgeon Dr. Carrasco for LLE bypass. Patient was seen in clinic and recommended for inpatient cards eval and subsequent L axillary to femoral bypass. Denied any fever, chills, chest pain, sob, ab pain, n/v. Noted pain in b/l LE but chronic.

## 2024-12-22 NOTE — ED PROVIDER NOTE - PATIENT PORTAL LINK FT
You can access the FollowMyHealth Patient Portal offered by [facility] by registering at the following website: [email or web address]. By joining [employer]’s FollowMyHealth portal, you will also be able to view your health information using other applications (apps) compatible with our system.

## 2024-12-22 NOTE — H&P ADULT - NSICDXPASTSURGICALHX_GEN_ALL_CORE_FT
PAST SURGICAL HISTORY:  H/O endarterectomy Right Femoral, 2007    H/O shoulder surgery left    S/P femoral-tibial bypass [id number] as per pt

## 2024-12-22 NOTE — ED ADULT NURSE REASSESSMENT NOTE - NS ED NURSE REASSESS COMMENT FT1
PT's actual weight obtained. Heparin administered as per MD order with 2 RN's at the bedside. Heparin dose confirmed via Heparin Nomogram. PT educated on signs and symptoms of bleeding, verbalized understanding. Safety and comfort maintained. Call bell within reach.

## 2024-12-22 NOTE — ED ADULT NURSE NOTE - NSFALLRISKASMT_ED_ALL_ED_DT
Medicare Wellness Visit  Plan for Preventive Care    A good way for you to stay healthy is to use preventive care.  Medicare covers many services that can help you stay healthy.* The goal of these services is to find any health problems as quickly as possible. Finding problems early can help make them easier to treat.  Your personal plan below lists the services you may need and when they are due.     Health Maintenance Summary     Topic Due On Due Status Completed On    Diabetes Eye Exam Jun 27, 1986 Overdue     Glycohemoglobin A1C  (Diabetes Sugar)  Oct 19, 2017 Not Due Jul 19, 2017    GFR  (Kidney Function Test)  Aug 16, 2018 Not Due Aug 16, 2017    Diabetes Foot Exam  Jun 27, 1986 Overdue     IMMUNIZATION - DTaP/Tdap/Td May 16, 2019 Not Due May 16, 2009    Immunization-Influenza Sep 1, 2017 Not Due Oct 2, 2016           Preventive Care for Women and Men    Heart Screenings (Cardiovascular):  · Blood tests are used to check your cholesterol, lipid and triglyceride levels. High levels can increase your risk for heart disease and stroke. High levels can be treated with medications, diet and exercise. Lowering your levels can help keep your heart and blood vessels healthy.  Your provider will order these tests if they are needed.    · An ultrasound is done to see if you have an abdominal aortic aneurysm (AAA).  This is an enlargement of one of the main blood vessels that delivers blood to the body.   In the [place], 9,000 deaths are caused by AAA.  You may not even know you have this problem and as many as 1 in 3 people will have a serious problem if it is not treated.  Early diagnosis allows for more effective treatment and cure.  If you have a family history of AAA or are a male age 65-75 who has smoked, you are at higher risk of an AAA.  Your provider can order this test, if needed.    Colorectal Screening:  · There are many tests that are used to check for cancer of your colon and rectum. You and your  provider should discuss what test is best for you and when to have it done.  Options include:  · Screening Colonoscopy: exam of the entire colon, seen through a flexible lighted tube.  · Flexible Sigmoidoscopy: exam of the last third (sigmoid portion) of the colon and rectum, seen through a flexible lighted tube.  · Cologuard DNA stool test: a sample of your stool is used to screen for cancer and unseen blood in your stool.  · Fecal Occult Blood Test: a sample of your stool is studied to find any unseen blood    Flu Shot:  · An immunization that helps to prevent influenza (the flu). You should get this every year. The best time to get the shot is in the fall.    Pneumococcal Shot:  • Vaccines are available that can help prevent pneumococcal disease, which is any type of infection caused by Streptococcus pneumoniae bacteria.   Their use can prevent some cases of pneumonia, meningitis, and sepsis. There are two types of pneumococcal vaccines:   o Conjugate vaccines (PCV-13 or Prevnar 13®) - helps protect against the 13 types of pneumococcal bacteria that are the most common causes of serious infections in children and adults.    o Polysaccharide vaccine (PPSV23 or Gaopshohz93®) - helps protect against 23 types of pneumococcal bacteria for patients who are recommended to get it.  These vaccines should be given at least 12 months apart.  A booster is usually not needed.     Hepatitis B Shot:  · An immunization that helps to protect people from getting Hepatitis B. Hepatitis B is a virus that spreads through contact with infected blood or body fluids. Many people with the virus do not have symptoms.  The virus can lead to serious problems, such as liver disease. Some people are at higher risk than others. Your doctor will tell you if you need this shot.     Diabetes Screening:  · A test to measure sugar (glucose) in your blood is called a fasting blood sugar. Fasting means you cannot have food or drink for at least 8  hours before the test. This test can detect diabetes long before you may notice symptoms.    Glaucoma Screening:  · Glaucoma screening is performed by your eye doctor. The test measures the fluid pressure inside your eyes to determine if you have glaucoma.     Hepatitis C Screening:  · A blood test to see if you have the hepatitis C virus.  Hepatitis C attacks the liver and is a major cause of chronic liver disease.  Medicare will cover a single screening for all adults born between 1945 & 1965, or high risk patients (people who have injected illegal drugs or people who have had blood transfusions).  High risk patients who continue to inject illegal drugs can be screened for Hepatitis C every year.    Smoking and Tobacco-Use Cessation Counseling:  · Tobacco is the single greatest cause of disease and early death in our country today. Medication and counseling together can increase a person’s chance of quitting for good.   · Medicare covers two quitting attempts per year, with four counseling sessions per attempt (eight sessions in a 12 month period)    Preventive Screening tests for Women    Screening Mammograms and Breast Exams:  · An x-ray of your breasts to check for breast cancer before you or your doctor may be able to feel it.  If breast cancer is found early it can usually be treated with success.    Pelvic Exams and Pap Tests:  · An exam to check for cervical and vaginal cancer. A Pap test is a lab test in which cells are taken from your cervix and sent to the lab to look for signs of cervical cancer. If cancer of the cervix is found early, chances for a cure are good. Testing can generally end at age 65, or if a woman has a hysterectomy for a benign condition. Your provider may recommend more frequent testing if certain abnormal results are found.    Bone Mass Measurements:  · A painless x-ray of your bone density to see if you are at risk for a broken bone. Bone density refers to the thickness of bones or  how tightly the bone tissue is packed.    Preventive Screening tests for Men    Prostate Screening:  · PSA - Prostate Cancer blood test.  Experts do not recommend routine screening of healthy men with no signs or symptoms of prostate disease.  However, men should not ignore urinary symptoms, and should discuss their family history with their doctor.    *Medicare pays for many preventive services to keep you healthy. For some of these services, you might have to pay a deductible, coinsurance, and / or copayment.  The amounts vary depending on the type of services you need and the kind of Medicare health plan you have.               22-Dec-2024 15:03

## 2024-12-22 NOTE — ED PROVIDER NOTE - OBJECTIVE STATEMENT
83 y/o M with PMHx PVD who [place] [facility] [place] for LE bypass. Patient states chronically he has been having bilateral leg pains.  Patient with multiple endovascular procedures in the past last 1 being 3 months prior with Dr. Nascimento.  Patient went to vascular surgeon 2 days prior to presentation today in which he recommended patient to come in for urgent endovascular physician for bilateral lower legs.

## 2024-12-22 NOTE — ED PROVIDER NOTE - BIRTH SEX
"Patient \"motioned nurse to come into the room\", this writer went into the room and patient is very upset and wants her emergency contact information changed.  Patient states \"I don't want my mother listed or information given to her\".  Patient wrote down a series of numbers and wanted it changed. Explained that it was a registration process.  Did talk to a registration person and they did state that sometime today they would come up. Did update patient.  Patient's sister at that is on the computer screen remained very upset.  Patient and patient's family are asking for the exact time that the 72 hour hold is up so they can be here. I deferred that question to the physician.  " Male

## 2024-12-22 NOTE — ED ADULT NURSE NOTE - OBJECTIVE STATEMENT
Pt received to room 19. pt is AxO 3 and ambulatory with cane. pt hard of hearing. pt sent by MD for vascular surgery for ischemia to the right lower leg. pt has equal pulses bi-lateral on palpation to the lower legs. pt endorses numbness to the right leg at times with difficulty walking. pt normal sinus on the tele monitor. pt respirations even and unlabored on room air. pt denies headaches, dizziness, n/v/d, abdominal pain, SOB, chest pain, or fever like symptoms.  Pt endorses hx of HTN. pt endorses use of Eliquis. Pt has a 20G to the left forearm. pt labs obtained and sent to the lab. EKG obtained. pt plan of care ongoing.

## 2024-12-22 NOTE — ED PROVIDER NOTE - NSFOLLOWUPINSTRUCTIONS_ED_ALL_ED_FT
You presented to the hospital for a left lower extremity procedure with Dr. Carrasco. Please go to [facility] at [address], [place] [address].

## 2024-12-22 NOTE — ED ADULT TRIAGE NOTE - CHIEF COMPLAINT QUOTE
pt ambulatory to triage, sent for admission by Dr. Gilbert Carrasco for vascular surgery d/2 R lower extremity ischemia.

## 2024-12-22 NOTE — H&P ADULT - HISTORY OF PRESENT ILLNESS
84M PMH HTN, HLD, PAD s/p L fem to PT arterial bypass with GSV, L CFA endarterectomy, L profunda femoral endarterectomy with hemashield patch angioplasty (Koby, 11/2015), L profunda to PTA PTFE bypass, L profunda endarterectomy, L PT endarterectomy (Luna 05/2019), reop R femoral to L femoral bypass with PTFE graft, right profunda femoral endarterectomy (Koby, 05/2021), who presents with worsening RLE pain and reduced velocities in the fem-fem bypass - sent in by Dr. Carrasco for operative planning of L ax-fem bypass.    In ED: labs wnl, PT 13.0, PTT 33.9, INR: 1.08.

## 2024-12-22 NOTE — H&P ADULT - NSHPLABSRESULTS_GEN_ALL_CORE
15.8   9.63  )-----------( 305      ( 22 Dec 2024 18:14 )             45.6   12-22    136  |  98  |  13  ----------------------------<  100[H]  3.7   |  24  |  1.08    Ca    10.3      22 Dec 2024 18:14    TPro  8.0  /  Alb  4.4  /  TBili  0.8  /  DBili  x   /  AST  23  /  ALT  16  /  AlkPhos  108  12-22

## 2024-12-22 NOTE — ED PROVIDER NOTE - PHYSICAL EXAMINATION
General: well appearing, interactive, well nourished, no apparent distress, ncat  HEENT: EOMI, PERRLA, normal mucosa, normal oropharynx, no lesions on the lips or on oral mucosa, normal external ear  Neck: supple, no lymphadenopathy, full range of motion, no nuchal rigidity  CV: RRR, normal S1 and S2 with no murmur, capillary refill less than two seconds  Resp: lungs CTA b/l, good aeration bilaterally, symmetric chest wall   Abd: non-distended, soft, non-tender  : no CVA tenderness  MSK: full range of motion, no cyanosis, no edema, no clubbing, no immobility  Neuro: CN II-XII grossly intact, muscle strength 5/5 in all extremities, normal gait  Skin: no rashes, skin intact  Doppler apprecation of bilateral DT and PT

## 2024-12-22 NOTE — ED ADULT NURSE NOTE - OBJECTIVE STATEMENT
PT is an 84 year old A&OX4 male with PMH of PVD, HTN, and takes Eliquis who presents to the ED from home who was sent in by vascular surgery MD Carrasco for ischemia to lower legs. PT scheduled for bypass. PT originally presented to [facility] today and was discharged and told to come to [facility] ED instead. PT endorsing chronic pain and numbness to the legs R>L with difficulty walking. PT denies chest pain, SOB, N/V/D, dizziness, fevers, and chills. PT is resting comfortably in bed, breathing unlabored on room air, and speaking in complete sentences. Abdomen is soft, non-tender, and non-distended. Skin is warm and dry, no diaphoresis noted. Strong strength in B/L extremities, sensation intact. PT placed in hospital gown, Safety and comfort maintained. Son at the bedside.

## 2024-12-22 NOTE — H&P ADULT - ASSESSMENT
84M PMH HTN, HLD, PAD s/p L fem to PT arterial bypass with GSV, L CFA endarterectomy, L profunda femoral endarterectomy with hemashield patch angioplasty (Koby, 11/2015), L profunda to PTA PTFE bypass, L profunda endarterectomy, L PT endarterectomy (Luna 05/2019), reop R femoral to L femoral bypass with PTFE graft, right profunda femoral endarterectomy (Koby, 05/2021), who presents with worsening RLE pain and reduced velocities in the fem-fem bypass - sent in by Dr. Carrasco for operative planning of L ax-fem bypass.    Plan:   - Admit to Dr. Carrasco  - Heparin gtt and home eliquis until PTT therapeutic then DC eliquis  - Medical and cardiac consults for risk stratification and comanagement  - Planning for L ax-fem bypass 12/25/24  - Pt needs medication reconciliation; doesn't currently know doses of his medications but claims he takes baby ASA, eliquis, losartan, evastatin and clonidine.     Discussed with Vascular fellow Dr. Jaquez on behalf of Dr. Carrasco    Vascular [facility]   [id number] / [phone]

## 2024-12-22 NOTE — ED PROVIDER NOTE - ATTENDING CONTRIBUTION TO CARE
85yo M referred by vascular for admission for RLE bypass   per vascular note:  Patient with severe peripheral vascular disease. Patient continues to have significant disease in the right common   femoral artery region proximal to the femoral-femoral bypass. Patient reports worsening symptoms of right lower   extremity and significantly reduced velocities in the femoral-femoral bypass. Patient needs urgent lower extremity   revascularization procedure to preserve the femoral-femoral bypass.  Based on duplex findings and the angiogram recently performed, patient needs an inflow procedure. Recommend   left axillary to femoral bypass.  I spoke to patient, patient's son, patient's daughter at length spending more than 45 minutes discussing the   procedure and the need for urgent revascularization.  Will refer the patient to the emergency room and have him undergo inpatient cardiac evaluation with subsequent   left axillary to femoral bypass while the patient is being heparinized.  The patient and the family agreed to the procedure.  Continue Eliquis for the treatment of atherosclerotic disease.  Continue atorvastatin for treatment of hyperlipidemia.  will dw vascular surgery and admit 85yo M referred by vascular for admission for LLE bypass   per vascular note:  Patient with severe peripheral vascular disease. Patient continues to have significant disease in the right common   femoral artery region proximal to the femoral-femoral bypass. Patient reports worsening symptoms of right lower   extremity and significantly reduced velocities in the femoral-femoral bypass. Patient needs urgent lower extremity   revascularization procedure to preserve the femoral-femoral bypass.  Based on duplex findings and the angiogram recently performed, patient needs an inflow procedure. Recommend   left axillary to femoral bypass.  I spoke to patient, patient's son, patient's daughter at length spending more than 45 minutes discussing the   procedure and the need for urgent revascularization.  Will refer the patient to the emergency room and have him undergo inpatient cardiac evaluation with subsequent   left axillary to femoral bypass while the patient is being heparinized.  The patient and the family agreed to the procedure.  Continue Eliquis for the treatment of atherosclerotic disease.  Continue atorvastatin for treatment of hyperlipidemia.  will dw vascular surgery and admit

## 2024-12-22 NOTE — ED ADULT NURSE REASSESSMENT NOTE - NS ED NURSE REASSESS COMMENT FT1
Multiple RN's attempted to place PIV without success, MD Kelsey placed USIV in left AC and states ok to give heparin with 1 PIV.

## 2024-12-22 NOTE — ED PROVIDER NOTE - NSICDXPASTSURGICALHX_GEN_ALL_CORE_FT
PAST SURGICAL HISTORY:  H/O endarterectomy Right Femoral, 2007    H/O shoulder surgery left    S/P femoral-tibial bypass [phone] as per pt

## 2024-12-22 NOTE — ED ADULT NURSE NOTE - NSFALLHARMRISKINTERV_ED_ALL_ED

## 2024-12-22 NOTE — ED ADULT NURSE NOTE - NSICDXPASTSURGICALHX_GEN_ALL_CORE_FT
PAST SURGICAL HISTORY:  H/O endarterectomy Right Femoral, 2007    H/O shoulder surgery left    S/P femoral-tibial bypass [phone] as per pt

## 2024-12-22 NOTE — ED PROVIDER NOTE - NSICDXPASTSURGICALHX_GEN_ALL_CORE_FT
PAST SURGICAL HISTORY:  H/O endarterectomy Right Femoral, 2007    H/O shoulder surgery left    S/P femoral-tibial bypass [phone] as per pt

## 2024-12-22 NOTE — ED PROVIDER NOTE - CLINICAL SUMMARY MEDICAL DECISION MAKING FREE TEXT BOX
This is an 85 y/o M with PMHx PVD who was sent to the ED by [facility] surgeon Dr. Carrasco for LLE bypass and inpatient cardiac workup. Will obtain preop labs and page [facility] surgery.

## 2024-12-22 NOTE — ED PROVIDER NOTE - CLINICAL SUMMARY MEDICAL DECISION MAKING FREE TEXT BOX
85 y/o M with PMHx PVD who was sent to the ED by [facility] surgeon Dr. Carrasco for LE bypass. Will order pre op labs, heparin drip.

## 2024-12-22 NOTE — ED PROVIDER NOTE - IV ALTEPLASE EXCL REL HIDDEN
The patient was left a message to call back to schedule a Colonoscopy screening.    The office number was given on the voice mail.    This is an open access referral. The patient can be scheduled at Hospital.      Letter sent   
The patient was left a message to call back to schedule a Colonoscopy screening.    The office number was given on the voice mail.    This is an open access referral. The patient can be scheduled at Hospital.    NO ASC     
show

## 2024-12-22 NOTE — H&P ADULT - NSHPPHYSICALEXAM_GEN_ALL_CORE
PHYSICAL EXAM:  GENERAL: NAD, well-developed  HEAD:  Atraumatic, Normocephalic  EYES: EOMI, conjunctiva and sclera clear  CHEST/LUNG: No distress, speaks in full sentences, on RA  HEART: VSS, NSR  ABDOMEN: Soft, nontender, nondistended  PSYCH: AAOx3  NEUROLOGY: non-focal  SKIN: No rashes or lesions  VASCULAR: Bilateral feet without evidence of ulcers, DP and PT signals dopplerable bilaterally. Warm. Motor and sensation intact B/L.

## 2024-12-22 NOTE — ED PROVIDER NOTE - NSICDXPASTMEDICALHX_GEN_ALL_CORE_FT
History of Present Illness  Ms. Codie Liu returns to discuss her most recent ultrasound results .  She is a 80 year-old woman with hypertension, hyperlipidemia, diabetes mellitus, severe lumbar radiculopathy and intervertebral disc degeneration status post spinal injection therapy, chronic DVT and bilateral venous stasis.  She presented with painful lower extremities and gastrocnemius muscular venous thrombosis and underwent:     05/31/19:  Radiofrequency ablation of the right great saphenous vein  08/05/19:  Varicectomy of painful varicose tributaries in the right posterior calf.    The patient reports good relief of her right leg symptoms following venous ablation.  However, she now returns with recurrence.  She has pain and heaviness in the left lower extremity with cramping at night.  In addition, she has recurrent, enlarging varicose lesions in the right lateral calf.    Physical Examination  There were no vitals taken for this visit.  On physical examination, the patient appears well.  The feet are warm with palpable pulses.  The right great saphenous vein is surgically thrombosed and the excised posterior calf varicosities are absent.  There are large, tender varicosities in both lower extremities, most notably in the right lateral ankle and throughout the left calf.      Imaging  02/22/19  Bilateral lower extremity venous reflux study:  INDICATION: 79-year-old female with leg pain and swelling for lower extremity venous reflux  evaluation.  PROCEDURE: Noninvasive bilateral lower extremity venous reflux study was performed with a  duplex scanner in upright position. Real-time grayscale images, color-flow images and spectral  waveforms were obtained. This exam was performed by Annamaria Cerna RVT.  FINDINGS: The known left gastrocnemius vein thrombosis showed complete recanalization. Chronic  DVT changes related wall thickening and recanalized venous flow in the left deep femoral  PAST MEDICAL HISTORY:  Anxiety     Claudication in peripheral vascular disease on Eliquis once a day for DVT prophylaxis and for LE blood flow as per pt    DVT (deep venous thrombosis) 11/2013, left    Dyslipidemia     ED (erectile dysfunction)     H/O gastroesophageal reflux (GERD)     Hypertension     Pedal edema     Seasonal allergies      vein.  Otherwise, no evidence of any acute DVT or acute superficial thrombophlebitis in the bilateral  lower extremities.  There is deep venous insufficiency in the right and left common femoral veins. The reflux times  are 3.5 and 1.0 seconds.  The right greater saphenous vein is incompetent at the saphenofemoral junction and distal thigh.  The reflux times are 2.6 and 0.51 seconds. The right greater saphenous vein is incompetent in  the entire calf. The reflux is continuous and greater than 6 seconds in the proximal and mid  calf and 2.2 seconds reflux in the distal calf. The right small saphenous vein is incompetent in  the mid calf with a reflux time of 0.63 seconds.  The left greater saphenous vein is incompetent at the saphenofemoral junction and proximal calf.  The reflux times are 1.2 and 1.7 seconds.  There are varicose veins noted from the right greater saphenous vein in the proximal and distal  calf.  The right greater saphenous vein is approximately 5.2 mm in diameter at the saphenofemoral  junction. It is 5.1, 3.3 and 2.0 mm in the thigh and 2.3, 2.2 and 2.4 mm in the calf. The right  small saphenous vein is 3.8, 3.1 and 1.8 seconds.  The left greater saphenous vein is approximately 6.2 mm in diameter. It is 5.2, 3.4 and 2.5 mm in  the thigh and 2.8, 2.5 and 1.9 mm in the calf. The left small saphenous vein is 3.5, 3.0 and 2.2  mm.  IMPRESSION:  1.  No evidence of any acute DVT or acute superficial thrombophlebitis in the bilateral lower  extremities. Chronic residual DVT changes related wall thickening in the left deep femoral vein.  2.  Deep venous insufficiency in the bilateral common femoral veins.  3.  The right greater saphenous vein is incompetent at the saphenofemoral junction and distal  thigh to distal calf. The right small saphenous vein is incompetent in the mid calf. The left  greater saphenous vein shows reflux in the saphenofemoral junction and proximal calf.  4.  The superficial venous  diameters are as mentioned in the body of the report.    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  There is reflux in the bilateral common femoral veins.  There is minimal reflux in the right great saphenous vein in the calf.  There is reflux in the left great saphenous vein with the proximal diameter of 10.2 mm.  There is no reflux in the small saphenous veins.    Assessment & Plan  We are pleased with the results of venous ablative therapy in Ms. Codie Liu.  She has new symptoms in the left lower extremity and mild recurrence in the right.  At her request, we made plans for left great saphenous vein radiofrequency ablation on an elective basis.      Dawson Domingo M.D.

## 2024-12-22 NOTE — PATIENT PROFILE ADULT - FALL HARM RISK - HARM RISK INTERVENTIONS
Assistance with ambulation/Assistance OOB with selected safe patient handling equipment/Communicate Risk of Fall with Harm to all staff/Discuss with provider need for PT consult/Monitor gait and stability/Provide patient with walking aids - walker, cane, crutches/Reinforce activity limits and safety measures with patient and family/Tailored Fall Risk Interventions/Use of alarms - bed, chair and/or voice tab/Visual Cue: Yellow wristband and red socks/Bed in lowest position, wheels locked, appropriate side rails in place/Call bell, personal items and telephone in reach/Instruct patient to call for assistance before getting out of bed or chair/Non-slip footwear when patient is out of bed/[place] to call system/Physically safe environment - no spills, clutter or unnecessary equipment/Purposeful Proactive Rounding/Room/bathroom lighting operational, light cord in reach

## 2024-12-22 NOTE — ED PROVIDER NOTE - ATTENDING CONTRIBUTION TO CARE
I have personally performed a face to face medical and diagnostic evaluation of the patient. I have discussed with and reviewed the Resident's and/or ACP's and/or Medical/PA/NP student's note and agree with the History, ROS, Physical Exam and MDM unless otherwise indicated. A brief summary of my personal evaluation and impression can be found below.     84M PMH HTN, HLD, PAD s/p L fem to PT arterial bypass with GSV, L CFA endarterectomy, L profunda femoral endarterectomy with hemashield patch angioplasty (Koby, 11/2015), L profunda to PTA PTFE bypass, L profunda endarterectomy, L PT endarterectomy (Luna 05/2019), reop R femoral to L femoral bypass with PTFE graft, right profunda femoral endarterectomy (Koby, 05/2021), who presents with worsening RLE pain and reduced velocities in the fem-fem bypass - sent in by Dr. Carrasco for operative planning of L ax-fem bypass. Patient reports bilateral lower extremity pain with ambulation, worse on the right.  No paresthesias or weakness.  No chest pain, SOB.  On my ED evaluation patient asking for sandwich and has no complaints.  Resting comfortably.  PE shows no distal pulses palpable but patient with dopplerable distal pulses intact.  Extremities warm and with perfusion.  No pitting edema bilateral lower extremities.  Regular rate and rhythm, lungs clear to auscultation.  No neurodeficits.  Given history and physical differential includes is not limited to peripheral arterial disease which is worsening.  no overlying signs of infection of lower extremities on my exam.  Plan for preop labs, vascular consult, heparin, admission.    Genie Crowley DO (Attending):   EKG showing normal sinus rhythm, heart rate 62, NJ interval 138, QRS 94, QTc 438, no acute ischemic changes.

## 2024-12-23 ENCOUNTER — RESULT REVIEW (OUTPATIENT)
Age: 84
End: 2024-12-23

## 2024-12-23 DIAGNOSIS — I10 ESSENTIAL (PRIMARY) HYPERTENSION: ICD-10-CM

## 2024-12-23 DIAGNOSIS — N17.9 ACUTE KIDNEY FAILURE, UNSPECIFIED: ICD-10-CM

## 2024-12-23 DIAGNOSIS — I73.9 PERIPHERAL VASCULAR DISEASE, UNSPECIFIED: ICD-10-CM

## 2024-12-23 DIAGNOSIS — E78.5 HYPERLIPIDEMIA, UNSPECIFIED: ICD-10-CM

## 2024-12-23 LAB
ALBUMIN SERPL ELPH-MCNC: 3.6 G/DL — SIGNIFICANT CHANGE UP (ref 3.3–5)
ALP SERPL-CCNC: 92 U/L — SIGNIFICANT CHANGE UP (ref 40–120)
ALT FLD-CCNC: 13 U/L — SIGNIFICANT CHANGE UP (ref 10–45)
ANION GAP SERPL CALC-SCNC: 15 MMOL/L — SIGNIFICANT CHANGE UP (ref 5–17)
APPEARANCE UR: CLEAR — SIGNIFICANT CHANGE UP
APTT BLD: 31.7 SEC — SIGNIFICANT CHANGE UP (ref 24.5–35.6)
APTT BLD: 35.1 SEC — SIGNIFICANT CHANGE UP (ref 24.5–35.6)
APTT BLD: 81.6 SEC — HIGH (ref 24.5–35.6)
AST SERPL-CCNC: 23 U/L — SIGNIFICANT CHANGE UP (ref 10–40)
BACTERIA # UR AUTO: NEGATIVE /HPF — SIGNIFICANT CHANGE UP
BILIRUB SERPL-MCNC: 0.6 MG/DL — SIGNIFICANT CHANGE UP (ref 0.2–1.2)
BILIRUB UR-MCNC: NEGATIVE — SIGNIFICANT CHANGE UP
BLD GP AB SCN SERPL QL: NEGATIVE — SIGNIFICANT CHANGE UP
BUN SERPL-MCNC: 15 MG/DL — SIGNIFICANT CHANGE UP (ref 7–23)
CALCIUM SERPL-MCNC: 9.7 MG/DL — SIGNIFICANT CHANGE UP (ref 8.4–10.5)
CAST: 11 /LPF — HIGH (ref 0–4)
CHLORIDE SERPL-SCNC: 100 MMOL/L — SIGNIFICANT CHANGE UP (ref 96–108)
CO2 SERPL-SCNC: 20 MMOL/L — LOW (ref 22–31)
COLOR SPEC: YELLOW — SIGNIFICANT CHANGE UP
CREAT ?TM UR-MCNC: 193 MG/DL — SIGNIFICANT CHANGE UP
CREAT SERPL-MCNC: 1.2 MG/DL — SIGNIFICANT CHANGE UP (ref 0.5–1.3)
DIFF PNL FLD: ABNORMAL
EGFR: 60 ML/MIN/1.73M2 — SIGNIFICANT CHANGE UP
GLUCOSE SERPL-MCNC: 94 MG/DL — SIGNIFICANT CHANGE UP (ref 70–99)
GLUCOSE UR QL: NEGATIVE MG/DL — SIGNIFICANT CHANGE UP
HCT VFR BLD CALC: 43.6 % — SIGNIFICANT CHANGE UP (ref 39–50)
HCT VFR BLD CALC: 44.2 % — SIGNIFICANT CHANGE UP (ref 39–50)
HGB BLD-MCNC: 15 G/DL — SIGNIFICANT CHANGE UP (ref 13–17)
HGB BLD-MCNC: 15.5 G/DL — SIGNIFICANT CHANGE UP (ref 13–17)
KETONES UR-MCNC: ABNORMAL MG/DL
LEUKOCYTE ESTERASE UR-ACNC: ABNORMAL
MAGNESIUM SERPL-MCNC: 1.8 MG/DL — SIGNIFICANT CHANGE UP (ref 1.6–2.6)
MCHC RBC-ENTMCNC: 33.6 PG — SIGNIFICANT CHANGE UP (ref 27–34)
MCHC RBC-ENTMCNC: 33.6 PG — SIGNIFICANT CHANGE UP (ref 27–34)
MCHC RBC-ENTMCNC: 34.4 G/DL — SIGNIFICANT CHANGE UP (ref 32–36)
MCHC RBC-ENTMCNC: 35.1 G/DL — SIGNIFICANT CHANGE UP (ref 32–36)
MCV RBC AUTO: 95.9 FL — SIGNIFICANT CHANGE UP (ref 80–100)
MCV RBC AUTO: 97.8 FL — SIGNIFICANT CHANGE UP (ref 80–100)
NITRITE UR-MCNC: NEGATIVE — SIGNIFICANT CHANGE UP
NRBC # BLD: 0 /100 WBCS — SIGNIFICANT CHANGE UP (ref 0–0)
NRBC # BLD: 0 /100 WBCS — SIGNIFICANT CHANGE UP (ref 0–0)
OSMOLALITY UR: 445 MOS/KG — SIGNIFICANT CHANGE UP (ref 300–900)
PH UR: 6 — SIGNIFICANT CHANGE UP (ref 5–8)
PHOSPHATE SERPL-MCNC: 3.5 MG/DL — SIGNIFICANT CHANGE UP (ref 2.5–4.5)
PLATELET # BLD AUTO: 272 K/UL — SIGNIFICANT CHANGE UP (ref 150–400)
PLATELET # BLD AUTO: 277 K/UL — SIGNIFICANT CHANGE UP (ref 150–400)
POTASSIUM SERPL-MCNC: 3.7 MMOL/L — SIGNIFICANT CHANGE UP (ref 3.5–5.3)
POTASSIUM SERPL-SCNC: 3.7 MMOL/L — SIGNIFICANT CHANGE UP (ref 3.5–5.3)
POTASSIUM UR-SCNC: 63 MMOL/L — SIGNIFICANT CHANGE UP
PROT ?TM UR-MCNC: 73 MG/DL — HIGH (ref 0–12)
PROT SERPL-MCNC: 7 G/DL — SIGNIFICANT CHANGE UP (ref 6–8.3)
PROT UR-MCNC: 100 MG/DL
PROT/CREAT UR-RTO: 0.4 RATIO — HIGH (ref 0–0.2)
RBC # BLD: 4.46 M/UL — SIGNIFICANT CHANGE UP (ref 4.2–5.8)
RBC # BLD: 4.61 M/UL — SIGNIFICANT CHANGE UP (ref 4.2–5.8)
RBC # FLD: 12.7 % — SIGNIFICANT CHANGE UP (ref 10.3–14.5)
RBC # FLD: 12.8 % — SIGNIFICANT CHANGE UP (ref 10.3–14.5)
RBC CASTS # UR COMP ASSIST: 2 /HPF — SIGNIFICANT CHANGE UP (ref 0–4)
REVIEW: SIGNIFICANT CHANGE UP
RH IG SCN BLD-IMP: POSITIVE — SIGNIFICANT CHANGE UP
SODIUM SERPL-SCNC: 135 MMOL/L — SIGNIFICANT CHANGE UP (ref 135–145)
SODIUM UR-SCNC: 38 MMOL/L — SIGNIFICANT CHANGE UP
SP GR SPEC: 1.02 — SIGNIFICANT CHANGE UP (ref 1–1.03)
SQUAMOUS # UR AUTO: 3 /HPF — SIGNIFICANT CHANGE UP (ref 0–5)
UROBILINOGEN FLD QL: 1 MG/DL — SIGNIFICANT CHANGE UP (ref 0.2–1)
WBC # BLD: 10.01 K/UL — SIGNIFICANT CHANGE UP (ref 3.8–10.5)
WBC # BLD: 12.46 K/UL — HIGH (ref 3.8–10.5)
WBC # FLD AUTO: 10.01 K/UL — SIGNIFICANT CHANGE UP (ref 3.8–10.5)
WBC # FLD AUTO: 12.46 K/UL — HIGH (ref 3.8–10.5)
WBC UR QL: 69 /HPF — HIGH (ref 0–5)

## 2024-12-23 PROCEDURE — 99222 1ST HOSP IP/OBS MODERATE 55: CPT

## 2024-12-23 PROCEDURE — 93306 TTE W/DOPPLER COMPLETE: CPT | Mod: 26

## 2024-12-23 PROCEDURE — 93356 MYOCRD STRAIN IMG SPCKL TRCK: CPT

## 2024-12-23 RX ORDER — HEPARIN SODIUM 1000 [USP'U]/ML
5000 INJECTION, SOLUTION INTRAVENOUS; SUBCUTANEOUS ONCE
Refills: 0 | Status: COMPLETED | OUTPATIENT
Start: 2024-12-23 | End: 2024-12-23

## 2024-12-23 RX ORDER — ACETAMINOPHEN 80 MG/.8ML
975 SOLUTION/ DROPS ORAL EVERY 6 HOURS
Refills: 0 | Status: DISCONTINUED | OUTPATIENT
Start: 2024-12-23 | End: 2024-12-24

## 2024-12-23 RX ORDER — OXYCODONE HCL 15 MG
2.5 TABLET ORAL EVERY 6 HOURS
Refills: 0 | Status: DISCONTINUED | OUTPATIENT
Start: 2024-12-23 | End: 2024-12-24

## 2024-12-23 RX ORDER — SOD PHOS DI, MONO/K PHOS MONO 250 MG
1 TABLET ORAL ONCE
Refills: 0 | Status: COMPLETED | OUTPATIENT
Start: 2024-12-23 | End: 2024-12-23

## 2024-12-23 RX ORDER — MAGNESIUM SULFATE 500 MG/ML
2 INJECTION, SOLUTION INTRAMUSCULAR; INTRAVENOUS ONCE
Refills: 0 | Status: COMPLETED | OUTPATIENT
Start: 2024-12-23 | End: 2024-12-23

## 2024-12-23 RX ORDER — SODIUM CHLORIDE 9 MG/ML
1000 INJECTION, SOLUTION INTRAVENOUS
Refills: 0 | Status: DISCONTINUED | OUTPATIENT
Start: 2024-12-23 | End: 2024-12-24

## 2024-12-23 RX ADMIN — HEPARIN SODIUM 16 UNIT(S)/HR: 1000 INJECTION, SOLUTION INTRAVENOUS; SUBCUTANEOUS at 01:06

## 2024-12-23 RX ADMIN — MAGNESIUM SULFATE 25 GRAM(S): 500 INJECTION, SOLUTION INTRAMUSCULAR; INTRAVENOUS at 09:03

## 2024-12-23 RX ADMIN — HEPARIN SODIUM 19 UNIT(S)/HR: 1000 INJECTION, SOLUTION INTRAVENOUS; SUBCUTANEOUS at 10:17

## 2024-12-23 RX ADMIN — HEPARIN SODIUM 5000 UNIT(S): 1000 INJECTION, SOLUTION INTRAVENOUS; SUBCUTANEOUS at 01:06

## 2024-12-23 RX ADMIN — ACETAMINOPHEN 975 MILLIGRAM(S): 80 SOLUTION/ DROPS ORAL at 06:57

## 2024-12-23 RX ADMIN — ACETAMINOPHEN 975 MILLIGRAM(S): 80 SOLUTION/ DROPS ORAL at 13:46

## 2024-12-23 RX ADMIN — ACETAMINOPHEN 975 MILLIGRAM(S): 80 SOLUTION/ DROPS ORAL at 12:46

## 2024-12-23 RX ADMIN — Medication 81 MILLIGRAM(S): at 12:47

## 2024-12-23 RX ADMIN — HEPARIN SODIUM 19 UNIT(S)/HR: 1000 INJECTION, SOLUTION INTRAVENOUS; SUBCUTANEOUS at 17:40

## 2024-12-23 RX ADMIN — HEPARIN SODIUM 19 UNIT(S)/HR: 1000 INJECTION, SOLUTION INTRAVENOUS; SUBCUTANEOUS at 19:52

## 2024-12-23 RX ADMIN — Medication 1 PACKET(S): at 09:03

## 2024-12-23 RX ADMIN — ACETAMINOPHEN 975 MILLIGRAM(S): 80 SOLUTION/ DROPS ORAL at 06:27

## 2024-12-23 RX ADMIN — PANTOPRAZOLE 40 MILLIGRAM(S): 40 TABLET, DELAYED RELEASE ORAL at 06:27

## 2024-12-23 RX ADMIN — HEPARIN SODIUM 16 UNIT(S)/HR: 1000 INJECTION, SOLUTION INTRAVENOUS; SUBCUTANEOUS at 07:40

## 2024-12-23 NOTE — CONSULT NOTE ADULT - SUBJECTIVE AND OBJECTIVE BOX
CHIEF COMPLAINT:    HISTORY OF PRESENT ILLNESS:  84M PMH HTN, HLD, PAD s/p L fem to PT arterial bypass with GSV, L CFA endarterectomy, L profunda femoral endarterectomy with hemashield patch angioplasty (Koby, 11/2015), L profunda to PTA PTFE bypass, L profunda endarterectomy, L PT endarterectomy (Luna 05/2019), reop R femoral to L femoral bypass with PTFE graft, right profunda femoral endarterectomy (Koby, 05/2021), who presents with worsening RLE pain and reduced velocities in the fem-fem bypass - sent in by Dr. Carrasco for operative planning of L ax-fem bypass.  States to walk to the [place]  He walks up a couple of [place]   He swims   no chest pain or shortness of breath     PAST MEDICAL & SURGICAL HISTORY:  Pedal edema      Claudication in peripheral vascular disease  on Eliquis once a day for DVT prophylaxis and for LE blood flow as per pt      Anxiety      ED (erectile dysfunction)      DVT (deep venous thrombosis)  11/2013, left      Seasonal allergies      Dyslipidemia      Hypertension      H/O gastroesophageal reflux (GERD)      H/O shoulder surgery  left      H/O endarterectomy  Right Femoral, 2007      S/P femoral-tibial bypass  [id number] as per pt              MEDICATIONS:  apixaban 2.5 milliGRAM(s) Oral every 12 hours  aspirin  chewable 81 milliGRAM(s) Oral daily  heparin  Infusion 1400 Unit(s)/Hr IV Continuous <Continuous>        acetaminophen     Tablet .. 975 milliGRAM(s) Oral every 6 hours  oxyCODONE    IR 2.5 milliGRAM(s) Oral every 6 hours PRN    pantoprazole    Tablet 40 milliGRAM(s) Oral before breakfast      influenza  Vaccine (HIGH DOSE) 0.5 milliLiter(s) IntraMuscular once      FAMILY HISTORY:      SOCIAL HISTORY:    [ ] Non-smoker  [ ] Smoker  [ ] Alcohol    Allergies    meclizine (Rash)    Intolerances    	    REVIEW OF SYSTEMS:  CONSTITUTIONAL: No fever, weight loss, or fatigue  EYES: No eye pain, visual disturbances, or discharge  ENMT:  No difficulty hearing, tinnitus, vertigo; No sinus or throat pain  NECK: No pain or stiffness  RESPIRATORY: No cough, wheezing, chills or hemoptysis; No Shortness of Breath  CARDIOVASCULAR: No chest pain, palpitations, passing out, dizziness, or leg swelling  GASTROINTESTINAL: No abdominal or epigastric pain. No nausea, vomiting, or hematemesis; No diarrhea or constipation. No melena or hematochezia.  GENITOURINARY: No dysuria, frequency, hematuria, or incontinence  NEUROLOGICAL: No headaches, memory loss, loss of strength, numbness, or tremors  SKIN: No itching, burning, rashes, or lesions   LYMPH Nodes: No enlarged glands  ENDOCRINE: No heat or cold intolerance; No hair loss  MUSCULOSKELETAL: +joint pain or swelling; No muscle, back, or extremity pain  PSYCHIATRIC: No depression, anxiety, mood swings, or difficulty sleeping  HEME/LYMPH: No easy bruising, or bleeding gums  ALLERY AND IMMUNOLOGIC: No hives or eczema	    [ ] All others negative	  [ ] Unable to obtain    PHYSICAL EXAM:  T(C): 36.4 (12-23-24 @ 06:01), Max: 36.9 (12-23-24 @ 01:26)  HR: 83 (12-23-24 @ 06:01) (66 - 85)  BP: 147/80 (12-23-24 @ 06:01) (138/69 - 177/80)  RR: 18 (12-23-24 @ 06:01) (16 - 20)  SpO2: 92% (12-23-24 @ 06:01) (92% - 97%)  Wt(kg): --  I&O's Summary    22 Dec 2024 07:01  -  23 Dec 2024 07:00  --------------------------------------------------------  IN: 388 mL / OUT: 0 mL / NET: 388 mL        Appearance: Normal	  HEENT:   Normal oral mucosa, PERRL, EOMI	  Lymphatic: No lymphadenopathy  Cardiovascular: Normal S1 S2, No JVD, No murmurs, No edema  Respiratory: Lungs clear to auscultation	  Psychiatry: A & O x 3, Mood & affect appropriate  Gastrointestinal:  Soft, Non-tender, + BS	  Skin: No rashes, No ecchymoses, No cyanosis	  Neurologic: Non-focal  VASCULAR: Bilateral feet without evidence of ulcers, DP and PT signals dopplerable bilaterally. Warm. Motor and sensation intact B/L.  Vascular: Peripheral pulses palpable 2+ bilaterally    TELEMETRY: 	    ECG:  	  RADIOLOGY: < from: Transthoracic Echocardiogram (07.21.15 @ 07:25) >    Quality: Technically good    Procedure: Transthoracic echocardiogram with 2-D, M-Mode  and complete spectral and color flow Doppler.    Clinical Indications: patient with pulmonary embolus on  heparin      ------------------------------------------------------------------------  Dimensions:    Normal Values:  LA:     3.8    2.0 - 4.0 cm  Ao:     3.3    2.0 - 3.8 cm  SEPTUM: 1.1    0.6 - 1.2 cm  PWT:    1.0    0.6 - 1.1 cm  LVIDd:  5.6    3.0 - 5.6 cm  LVIDs:  3.1    1.8 - 4.0 cm    Derived variables:  LVMI: 114 g/m2  RWT: 0.35  Ejection Fraction: 55-60 %    ------------------------------------------------------------------------  Mitral Valve: Normal mitral valve.    Aortic Valve/Aorta: Calcified trileaflet aortic valve with  normal opening.  Normal aortic root.    Left Atrium: Normal left atrium.    Left Ventricle: Mild concentric left ventricular  hypertrophy.  Normal Left Ventricular Systolic Function,  (EF = 55 to  60%)    Right Heart: Normal right atrium. Normal right ventricular  size and function. There is mild tricuspid regurgitation.  Normal pulmonic valve.    Pericardium/Pleura: Normal pericardium with no pericardial  effusion.      Doppler:    ------------------------------------------------------------------------  Conclusions:  1. Normal mitral valve.  2. Calcified trileaflet aortic valve with normal opening.  3. Normal aortic root.  4. Normal left atrium.  5. Mild concentric left ventricular hypertrophy.  6. Normal Left Ventricular Systolic Function,  (EF = 55 to  60%)  7. Grade II diastolic dysfunction  8. Normal right atrium.  9. Normal right ventricular size and function.  10. RV systolic pressure is mildly increased (PASP 37mm  Hg).  11. There is mild tricuspid regurgitation.  12. Normal pulmonic valve.  13. Normal pericardium with no pericardial effusion.      ------------------------------------------------------------------------    Confirmed on  7/22/2015 - 07:44:38 by Blossom Harkins MD  ------------------------------------------------------------------------    < end of copied text >    OTHER: 	  	  LABS:	 	    CARDIAC MARKERS:                                  15.0   10.01 )-----------( 277      ( 23 Dec 2024 07:13 )             43.6     12-23    135  |  100  |  15  ----------------------------<  94  3.7   |  20[L]  |  1.20    Ca    9.7      23 Dec 2024 07:12  Phos  3.5     12-23  Mg     1.8     12-23    TPro  7.0  /  Alb  3.6  /  TBili  0.6  /  DBili  x   /  AST  23  /  ALT  13  /  AlkPhos  92  12-23    proBNP:   Lipid Profile:   HgA1c:   TSH:

## 2024-12-23 NOTE — CONSULT NOTE ADULT - ASSESSMENT
84 M with MELINDA.
Patient is a 83 Y/o Male with PMHx HTN, HLD, PAD s/p L fem to PT arterial bypass with GSV, L CFA endarterectomy, L profunda femoral endarterectomy with hemashield patch angioplasty (Koby, 11/2015), L profunda to PTA PTFE bypass, L profunda endarterectomy, L PT endarterectomy (Luna 05/2019), reop R femoral to L femoral bypass with PTFE graft, right profunda femoral endarterectomy (Koby, 05/2021), who presents with worsening RLE pain and reduced velocities in the fem-fem bypass     # PAD   plan for bypass  prior hx of bypass in the past   vascular care appreciated  ASA and heparin  monitor ptt level  check echo   ischemic W/U , preop for bypass  card eval per vascular team     # htn/HLD  BP control   adjust as tolerated  lipid panel  statin     # Mild renal insufficiency  MOnitor bun/Cr   hydration PRN      DVt and GI PPX     discussed with vascular team 
84M PMH HTN, HLD, PAD s/p L fem to PT arterial bypass with GSV, L CFA endarterectomy, L profunda femoral endarterectomy with hemashield patch angioplasty (Koby, 11/2015), L profunda to PTA PTFE bypass, L profunda endarterectomy, L PT endarterectomy (Luna 05/2019), reop R femoral to L femoral bypass with PTFE graft, right profunda femoral endarterectomy (Koby, 05/2021), who presents with worsening RLE pain and reduced velocities in the fem-fem bypass - sent in by Dr. Carrasco for operative planning of L ax-fem bypass.

## 2024-12-23 NOTE — CONSULT NOTE ADULT - CONSULT REQUESTED BY NAME
OCCUPATIONAL THERAPY PROGRESS NOTE  Group Time:  2172  Attendance: The patient attended full group. Participation:  The patient participated fully in the activity. Attention:  The patient needed redirection to activity at least once. Interaction:  The patient occasionally  interacts with others. Encouraged to look at positive self talk (general theme) and efforts to help self, most focus on family issues.
Vascular team
Surgery
Dr. Gilbert Carrasco

## 2024-12-23 NOTE — CONSULT NOTE ADULT - SUBJECTIVE AND OBJECTIVE BOX
Name of Patient : ZEYNEP LUCIO  MRN: [id number]  Date of visit: 12-23-24 @ 12:56      Subjective: Patient seen and examined. No new events except as noted.     REVIEW OF SYSTEMS:    CONSTITUTIONAL: No weakness, fevers or chills  EYES/ENT: No visual changes;  No vertigo or throat pain   NECK: No pain or stiffness  RESPIRATORY: No cough, wheezing, hemoptysis; No shortness of breath  CARDIOVASCULAR: No chest pain or palpitations  GASTROINTESTINAL: No abdominal or epigastric pain. No nausea, vomiting, or hematemesis; No diarrhea or constipation. No melena or hematochezia.  GENITOURINARY: No dysuria, frequency or hematuria  NEUROLOGICAL: No numbness or weakness  SKIN: No itching, burning, rashes, or lesions   All other review of systems is negative unless indicated above.    MEDICATIONS:  MEDICATIONS  (STANDING):  acetaminophen     Tablet .. 975 milliGRAM(s) Oral every 6 hours  aspirin  chewable 81 milliGRAM(s) Oral daily  heparin  Infusion 1400 Unit(s)/Hr (19 mL/Hr) IV Continuous <Continuous>  influenza  Vaccine (HIGH DOSE) 0.5 milliLiter(s) IntraMuscular once  pantoprazole    Tablet 40 milliGRAM(s) Oral before breakfast      PHYSICAL EXAM:  T(C): 36.4 (12-23-24 @ 10:32), Max: 36.9 (12-23-24 @ 01:26)  HR: 70 (12-23-24 @ 10:32) (66 - 85)  BP: 169/81 (12-23-24 @ 10:32) (138/69 - 177/80)  RR: 18 (12-23-24 @ 10:32) (16 - 20)  SpO2: 95% (12-23-24 @ 10:32) (92% - 97%)  Wt(kg): --  I&O's Summary    22 Dec 2024 07:01  -  23 Dec 2024 07:00  --------------------------------------------------------  IN: 388 mL / OUT: 0 mL / NET: 388 mL    23 Dec 2024 07:01  -  23 Dec 2024 12:56  --------------------------------------------------------  IN: 240 mL / OUT: 0 mL / NET: 240 mL      Height (cm): 175.3 (12-22 @ 16:41)  Weight (kg): 76.1 (12-22 @ 18:15), 83.869 (12-22 @ 13:04)  BMI (kg/m2): 24.8 (12-22 @ 18:15), 27.3 (12-22 @ 16:41)  BSA (m2): 1.92 (12-22 @ 18:15), 2 (12-22 @ 16:41)    Appearance: Normal	  HEENT:  PERRLA   Lymphatic: No lymphadenopathy   Cardiovascular: Normal S1 S2, no JVD  Respiratory: normal effort , clear  Gastrointestinal:  Soft, Non-tender  Skin: No rashes,  warm to touch  Psychiatry:  Mood & affect appropriate  Musculuskeletal: No edema    recent labs, Imaging and EKGs personally reviewed   CODE status discussed with the patient in detail            12-22-24 @ 07:01  -  12-23-24 @ 07:00  --------------------------------------------------------  IN: 388 mL / OUT: 0 mL / NET: 388 mL    12-23-24 @ 07:01  -  12-23-24 @ 12:56  --------------------------------------------------------  IN: 240 mL / OUT: 0 mL / NET: 240 mL             Name of Patient : ZEYNEP LUCIO  MRN: [id number]  Date of visit: 24 @ 12:56    Patient is a 83 Y/o Male with PMHx HTN, HLD, PAD s/p L fem to PT arterial bypass with GSV, L CFA endarterectomy, L profunda femoral endarterectomy with hemashield patch angioplasty (Koby, 2015), L profunda to PTA PTFE bypass, L profunda endarterectomy, L PT endarterectomy (Luna 2019), reop R femoral to L femoral bypass with PTFE graft, right profunda femoral endarterectomy (Koby, 2021), who presents with worsening RLE pain and reduced velocities in the fem-fem bypass   Subjective: Patient seen and examined. No new events except as noted.     REVIEW OF SYSTEMS:    CONSTITUTIONAL: No weakness, fevers or chills  EYES/ENT: No visual changes;  No vertigo or throat pain   NECK: No pain or stiffness  RESPIRATORY: No cough, wheezing, hemoptysis; No shortness of breath  CARDIOVASCULAR: No chest pain or palpitations  GASTROINTESTINAL: No abdominal or epigastric pain. No nausea, vomiting, or hematemesis; No diarrhea or constipation. No melena or hematochezia.  GENITOURINARY: No dysuria, frequency or hematuria  NEUROLOGICAL: No numbness or weakness  SKIN: No itching, burning, rashes, or lesions   All other review of systems is negative unless indicated above.    MEDICATIONS:  MEDICATIONS  (STANDING):  acetaminophen     Tablet .. 975 milliGRAM(s) Oral every 6 hours  aspirin  chewable 81 milliGRAM(s) Oral daily  heparin  Infusion 1400 Unit(s)/Hr (19 mL/Hr) IV Continuous <Continuous>  influenza  Vaccine (HIGH DOSE) 0.5 milliLiter(s) IntraMuscular once  pantoprazole    Tablet 40 milliGRAM(s) Oral before breakfast      PHYSICAL EXAM:  T(C): 36.4 (24 @ 10:32), Max: 36.9 (24 @ 01:26)  HR: 70 (24 @ 10:32) (66 - 85)  BP: 169/81 (24 @ 10:32) (138/69 - 177/80)  RR: 18 (24 @ 10:32) (16 - 20)  SpO2: 95% (1223-24 @ 10:32) (92% - 97%)  Wt(kg): --  I&O's Summary    22 Dec 2024 07:01  -  23 Dec 2024 07:00  --------------------------------------------------------  IN: 388 mL / OUT: 0 mL / NET: 388 mL    23 Dec 2024 07:01  -  23 Dec 2024 12:56  --------------------------------------------------------  IN: 240 mL / OUT: 0 mL / NET: 240 mL      Height (cm): 175.3 ( @ 16:41)  Weight (kg): 76.1 ( @ 18:15), 83.869 ( @ 13:04)  BMI (kg/m2): 24.8 ( @ 18:15), 27.3 ( @ 16:41)  BSA (m2): 1.92 ( @ 18:15), 2 ( @ 16:41)    Appearance: Normal	  HEENT:  PERRLA   Lymphatic: No lymphadenopathy   Cardiovascular: Normal S1 S2, no JVD  Respiratory: normal effort , clear  Gastrointestinal:  Soft, Non-tender  Skin: No rashes,  warm to touch  Psychiatry:  Mood & affect appropriate  Musculuskeletal: No edema    recent labs, Imaging and EKGs personally reviewed   CODE status discussed with the patient in detail    24 @ 07:01  -  24 @ 07:00  --------------------------------------------------------  IN: 388 mL / OUT: 0 mL / NET: 388 mL    24 @ 07:01  -  24 @ 12:56  --------------------------------------------------------  IN: 240 mL / OUT: 0 mL / NET: 240 mL                          15.0   10.01 )-----------( 277      ( 23 Dec 2024 07:13 )             43.6                   135  |  100  |  15  ----------------------------<  94  3.7   |  20[L]  |  1.20    Ca    9.7      23 Dec 2024 07:12  Phos  3.5       Mg     1.8         TPro  7.0  /  Alb  3.6  /  TBili  0.6  /  DBili  x   /  AST  23  /  ALT  13  /  AlkPhos  92      PT/INR - ( 22 Dec 2024 18:14 )   PT: 12.3 sec;   INR: 1.08 ratio         PTT - ( 23 Dec 2024 16:23 )  PTT:81.6 sec                   Urinalysis Basic - ( 23 Dec 2024 19:58 )    Color: Yellow / Appearance: Clear / S.018 / pH: x  Gluc: x / Ketone: Trace mg/dL  / Bili: Negative / Urobili: 1.0 mg/dL   Blood: x / Protein: 100 mg/dL / Nitrite: Negative   Leuk Esterase: Moderate / RBC: 2 /HPF / WBC 69 /HPF   Sq Epi: x / Non Sq Epi: 3 /HPF / Bacteria: Negative /HPF

## 2024-12-23 NOTE — CONSULT NOTE ADULT - ATTENDING COMMENTS
patient seen and evaluated.  mild increase in creatinine.  ultimately would be a low risk for JOSS.  can consider fluids pre and post procedure echo noted good LV function IVC not distended.

## 2024-12-23 NOTE — PROGRESS NOTE ADULT - ASSESSMENT
84M PMH HTN, HLD, PAD s/p L fem to PT arterial bypass with GSV, L CFA endarterectomy, L profunda femoral endarterectomy with hemashield patch angioplasty (Koby, 11/2015), L profunda to PTA PTFE bypass, L profunda endarterectomy, L PT endarterectomy (Luna 05/2019), reop R femoral to L femoral bypass with PTFE graft, right profunda femoral endarterectomy (Koby, 05/2021), who presents with worsening RLE pain and reduced velocities in the fem-fem bypass - sent in by Dr. Carrasco for operative planning of L ax-fem bypass.    Plan:   - Heparin gtt and home eliquis until PTT therapeutic then DC eliquis  - Medical and cardiac consults for risk stratification and comanagement  - Planning for L ax-fem bypass 12/25/24  - Pt needs medication reconciliation; doesn't currently know doses of his medications but claims he takes baby ASA, eliquis, losartan, evastatin and clonidine.     Vascular [facility]   [phone] / [phone]

## 2024-12-23 NOTE — PROGRESS NOTE ADULT - SUBJECTIVE AND OBJECTIVE BOX
VASCULAR SURGERY DAILY PROGRESS NOTE:     SUBJECTIVE/ROS: Patient seen and examined. He is resting comfortably. No new events/complaints.     MEDICATIONS  (STANDING):  acetaminophen     Tablet .. 975 milliGRAM(s) Oral every 6 hours  apixaban 2.5 milliGRAM(s) Oral every 12 hours  aspirin  chewable 81 milliGRAM(s) Oral daily  heparin  Infusion 1400 Unit(s)/Hr (19 mL/Hr) IV Continuous <Continuous>  influenza  Vaccine (HIGH DOSE) 0.5 milliLiter(s) IntraMuscular once  pantoprazole    Tablet 40 milliGRAM(s) Oral before breakfast    MEDICATIONS  (PRN):  oxyCODONE    IR 2.5 milliGRAM(s) Oral every 6 hours PRN Moderate Pain (4 - 6)      OBJECTIVE:    Vital Signs Last 24 Hrs  T(C): 36.4 (23 Dec 2024 06:01), Max: 36.9 (23 Dec 2024 01:26)  T(F): 97.5 (23 Dec 2024 06:01), Max: 98.5 (23 Dec 2024 01:26)  HR: 83 (23 Dec 2024 06:01) (66 - 85)  BP: 147/80 (23 Dec 2024 06:01) (138/69 - 177/80)  BP(mean): 85 (22 Dec 2024 19:51) (85 - 106)  RR: 18 (23 Dec 2024 06:01) (16 - 20)  SpO2: 92% (23 Dec 2024 06:01) (92% - 97%)    Parameters below as of 23 Dec 2024 06:01  Patient On (Oxygen Delivery Method): room air            I&O's Detail    22 Dec 2024 07:01  -  23 Dec 2024 07:00  --------------------------------------------------------  IN:    Heparin: 168 mL    Oral Fluid: 220 mL  Total IN: 388 mL    OUT:  Total OUT: 0 mL    Total NET: 388 mL          Daily Height in cm: 175.26 (22 Dec 2024 16:41)    Daily     LABS:                        15.0   10.01 )-----------( 277      ( 23 Dec 2024 07:13 )             43.6     12-23    135  |  100  |  15  ----------------------------<  94  3.7   |  20[L]  |  1.20    Ca    9.7      23 Dec 2024 07:12  Phos  3.5     12-23  Mg     1.8     12-23    TPro  7.0  /  Alb  3.6  /  TBili  0.6  /  DBili  x   /  AST  23  /  ALT  13  /  AlkPhos  92  12-23    PT/INR - ( 22 Dec 2024 18:14 )   PT: 12.3 sec;   INR: 1.08 ratio         PTT - ( 23 Dec 2024 07:14 )  PTT:35.1 sec  Urinalysis Basic - ( 23 Dec 2024 07:12 )    Color: x / Appearance: x / SG: x / pH: x  Gluc: 94 mg/dL / Ketone: x  / Bili: x / Urobili: x   Blood: x / Protein: x / Nitrite: x   Leuk Esterase: x / RBC: x / WBC x   Sq Epi: x / Non Sq Epi: x / Bacteria: x    PHYSICAL EXAM:  GENERAL: NAD, well-developed  HEAD:  Atraumatic, Normocephalic  EYES: EOMI, conjunctiva and sclera clear  CHEST/LUNG: No distress, speaks in full sentences, on RA  ABDOMEN: Soft, nontender, nondistended  NEUROLOGY: non-focal  SKIN: No rashes or lesions  VASCULAR: Bilateral feet without evidence of ulcers, DP and PT signals dopplerable bilaterally. Warm. Motor and sensation intact B/L.

## 2024-12-23 NOTE — CONSULT NOTE ADULT - SUBJECTIVE AND OBJECTIVE BOX
[facility] DIVISION OF KIDNEY DISEASES AND HYPERTENSION -- [phone]  -- INITIAL CONSULT NOTE  --------------------------------------------------------------------------------  HPI: 84 M with hx of severe PAD, HLD, HTN presented with worsening pain in his right leg and sent in by Dr. Carrasco for bypass in his leg. Nephrology consulted for MELINDA and clearance for procedure.    Pt states to have leg pain for last few days, worse with walking, that has improved today. His Scr on admission was 0.9 and today 1.2. Pt denies any hx of kidney disease. Pt denies any difficulty with urination or change in color of urine. Pt denies any chest pain, SOB, abdominal pain, diarrhea.         PAST HISTORY  --------------------------------------------------------------------------------  PAST MEDICAL & SURGICAL HISTORY:  Pedal edema  Claudication in peripheral vascular disease  on Eliquis once a day for DVT prophylaxis and for LE blood flow as per pt  Anxiety  ED (erectile dysfunction)  DVT (deep venous thrombosis)  11/2013, left  Seasonal allergies  Dyslipidemia  Hypertension  H/O gastroesophageal reflux (GERD)  H/O shoulder surgery  left  H/O endarterectomy  Right Femoral, 2007  S/P femoral-tibial bypass  [id number] as per pt        FAMILY HISTORY: Non-contributory    PAST SOCIAL HISTORY: Non-contributory    ALLERGIES & MEDICATIONS  --------------------------------------------------------------------------------  Allergies    meclizine (Rash)    Intolerances      Standing Inpatient Medications  acetaminophen     Tablet .. 975 milliGRAM(s) Oral every 6 hours  aspirin  chewable 81 milliGRAM(s) Oral daily  heparin  Infusion 1400 Unit(s)/Hr IV Continuous <Continuous>  influenza  Vaccine (HIGH DOSE) 0.5 milliLiter(s) IntraMuscular once  pantoprazole    Tablet 40 milliGRAM(s) Oral before breakfast    PRN Inpatient Medications  oxyCODONE    IR 2.5 milliGRAM(s) Oral every 6 hours PRN      REVIEW OF SYSTEMS  --------------------------------------------------------------------------------  Gen: No fevers/chills  Head/Eyes/Ears: Normal hearing  Respiratory: No dyspnea, cough  CV: No chest pain  GI: No abdominal pain, diarrhea  : No dysuria, hematuria  MSK: No  edema    All other systems were reviewed and are negative, except as noted.    VITALS/PHYSICAL EXAM  --------------------------------------------------------------------------------  T(C): 36.4 (12-23-24 @ 10:32), Max: 36.9 (12-23-24 @ 01:26)  HR: 70 (12-23-24 @ 10:32) (66 - 85)  BP: 169/81 (12-23-24 @ 10:32) (138/69 - 175/82)  RR: 18 (12-23-24 @ 10:32) (16 - 20)  SpO2: 95% (12-23-24 @ 10:32) (92% - 97%)  Wt(kg): --  Height (cm): 175.3 (12-22-24 @ 16:41)  Weight (kg): 76.1 (12-22-24 @ 18:15)  BMI (kg/m2): 24.8 (12-22-24 @ 18:15)  BSA (m2): 1.92 (12-22-24 @ 18:15)      12-22-24 @ 07:01  -  12-23-24 @ 07:00  --------------------------------------------------------  IN: 388 mL / OUT: 0 mL / NET: 388 mL    12-23-24 @ 07:01  -  12-23-24 @ 14:02  --------------------------------------------------------  IN: 600 mL / OUT: 0 mL / NET: 600 mL      Physical Exam:  	Gen: NAD  	Pulm: CTA B/L  	CV: S1S2  	Abd: Soft, +BS   	Ext: No LE edema B/L  	Neuro: Awake  	Skin: Warm and dry    LABS/STUDIES  --------------------------------------------------------------------------------              15.0   10.01 >-----------<  277      [12-23-24 @ 07:13]              43.6     135  |  100  |  15  ----------------------------<  94      [12-23-24 @ 07:12]  3.7   |  20  |  1.20        Ca     9.7     [12-23-24 @ 07:12]      Mg     1.8     [12-23-24 @ 07:12]      Phos  3.5     [12-23-24 @ 07:12]    TPro  7.0  /  Alb  3.6  /  TBili  0.6  /  DBili  x   /  AST  23  /  ALT  13  /  AlkPhos  92  [12-23-24 @ 07:12]    PT/INR: PT 12.3 , INR 1.08       [12-22-24 @ 18:14]  PTT: 35.1       [12-23-24 @ 07:14]      Creatinine Trend:  SCr 1.20 [12-23 @ 07:12]  SCr 1.08 [12-22 @ 18:14]  SCr 0.99 [12-22 @ 14:51]    Urinalysis - [12-23-24 @ 07:12]      Color  / Appearance  / SG  / pH       Gluc 94 / Ketone   / Bili  / Urobili        Blood  / Protein  / Leuk Est  / Nitrite       RBC  / WBC  / Hyaline  / Gran  / Sq Epi  / Non Sq Epi  / Bacteria            [facility] DIVISION OF KIDNEY DISEASES AND HYPERTENSION -- [phone]  -- INITIAL CONSULT NOTE  --------------------------------------------------------------------------------  HPI: 84 M with hx of severe PAD, HLD, HTN presented with worsening pain in his right leg and sent in by Dr. Carrasco for bypass in his leg. Nephrology consulted for MELINDA and clearance for procedure.    Pt states to have leg pain for last few days, worse with walking, that has improved today. His Scr on admission was 0.9 and today 1.2. Pt denies any hx of kidney disease. Pt denies any difficulty with urination or change in color of urine. Pt denies any chest pain, SOB, abdominal pain, diarrhea.       PAST HISTORY  --------------------------------------------------------------------------------  PAST MEDICAL & SURGICAL HISTORY:  Pedal edema  Claudication in peripheral vascular disease  on Eliquis once a day for DVT prophylaxis and for LE blood flow as per pt  Anxiety  ED (erectile dysfunction)  DVT (deep venous thrombosis)  11/2013, left  Seasonal allergies  Dyslipidemia  Hypertension  H/O gastroesophageal reflux (GERD)  H/O shoulder surgery  left  H/O endarterectomy  Right Femoral, 2007  S/P femoral-tibial bypass  [id number] as per pt        FAMILY HISTORY: Non-contributory    PAST SOCIAL HISTORY: Non-contributory    ALLERGIES & MEDICATIONS  --------------------------------------------------------------------------------  Allergies    meclizine (Rash)    Intolerances      Standing Inpatient Medications  acetaminophen     Tablet .. 975 milliGRAM(s) Oral every 6 hours  aspirin  chewable 81 milliGRAM(s) Oral daily  heparin  Infusion 1400 Unit(s)/Hr IV Continuous <Continuous>  influenza  Vaccine (HIGH DOSE) 0.5 milliLiter(s) IntraMuscular once  pantoprazole    Tablet 40 milliGRAM(s) Oral before breakfast    PRN Inpatient Medications  oxyCODONE    IR 2.5 milliGRAM(s) Oral every 6 hours PRN      REVIEW OF SYSTEMS  --------------------------------------------------------------------------------  Gen: No fevers/chills  Head/Eyes/Ears: Normal hearing  Respiratory: No dyspnea, cough  CV: No chest pain  GI: No abdominal pain, diarrhea  : No dysuria, hematuria  MSK: No  edema    All other systems were reviewed and are negative, except as noted.    VITALS/PHYSICAL EXAM  --------------------------------------------------------------------------------  T(C): 36.4 (12-23-24 @ 10:32), Max: 36.9 (12-23-24 @ 01:26)  HR: 70 (12-23-24 @ 10:32) (66 - 85)  BP: 169/81 (12-23-24 @ 10:32) (138/69 - 175/82)  RR: 18 (12-23-24 @ 10:32) (16 - 20)  SpO2: 95% (12-23-24 @ 10:32) (92% - 97%)  Wt(kg): --  Height (cm): 175.3 (12-22-24 @ 16:41)  Weight (kg): 76.1 (12-22-24 @ 18:15)  BMI (kg/m2): 24.8 (12-22-24 @ 18:15)  BSA (m2): 1.92 (12-22-24 @ 18:15)      12-22-24 @ 07:01  -  12-23-24 @ 07:00  --------------------------------------------------------  IN: 388 mL / OUT: 0 mL / NET: 388 mL    12-23-24 @ 07:01  -  12-23-24 @ 14:02  --------------------------------------------------------  IN: 600 mL / OUT: 0 mL / NET: 600 mL      Physical Exam:  	Gen: NAD  	Pulm: CTA B/L  	CV: S1S2  	Abd: Soft, +BS   	Ext: No LE edema B/L  	Neuro: Awake  	Skin: Warm and dry    LABS/STUDIES  --------------------------------------------------------------------------------              15.0   10.01 >-----------<  277      [12-23-24 @ 07:13]              43.6     135  |  100  |  15  ----------------------------<  94      [12-23-24 @ 07:12]  3.7   |  20  |  1.20        Ca     9.7     [12-23-24 @ 07:12]      Mg     1.8     [12-23-24 @ 07:12]      Phos  3.5     [12-23-24 @ 07:12]    TPro  7.0  /  Alb  3.6  /  TBili  0.6  /  DBili  x   /  AST  23  /  ALT  13  /  AlkPhos  92  [12-23-24 @ 07:12]    PT/INR: PT 12.3 , INR 1.08       [12-22-24 @ 18:14]  PTT: 35.1       [12-23-24 @ 07:14]      Creatinine Trend:  SCr 1.20 [12-23 @ 07:12]  SCr 1.08 [12-22 @ 18:14]  SCr 0.99 [12-22 @ 14:51]    Urinalysis - [12-23-24 @ 07:12]      Color  / Appearance  / SG  / pH       Gluc 94 / Ketone   / Bili  / Urobili        Blood  / Protein  / Leuk Est  / Nitrite       RBC  / WBC  / Hyaline  / Gran  / Sq Epi  / Non Sq Epi  / Bacteria

## 2024-12-23 NOTE — CONSULT NOTE ADULT - PROBLEM SELECTOR RECOMMENDATION 9
Pt with mild MELINDA. Upon admission, Scr-0.99 and today it is 1.2. Pt has no hx of kidney disease. Echo reviewed. Can consider gentle hydration if BP improves. Send UA, urine electrolytes, spot urine TP/CR. Monitor labs and urine output. Avoid NSAIDs, ACEI/ARBS, RCA and nephrotoxins. Dose medications as per eGFR.    Please call if you have any questions  Miguel A Stroud, PGY4  Nephrology Fellow  [id number]/Teams preferred  (After 5PM or weekends, reach out to fellow on call)
s/p previous extensive vascular interventions as above   Plan for L ax-fem bypass  Acceptable cardiac and medical risk to proceed   RCRI 1   METS > 4 and no anginal symptoms   would obtain TTE to assess baseline LVEF   Discussed with Dr. Gilbert Carrasco  ASA   Heparin gtt   DC eliquis

## 2024-12-24 ENCOUNTER — APPOINTMENT (OUTPATIENT)
Dept: VASCULAR SURGERY | Facility: HOSPITAL | Age: 84
End: 2024-12-24

## 2024-12-24 LAB
ANION GAP SERPL CALC-SCNC: 13 MMOL/L — SIGNIFICANT CHANGE UP (ref 5–17)
ANION GAP SERPL CALC-SCNC: 17 MMOL/L — SIGNIFICANT CHANGE UP (ref 5–17)
APTT BLD: >200 SEC — CRITICAL HIGH (ref 24.5–35.6)
APTT BLD: >200 SEC — CRITICAL HIGH (ref 24.5–35.6)
BUN SERPL-MCNC: 14 MG/DL — SIGNIFICANT CHANGE UP (ref 7–23)
BUN SERPL-MCNC: 17 MG/DL — SIGNIFICANT CHANGE UP (ref 7–23)
CALCIUM SERPL-MCNC: 8.6 MG/DL — SIGNIFICANT CHANGE UP (ref 8.4–10.5)
CALCIUM SERPL-MCNC: 9.8 MG/DL — SIGNIFICANT CHANGE UP (ref 8.4–10.5)
CHLORIDE SERPL-SCNC: 101 MMOL/L — SIGNIFICANT CHANGE UP (ref 96–108)
CHLORIDE SERPL-SCNC: 98 MMOL/L — SIGNIFICANT CHANGE UP (ref 96–108)
CO2 SERPL-SCNC: 22 MMOL/L — SIGNIFICANT CHANGE UP (ref 22–31)
CO2 SERPL-SCNC: 23 MMOL/L — SIGNIFICANT CHANGE UP (ref 22–31)
CREAT SERPL-MCNC: 1.09 MG/DL — SIGNIFICANT CHANGE UP (ref 0.5–1.3)
CREAT SERPL-MCNC: 1.29 MG/DL — SIGNIFICANT CHANGE UP (ref 0.5–1.3)
EGFR: 55 ML/MIN/1.73M2 — LOW
EGFR: 67 ML/MIN/1.73M2 — SIGNIFICANT CHANGE UP
GAS PNL BLDA: SIGNIFICANT CHANGE UP
GLUCOSE SERPL-MCNC: 113 MG/DL — HIGH (ref 70–99)
GLUCOSE SERPL-MCNC: 90 MG/DL — SIGNIFICANT CHANGE UP (ref 70–99)
HCT VFR BLD CALC: 43 % — SIGNIFICANT CHANGE UP (ref 39–50)
HCT VFR BLD CALC: 47.1 % — SIGNIFICANT CHANGE UP (ref 39–50)
HGB BLD-MCNC: 14.7 G/DL — SIGNIFICANT CHANGE UP (ref 13–17)
HGB BLD-MCNC: 16.1 G/DL — SIGNIFICANT CHANGE UP (ref 13–17)
HYALINE CASTS # UR AUTO: PRESENT
MAGNESIUM SERPL-MCNC: 2.2 MG/DL — SIGNIFICANT CHANGE UP (ref 1.6–2.6)
MCHC RBC-ENTMCNC: 33.2 PG — SIGNIFICANT CHANGE UP (ref 27–34)
MCHC RBC-ENTMCNC: 33.4 PG — SIGNIFICANT CHANGE UP (ref 27–34)
MCHC RBC-ENTMCNC: 34.2 G/DL — SIGNIFICANT CHANGE UP (ref 32–36)
MCHC RBC-ENTMCNC: 34.2 G/DL — SIGNIFICANT CHANGE UP (ref 32–36)
MCV RBC AUTO: 97.1 FL — SIGNIFICANT CHANGE UP (ref 80–100)
MCV RBC AUTO: 97.7 FL — SIGNIFICANT CHANGE UP (ref 80–100)
NRBC # BLD: 0 /100 WBCS — SIGNIFICANT CHANGE UP (ref 0–0)
NRBC # BLD: 0 /100 WBCS — SIGNIFICANT CHANGE UP (ref 0–0)
PHOSPHATE SERPL-MCNC: 3.9 MG/DL — SIGNIFICANT CHANGE UP (ref 2.5–4.5)
PLATELET # BLD AUTO: 267 K/UL — SIGNIFICANT CHANGE UP (ref 150–400)
PLATELET # BLD AUTO: 307 K/UL — SIGNIFICANT CHANGE UP (ref 150–400)
POTASSIUM SERPL-MCNC: 3.3 MMOL/L — LOW (ref 3.5–5.3)
POTASSIUM SERPL-MCNC: 3.4 MMOL/L — LOW (ref 3.5–5.3)
POTASSIUM SERPL-SCNC: 3.3 MMOL/L — LOW (ref 3.5–5.3)
POTASSIUM SERPL-SCNC: 3.4 MMOL/L — LOW (ref 3.5–5.3)
RBC # BLD: 4.43 M/UL — SIGNIFICANT CHANGE UP (ref 4.2–5.8)
RBC # BLD: 4.82 M/UL — SIGNIFICANT CHANGE UP (ref 4.2–5.8)
RBC # FLD: 12.5 % — SIGNIFICANT CHANGE UP (ref 10.3–14.5)
RBC # FLD: 12.7 % — SIGNIFICANT CHANGE UP (ref 10.3–14.5)
SODIUM SERPL-SCNC: 137 MMOL/L — SIGNIFICANT CHANGE UP (ref 135–145)
SODIUM SERPL-SCNC: 137 MMOL/L — SIGNIFICANT CHANGE UP (ref 135–145)
UUN UR-MCNC: 599 MG/DL — SIGNIFICANT CHANGE UP
WBC # BLD: 10.55 K/UL — HIGH (ref 3.8–10.5)
WBC # BLD: 17.3 K/UL — HIGH (ref 3.8–10.5)
WBC # FLD AUTO: 10.55 K/UL — HIGH (ref 3.8–10.5)
WBC # FLD AUTO: 17.3 K/UL — HIGH (ref 3.8–10.5)

## 2024-12-24 PROCEDURE — 35621 BPG AXILLARY-FEMORAL: CPT | Mod: LT

## 2024-12-24 PROCEDURE — 35700 REOPERATION BYPASS GRAFT: CPT

## 2024-12-24 DEVICE — SURGICEL 2 X 14": Type: IMPLANTABLE DEVICE | Site: LEFT | Status: FUNCTIONAL

## 2024-12-24 DEVICE — CLIP APPLIER COVIDIEN SURGICLIP II 9.75" MEDIUM: Type: IMPLANTABLE DEVICE | Site: LEFT | Status: FUNCTIONAL

## 2024-12-24 DEVICE — KIT A-LINE 1LUM 20G X 12CM SAFE KIT: Type: IMPLANTABLE DEVICE | Site: LEFT | Status: FUNCTIONAL

## 2024-12-24 DEVICE — AGENT HEMOSTATIC HEMOBLAST 1.65G 10CM: Type: IMPLANTABLE DEVICE | Site: LEFT | Status: FUNCTIONAL

## 2024-12-24 DEVICE — CLIP APPLIER COVIDIEN SURGICLIP III 9" SM: Type: IMPLANTABLE DEVICE | Site: LEFT | Status: FUNCTIONAL

## 2024-12-24 DEVICE — GRAFT VASC PROPATEN 8MMX70X80CM TW REMOV RING: Type: IMPLANTABLE DEVICE | Site: LEFT | Status: FUNCTIONAL

## 2024-12-24 RX ORDER — SODIUM CHLORIDE 9 MG/ML
1000 INJECTION, SOLUTION INTRAVENOUS
Refills: 0 | Status: DISCONTINUED | OUTPATIENT
Start: 2024-12-24 | End: 2024-12-26

## 2024-12-24 RX ORDER — PANTOPRAZOLE 40 MG/1
40 TABLET, DELAYED RELEASE ORAL
Refills: 0 | Status: DISCONTINUED | OUTPATIENT
Start: 2024-12-24 | End: 2024-12-29

## 2024-12-24 RX ORDER — HYDROMORPHONE HCL 4 MG
0.25 TABLET ORAL
Refills: 0 | Status: DISCONTINUED | OUTPATIENT
Start: 2024-12-24 | End: 2024-12-24

## 2024-12-24 RX ORDER — ONDANSETRON 4 MG/1
4 TABLET ORAL EVERY 6 HOURS
Refills: 0 | Status: DISCONTINUED | OUTPATIENT
Start: 2024-12-24 | End: 2024-12-24

## 2024-12-24 RX ORDER — POTASSIUM PHOSPHATE, MONOBASIC AND POTASSIUM PHOSPHATE, DIBASIC 224; 236 MG/ML; MG/ML
15 INJECTION, SOLUTION INTRAVENOUS ONCE
Refills: 0 | Status: DISCONTINUED | OUTPATIENT
Start: 2024-12-24 | End: 2024-12-25

## 2024-12-24 RX ORDER — POTASSIUM CHLORIDE 600 MG/1
10 TABLET, FILM COATED, EXTENDED RELEASE ORAL
Refills: 0 | Status: COMPLETED | OUTPATIENT
Start: 2024-12-24 | End: 2024-12-24

## 2024-12-24 RX ORDER — OXYCODONE HCL 15 MG
2.5 TABLET ORAL EVERY 6 HOURS
Refills: 0 | Status: DISCONTINUED | OUTPATIENT
Start: 2024-12-24 | End: 2024-12-29

## 2024-12-24 RX ORDER — ACETAMINOPHEN 80 MG/.8ML
975 SOLUTION/ DROPS ORAL EVERY 6 HOURS
Refills: 0 | Status: DISCONTINUED | OUTPATIENT
Start: 2024-12-24 | End: 2024-12-29

## 2024-12-24 RX ORDER — HEPARIN SODIUM 1000 [USP'U]/ML
500 INJECTION, SOLUTION INTRAVENOUS; SUBCUTANEOUS
Qty: 25000 | Refills: 0 | Status: DISCONTINUED | OUTPATIENT
Start: 2024-12-24 | End: 2024-12-25

## 2024-12-24 RX ORDER — HYDROMORPHONE HCL 4 MG
0.5 TABLET ORAL
Refills: 0 | Status: DISCONTINUED | OUTPATIENT
Start: 2024-12-24 | End: 2024-12-24

## 2024-12-24 RX ADMIN — PANTOPRAZOLE 40 MILLIGRAM(S): 40 TABLET, DELAYED RELEASE ORAL at 05:46

## 2024-12-24 RX ADMIN — HEPARIN SODIUM 19 UNIT(S)/HR: 1000 INJECTION, SOLUTION INTRAVENOUS; SUBCUTANEOUS at 00:52

## 2024-12-24 RX ADMIN — POTASSIUM CHLORIDE 100 MILLIEQUIVALENT(S): 600 TABLET, FILM COATED, EXTENDED RELEASE ORAL at 17:44

## 2024-12-24 RX ADMIN — POTASSIUM CHLORIDE 100 MILLIEQUIVALENT(S): 600 TABLET, FILM COATED, EXTENDED RELEASE ORAL at 18:45

## 2024-12-24 RX ADMIN — ACETAMINOPHEN 975 MILLIGRAM(S): 80 SOLUTION/ DROPS ORAL at 23:31

## 2024-12-24 RX ADMIN — ACETAMINOPHEN 975 MILLIGRAM(S): 80 SOLUTION/ DROPS ORAL at 04:01

## 2024-12-24 RX ADMIN — ACETAMINOPHEN 975 MILLIGRAM(S): 80 SOLUTION/ DROPS ORAL at 18:07

## 2024-12-24 RX ADMIN — POTASSIUM CHLORIDE 100 MILLIEQUIVALENT(S): 600 TABLET, FILM COATED, EXTENDED RELEASE ORAL at 19:52

## 2024-12-24 RX ADMIN — ACETAMINOPHEN 975 MILLIGRAM(S): 80 SOLUTION/ DROPS ORAL at 03:01

## 2024-12-24 RX ADMIN — ACETAMINOPHEN 975 MILLIGRAM(S): 80 SOLUTION/ DROPS ORAL at 18:37

## 2024-12-24 RX ADMIN — SODIUM CHLORIDE 50 MILLILITER(S): 9 INJECTION, SOLUTION INTRAVENOUS at 09:12

## 2024-12-24 RX ADMIN — ACETAMINOPHEN 975 MILLIGRAM(S): 80 SOLUTION/ DROPS ORAL at 23:01

## 2024-12-24 RX ADMIN — HEPARIN SODIUM 16 UNIT(S)/HR: 1000 INJECTION, SOLUTION INTRAVENOUS; SUBCUTANEOUS at 05:49

## 2024-12-24 RX ADMIN — HEPARIN SODIUM 5 UNIT(S)/HR: 1000 INJECTION, SOLUTION INTRAVENOUS; SUBCUTANEOUS at 22:12

## 2024-12-24 NOTE — PROVIDER CONTACT NOTE (CRITICAL VALUE NOTIFICATION) - ACTION/TREATMENT ORDERED:
Provider made aware. As per provider hold hep gtt for an hour.
Provider made aware. As per provider repeat aptt

## 2024-12-24 NOTE — PROGRESS NOTE ADULT - ASSESSMENT
Patient is a 85 Y/o Male with PMHx HTN, HLD, PAD s/p L fem to PT arterial bypass with GSV, L CFA endarterectomy, L profunda femoral endarterectomy with hemashield patch angioplasty ([facility], 11/2015), L profunda to PTA PTFE bypass, L profunda endarterectomy, L PT endarterectomy ([facility] 05/2019), reop R femoral to L femoral bypass with PTFE graft, right profunda femoral endarterectomy (Koby, 05/2021), who presents with worsening RLE pain and reduced velocities in the fem-fem bypass     # PAD   S/P Bypass, doing okay   prior hx of bypass in the past   vascular care appreciated  ASA and heparin  monitor ptt level  check echo , noted       # htn/HLD  BP control   adjust as tolerated  lipid panel  statin     # Mild renal insufficiency  MOnitor bun/Cr   hydration PRN   renal eval appreciated      DVt and GI PPX     discussed with vascular team     Patient seen and examined by me. patient care and plan discussed and reviewed with PA. Plan as outlined above edited by me to reflect our discussion. Advanced care planning/advanced directives discussed with patient/family. DNR status including forceful chest compressions to attempt to restart the heart, ventilator support/artificial breathing, electric shock, artificial nutrition, health care proxy, Molst form all discussed with pt. More than 50% of the visit was spent counseling and/or coordinating care by the attending physician.

## 2024-12-24 NOTE — PROGRESS NOTE ADULT - SUBJECTIVE AND OBJECTIVE BOX
Subjective: Patient seen and examined. No new events except as noted.     REVIEW OF SYSTEMS:    CONSTITUTIONAL: + weakness, fevers or chills  EYES/ENT: No visual changes;  No vertigo or throat pain   NECK: No pain or stiffness  RESPIRATORY: No cough, wheezing, hemoptysis; No shortness of breath  CARDIOVASCULAR: No chest pain or palpitations  GASTROINTESTINAL: No abdominal or epigastric pain. No nausea, vomiting, or hematemesis; No diarrhea or constipation. No melena or hematochezia.  GENITOURINARY: No dysuria, frequency or hematuria  NEUROLOGICAL: No numbness or weakness  SKIN: No itching, burning, rashes, or lesions   All other review of systems is negative unless indicated above.    MEDICATIONS:  MEDICATIONS  (STANDING):  acetaminophen     Tablet .. 975 milliGRAM(s) Oral every 6 hours  aspirin  chewable 81 milliGRAM(s) Oral daily  dextrose 5% + sodium chloride 0.45%. 1000 milliLiter(s) (50 mL/Hr) IV Continuous <Continuous>  heparin  Infusion 1400 Unit(s)/Hr (16 mL/Hr) IV Continuous <Continuous>  influenza  Vaccine (HIGH DOSE) 0.5 milliLiter(s) IntraMuscular once  pantoprazole    Tablet 40 milliGRAM(s) Oral before breakfast      PHYSICAL EXAM:  T(C): 37.3 (12-24-24 @ 06:04), Max: 37.4 (12-24-24 @ 01:04)  HR: 71 (12-24-24 @ 06:04) (70 - 84)  BP: 164/81 (12-24-24 @ 06:04) (140/80 - 169/81)  RR: 18 (12-24-24 @ 06:04) (18 - 18)  SpO2: 94% (12-24-24 @ 06:04) (93% - 98%)  Wt(kg): --  I&O's Summary    23 Dec 2024 07:01  -  24 Dec 2024 07:00  --------------------------------------------------------  IN: 1030 mL / OUT: 675 mL / NET: 355 mL            Appearance: Normal	  HEENT:   Normal oral mucosa, PERRL, EOMI	  Lymphatic: No lymphadenopathy  Cardiovascular: Normal S1 S2, No JVD, No murmurs, No edema  Respiratory: Lungs clear to auscultation	  Psychiatry: A & O x 3, Mood & affect appropriate  Gastrointestinal:  Soft, Non-tender, + BS	  Skin: No rashes, No ecchymoses, No cyanosis	  Neurologic: Non-focal  VASCULAR: Bilateral feet without evidence of ulcers, DP and PT signals dopplerable bilaterally. Warm. Motor and sensation intact B/L.  Vascular: Peripheral pulses palpable 2+ bilaterally          LABS:    CARDIAC MARKERS:                                16.1   10.55 )-----------( 307      ( 24 Dec 2024 07:28 )             47.1     12-24    137  |  98  |  17  ----------------------------<  90  3.4[L]   |  22  |  1.29    Ca    9.8      24 Dec 2024 07:28  Phos  3.9     12-24  Mg     2.2     12-24    TPro  7.0  /  Alb  3.6  /  TBili  0.6  /  DBili  x   /  AST  23  /  ALT  13  /  AlkPhos  92  12-23    proBNP:   Lipid Profile:   HgA1c:   TSH:     Trace          TELEMETRY: 	    ECG:  	  RADIOLOGY:   DIAGNOSTIC TESTING:  [ ] Echocardiogram:  < from: TTE W or WO Ultrasound Enhancing Agent (12.23.24 @ 11:44) >    TRANSTHORACIC ECHOCARDIOGRAM REPORT  ________________________________________________________________________________                                      _______       Pt. Name:       ZEYNEP LUCIO Study Date:    12/23/2024  MRN:            [id number]        YOB: 1940  Accession #:    [id number]         Age:           84 years  Account#:       [id number]      Gender:        M  Heart Rate:     67 bpm            Height:        69.00 in (175.26 cm)  Rhythm:      Weight:        167.00 lb (75.75 kg)  Blood Pressure: 169/81 mmHg       BSA/BMI:       1.91 m² / 24.66 kg/m²  ________________________________________________________________________________________  Referring Physician: [id number] Gilbert Carrasco  Primary Sonographer: Phyllis Delacruz [facility]    CPT:               MYOCARDIAL STRAIN IMAGING - 14049.m;ECHO TTE WO CON COMP W                     DOPP - 47762.m  Indication(s):     Chest pain, unspecified - R07.9  Procedure:         Transthoracic echocardiogram with 2-D, M-mode and complete                     spectral and color flow Doppler. Strain imaging performed for                     evaluation of regional and global myocardial shape and                     dimensions.  Ordering Location: [facility]  Admission Status:  Inpatient  Study Information: Image quality for this study is adequate.    _______________________________________________________________________________________     CONCLUSIONS:      1. Left ventricular cavity is normal in size. Left ventricular wall thickness is normal. Left ventricular systolic function is normal with an ejection fraction of 64 % by Malagon's method of disks. There are no regional wall motion abnormalities seen.   2. Left ventricular global longitudinalstrain is -12.5 % is abnormal (> -16%). Images were acquired on a [employer] ultrasound system and processed on the ultrasound machine with a heart rate of 67 bpm and a blood pressure of 169/81 mmHg.   3. Normal left ventricular diastolic function, with normal left ventricular filling pressure.   4. Normal right ventricular cavity size, with normal wall thickness, and normal right ventricular systolic function.   5. No pericardial effusion seen.   6. No prior echocardiogram is available for comparison.    ________________________________________________________________________________________  FINDINGS:     Left Ventricle:  The left ventricular cavity is normal in size. Left ventricular wall thickness is normal. Left ventricular systolic function is normal with a calculated ejection fraction of 64 % by the Malagon's biplane method of disks. There are no regional wall motion abnormalities seen. There is normal left ventricular diastolic function, with normal left ventricular filling pressure. Left ventricular global longitudinal strain is -12.5 % is abnormal (> -16%). Images were acquired on a [employer] ultrasound system and processed on the ultrasound machine with a heart rate of 67 bpm and a blood pressure of 169/81 mmHg.     Right Ventricle:  The right ventricular cavity is normal in size, with normal wall thickness and right ventricular systolic function is normal. Tricuspid annular plane systolic excursion (TAPSE) is 1.9 cm (normal >=1.7 cm). Tricuspid annular tissue Doppler S' is 9.0 cm/s (normal >10 cm/s).     Left Atrium:  The left atrium is mildly dilated with an indexed volume of 40.09 ml/m².     Right Atrium:  The right atrium is normal in size with an indexed volume of 26.66 ml/m².     Interatrial Septum:  The interatrial septum appears intact.     Aortic Valve:  The aortic valve appears trileaflet. There is mild calcification of the aortic valve leaflets. There is fibrocalcific aortic valve sclerosis without stenosis. There is no evidence of aortic regurgitation.     Mitral Valve:  Structurally normal mitral valve with normal leaflet excursion. There is no mitral valve stenosis. There is trace mitral regurgitation.     Tricuspid Valve:  Structurally normal tricuspid valve with normal leaflet excursion. There is mild tricuspid regurgitation. Estimated pulmonary artery systolic pressure is 50 mmHg.     Pulmonic Valve:  Structurally normal pulmonic valve with normal leaflet excursion. There is trace pulmonic regurgitation.     Aorta:  The aortic root appears normal in size.     Pericardium:  No pericardial effusion seen.     Systemic Veins:  The inferior vena cava is normal in size (normal <2.1cm) with normal inspiratory collapse (normal >50%) consistent with normal right atrial pressure (~3, range 0-5mmHg).  ____________________________________________________________________  QUANTITATIVE DATA:  Left Ventricle Measurements: (Indexed to BSA)     IVSd (2D):   1.1 cm  LVPWd (2D):  1.2 cm  LVIDd (2D):  4.8 cm  LVIDs (2D):  2.8 cm  LV Mass:     210 g  110.0 g/m²  LV Vol d, MOD A2C: 86.9 ml 45.42 ml/m²  LV Vol d, MOD A4C: 93.6 ml 48.92 ml/m²  LV Vol d, MOD BP:  92.7 ml 48.46 ml/m²  LV Vol s, MOD A2C: 27.4 ml 14.32 ml/m²  LV Vol s, MOD A4C: 35.8 ml 18.71 ml/m²  LV Vol s, MOD BP:  32.9 ml 17.21 ml/m²  LVOT SV MOD BP:    59.8 ml  LV EF% MOD BP:     64 %     MV E Vmax:    0.57 m/s  MV A Vmax:    0.79 m/s  MV E/A:       0.73  e' lateral:   5.87 cm/s  e' medial:    4.68 cm/s  E/e' lateral: 9.73  E/e' medial:  12.20  E/e' Average: 10.82    Aorta Measurements: (Normal range) (Indexed to BSA)     Ao Root d     3.60 cm (3.1 - 3.7 cm) 1.88 cm/m²  Ao Asc d, 2D: 3.20  Ao Asc prox:  3.20 cm                1.67 cm/m²  Ao Arch:      2.5 cm            Left Atrium Measurements: (Indexed to BSA)  LA Diam 2D:   4.20 cm  LA Vol s, MOD A4C: 63.60 ml.  LA Vol s, MOD A2C: 82.80 ml.  LA Vol s, MOD BP:  76.70 ml  40.09 ml/m²         Right Ventricle Measurements: Right Atrial Measurements:     TAPSE:      1.9 cm            RA Vol:            51.00 ml  RV S' Vmax: 8.92 cm/s         RA Vol s, MOD A4C  51.0 ml                                RA Vol Index:      26.66 ml/m²                                RA Area s, MOD A4C 18.6 cm²       LVOT / RVOT/ Qp/Qs Data: (Indexed to BSA)  LVOT Diameter:  2.50 cm  LVOT Area:      4.91 cm²  LVOT Vmax:      0.82 m/s  LVOT Vmn:       0.558 m/s  LVOT VTI:       18.30 cm  LVOT peak grad: 3 mmHg  LVOT mean grad: 1.0 mmHg  LVOT SV:        89.8 ml   46.95 ml/m²    Mitral Valve Measurements:     MV E Vmax: 0.6 m/s  MV A Vmax: 0.8 m/s  MV E/A:    0.7       Tricuspid Valve Measurements:     TV S'             9.0 cm/s  TR Vmax:          3.4 m/s  TR Peak Gradient: 47.1 mmHg  RA Pressure:      3 mmHg  PASP:             50 mmHg    ________________________________________________________________________________________  Electronically signed on 12/23/2024 at 12:41:03 PM by Angelito Cazares M.D.         *** Final ***    < end of copied text >    [ ]  Catheterization:  [ ] Stress Test:    OTHER:

## 2024-12-24 NOTE — PROGRESS NOTE ADULT - PROBLEM SELECTOR PLAN 1
s/p previous extensive vascular interventions as above   Plan for L ax-fem bypass  Acceptable cardiac and medical risk to proceed   RCRI 1   METS > 4 and no anginal symptoms   Normal LVEF  ASA   Heparin gtt

## 2024-12-24 NOTE — PROGRESS NOTE ADULT - SUBJECTIVE AND OBJECTIVE BOX
VASCULAR SURGERY DAILY PROGRESS NOTE:     SUBJECTIVE/ROS: Patient seen and examined. He is resting comfortably. No new events/complaints.     MEDICATIONS  (STANDING):  acetaminophen     Tablet .. 975 milliGRAM(s) Oral every 6 hours  apixaban 2.5 milliGRAM(s) Oral every 12 hours  aspirin  chewable 81 milliGRAM(s) Oral daily  heparin  Infusion 1400 Unit(s)/Hr (19 mL/Hr) IV Continuous <Continuous>  influenza  Vaccine (HIGH DOSE) 0.5 milliLiter(s) IntraMuscular once  pantoprazole    Tablet 40 milliGRAM(s) Oral before breakfast    MEDICATIONS  (PRN):  oxyCODONE    IR 2.5 milliGRAM(s) Oral every 6 hours PRN Moderate Pain (4 - 6)      OBJECTIVE:    Vital Signs Last 24 Hrs  T(C): 37.3 (24 Dec 2024 06:04), Max: 37.4 (24 Dec 2024 01:04)  T(F): 99.2 (24 Dec 2024 06:04), Max: 99.3 (24 Dec 2024 01:04)  HR: 71 (24 Dec 2024 06:04) (70 - 84)  BP: 164/81 (24 Dec 2024 06:04) (140/80 - 169/81)  BP(mean): --  ABP: --  ABP(mean): --  RR: 18 (24 Dec 2024 06:04) (18 - 18)  SpO2: 94% (24 Dec 2024 06:04) (93% - 98%)    O2 Parameters below as of 24 Dec 2024 06:04  Patient On (Oxygen Delivery Method): room air      I&O's Detail    23 Dec 2024 07:01  -  24 Dec 2024 07:00  --------------------------------------------------------  IN:    Heparin: 190 mL    Oral Fluid: 840 mL  Total IN: 1030 mL    OUT:    Voided (mL): 675 mL  Total OUT: 675 mL    Total NET: 355 mL      Daily   LABS:                        16.1   10.55 )-----------( 307      ( 24 Dec 2024 07:28 )             47.1     12-24    137  |  98  |  17  ----------------------------<  90  3.4[L]   |  22  |  1.29    Ca    9.8      24 Dec 2024 07:28  Phos  3.9     12-24  Mg     2.2     12-24      PHYSICAL EXAM:  GENERAL: NAD, well-developed  HEAD:  Atraumatic, Normocephalic  EYES: EOMI, conjunctiva and sclera clear  CHEST/LUNG: No distress, speaks in full sentences, on RA  ABDOMEN: Soft, nontender, nondistended  NEUROLOGY: non-focal  SKIN: No rashes or lesions  VASCULAR: Bilateral feet without evidence of ulcers, DP and PT signals dopplerable bilaterally. Warm. Motor and sensation intact B/L

## 2024-12-24 NOTE — CHART NOTE - NSCHARTNOTEFT_GEN_A_CORE
Reviewed patient's labs and renal function remains stable. Please re-consult if you have any further questions.    Miguel A Stroud, PGY4  [id number]/Teams [place] Reviewed patient's labs and renal function remains stable. Please re-consult if you have any further questions.  Patient with stable kidney function does have some urine protein will need to follow up with nephrology office   Miguel A Stroud, PGY4  [id number]/Teams preferred

## 2024-12-24 NOTE — PROGRESS NOTE ADULT - SUBJECTIVE AND OBJECTIVE BOX
SURGERY DAILY PROGRESS NOTE:       SUBJECTIVE/ROS: Patient seen and evaluated on AM rounds.   Denies nausea, vomiting, chest pain, shortness of breath. Plan for OR today.       OBJECTIVE:  Vital Signs Last 24 Hrs  T(C): 37.3 (24 Dec 2024 06:04), Max: 37.4 (24 Dec 2024 01:04)  T(F): 99.2 (24 Dec 2024 06:04), Max: 99.3 (24 Dec 2024 01:04)  HR: 71 (24 Dec 2024 06:04) (70 - 84)  BP: 164/81 (24 Dec 2024 06:04) (140/80 - 169/81)  BP(mean): --  RR: 18 (24 Dec 2024 06:04) (18 - 18)  SpO2: 94% (24 Dec 2024 06:04) (93% - 98%)    Parameters below as of 24 Dec 2024 06:04  Patient On (Oxygen Delivery Method): room air      I&O's Detail    23 Dec 2024 07:01  -  24 Dec 2024 07:00  --------------------------------------------------------  IN:    Heparin: 190 mL    Oral Fluid: 840 mL  Total IN: 1030 mL    OUT:    Voided (mL): 675 mL  Total OUT: 675 mL    Total NET: 355 mL        Daily     Daily   MEDICATIONS  (STANDING):  acetaminophen     Tablet .. 975 milliGRAM(s) Oral every 6 hours  aspirin  chewable 81 milliGRAM(s) Oral daily  dextrose 5% + sodium chloride 0.45%. 1000 milliLiter(s) (50 mL/Hr) IV Continuous <Continuous>  influenza  Vaccine (HIGH DOSE) 0.5 milliLiter(s) IntraMuscular once  pantoprazole    Tablet 40 milliGRAM(s) Oral before breakfast    MEDICATIONS  (PRN):  oxyCODONE    IR 2.5 milliGRAM(s) Oral every 6 hours PRN Moderate Pain (4 - 6)      LABS:                        16.1   10.55 )-----------( 307      ( 24 Dec 2024 07:28 )             47.1     12-24    137  |  98  |  17  ----------------------------<  90  3.4[L]   |  22  |  1.29    Ca    9.8      24 Dec 2024 07:28  Phos  3.9     12-24  Mg     2.2     12-24    TPro  7.0  /  Alb  3.6  /  TBili  0.6  /  DBili  x   /  AST  23  /  ALT  13  /  AlkPhos  92  12-23    PT/INR - ( 22 Dec 2024 18:14 )   PT: 12.3 sec;   INR: 1.08 ratio         PTT - ( 24 Dec 2024 02:55 )  PTT:>200.0 sec  Urinalysis Basic - ( 24 Dec 2024 07:28 )    Color: x / Appearance: x / SG: x / pH: x  Gluc: 90 mg/dL / Ketone: x  / Bili: x / Urobili: x   Blood: x / Protein: x / Nitrite: x   Leuk Esterase: x / RBC: x / WBC x   Sq Epi: x / Non Sq Epi: x / Bacteria: x                  PHYSICAL EXAM:  GENERAL: NAD, well-developed  HEAD:  Atraumatic, Normocephalic  EYES: EOMI, conjunctiva and sclera clear  CHEST/LUNG: No distress, speaks in full sentences, on RA  ABDOMEN: Soft, nontender, nondistended  NEUROLOGY: non-focal  SKIN: No rashes or lesions  VASCULAR: Bilateral feet without evidence of ulcers, DP and PT signals dopplerable bilaterally. Warm. Motor and sensation intact B/L.    Assessment:84yHPI:  84M PMH HTN, HLD, PAD s/p L fem to PT arterial bypass with GSV, L CFA endarterectomy, L profunda femoral endarterectomy with hemashield patch angioplasty (Koby, 11/2015), L profunda to PTA PTFE bypass, L profunda endarterectomy, L PT endarterectomy (Luna 05/2019), reop R femoral to L femoral bypass with PTFE graft, right profunda femoral endarterectomy (Koby, 05/2021), who presents with worsening RLE pain and reduced velocities in the fem-fem bypass - sent in by Dr. Carrasco for operative planning of L ax-fem bypass.    In ED: labs wnl, PT 13.0, PTT 33.9, INR: 1.08.      Plan:   - Heparin gtt held since 6am prior to OR today for ax-fem bypass today  - NPO  - Post-op check post-op  - Anti-coagulation post-op    MD SANTOS Noland/[facility] Surgery Resident PGY1  Vascular Surgery [id number]

## 2024-12-24 NOTE — CHART NOTE - NSCHARTNOTEFT_GEN_A_CORE
Post Operative Note  Patient: ZEYNEP LUCIO 84y (1940) Male   MRN: [id number]  Location: [facility] PACU 26  Visit: 12-22-24 Inpatient  Date: 12-24-24 @ 19:21    Procedure: S/P left ax-fem bypass with 8mm ptfe on 12/24.     Subjective: Patient seen and examined post operatively. Reports pain as controlled. Denies nausea, vomiting, fever, chills, chest pain, SOB, cough.  ________________________________________________  Objective:  Vitals: T(F): 96.8 (12-24-24 @ 14:47), Max: 99.3 (12-24-24 @ 01:04)  HR: 72 (12-24-24 @ 19:00)  BP: 136/63 (12-24-24 @ 19:00) (136/63 - 186/68)  RR: 16 (12-24-24 @ 19:00)  SpO2: 98% (12-24-24 @ 19:00)    Physical Examination:  General: NAD, resting comfortably in bed  HEENT: Normocephalic atraumatic  Respiratory: Nonlabored respirations, normal CW expansion.  Cardio: regular rate, normotensive  Vascular: palpable L PT pulse  ________________________________________________  A:  84M with PMH of HTN, HLD, and PAD, s/p L fem to PT arterial bypass and multiple endarterectomies, presented with worsening RLE pain and reduced velocities in fem-fem bypass, now s/p L ax-fem bypass with 8mm PTFE on 12/24.    P:  - IV Abx:   - Pain: APAP, oxy, dilaudid  - Diet: regular  - DVT ppx: SCD's &   - IVF: dextrose 5% + sodium chloride 0.45% @50  - Carter in place  - if okay at 9pm start hep gtt @500 and keep overnight (no ptt checks)    Vascular  [id number]     Date/Time: 12-24-24 @ 19:21

## 2024-12-24 NOTE — PROGRESS NOTE ADULT - ATTENDING COMMENTS
Patient with severe PVD  right claudication with severe right CFA stenosis  plan for left Axillary to femoral bypass  D/W patient and family , they agree with procedure

## 2024-12-24 NOTE — PROGRESS NOTE ADULT - ASSESSMENT
84M PMH HTN, HLD, PAD s/p L fem to PT arterial bypass with GSV, L CFA endarterectomy, L profunda femoral endarterectomy with hemashield patch angioplasty (Koby, 11/2015), L profunda to PTA PTFE bypass, L profunda endarterectomy, L PT endarterectomy (Luna 05/2019), reop R femoral to L femoral bypass with PTFE graft, right profunda femoral endarterectomy (Koby, 05/2021), who presents with worsening RLE pain and reduced velocities in the fem-fem bypass - sent in by Dr. Carrasco for operative planning of L ax-fem bypass    Plan:   - Heparin gtt & ASA  - OR today, ax fem bypass     Vascular [facility]   [id number] / [phone] 84M PMH HTN, HLD, PAD s/p L fem to PT arterial bypass with GSV, L CFA endarterectomy, L profunda femoral endarterectomy with hemashield patch angioplasty (Koby, 11/2015), L profunda to PTA PTFE bypass, L profunda endarterectomy, L PT endarterectomy (Luna 05/2019), reop R femoral to L femoral bypass with PTFE graft, right profunda femoral endarterectomy (Koby, 05/2021), who presents with worsening RLE pain and reduced velocities in the fem-fem bypass - sent in by Dr. Carrasco for operative planning of L ax-fem bypass    Plan:   - Heparin gtt held & ASA  - OR today, ax fem bypass     Vascular [facility]   [id number] / [phone]

## 2024-12-24 NOTE — PRE-ANESTHESIA EVALUATION ADULT - NSANTHPMHFT_GEN_ALL_CORE
Medical history and ROS reviewed  h/o PAD s/p multiple vascular surgeries, uncontrolled HTN  EF 65%, no CP, no SOB  ROS otherwise neg

## 2024-12-24 NOTE — PROGRESS NOTE ADULT - ASSESSMENT
84M PMH HTN, HLD, PAD s/p L fem to PT arterial bypass with GSV, L CFA endarterectomy, L profunda femoral endarterectomy with hemashield patch angioplasty (Koby, 11/2015), L profunda to PTA PTFE bypass, L profunda endarterectomy, L PT endarterectomy (Luna 05/2019), reop R femoral to L femoral bypass with PTFE graft, right profunda femoral endarterectomy (Koby, 05/2021), who presents with worsening RLE pain and reduced velocities in the fem-fem bypass - sent in by Dr. Carrasco for operative planning of L ax-fem bypass.

## 2024-12-24 NOTE — PROGRESS NOTE ADULT - SUBJECTIVE AND OBJECTIVE BOX
Name of Patient : ZEYNEP LUCIO  MRN: [id number]  Date of visit: 12-24-24 @ 15:21      Subjective: Patient seen and examined. No new events except as noted.   doing okay  S/P OR       REVIEW OF SYSTEMS:    CONSTITUTIONAL: No weakness, fevers or chills  EYES/ENT: No visual changes;  No vertigo or throat pain   NECK: No pain or stiffness  RESPIRATORY: No cough, wheezing, hemoptysis; No shortness of breath  CARDIOVASCULAR: No chest pain or palpitations  GASTROINTESTINAL: No abdominal or epigastric pain. No nausea, vomiting, or hematemesis; No diarrhea or constipation. No melena or hematochezia.  GENITOURINARY: No dysuria, frequency or hematuria  NEUROLOGICAL: No numbness or weakness  SKIN: No itching, burning, rashes, or lesions   All other review of systems is negative unless indicated above.    MEDICATIONS:  MEDICATIONS  (STANDING):  acetaminophen     Tablet .. 975 milliGRAM(s) Oral every 6 hours  dextrose 5% + sodium chloride 0.45%. 1000 milliLiter(s) (50 mL/Hr) IV Continuous <Continuous>  influenza  Vaccine (HIGH DOSE) 0.5 milliLiter(s) IntraMuscular once  pantoprazole    Tablet 40 milliGRAM(s) Oral before breakfast  potassium phosphate IVPB 15 milliMole(s) IV Intermittent once      PHYSICAL EXAM:  T(C): 36 (12-24-24 @ 14:47), Max: 37.4 (12-24-24 @ 01:04)  HR: 65 (12-24-24 @ 15:15) (63 - 85)  BP: 153/74 (12-24-24 @ 15:15) (149/73 - 186/68)  RR: 16 (12-24-24 @ 15:15) (16 - 18)  SpO2: 96% (12-24-24 @ 15:15) (93% - 97%)  Wt(kg): --  I&O's Summary    23 Dec 2024 07:01  -  24 Dec 2024 07:00  --------------------------------------------------------  IN: 1030 mL / OUT: 675 mL / NET: 355 mL    24 Dec 2024 07:01  -  24 Dec 2024 15:21  --------------------------------------------------------  IN: 0 mL / OUT: 300 mL / NET: -300 mL      Height (cm): 175.3 (12-24 @ 12:22)  Weight (kg): 76.1 (12-24 @ 12:22)  BMI (kg/m2): 24.8 (12-24 @ 12:22)  BSA (m2): 1.92 (12-24 @ 12:22)    Appearance: Normal	  HEENT:  PERRLA   Lymphatic: No lymphadenopathy   Cardiovascular: Normal S1 S2, no JVD  Respiratory: normal effort , clear  Gastrointestinal:  Soft, Non-tender  Skin: No rashes,  warm to touch  Psychiatry:  Mood & affect appropriate  Musculuskeletal: No edema    recent labs, Imaging and EKGs personally reviewed       12-23-24 @ 07:01  -  12-24-24 @ 07:00  --------------------------------------------------------  IN: 1030 mL / OUT: 675 mL / NET: 355 mL    12-24-24 @ 07:01  -  12-24-24 @ 15:21  --------------------------------------------------------  IN: 0 mL / OUT: 300 mL / NET: -300 mL                          16.1   10.55 )-----------( 307      ( 24 Dec 2024 07:28 )             47.1               12-24    137  |  98  |  17  ----------------------------<  90  3.4[L]   |  22  |  1.29    Ca    9.8      24 Dec 2024 07:28  Phos  3.9     12-24  Mg     2.2     12-24    TPro  7.0  /  Alb  3.6  /  TBili  0.6  /  DBili  x   /  AST  23  /  ALT  13  /  AlkPhos  92  12-23    PT/INR - ( 22 Dec 2024 18:14 )   PT: 12.3 sec;   INR: 1.08 ratio         PTT - ( 24 Dec 2024 02:55 )  PTT:>200.0 sec                 ABG - ( 24 Dec 2024 12:28 )  pH, Arterial: 7.43  pH, Blood: x     /  pCO2: 42    /  pO2: 148   / HCO3: 28    / Base Excess: 3.2   /  SaO2: 100.0             Urinalysis Basic - ( 24 Dec 2024 07:28 )    Color: x / Appearance: x / SG: x / pH: x  Gluc: 90 mg/dL / Ketone: x  / Bili: x / Urobili: x   Blood: x / Protein: x / Nitrite: x   Leuk Esterase: x / RBC: x / WBC x   Sq Epi: x / Non Sq Epi: x / Bacteria: x

## 2024-12-25 LAB
ANION GAP SERPL CALC-SCNC: 15 MMOL/L — SIGNIFICANT CHANGE UP (ref 5–17)
APTT BLD: 32 SEC — SIGNIFICANT CHANGE UP (ref 24.5–35.6)
APTT BLD: 49.9 SEC — HIGH (ref 24.5–35.6)
APTT BLD: 58.2 SEC — HIGH (ref 24.5–35.6)
BUN SERPL-MCNC: 14 MG/DL — SIGNIFICANT CHANGE UP (ref 7–23)
CALCIUM SERPL-MCNC: 9.1 MG/DL — SIGNIFICANT CHANGE UP (ref 8.4–10.5)
CHLORIDE SERPL-SCNC: 101 MMOL/L — SIGNIFICANT CHANGE UP (ref 96–108)
CO2 SERPL-SCNC: 22 MMOL/L — SIGNIFICANT CHANGE UP (ref 22–31)
CREAT SERPL-MCNC: 1.22 MG/DL — SIGNIFICANT CHANGE UP (ref 0.5–1.3)
EGFR: 59 ML/MIN/1.73M2 — LOW
GLUCOSE SERPL-MCNC: 117 MG/DL — HIGH (ref 70–99)
HCT VFR BLD CALC: 45.2 % — SIGNIFICANT CHANGE UP (ref 39–50)
HGB BLD-MCNC: 15.3 G/DL — SIGNIFICANT CHANGE UP (ref 13–17)
MAGNESIUM SERPL-MCNC: 1.9 MG/DL — SIGNIFICANT CHANGE UP (ref 1.6–2.6)
MCHC RBC-ENTMCNC: 33.1 PG — SIGNIFICANT CHANGE UP (ref 27–34)
MCHC RBC-ENTMCNC: 33.8 G/DL — SIGNIFICANT CHANGE UP (ref 32–36)
MCV RBC AUTO: 97.8 FL — SIGNIFICANT CHANGE UP (ref 80–100)
NRBC # BLD: 0 /100 WBCS — SIGNIFICANT CHANGE UP (ref 0–0)
PHOSPHATE SERPL-MCNC: 3.2 MG/DL — SIGNIFICANT CHANGE UP (ref 2.5–4.5)
PLATELET # BLD AUTO: 271 K/UL — SIGNIFICANT CHANGE UP (ref 150–400)
POTASSIUM SERPL-MCNC: 3.7 MMOL/L — SIGNIFICANT CHANGE UP (ref 3.5–5.3)
POTASSIUM SERPL-SCNC: 3.7 MMOL/L — SIGNIFICANT CHANGE UP (ref 3.5–5.3)
RBC # BLD: 4.62 M/UL — SIGNIFICANT CHANGE UP (ref 4.2–5.8)
RBC # FLD: 12.6 % — SIGNIFICANT CHANGE UP (ref 10.3–14.5)
SODIUM SERPL-SCNC: 138 MMOL/L — SIGNIFICANT CHANGE UP (ref 135–145)
WBC # BLD: 13.03 K/UL — HIGH (ref 3.8–10.5)
WBC # FLD AUTO: 13.03 K/UL — HIGH (ref 3.8–10.5)

## 2024-12-25 RX ORDER — HEPARIN SODIUM 1000 [USP'U]/ML
1000 INJECTION, SOLUTION INTRAVENOUS; SUBCUTANEOUS
Qty: 25000 | Refills: 0 | Status: DISCONTINUED | OUTPATIENT
Start: 2024-12-25 | End: 2024-12-27

## 2024-12-25 RX ADMIN — ACETAMINOPHEN 975 MILLIGRAM(S): 80 SOLUTION/ DROPS ORAL at 19:00

## 2024-12-25 RX ADMIN — ACETAMINOPHEN 975 MILLIGRAM(S): 80 SOLUTION/ DROPS ORAL at 06:07

## 2024-12-25 RX ADMIN — ACETAMINOPHEN 975 MILLIGRAM(S): 80 SOLUTION/ DROPS ORAL at 13:10

## 2024-12-25 RX ADMIN — ACETAMINOPHEN 975 MILLIGRAM(S): 80 SOLUTION/ DROPS ORAL at 14:00

## 2024-12-25 RX ADMIN — ACETAMINOPHEN 975 MILLIGRAM(S): 80 SOLUTION/ DROPS ORAL at 05:37

## 2024-12-25 RX ADMIN — HEPARIN SODIUM 10 UNIT(S)/HR: 1000 INJECTION, SOLUTION INTRAVENOUS; SUBCUTANEOUS at 09:33

## 2024-12-25 RX ADMIN — HEPARIN SODIUM 10 UNIT(S)/HR: 1000 INJECTION, SOLUTION INTRAVENOUS; SUBCUTANEOUS at 19:45

## 2024-12-25 RX ADMIN — HEPARIN SODIUM 10 UNIT(S)/HR: 1000 INJECTION, SOLUTION INTRAVENOUS; SUBCUTANEOUS at 23:43

## 2024-12-25 RX ADMIN — PANTOPRAZOLE 40 MILLIGRAM(S): 40 TABLET, DELAYED RELEASE ORAL at 05:38

## 2024-12-25 RX ADMIN — SODIUM CHLORIDE 50 MILLILITER(S): 9 INJECTION, SOLUTION INTRAVENOUS at 19:44

## 2024-12-25 RX ADMIN — ACETAMINOPHEN 975 MILLIGRAM(S): 80 SOLUTION/ DROPS ORAL at 18:08

## 2024-12-25 NOTE — PROGRESS NOTE ADULT - SUBJECTIVE AND OBJECTIVE BOX
Subjective: Patient seen and examined. No new events except as noted.   s/p Axillobifemoral bypass graft with prosthesis  resting comfortably     REVIEW OF SYSTEMS:    CONSTITUTIONAL: + weakness, fevers or chills  EYES/ENT: No visual changes;  No vertigo or throat pain   NECK: No pain or stiffness  RESPIRATORY: No cough, wheezing, hemoptysis; No shortness of breath  CARDIOVASCULAR: No chest pain or palpitations  GASTROINTESTINAL: No abdominal or epigastric pain. No nausea, vomiting, or hematemesis; No diarrhea or constipation. No melena or hematochezia.  GENITOURINARY: No dysuria, frequency or hematuria  NEUROLOGICAL: No numbness or weakness  SKIN: No itching, burning, rashes, or lesions   All other review of systems is negative unless indicated above.    MEDICATIONS:  MEDICATIONS  (STANDING):  acetaminophen     Tablet .. 975 milliGRAM(s) Oral every 6 hours  dextrose 5% + sodium chloride 0.45%. 1000 milliLiter(s) (50 mL/Hr) IV Continuous <Continuous>  heparin  Infusion 500 Unit(s)/Hr (5 mL/Hr) IV Continuous <Continuous>  influenza  Vaccine (HIGH DOSE) 0.5 milliLiter(s) IntraMuscular once  pantoprazole    Tablet 40 milliGRAM(s) Oral before breakfast  potassium phosphate IVPB 15 milliMole(s) IV Intermittent once      PHYSICAL EXAM:  T(C): 37.1 (12-25-24 @ 06:17), Max: 37.4 (12-24-24 @ 22:40)  HR: 64 (12-25-24 @ 06:17) (63 - 85)  BP: 157/68 (12-25-24 @ 06:17) (136/63 - 186/68)  RR: 18 (12-25-24 @ 06:17) (14 - 18)  SpO2: 100% (12-25-24 @ 06:17) (92% - 100%)  Wt(kg): --  I&O's Summary    24 Dec 2024 07:01  -  25 Dec 2024 07:00  --------------------------------------------------------  IN: 985 mL / OUT: 1475 mL / NET: -490 mL      Height (cm): 175.3 (12-24 @ 12:22)  Weight (kg): 76.1 (12-24 @ 12:22)  BMI (kg/m2): 24.8 (12-24 @ 12:22)  BSA (m2): 1.92 (12-24 @ 12:22)    Appearance: Normal	  HEENT:   Normal oral mucosa, PERRL, EOMI	  Lymphatic: No lymphadenopathy , no edema  Cardiovascular: Normal S1 S2, No JVD, No murmurs , Peripheral pulses palpable 2+ bilaterally  Respiratory: Lungs clear to auscultation, normal effort 	  Gastrointestinal:  Soft, Non-tender, + BS	  Skin: No rashes, No ecchymoses, No cyanosis, warm to touch  Musculoskeletal: Normal range of motion, normal strength  Psychiatry:  Mood & affect appropriate  Ext: No edema  Vascular: palpable L PT pulse    LABS:    CARDIAC MARKERS:                                15.3   13.03 )-----------( 271      ( 25 Dec 2024 04:25 )             45.2     12-25    138  |  101  |  14  ----------------------------<  117[H]  3.7   |  22  |  1.22    Ca    9.1      25 Dec 2024 04:25  Phos  3.2     12-25  Mg     1.9     12-25          TELEMETRY: 	    ECG:  	  RADIOLOGY:   DIAGNOSTIC TESTING:  [ ] Echocardiogram:  [ ]  Catheterization:  [ ] Stress Test:    OTHER:

## 2024-12-25 NOTE — PROGRESS NOTE ADULT - SUBJECTIVE AND OBJECTIVE BOX
Name of Patient : ZEYNEP LUCIO  MRN: [id number]      Subjective: Patient seen and examined. No new events except as noted.   doing okay  S/P OR     REVIEW OF SYSTEMS:    CONSTITUTIONAL: No weakness, fevers or chills  EYES/ENT: No visual changes;  No vertigo or throat pain   NECK: No pain or stiffness  RESPIRATORY: No cough, wheezing, hemoptysis; No shortness of breath  CARDIOVASCULAR: No chest pain or palpitations  GASTROINTESTINAL: No abdominal or epigastric pain. No nausea, vomiting, or hematemesis; No diarrhea or constipation. No melena or hematochezia.  GENITOURINARY: No dysuria, frequency or hematuria  NEUROLOGICAL: No numbness or weakness  SKIN: No itching, burning, rashes, or lesions   All other review of systems is negative unless indicated above.    MEDICATIONS:  MEDICATIONS  (STANDING):  acetaminophen     Tablet .. 975 milliGRAM(s) Oral every 6 hours  dextrose 5% + sodium chloride 0.45%. 1000 milliLiter(s) (50 mL/Hr) IV Continuous <Continuous>  heparin  Infusion 1000 Unit(s)/Hr (10 mL/Hr) IV Continuous <Continuous>  influenza  Vaccine (HIGH DOSE) 0.5 milliLiter(s) IntraMuscular once  pantoprazole    Tablet 40 milliGRAM(s) Oral before breakfast      PHYSICAL EXAM:  T(C): 37.1 (12-25-24 @ 21:09), Max: 37.3 (12-25-24 @ 14:52)  HR: 79 (12-25-24 @ 21:09) (64 - 91)  BP: 158/85 (12-25-24 @ 21:09) (140/60 - 175/68)  RR: 18 (12-25-24 @ 21:09) (18 - 18)  SpO2: 95% (12-25-24 @ 21:09) (92% - 100%)  Wt(kg): --  I&O's Summary    24 Dec 2024 07:01  -  25 Dec 2024 07:00  --------------------------------------------------------  IN: 985 mL / OUT: 1475 mL / NET: -490 mL    25 Dec 2024 07:01  -  26 Dec 2024 00:36  --------------------------------------------------------  IN: 1820 mL / OUT: 770 mL / NET: 1050 mL          Appearance: Normal	  HEENT:  PERRLA   Lymphatic: No lymphadenopathy   Cardiovascular: Normal S1 S2, no JVD  Respiratory: normal effort , clear  Gastrointestinal:  Soft, Non-tender  Skin: No rashes,  warm to touch  Psychiatry:  Mood & affect appropriate  Musculuskeletal: No edema    recent labs, Imaging and EKGs personally reviewed   CODE status discussed with the patient in detail    12-24-24 @ 07:01  -  12-25-24 @ 07:00  --------------------------------------------------------  IN: 985 mL / OUT: 1475 mL / NET: -490 mL    12-25-24 @ 07:01  -  12-26-24 @ 00:36  --------------------------------------------------------  IN: 1820 mL / OUT: 770 mL / NET: 1050 mL                          15.3   13.03 )-----------( 271      ( 25 Dec 2024 04:25 )             45.2               12-25    138  |  101  |  14  ----------------------------<  117[H]  3.7   |  22  |  1.22    Ca    9.1      25 Dec 2024 04:25  Phos  3.2     12-25  Mg     1.9     12-25      PTT - ( 25 Dec 2024 22:39 )  PTT:58.2 sec                 ABG - ( 24 Dec 2024 12:28 )  pH, Arterial: 7.43  pH, Blood: x     /  pCO2: 42    /  pO2: 148   / HCO3: 28    / Base Excess: 3.2   /  SaO2: 100.0             Urinalysis Basic - ( 25 Dec 2024 04:25 )    Color: x / Appearance: x / SG: x / pH: x  Gluc: 117 mg/dL / Ketone: x  / Bili: x / Urobili: x   Blood: x / Protein: x / Nitrite: x   Leuk Esterase: x / RBC: x / WBC x   Sq Epi: x / Non Sq Epi: x / Bacteria: x

## 2024-12-25 NOTE — PROGRESS NOTE ADULT - SUBJECTIVE AND OBJECTIVE BOX
SURGERY DAILY PROGRESS NOTE:       SUBJECTIVE/ROS: Patient seen and evaluated on AM rounds.   Denies nausea, vomiting, chest pain, shortness of breath. Reports he is doing very well this morning. Wants the sigala removed.       OBJECTIVE:  Vital Signs Last 24 Hrs  T(C): 36.6 (25 Dec 2024 10:26), Max: 37.4 (24 Dec 2024 22:40)  T(F): 97.9 (25 Dec 2024 10:26), Max: 99.4 (24 Dec 2024 22:40)  HR: 91 (25 Dec 2024 10:26) (63 - 91)  BP: 157/68 (25 Dec 2024 06:17) (136/63 - 160/72)  BP(mean): 92 (24 Dec 2024 21:00) (90 - 105)  RR: 18 (25 Dec 2024 10:26) (14 - 18)  SpO2: 92% (25 Dec 2024 10:26) (92% - 100%)    Parameters below as of 25 Dec 2024 10:26  Patient On (Oxygen Delivery Method): room air      I&O's Detail    24 Dec 2024 07:01  -  25 Dec 2024 07:00  --------------------------------------------------------  IN:    dextrose 5% + sodium chloride 0.45%: 600 mL    Heparin: 45 mL    IV PiggyBack: 100 mL    Oral Fluid: 240 mL  Total IN: 985 mL    OUT:    Bulb (mL): 55 mL    Indwelling Catheter - Urethral (mL): 1120 mL    Voided (mL): 300 mL  Total OUT: 1475 mL    Total NET: -490 mL      25 Dec 2024 07:01  -  25 Dec 2024 12:22  --------------------------------------------------------  IN:    Oral Fluid: 300 mL  Total IN: 300 mL    OUT:    Bulb (mL): 30 mL    Indwelling Catheter - Urethral (mL): 300 mL  Total OUT: 330 mL    Total NET: -30 mL        Daily     Daily   MEDICATIONS  (STANDING):  acetaminophen     Tablet .. 975 milliGRAM(s) Oral every 6 hours  dextrose 5% + sodium chloride 0.45%. 1000 milliLiter(s) (50 mL/Hr) IV Continuous <Continuous>  heparin  Infusion 1000 Unit(s)/Hr (10 mL/Hr) IV Continuous <Continuous>  influenza  Vaccine (HIGH DOSE) 0.5 milliLiter(s) IntraMuscular once  pantoprazole    Tablet 40 milliGRAM(s) Oral before breakfast    MEDICATIONS  (PRN):  oxyCODONE    IR 2.5 milliGRAM(s) Oral every 6 hours PRN Moderate Pain (4 - 6)      LABS:                        15.3   13.03 )-----------( 271      ( 25 Dec 2024 04:25 )             45.2     12-25    138  |  101  |  14  ----------------------------<  117[H]  3.7   |  22  |  1.22    Ca    9.1      25 Dec 2024 04:25  Phos  3.2     12-25  Mg     1.9     12-25      PTT - ( 25 Dec 2024 04:25 )  PTT:32.0 sec  Urinalysis Basic - ( 25 Dec 2024 04:25 )    Color: x / Appearance: x / SG: x / pH: x  Gluc: 117 mg/dL / Ketone: x  / Bili: x / Urobili: x   Blood: x / Protein: x / Nitrite: x   Leuk Esterase: x / RBC: x / WBC x   Sq Epi: x / Non Sq Epi: x / Bacteria: x                  PHYSICAL EXAM:  General: NAD, resting comfortably in bed  Pulmonary: Nonlabored breathing, no respiratory distress  Cardiovascular: NSR  Extremities: WWP  Pulses: palpable L PT, dopplerable PT on right and dopp DP b/l     Assessment:84yHPI:  84M PMH HTN, HLD, PAD s/p L fem to PT arterial bypass with GSV, L CFA endarterectomy, L profunda femoral endarterectomy with hemashield patch angioplasty (Koby, 11/2015), L profunda to PTA PTFE bypass, L profunda endarterectomy, L PT endarterectomy (Luna 05/2019), reop R femoral to L femoral bypass with PTFE graft, right profunda femoral endarterectomy (Koby, 05/2021), who presents with worsening RLE pain and reduced velocities in the fem-fem bypass - sent in by Dr. Carrasco for operative planning of L ax-fem bypass.    s/p L ax-fem bypass with 8mm PTFE on 12/24.    Plan:  - IV Abx  - Pain: APAP, oxy, dilaudid  - Diet: regular  - DVT ppx: SCD's   - Sigala to be removed, follow-up TOV  - Hep Gtt increased to 1000un, f/u PTT  - OOB to chair    Charbel Crawford MD   NS/[facility] Surgery Resident PGY1  Vascular Surgery [id number]

## 2024-12-26 LAB
ANION GAP SERPL CALC-SCNC: 14 MMOL/L — SIGNIFICANT CHANGE UP (ref 5–17)
APTT BLD: 41.3 SEC — HIGH (ref 24.5–35.6)
APTT BLD: 47.6 SEC — HIGH (ref 24.5–35.6)
BUN SERPL-MCNC: 10 MG/DL — SIGNIFICANT CHANGE UP (ref 7–23)
CALCIUM SERPL-MCNC: 9.2 MG/DL — SIGNIFICANT CHANGE UP (ref 8.4–10.5)
CHLORIDE SERPL-SCNC: 101 MMOL/L — SIGNIFICANT CHANGE UP (ref 96–108)
CO2 SERPL-SCNC: 22 MMOL/L — SIGNIFICANT CHANGE UP (ref 22–31)
CREAT SERPL-MCNC: 0.98 MG/DL — SIGNIFICANT CHANGE UP (ref 0.5–1.3)
EGFR: 77 ML/MIN/1.73M2 — SIGNIFICANT CHANGE UP
GLUCOSE SERPL-MCNC: 99 MG/DL — SIGNIFICANT CHANGE UP (ref 70–99)
HCT VFR BLD CALC: 43.2 % — SIGNIFICANT CHANGE UP (ref 39–50)
HGB BLD-MCNC: 14.6 G/DL — SIGNIFICANT CHANGE UP (ref 13–17)
MAGNESIUM SERPL-MCNC: 1.8 MG/DL — SIGNIFICANT CHANGE UP (ref 1.6–2.6)
MCHC RBC-ENTMCNC: 33.3 PG — SIGNIFICANT CHANGE UP (ref 27–34)
MCHC RBC-ENTMCNC: 33.8 G/DL — SIGNIFICANT CHANGE UP (ref 32–36)
MCV RBC AUTO: 98.4 FL — SIGNIFICANT CHANGE UP (ref 80–100)
NRBC # BLD: 0 /100 WBCS — SIGNIFICANT CHANGE UP (ref 0–0)
PHOSPHATE SERPL-MCNC: 2.3 MG/DL — LOW (ref 2.5–4.5)
PLATELET # BLD AUTO: 243 K/UL — SIGNIFICANT CHANGE UP (ref 150–400)
POTASSIUM SERPL-MCNC: 3.5 MMOL/L — SIGNIFICANT CHANGE UP (ref 3.5–5.3)
POTASSIUM SERPL-SCNC: 3.5 MMOL/L — SIGNIFICANT CHANGE UP (ref 3.5–5.3)
RBC # BLD: 4.39 M/UL — SIGNIFICANT CHANGE UP (ref 4.2–5.8)
RBC # FLD: 12.4 % — SIGNIFICANT CHANGE UP (ref 10.3–14.5)
SODIUM SERPL-SCNC: 137 MMOL/L — SIGNIFICANT CHANGE UP (ref 135–145)
WBC # BLD: 14.78 K/UL — HIGH (ref 3.8–10.5)
WBC # FLD AUTO: 14.78 K/UL — HIGH (ref 3.8–10.5)

## 2024-12-26 RX ORDER — SENNOSIDES 8.6 MG/1
1 TABLET, FILM COATED ORAL AT BEDTIME
Refills: 0 | Status: DISCONTINUED | OUTPATIENT
Start: 2024-12-26 | End: 2024-12-29

## 2024-12-26 RX ORDER — ASPIRIN 81 MG
81 TABLET, DELAYED RELEASE (ENTERIC COATED) ORAL DAILY
Refills: 0 | Status: DISCONTINUED | OUTPATIENT
Start: 2024-12-26 | End: 2024-12-29

## 2024-12-26 RX ORDER — POLYETHYLENE GLYCOL 3350 17 G/DOSE
17 POWDER (GRAM) ORAL DAILY
Refills: 0 | Status: DISCONTINUED | OUTPATIENT
Start: 2024-12-26 | End: 2024-12-29

## 2024-12-26 RX ORDER — POTASSIUM PHOSPHATE, MONOBASIC AND POTASSIUM PHOSPHATE, DIBASIC 224; 236 MG/ML; MG/ML
30 INJECTION, SOLUTION INTRAVENOUS ONCE
Refills: 0 | Status: COMPLETED | OUTPATIENT
Start: 2024-12-26 | End: 2024-12-26

## 2024-12-26 RX ORDER — LOSARTAN POTASSIUM 100 MG/1
100 TABLET, FILM COATED ORAL DAILY
Refills: 0 | Status: DISCONTINUED | OUTPATIENT
Start: 2024-12-26 | End: 2024-12-26

## 2024-12-26 RX ADMIN — HEPARIN SODIUM 12 UNIT(S)/HR: 1000 INJECTION, SOLUTION INTRAVENOUS; SUBCUTANEOUS at 11:24

## 2024-12-26 RX ADMIN — Medication 17 GRAM(S): at 12:46

## 2024-12-26 RX ADMIN — HEPARIN SODIUM 14 UNIT(S)/HR: 1000 INJECTION, SOLUTION INTRAVENOUS; SUBCUTANEOUS at 18:57

## 2024-12-26 RX ADMIN — ACETAMINOPHEN 975 MILLIGRAM(S): 80 SOLUTION/ DROPS ORAL at 17:06

## 2024-12-26 RX ADMIN — PANTOPRAZOLE 40 MILLIGRAM(S): 40 TABLET, DELAYED RELEASE ORAL at 05:11

## 2024-12-26 RX ADMIN — SENNOSIDES 1 TABLET(S): 8.6 TABLET, FILM COATED ORAL at 21:05

## 2024-12-26 RX ADMIN — POTASSIUM PHOSPHATE, MONOBASIC AND POTASSIUM PHOSPHATE, DIBASIC 83.33 MILLIMOLE(S): 224; 236 INJECTION, SOLUTION INTRAVENOUS at 12:46

## 2024-12-26 RX ADMIN — LOSARTAN POTASSIUM 100 MILLIGRAM(S): 100 TABLET, FILM COATED ORAL at 10:10

## 2024-12-26 RX ADMIN — ACETAMINOPHEN 975 MILLIGRAM(S): 80 SOLUTION/ DROPS ORAL at 18:06

## 2024-12-26 RX ADMIN — Medication 10 MILLIGRAM(S): at 10:10

## 2024-12-26 RX ADMIN — HEPARIN SODIUM 10 UNIT(S)/HR: 1000 INJECTION, SOLUTION INTRAVENOUS; SUBCUTANEOUS at 07:33

## 2024-12-26 RX ADMIN — Medication 81 MILLIGRAM(S): at 12:46

## 2024-12-26 RX ADMIN — ACETAMINOPHEN 975 MILLIGRAM(S): 80 SOLUTION/ DROPS ORAL at 13:46

## 2024-12-26 RX ADMIN — ACETAMINOPHEN 975 MILLIGRAM(S): 80 SOLUTION/ DROPS ORAL at 05:10

## 2024-12-26 RX ADMIN — ACETAMINOPHEN 975 MILLIGRAM(S): 80 SOLUTION/ DROPS ORAL at 05:43

## 2024-12-26 RX ADMIN — HEPARIN SODIUM 14 UNIT(S)/HR: 1000 INJECTION, SOLUTION INTRAVENOUS; SUBCUTANEOUS at 19:14

## 2024-12-26 RX ADMIN — ACETAMINOPHEN 975 MILLIGRAM(S): 80 SOLUTION/ DROPS ORAL at 12:46

## 2024-12-26 NOTE — PROGRESS NOTE ADULT - SUBJECTIVE AND OBJECTIVE BOX
Subjective: Patient seen and examined. No new events except as noted.   s/p Axillobifemoral bypass graft with prosthesis  resting comfortably     REVIEW OF SYSTEMS:    CONSTITUTIONAL: =weakness, fevers or chills  EYES/ENT: No visual changes;  No vertigo or throat pain   NECK: No pain or stiffness  RESPIRATORY: No cough, wheezing, hemoptysis; No shortness of breath  CARDIOVASCULAR: No chest pain or palpitations  GASTROINTESTINAL: No abdominal or epigastric pain. No nausea, vomiting, or hematemesis; No diarrhea or constipation. No melena or hematochezia.  GENITOURINARY: No dysuria, frequency or hematuria  NEUROLOGICAL: No numbness or weakness  SKIN: No itching, burning, rashes, or lesions   All other review of systems is negative unless indicated above.    MEDICATIONS:  MEDICATIONS  (STANDING):  acetaminophen     Tablet .. 975 milliGRAM(s) Oral every 6 hours  amLODIPine   Tablet 10 milliGRAM(s) Oral daily  aspirin  chewable 81 milliGRAM(s) Oral daily  heparin  Infusion 1000 Unit(s)/Hr (12 mL/Hr) IV Continuous <Continuous>  influenza  Vaccine (HIGH DOSE) 0.5 milliLiter(s) IntraMuscular once  pantoprazole    Tablet 40 milliGRAM(s) Oral before breakfast  polyethylene glycol 3350 17 Gram(s) Oral daily  potassium phosphate IVPB 30 milliMole(s) IV Intermittent once  senna 1 Tablet(s) Oral at bedtime      PHYSICAL EXAM:  T(C): 36.5 (12-26-24 @ 09:55), Max: 37.3 (12-25-24 @ 14:52)  HR: 100 (12-26-24 @ 09:55) (75 - 100)  BP: 182/93 (12-26-24 @ 09:55) (158/73 - 185/82)  RR: 18 (12-26-24 @ 09:55) (18 - 18)  SpO2: 94% (12-26-24 @ 09:55) (92% - 96%)  Wt(kg): --  I&O's Summary    25 Dec 2024 07:01  -  26 Dec 2024 07:00  --------------------------------------------------------  IN: 2370 mL / OUT: 1630 mL / NET: 740 mL    26 Dec 2024 07:01  -  26 Dec 2024 12:40  --------------------------------------------------------  IN: 240 mL / OUT: 300 mL / NET: -60 mL          Appearance: Normal	  HEENT:   Normal oral mucosa, PERRL, EOMI	  Lymphatic: No lymphadenopathy , no edema  Cardiovascular: Normal S1 S2, No JVD, No murmurs , Peripheral pulses palpable 2+ bilaterally  Respiratory: Lungs clear to auscultation, normal effort 	  Gastrointestinal:  Soft, Non-tender, + BS	  Skin: No rashes, No ecchymoses, No cyanosis, warm to touch  Musculoskeletal: Normal range of motion, normal strength  Psychiatry:  Mood & affect appropriate  Ext: No edema  Vascular: palpable L PT pulse      LABS:    CARDIAC MARKERS:                                14.6   14.78 )-----------( 243      ( 26 Dec 2024 08:04 )             43.2     12-26    137  |  101  |  10  ----------------------------<  99  3.5   |  22  |  0.98    Ca    9.2      26 Dec 2024 08:09  Phos  2.3     12-26  Mg     1.8     12-26      proBNP:   Lipid Profile:   HgA1c:   TSH:     Present          TELEMETRY: 	    ECG:  	  RADIOLOGY:   DIAGNOSTIC TESTING:  [ ] Echocardiogram:  [ ]  Catheterization:  [ ] Stress Test:    OTHER:

## 2024-12-26 NOTE — PHYSICAL THERAPY INITIAL EVALUATION ADULT - GAIT DEVIATIONS NOTED, PT EVAL
decreased branden/decreased velocity of limb motion/decreased step length/decreased weight-shifting ability

## 2024-12-26 NOTE — PROGRESS NOTE ADULT - ASSESSMENT
84M PMH HTN, HLD, PAD s/p L fem to PT arterial bypass with GSV, L CFA endarterectomy, L profunda femoral endarterectomy with hemashield patch angioplasty (Koby, 11/2015), L profunda to PTA PTFE bypass, L profunda endarterectomy, L PT endarterectomy (Luan 05/2019), reop R femoral to L femoral bypass with PTFE graft, right profunda femoral endarterectomy (Koby, 05/2021), who presents with worsening RLE pain and reduced velocities in the fem-fem bypass - sent in by Dr. Carrasco for operative planning of L ax-fem bypass.    s/p L ax-fem bypass with 8mm PTFE on 12/24.    Plan:  - ASA  - Pain: APAP, oxy, dilaudid  - Diet: regular  - DVT ppx: SCD's   - passed TOV  - Hep Gtt increased to 1000un, f/u PTT  - OOB to chair  - consult PT     Vascular Surgery   [id number]

## 2024-12-26 NOTE — PROGRESS NOTE ADULT - SUBJECTIVE AND OBJECTIVE BOX
INTERVAL EVENTS: No acute events overnight.  SUBJECTIVE: Patient seen and examined at bedside with surgical team, patient without complaints. Denies fever, chills, CP, SOB nausea, vomiting, abdominal pain.    OBJECTIVE:    Vital Signs Last 24 Hrs  T(C): 37.3 (26 Dec 2024 06:27), Max: 37.3 (25 Dec 2024 14:52)  T(F): 99.1 (26 Dec 2024 06:27), Max: 99.1 (25 Dec 2024 14:52)  HR: 94 (26 Dec 2024 06:27) (75 - 94)  BP: 158/73 (26 Dec 2024 06:27) (158/73 - 185/82)  BP(mean): --  RR: 18 (26 Dec 2024 06:27) (18 - 18)  SpO2: 93% (26 Dec 2024 06:27) (92% - 96%)    Parameters below as of 26 Dec 2024 06:27  Patient On (Oxygen Delivery Method): room air    I&O's Detail    25 Dec 2024 07:01  -  26 Dec 2024 07:00  --------------------------------------------------------  IN:    dextrose 5% + sodium chloride 0.45%: 1150 mL    Heparin: 220 mL    Oral Fluid: 1000 mL  Total IN: 2370 mL    OUT:    Bulb (mL): 130 mL    Indwelling Catheter - Urethral (mL): 300 mL    Voided (mL): 1200 mL  Total OUT: 1630 mL    Total NET: 740 mL      MEDICATIONS  (STANDING):  acetaminophen     Tablet .. 975 milliGRAM(s) Oral every 6 hours  aspirin  chewable 81 milliGRAM(s) Oral daily  heparin  Infusion 1000 Unit(s)/Hr (10 mL/Hr) IV Continuous <Continuous>  influenza  Vaccine (HIGH DOSE) 0.5 milliLiter(s) IntraMuscular once  pantoprazole    Tablet 40 milliGRAM(s) Oral before breakfast    MEDICATIONS  (PRN):  oxyCODONE    IR 2.5 milliGRAM(s) Oral every 6 hours PRN Moderate Pain (4 - 6)      PHYSICAL EXAM:  General: NAD, resting comfortably in bed  Pulmonary: Nonlabored breathing, no respiratory distress  Cardiovascular: NSR  Extremities: WWP  Pulses: palpable L PT, dopplerable PT on right and dopp DP b/l     LABS:                        14.6   14.78 )-----------( 243      ( 26 Dec 2024 08:04 )             43.2     12-26    137  |  101  |  10  ----------------------------<  99  3.5   |  22  |  0.98    Ca    9.2      26 Dec 2024 08:09  Phos  2.3     12-26  Mg     1.8     12-26      PTT - ( 26 Dec 2024 08:04 )  PTT:41.3 sec    Urinalysis Basic - ( 26 Dec 2024 08:09 )    Color: x / Appearance: x / SG: x / pH: x  Gluc: 99 mg/dL / Ketone: x  / Bili: x / Urobili: x   Blood: x / Protein: x / Nitrite: x   Leuk Esterase: x / RBC: x / WBC x   Sq Epi: x / Non Sq Epi: x / Bacteria: x

## 2024-12-26 NOTE — PHYSICAL THERAPY INITIAL EVALUATION ADULT - PERTINENT HX OF CURRENT PROBLEM, REHAB EVAL
84M PMH HTN, HLD, PAD s/p L fem to PT arterial bypass with GSV, L CFA endarterectomy, L profunda femoral endarterectomy with Hemashield patch angioplasty (Koby, 11/2015), L profunda to PTA PTFE bypass, L profunda endarterectomy, L PT endarterectomy (Luna 05/2019), reop R femoral to L femoral bypass with PTFE graft, right profunda femoral endarterectomy (Koby, 05/2021), who presents with worsening RLE pain and reduced velocities in the fem-fem bypass - sent in by Dr. Carrasco for operative planning of L ax-fem bypass. Hospital course: S/P Axillobifemoral bypass graft with prosthesis (12/24)

## 2024-12-26 NOTE — PHYSICAL THERAPY INITIAL EVALUATION ADULT - ADDITIONAL COMMENTS
Patient lives with his son & family in an elevator accessible apartment building with no stairs to enter. PTA, pt. was independent in ADLs & mobility using RW & SC; reports he used to use SC most of the time. Pt. denies any hx of fall in the past 6 months.

## 2024-12-26 NOTE — PROGRESS NOTE ADULT - SUBJECTIVE AND OBJECTIVE BOX
Name of Patient : ZEYNEP LUCIO  MRN: [id number]  Date of visit: 12-27-24       Subjective: Patient seen and examined. No new events except as noted.     REVIEW OF SYSTEMS:    CONSTITUTIONAL: No weakness, fevers or chills  EYES/ENT: No visual changes;  No vertigo or throat pain   NECK: No pain or stiffness  RESPIRATORY: No cough, wheezing, hemoptysis; No shortness of breath  CARDIOVASCULAR: No chest pain or palpitations  GASTROINTESTINAL: No abdominal or epigastric pain. No nausea, vomiting, or hematemesis; No diarrhea or constipation. No melena or hematochezia.  GENITOURINARY: No dysuria, frequency or hematuria  NEUROLOGICAL: No numbness or weakness  SKIN: No itching, burning, rashes, or lesions   All other review of systems is negative unless indicated above.    MEDICATIONS:  MEDICATIONS  (STANDING):  acetaminophen     Tablet .. 975 milliGRAM(s) Oral every 6 hours  amLODIPine   Tablet 10 milliGRAM(s) Oral daily  aspirin  chewable 81 milliGRAM(s) Oral daily  heparin  Infusion 1000 Unit(s)/Hr (14 mL/Hr) IV Continuous <Continuous>  hydrochlorothiazide 12.5 milliGRAM(s) Oral daily  influenza  Vaccine (HIGH DOSE) 0.5 milliLiter(s) IntraMuscular once  pantoprazole    Tablet 40 milliGRAM(s) Oral before breakfast  polyethylene glycol 3350 17 Gram(s) Oral daily  senna 1 Tablet(s) Oral at bedtime      PHYSICAL EXAM:  T(C): 37.4 (12-26-24 @ 21:06), Max: 37.4 (12-26-24 @ 21:06)  HR: 86 (12-26-24 @ 21:06) (78 - 100)  BP: 169/76 (12-26-24 @ 21:06) (131/70 - 185/82)  RR: 18 (12-26-24 @ 21:06) (18 - 18)  SpO2: 94% (12-26-24 @ 21:06) (92% - 96%)  Wt(kg): --  I&O's Summary    25 Dec 2024 07:01  -  26 Dec 2024 07:00  --------------------------------------------------------  IN: 2370 mL / OUT: 1630 mL / NET: 740 mL    26 Dec 2024 07:01  -  27 Dec 2024 00:46  --------------------------------------------------------  IN: 1406 mL / OUT: 1260 mL / NET: 146 mL          Appearance: Normal	  HEENT:  PERRLA   Lymphatic: No lymphadenopathy   Cardiovascular: Normal S1 S2, no JVD  Respiratory: normal effort , clear  Gastrointestinal:  Soft, Non-tender  Skin: No rashes,  warm to touch  Psychiatry:  Mood & affect appropriate  Musculuskeletal: No edema    recent labs, Imaging and EKGs personally reviewed   CODE status discussed with the patient in detail            12-25-24 @ 07:01  -  12-26-24 @ 07:00  --------------------------------------------------------  IN: 2370 mL / OUT: 1630 mL / NET: 740 mL    12-26-24 @ 07:01  -  12-27-24 @ 00:46  --------------------------------------------------------  IN: 1406 mL / OUT: 1260 mL / NET: 146 mL

## 2024-12-27 LAB
ANION GAP SERPL CALC-SCNC: 13 MMOL/L — SIGNIFICANT CHANGE UP (ref 5–17)
APTT BLD: 29.7 SEC — SIGNIFICANT CHANGE UP (ref 24.5–35.6)
APTT BLD: 72.5 SEC — HIGH (ref 24.5–35.6)
APTT BLD: 83.6 SEC — HIGH (ref 24.5–35.6)
BUN SERPL-MCNC: 13 MG/DL — SIGNIFICANT CHANGE UP (ref 7–23)
CALCIUM SERPL-MCNC: 9.2 MG/DL — SIGNIFICANT CHANGE UP (ref 8.4–10.5)
CHLORIDE SERPL-SCNC: 100 MMOL/L — SIGNIFICANT CHANGE UP (ref 96–108)
CO2 SERPL-SCNC: 22 MMOL/L — SIGNIFICANT CHANGE UP (ref 22–31)
CREAT SERPL-MCNC: 1.17 MG/DL — SIGNIFICANT CHANGE UP (ref 0.5–1.3)
EGFR: 62 ML/MIN/1.73M2 — SIGNIFICANT CHANGE UP
GLUCOSE SERPL-MCNC: 100 MG/DL — HIGH (ref 70–99)
HCT VFR BLD CALC: 39.5 % — SIGNIFICANT CHANGE UP (ref 39–50)
HGB BLD-MCNC: 13.6 G/DL — SIGNIFICANT CHANGE UP (ref 13–17)
MAGNESIUM SERPL-MCNC: 1.8 MG/DL — SIGNIFICANT CHANGE UP (ref 1.6–2.6)
MCHC RBC-ENTMCNC: 32.8 PG — SIGNIFICANT CHANGE UP (ref 27–34)
MCHC RBC-ENTMCNC: 34.4 G/DL — SIGNIFICANT CHANGE UP (ref 32–36)
MCV RBC AUTO: 95.2 FL — SIGNIFICANT CHANGE UP (ref 80–100)
NRBC # BLD: 0 /100 WBCS — SIGNIFICANT CHANGE UP (ref 0–0)
PHOSPHATE SERPL-MCNC: 3.8 MG/DL — SIGNIFICANT CHANGE UP (ref 2.5–4.5)
PLATELET # BLD AUTO: 227 K/UL — SIGNIFICANT CHANGE UP (ref 150–400)
POTASSIUM SERPL-MCNC: 3.6 MMOL/L — SIGNIFICANT CHANGE UP (ref 3.5–5.3)
POTASSIUM SERPL-SCNC: 3.6 MMOL/L — SIGNIFICANT CHANGE UP (ref 3.5–5.3)
RBC # BLD: 4.15 M/UL — LOW (ref 4.2–5.8)
RBC # FLD: 12.7 % — SIGNIFICANT CHANGE UP (ref 10.3–14.5)
SODIUM SERPL-SCNC: 135 MMOL/L — SIGNIFICANT CHANGE UP (ref 135–145)
WBC # BLD: 14.3 K/UL — HIGH (ref 3.8–10.5)
WBC # FLD AUTO: 14.3 K/UL — HIGH (ref 3.8–10.5)

## 2024-12-27 RX ORDER — POTASSIUM CHLORIDE 600 MG/1
10 TABLET, FILM COATED, EXTENDED RELEASE ORAL ONCE
Refills: 0 | Status: COMPLETED | OUTPATIENT
Start: 2024-12-27 | End: 2024-12-27

## 2024-12-27 RX ORDER — NIFEDIPINE 60 MG/1
60 TABLET, EXTENDED RELEASE ORAL DAILY
Refills: 0 | Status: DISCONTINUED | OUTPATIENT
Start: 2024-12-27 | End: 2024-12-29

## 2024-12-27 RX ORDER — RIVAROXABAN 2.5 MG/1
20 TABLET, FILM COATED ORAL
Refills: 0 | Status: DISCONTINUED | OUTPATIENT
Start: 2024-12-27 | End: 2024-12-29

## 2024-12-27 RX ORDER — SENNOSIDES 8.6 MG/1
1 TABLET, FILM COATED ORAL
Qty: 0 | Refills: 0 | DISCHARGE
Start: 2024-12-27

## 2024-12-27 RX ORDER — LOSARTAN POTASSIUM 100 MG/1
100 TABLET, FILM COATED ORAL DAILY
Refills: 0 | Status: DISCONTINUED | OUTPATIENT
Start: 2024-12-27 | End: 2024-12-29

## 2024-12-27 RX ORDER — CLONAZEPAM 2 MG
1 TABLET ORAL DAILY
Refills: 0 | Status: DISCONTINUED | OUTPATIENT
Start: 2024-12-27 | End: 2024-12-29

## 2024-12-27 RX ORDER — RIVAROXABAN 2.5 MG/1
15 TABLET, FILM COATED ORAL
Refills: 0 | Status: DISCONTINUED | OUTPATIENT
Start: 2024-12-27 | End: 2024-12-27

## 2024-12-27 RX ADMIN — Medication 81 MILLIGRAM(S): at 11:33

## 2024-12-27 RX ADMIN — ACETAMINOPHEN 975 MILLIGRAM(S): 80 SOLUTION/ DROPS ORAL at 17:02

## 2024-12-27 RX ADMIN — LOSARTAN POTASSIUM 100 MILLIGRAM(S): 100 TABLET, FILM COATED ORAL at 10:11

## 2024-12-27 RX ADMIN — ACETAMINOPHEN 975 MILLIGRAM(S): 80 SOLUTION/ DROPS ORAL at 18:02

## 2024-12-27 RX ADMIN — HEPARIN SODIUM 17 UNIT(S)/HR: 1000 INJECTION, SOLUTION INTRAVENOUS; SUBCUTANEOUS at 07:16

## 2024-12-27 RX ADMIN — ACETAMINOPHEN 975 MILLIGRAM(S): 80 SOLUTION/ DROPS ORAL at 01:54

## 2024-12-27 RX ADMIN — NIFEDIPINE 60 MILLIGRAM(S): 60 TABLET, EXTENDED RELEASE ORAL at 10:52

## 2024-12-27 RX ADMIN — HEPARIN SODIUM 17 UNIT(S)/HR: 1000 INJECTION, SOLUTION INTRAVENOUS; SUBCUTANEOUS at 02:19

## 2024-12-27 RX ADMIN — ACETAMINOPHEN 975 MILLIGRAM(S): 80 SOLUTION/ DROPS ORAL at 23:53

## 2024-12-27 RX ADMIN — Medication 17 GRAM(S): at 11:34

## 2024-12-27 RX ADMIN — RIVAROXABAN 20 MILLIGRAM(S): 2.5 TABLET, FILM COATED ORAL at 18:58

## 2024-12-27 RX ADMIN — Medication 10 MILLIGRAM(S): at 05:02

## 2024-12-27 RX ADMIN — ACETAMINOPHEN 975 MILLIGRAM(S): 80 SOLUTION/ DROPS ORAL at 05:01

## 2024-12-27 RX ADMIN — ACETAMINOPHEN 975 MILLIGRAM(S): 80 SOLUTION/ DROPS ORAL at 01:18

## 2024-12-27 RX ADMIN — ACETAMINOPHEN 975 MILLIGRAM(S): 80 SOLUTION/ DROPS ORAL at 05:38

## 2024-12-27 RX ADMIN — HEPARIN SODIUM 14 UNIT(S)/HR: 1000 INJECTION, SOLUTION INTRAVENOUS; SUBCUTANEOUS at 01:18

## 2024-12-27 RX ADMIN — Medication 1 MILLIGRAM(S): at 23:54

## 2024-12-27 RX ADMIN — ACETAMINOPHEN 975 MILLIGRAM(S): 80 SOLUTION/ DROPS ORAL at 12:33

## 2024-12-27 RX ADMIN — ACETAMINOPHEN 975 MILLIGRAM(S): 80 SOLUTION/ DROPS ORAL at 11:33

## 2024-12-27 RX ADMIN — POTASSIUM CHLORIDE 10 MILLIEQUIVALENT(S): 600 TABLET, FILM COATED, EXTENDED RELEASE ORAL at 10:12

## 2024-12-27 RX ADMIN — PANTOPRAZOLE 40 MILLIGRAM(S): 40 TABLET, DELAYED RELEASE ORAL at 05:02

## 2024-12-27 RX ADMIN — HEPARIN SODIUM 17 UNIT(S)/HR: 1000 INJECTION, SOLUTION INTRAVENOUS; SUBCUTANEOUS at 09:31

## 2024-12-27 NOTE — PROGRESS NOTE ADULT - SUBJECTIVE AND OBJECTIVE BOX
Subjective: Patient seen and examined. No new events except as noted.     REVIEW OF SYSTEMS:    CONSTITUTIONAL: + weakness, fevers or chills  EYES/ENT: No visual changes;  No vertigo or throat pain   NECK: No pain or stiffness  RESPIRATORY: No cough, wheezing, hemoptysis; No shortness of breath  CARDIOVASCULAR: No chest pain or palpitations  GASTROINTESTINAL: No abdominal or epigastric pain. No nausea, vomiting, or hematemesis; No diarrhea or constipation. No melena or hematochezia.  GENITOURINARY: No dysuria, frequency or hematuria  NEUROLOGICAL: No numbness or weakness  SKIN: No itching, burning, rashes, or lesions   All other review of systems is negative unless indicated above.    MEDICATIONS:  MEDICATIONS  (STANDING):  acetaminophen     Tablet .. 975 milliGRAM(s) Oral every 6 hours  amLODIPine   Tablet 10 milliGRAM(s) Oral daily  aspirin  chewable 81 milliGRAM(s) Oral daily  heparin  Infusion 1000 Unit(s)/Hr (17 mL/Hr) IV Continuous <Continuous>  hydrochlorothiazide 12.5 milliGRAM(s) Oral daily  influenza  Vaccine (HIGH DOSE) 0.5 milliLiter(s) IntraMuscular once  losartan 100 milliGRAM(s) Oral daily  pantoprazole    Tablet 40 milliGRAM(s) Oral before breakfast  polyethylene glycol 3350 17 Gram(s) Oral daily  potassium chloride    Tablet ER 10 milliEquivalent(s) Oral once  senna 1 Tablet(s) Oral at bedtime      PHYSICAL EXAM:  T(C): 36.6 (12-27-24 @ 05:07), Max: 37.4 (12-26-24 @ 21:06)  HR: 93 (12-27-24 @ 05:07) (81 - 100)  BP: 174/83 (12-27-24 @ 05:07) (131/70 - 182/93)  RR: 18 (12-27-24 @ 05:07) (18 - 18)  SpO2: 95% (12-27-24 @ 05:07) (92% - 95%)  Wt(kg): --  I&O's Summary    26 Dec 2024 07:01  -  27 Dec 2024 07:00  --------------------------------------------------------  IN: 2005 mL / OUT: 1920 mL / NET: 85 mL              Appearance: Normal	  HEENT:   Normal oral mucosa, PERRL, EOMI	  Lymphatic: No lymphadenopathy , no edema  Cardiovascular: Normal S1 S2, No JVD, No murmurs , Peripheral pulses palpable 2+ bilaterally  Respiratory: Lungs clear to auscultation, normal effort 	  Gastrointestinal:  Soft, Non-tender, + BS	  Skin: No rashes, No ecchymoses, No cyanosis, warm to touch  Musculoskeletal: Normal range of motion, normal strength  Psychiatry:  Mood & affect appropriate  Ext: No edema  Vascular: palpable L PT pulse        LABS:    CARDIAC MARKERS:                                13.6   14.30 )-----------( 227      ( 27 Dec 2024 07:24 )             39.5     12-27    135  |  100  |  13  ----------------------------<  100[H]  3.6   |  22  |  1.17    Ca    9.2      27 Dec 2024 07:22  Phos  3.8     12-27  Mg     1.8     12-27      proBNP:   Lipid Profile:   HgA1c:   TSH:             TELEMETRY: 	    ECG:  	  RADIOLOGY:   DIAGNOSTIC TESTING:  [ ] Echocardiogram:  [ ]  Catheterization:  [ ] Stress Test:    OTHER: 	           Subjective: Patient seen and examined. No new events except as noted.   Hypertensive   no cp   REVIEW OF SYSTEMS:    CONSTITUTIONAL: + weakness, fevers or chills  EYES/ENT: No visual changes;  No vertigo or throat pain   NECK: No pain or stiffness  RESPIRATORY: No cough, wheezing, hemoptysis; No shortness of breath  CARDIOVASCULAR: No chest pain or palpitations  GASTROINTESTINAL: No abdominal or epigastric pain. No nausea, vomiting, or hematemesis; No diarrhea or constipation. No melena or hematochezia.  GENITOURINARY: No dysuria, frequency or hematuria  NEUROLOGICAL: No numbness or weakness  SKIN: No itching, burning, rashes, or lesions   All other review of systems is negative unless indicated above.    MEDICATIONS:  MEDICATIONS  (STANDING):  acetaminophen     Tablet .. 975 milliGRAM(s) Oral every 6 hours  amLODIPine   Tablet 10 milliGRAM(s) Oral daily  aspirin  chewable 81 milliGRAM(s) Oral daily  heparin  Infusion 1000 Unit(s)/Hr (17 mL/Hr) IV Continuous <Continuous>  hydrochlorothiazide 12.5 milliGRAM(s) Oral daily  influenza  Vaccine (HIGH DOSE) 0.5 milliLiter(s) IntraMuscular once  losartan 100 milliGRAM(s) Oral daily  pantoprazole    Tablet 40 milliGRAM(s) Oral before breakfast  polyethylene glycol 3350 17 Gram(s) Oral daily  potassium chloride    Tablet ER 10 milliEquivalent(s) Oral once  senna 1 Tablet(s) Oral at bedtime      PHYSICAL EXAM:  T(C): 36.6 (12-27-24 @ 05:07), Max: 37.4 (12-26-24 @ 21:06)  HR: 93 (12-27-24 @ 05:07) (81 - 100)  BP: 174/83 (12-27-24 @ 05:07) (131/70 - 182/93)  RR: 18 (12-27-24 @ 05:07) (18 - 18)  SpO2: 95% (12-27-24 @ 05:07) (92% - 95%)  Wt(kg): --  I&O's Summary    26 Dec 2024 07:01  -  27 Dec 2024 07:00  --------------------------------------------------------  IN: 2005 mL / OUT: 1920 mL / NET: 85 mL              Appearance: Normal	  HEENT:   Normal oral mucosa, PERRL, EOMI	  Lymphatic: No lymphadenopathy , no edema  Cardiovascular: Normal S1 S2, No JVD, No murmurs , Peripheral pulses palpable 2+ bilaterally  Respiratory: Lungs clear to auscultation, normal effort 	  Gastrointestinal:  Soft, Non-tender, + BS	  Skin: No rashes, No ecchymoses, No cyanosis, warm to touch  Musculoskeletal: Normal range of motion, normal strength  Psychiatry:  Mood & affect appropriate  Ext: No edema  Vascular: palpable L PT pulse        LABS:    CARDIAC MARKERS:                                13.6   14.30 )-----------( 227      ( 27 Dec 2024 07:24 )             39.5     12-27    135  |  100  |  13  ----------------------------<  100[H]  3.6   |  22  |  1.17    Ca    9.2      27 Dec 2024 07:22  Phos  3.8     12-27  Mg     1.8     12-27      proBNP:   Lipid Profile:   HgA1c:   TSH:             TELEMETRY: 	    ECG:  	  RADIOLOGY:   DIAGNOSTIC TESTING:  [ ] Echocardiogram:  [ ]  Catheterization:  [ ] Stress Test:    OTHER:

## 2024-12-27 NOTE — PROGRESS NOTE ADULT - PROBLEM SELECTOR PLAN 1
s/p Axillobifemoral bypass graft with prosthesis  Heparin gtt   change to  Xarelto   Add ASA s/p Axillobifemoral bypass graft with prosthesis  Heparin gtt   change to  Xarelto   ASA

## 2024-12-27 NOTE — PROGRESS NOTE ADULT - SUBJECTIVE AND OBJECTIVE BOX
INTERVAL EVENTS: No acute events overnight.  SUBJECTIVE: Patient seen and examined at bedside with surgical team, patient without complaints. Denies fever, chills, CP, SOB nausea, vomiting, abdominal pain.    OBJECTIVE:    Vital Signs Last 24 Hrs  T(C): 36.6 (27 Dec 2024 05:07), Max: 37.4 (26 Dec 2024 21:06)  T(F): 97.9 (27 Dec 2024 05:07), Max: 99.3 (26 Dec 2024 21:06)  HR: 93 (27 Dec 2024 05:07) (81 - 100)  BP: 174/83 (27 Dec 2024 05:07) (131/70 - 182/93)  BP(mean): --  RR: 18 (27 Dec 2024 05:07) (18 - 18)  SpO2: 95% (27 Dec 2024 05:07) (92% - 95%)    Parameters below as of 27 Dec 2024 05:07  Patient On (Oxygen Delivery Method): room air    I&O's Detail    26 Dec 2024 07:01  -  27 Dec 2024 07:00  --------------------------------------------------------  IN:    Heparin: 285 mL    Oral Fluid: 1720 mL  Total IN: 2005 mL    OUT:    Bulb (mL): 90 mL    Voided (mL): 1830 mL  Total OUT: 1920 mL    Total NET: 85 mL      MEDICATIONS  (STANDING):  acetaminophen     Tablet .. 975 milliGRAM(s) Oral every 6 hours  amLODIPine   Tablet 10 milliGRAM(s) Oral daily  aspirin  chewable 81 milliGRAM(s) Oral daily  heparin  Infusion 1000 Unit(s)/Hr (17 mL/Hr) IV Continuous <Continuous>  hydrochlorothiazide 12.5 milliGRAM(s) Oral daily  influenza  Vaccine (HIGH DOSE) 0.5 milliLiter(s) IntraMuscular once  pantoprazole    Tablet 40 milliGRAM(s) Oral before breakfast  polyethylene glycol 3350 17 Gram(s) Oral daily  senna 1 Tablet(s) Oral at bedtime    MEDICATIONS  (PRN):  oxyCODONE    IR 2.5 milliGRAM(s) Oral every 6 hours PRN Moderate Pain (4 - 6)      PHYSICAL EXAM:  General: NAD, resting comfortably in bed  Pulmonary: Nonlabored breathing, no respiratory distress  Cardiovascular: NSR  Extremities: WWP  Pulses: palpable L PT, dopplerable PT on right and dopp DP b/l     LABS:                        13.6   14.30 )-----------( 227      ( 27 Dec 2024 07:24 )             39.5     12-26    137  |  101  |  10  ----------------------------<  99  3.5   |  22  |  0.98    Ca    9.2      26 Dec 2024 08:09  Phos  2.3     12-26  Mg     1.8     12-26      PTT - ( 27 Dec 2024 01:30 )  PTT:29.7 sec    Urinalysis Basic - ( 26 Dec 2024 08:09 )    Color: x / Appearance: x / SG: x / pH: x  Gluc: 99 mg/dL / Ketone: x  / Bili: x / Urobili: x   Blood: x / Protein: x / Nitrite: x   Leuk Esterase: x / RBC: x / WBC x   Sq Epi: x / Non Sq Epi: x / Bacteria: x

## 2024-12-27 NOTE — DISCHARGE NOTE PROVIDER - NSDCCPCAREPLAN_GEN_ALL_CORE_FT
PRINCIPAL DISCHARGE DIAGNOSIS  Diagnosis: PVD (peripheral vascular disease)  Assessment and Plan of Treatment: s/p left axillary to femoral bypass with PTFE

## 2024-12-27 NOTE — DISCHARGE NOTE PROVIDER - NSDCMRMEDTOKEN_GEN_ALL_CORE_FT
acetaminophen 325 mg oral tablet: 3 tab(s) orally every 6 hours, As needed, Mild Pain (1 - 3), Moderate Pain (4 - 6)  amLODIPine 10 mg oral tablet: 1 tab(s) orally once a day  aspirin 81 mg oral delayed release tablet: 1 tab(s) orally once a day  Bumex 1 mg oral tablet: orally every 48 hours  cyclobenzaprine 10 mg oral tablet: 1 tab(s) orally 3 times a day, As Needed   Eliquis 2.5 mg oral tablet: 1 tab(s) orally 2 times a day   hydrALAZINE 25 mg oral tablet: 1 tab(s) orally 3 times a day  Hyzaar 100 mg-12.5 mg oral tablet: 1 tab(s) orally once a day Am  Lipitor 40 mg oral tablet: orally once a day (at bedtime)  Pepcid 40 mg oral tablet: 1 tab(s) orally once a day  physical therapy: 3-5x weekly x 6 weeks  dx: I74.3  scopolamine: 1 patch transdermal once behind ear as needed for vertigo  senna leaf extract oral tablet: 1 tab(s) orally once a day (at bedtime)   acetaminophen 325 mg oral tablet: 3 tab(s) orally every 6 hours, As needed, Mild Pain (1 - 3), Moderate Pain (4 - 6)  amLODIPine 10 mg oral tablet: 1 tab(s) orally once a day  aspirin 81 mg oral delayed release tablet: 1 tab(s) orally once a day  Bumex 1 mg oral tablet: orally every 48 hours  clonazePAM 1 mg oral tablet: 1 tab(s) orally once a day As needed anxiety  cyclobenzaprine 10 mg oral tablet: 1 tab(s) orally 3 times a day, As Needed   Home Physical Therapy: Dx: I73.9  hydrALAZINE 25 mg oral tablet: 1 tab(s) orally 3 times a day  Hyzaar 100 mg-12.5 mg oral tablet: 1 tab(s) orally once a day Am  Lipitor 40 mg oral tablet: orally once a day (at bedtime)  Pepcid 40 mg oral tablet: 1 tab(s) orally once a day  physical therapy: 3-5x weekly x 6 weeks  dx: I74.3  rivaroxaban 20 mg oral tablet: 1 tab(s) orally once a day (before a meal) MDD: 1 tablet  scopolamine: 1 patch transdermal once behind ear as needed for vertigo  senna leaf extract oral tablet: 1 tab(s) orally once a day (at bedtime)

## 2024-12-27 NOTE — DISCHARGE NOTE PROVIDER - HOSPITAL COURSE
HPI:  84M PMH HTN, HLD, PAD s/p L fem to PT arterial bypass with GSV, L CFA endarterectomy, L profunda femoral endarterectomy with hemashield patch angioplasty (Koby, 11/2015), L profunda to PTA PTFE bypass, L profunda endarterectomy, L PT endarterectomy (Luna 05/2019), reop R femoral to L femoral bypass with PTFE graft, right profunda femoral endarterectomy (Koby, 05/2021), who presents with worsening RLE pain and reduced velocities in the fem-fem bypass - sent in by Dr. Carrasco for operative planning of L ax-fem bypass which was performed 12/24/24. The patient tolerated the procedure well and was sent to the floors post-operatively.     Post-operatively the patient was evaluated by physical therapy and deemed appropriate for discharge home with outpatient physical therapy and necessary assistive devices.

## 2024-12-27 NOTE — DISCHARGE NOTE PROVIDER - NSDCFUSCHEDAPPT_GEN_ALL_CORE_FT
[facility] Physician [facility]  VASCULAR [address] [facility]  Scheduled Appointment: 01/17/2025    Gilbert Carrasco  [facility]  VASCULAR [address] [facility]  Scheduled Appointment: 01/17/2025    [facility]  VASCULAR [address] [facility]  Scheduled Appointment: 02/21/2025    Gilbert Carrasco  [facility]  VASCULAR [address] [facility]  Scheduled Appointment: 02/21/2025

## 2024-12-27 NOTE — DISCHARGE NOTE PROVIDER - CARE PROVIDER_API CALL
Gilbert Carrasco  Vascular Surgery  [address], [address]  [place] [address]  Phone: [phone]  Fax: [phone]  Follow Up Time: 2 weeks

## 2024-12-27 NOTE — DISCHARGE NOTE PROVIDER - NSDCCPTREATMENT_GEN_ALL_CORE_FT
PRINCIPAL PROCEDURE  Procedure: Creation, bypass, arterial, axillary to femoral, using synthetic graft  Findings and Treatment: FOLLOW UP: Please follow up with your surgeon in 1-2 weeks, call to make an appt.  DIET: You may resume your regular diet. Narcotic pain medicine can cause extreme nausea and constipation. Drink plenty of water and take stool softeners (colace, Miralax) as needed. You can get them from your local pharmacy.  WOUND CARE:  Please keep incisions clean and dry. Please do not scrub or rub incisions. Do not use lotion or powder on incisions.  BATHING: Keep your surgical area clean and dry. You may shower or use sponge bath to bathe.  Do not submerge wound underwater.   PAIN CONTROL:  Please take your home medications, and tylenol every 8 hours as needed for pain control.   MEDICATIONS: Take all medications as prescribed. It is important to take your blood thinner medication to prevent dangerous blood clots (deep vein thrombosis).   ACTIVITY: No heavy lifting or straining. Otherwise, you may return to your usual level of physical activity, that you are able to tolerate. If you are taking narcotic pain medication (such as vicodin, oxycodone, or Percocet) DO NOT drive a car, operate machinery or make important decisions.  NOTIFY YOUR SURGEON IF: You have any bleeding that does not stop, any pus draining from your wound(s), any fever (over 100.4 F) or chills, persistent nausea/vomiting, persistent diarrhea, or if your pain is not controlled on your discharge pain medications.

## 2024-12-27 NOTE — PROGRESS NOTE ADULT - ASSESSMENT
Patient is a 83 Y/o Male with PMHx HTN, HLD, PAD s/p L fem to PT arterial bypass with GSV, L CFA endarterectomy, L profunda femoral endarterectomy with hemashield patch angioplasty (Koby, 11/2015), L profunda to PTA PTFE bypass, L profunda endarterectomy, L PT endarterectomy (Luna 05/2019), reop R femoral to L femoral bypass with PTFE graft, right profunda femoral endarterectomy (Koby, 05/2021), who presents with worsening RLE pain and reduced velocities in the fem-fem bypass     # PAD   prior hx of bypass in the past   S/P Bypass   vascular care appreciated  ASA and heparin  monitor ptt level       # htn/HLD  BP control   adjust as tolerated  lipid panel  statin     # Mild renal insufficiency  MOnitor bun/Cr   hydration PRN   renal eval appreciated     DISPO per vascular

## 2024-12-27 NOTE — PROGRESS NOTE ADULT - SUBJECTIVE AND OBJECTIVE BOX
INTERNAL MEDICINE PROGRESS NOTE     NAME OF PATIENT: ZEYNEP LUCIO  MRN: [id number]  DATE OF VISIT: 12-27-24 @ 17:51    SUBJECTIVE/ ROS:  - Patient seen and examined by bedside with no current complaints.    OBJECTIVE:  ICU Vital Signs Last 24 Hrs  T(C): 37.1 (27 Dec 2024 17:08), Max: 37.5 (27 Dec 2024 10:05)  T(F): 98.8 (27 Dec 2024 17:08), Max: 99.5 (27 Dec 2024 10:05)  HR: 88 (27 Dec 2024 17:08) (81 - 95)  BP: 136/67 (27 Dec 2024 17:08) (136/67 - 174/83)  BP(mean): --  ABP: --  ABP(mean): --  RR: 18 (27 Dec 2024 17:08) (18 - 18)  SpO2: 93% (27 Dec 2024 17:08) (93% - 99%)    O2 Parameters below as of 27 Dec 2024 17:08  Patient On (Oxygen Delivery Method): room air          12-27-24 @ 17:51  T(C): 37.1 (12-27-24 @ 17:08), Max: 37.5 (12-27-24 @ 10:05)  HR: 88 (12-27-24 @ 17:08) (81 - 95)  BP: 136/67 (12-27-24 @ 17:08) (136/67 - 174/83)  RR: 18 (12-27-24 @ 17:08) (18 - 18)  SpO2: 93% (12-27-24 @ 17:08) (93% - 99%)  Wt(kg): --  CAPILLARY BLOOD GLUCOSE          HOSPITAL MEDICATIONS:  MEDICATIONS  (STANDING):  acetaminophen     Tablet .. 975 milliGRAM(s) Oral every 6 hours  aspirin  chewable 81 milliGRAM(s) Oral daily  heparin  Infusion 1000 Unit(s)/Hr (17 mL/Hr) IV Continuous <Continuous>  hydrochlorothiazide 12.5 milliGRAM(s) Oral daily  influenza  Vaccine (HIGH DOSE) 0.5 milliLiter(s) IntraMuscular once  losartan 100 milliGRAM(s) Oral daily  NIFEdipine XL 60 milliGRAM(s) Oral daily  pantoprazole    Tablet 40 milliGRAM(s) Oral before breakfast  polyethylene glycol 3350 17 Gram(s) Oral daily  senna 1 Tablet(s) Oral at bedtime    MEDICATIONS  (PRN):  oxyCODONE    IR 2.5 milliGRAM(s) Oral every 6 hours PRN Moderate Pain (4 - 6)      PHYSICAL EXAMINATION:  General: NAD   Cards: S1/S2, no murmurs   Pulm: CTA bilaterally. No wheezes.   Abdomen: Soft, nondistended and nontender. BS (+)   Extremities: No pedal edema. LYUDMILA  Neurology: Awake with no acute focal neurological deficits   no  LABS:                        13.6   14.30 )-----------( 227      ( 27 Dec 2024 07:24 )             39.5     12-27    135  |  100  |  13  ----------------------------<  100[H]  3.6   |  22  |  1.17    Ca    9.2      27 Dec 2024 07:22  Phos  3.8     12-27  Mg     1.8     12-27      PTT - ( 27 Dec 2024 15:44 )  PTT:83.6 sec      MICROBIOLOGY:     RADIOLOGY:    CARDIOLOGY:

## 2024-12-27 NOTE — PROGRESS NOTE ADULT - ASSESSMENT
84M PMH HTN, HLD, PAD s/p L fem to PT arterial bypass with GSV, L CFA endarterectomy, L profunda femoral endarterectomy with hemashield patch angioplasty (Koby, 11/2015), L profunda to PTA PTFE bypass, L profunda endarterectomy, L PT endarterectomy (Luna 05/2019), reop R femoral to L femoral bypass with PTFE graft, right profunda femoral endarterectomy (Koby, 05/2021), who presents with worsening RLE pain and reduced velocities in the fem-fem bypass - sent in by Dr. Carrasco for operative planning of L ax-fem bypass.    s/p L ax-fem bypass with 8mm PTFE on 12/24.    Plan:  - ASA/ Hep ggt - titrate as needed   - Pain: APAP, oxy, dilaudid  - Diet: regular  - home BP meds restarted   - DVT ppx: SCD's   - passed TOV  - OOB to chair  - PT: Home PT w/ RW      Vascular Surgery   [id number]

## 2024-12-28 LAB
ANION GAP SERPL CALC-SCNC: 12 MMOL/L — SIGNIFICANT CHANGE UP (ref 5–17)
BUN SERPL-MCNC: 16 MG/DL — SIGNIFICANT CHANGE UP (ref 7–23)
CALCIUM SERPL-MCNC: 9.1 MG/DL — SIGNIFICANT CHANGE UP (ref 8.4–10.5)
CHLORIDE SERPL-SCNC: 100 MMOL/L — SIGNIFICANT CHANGE UP (ref 96–108)
CO2 SERPL-SCNC: 22 MMOL/L — SIGNIFICANT CHANGE UP (ref 22–31)
CREAT SERPL-MCNC: 1.21 MG/DL — SIGNIFICANT CHANGE UP (ref 0.5–1.3)
EGFR: 59 ML/MIN/1.73M2 — LOW
GLUCOSE SERPL-MCNC: 94 MG/DL — SIGNIFICANT CHANGE UP (ref 70–99)
HCT VFR BLD CALC: 35.7 % — LOW (ref 39–50)
HGB BLD-MCNC: 12 G/DL — LOW (ref 13–17)
MAGNESIUM SERPL-MCNC: 1.8 MG/DL — SIGNIFICANT CHANGE UP (ref 1.6–2.6)
MCHC RBC-ENTMCNC: 32.5 PG — SIGNIFICANT CHANGE UP (ref 27–34)
MCHC RBC-ENTMCNC: 33.6 G/DL — SIGNIFICANT CHANGE UP (ref 32–36)
MCV RBC AUTO: 96.7 FL — SIGNIFICANT CHANGE UP (ref 80–100)
NRBC # BLD: 0 /100 WBCS — SIGNIFICANT CHANGE UP (ref 0–0)
PHOSPHATE SERPL-MCNC: 3.2 MG/DL — SIGNIFICANT CHANGE UP (ref 2.5–4.5)
PLATELET # BLD AUTO: 226 K/UL — SIGNIFICANT CHANGE UP (ref 150–400)
POTASSIUM SERPL-MCNC: 3.6 MMOL/L — SIGNIFICANT CHANGE UP (ref 3.5–5.3)
POTASSIUM SERPL-SCNC: 3.6 MMOL/L — SIGNIFICANT CHANGE UP (ref 3.5–5.3)
RBC # BLD: 3.69 M/UL — LOW (ref 4.2–5.8)
RBC # FLD: 12.7 % — SIGNIFICANT CHANGE UP (ref 10.3–14.5)
SODIUM SERPL-SCNC: 134 MMOL/L — LOW (ref 135–145)
WBC # BLD: 12.52 K/UL — HIGH (ref 3.8–10.5)
WBC # FLD AUTO: 12.52 K/UL — HIGH (ref 3.8–10.5)

## 2024-12-28 RX ORDER — CLONAZEPAM 2 MG
1 TABLET ORAL ONCE
Refills: 0 | Status: DISCONTINUED | OUTPATIENT
Start: 2024-12-28 | End: 2024-12-28

## 2024-12-28 RX ADMIN — ACETAMINOPHEN 975 MILLIGRAM(S): 80 SOLUTION/ DROPS ORAL at 00:53

## 2024-12-28 RX ADMIN — Medication 1 MILLIGRAM(S): at 05:21

## 2024-12-28 RX ADMIN — ACETAMINOPHEN 975 MILLIGRAM(S): 80 SOLUTION/ DROPS ORAL at 17:58

## 2024-12-28 RX ADMIN — Medication 81 MILLIGRAM(S): at 11:43

## 2024-12-28 RX ADMIN — LOSARTAN POTASSIUM 100 MILLIGRAM(S): 100 TABLET, FILM COATED ORAL at 05:10

## 2024-12-28 RX ADMIN — RIVAROXABAN 20 MILLIGRAM(S): 2.5 TABLET, FILM COATED ORAL at 17:58

## 2024-12-28 RX ADMIN — ACETAMINOPHEN 975 MILLIGRAM(S): 80 SOLUTION/ DROPS ORAL at 06:12

## 2024-12-28 RX ADMIN — ACETAMINOPHEN 975 MILLIGRAM(S): 80 SOLUTION/ DROPS ORAL at 05:12

## 2024-12-28 RX ADMIN — NIFEDIPINE 60 MILLIGRAM(S): 60 TABLET, EXTENDED RELEASE ORAL at 05:11

## 2024-12-28 RX ADMIN — SENNOSIDES 1 TABLET(S): 8.6 TABLET, FILM COATED ORAL at 23:30

## 2024-12-28 RX ADMIN — PANTOPRAZOLE 40 MILLIGRAM(S): 40 TABLET, DELAYED RELEASE ORAL at 05:11

## 2024-12-28 RX ADMIN — ACETAMINOPHEN 975 MILLIGRAM(S): 80 SOLUTION/ DROPS ORAL at 11:43

## 2024-12-28 RX ADMIN — Medication 1 MILLIGRAM(S): at 22:33

## 2024-12-28 NOTE — PROGRESS NOTE ADULT - SUBJECTIVE AND OBJECTIVE BOX
Name of Patient : ZEYNEP LUCIO  MRN: [id number]      Subjective: Patient seen and examined. No new events except as noted.   doing okay     REVIEW OF SYSTEMS:    CONSTITUTIONAL: No weakness, fevers or chills  EYES/ENT: No visual changes;  No vertigo or throat pain   NECK: No pain or stiffness  RESPIRATORY: No cough, wheezing, hemoptysis; No shortness of breath  CARDIOVASCULAR: No chest pain or palpitations  GASTROINTESTINAL: No abdominal or epigastric pain. No nausea, vomiting, or hematemesis; No diarrhea or constipation. No melena or hematochezia.  GENITOURINARY: No dysuria, frequency or hematuria  NEUROLOGICAL: No numbness or weakness  SKIN: No itching, burning, rashes, or lesions   All other review of systems is negative unless indicated above.    MEDICATIONS:  MEDICATIONS  (STANDING):  acetaminophen     Tablet .. 975 milliGRAM(s) Oral every 6 hours  aspirin  chewable 81 milliGRAM(s) Oral daily  hydrochlorothiazide 12.5 milliGRAM(s) Oral daily  influenza  Vaccine (HIGH DOSE) 0.5 milliLiter(s) IntraMuscular once  losartan 100 milliGRAM(s) Oral daily  NIFEdipine XL 60 milliGRAM(s) Oral daily  pantoprazole    Tablet 40 milliGRAM(s) Oral before breakfast  polyethylene glycol 3350 17 Gram(s) Oral daily  rivaroxaban 20 milliGRAM(s) Oral with dinner  senna 1 Tablet(s) Oral at bedtime      PHYSICAL EXAM:  T(C): 37 (12-29-24 @ 01:13), Max: 37.4 (12-28-24 @ 16:50)  HR: 84 (12-29-24 @ 01:13) (79 - 97)  BP: 132/68 (12-29-24 @ 01:13) (116/63 - 136/66)  RR: 18 (12-29-24 @ 01:13) (18 - 18)  SpO2: 93% (12-29-24 @ 01:13) (91% - 94%)  Wt(kg): --  I&O's Summary    27 Dec 2024 07:01  -  28 Dec 2024 07:00  --------------------------------------------------------  IN: 1113 mL / OUT: 350 mL / NET: 763 mL    28 Dec 2024 07:01  -  29 Dec 2024 01:32  --------------------------------------------------------  IN: 680 mL / OUT: 250 mL / NET: 430 mL          Appearance: Normal	  HEENT:  PERRLA   Lymphatic: No lymphadenopathy   Cardiovascular: Normal S1 S2, no JVD  Respiratory: normal effort , clear  Gastrointestinal:  Soft, Non-tender  Skin: No rashes,  warm to touch  Psychiatry:  Mood & affect appropriate  Musculuskeletal: No edema    recent labs, Imaging and EKGs personally reviewed   CODE status discussed with the patient in detail    12-27-24 @ 07:01  -  12-28-24 @ 07:00  --------------------------------------------------------  IN: 1113 mL / OUT: 350 mL / NET: 763 mL    12-28-24 @ 07:01  -  12-29-24 @ 01:32  --------------------------------------------------------  IN: 680 mL / OUT: 250 mL / NET: 430 mL                          12.0   12.52 )-----------( 226      ( 28 Dec 2024 07:35 )             35.7               12-28    134[L]  |  100  |  16  ----------------------------<  94  3.6   |  22  |  1.21    Ca    9.1      28 Dec 2024 07:35  Phos  3.2     12-28  Mg     1.8     12-28      PTT - ( 27 Dec 2024 15:44 )  PTT:83.6 sec                   Urinalysis Basic - ( 28 Dec 2024 07:35 )    Color: x / Appearance: x / SG: x / pH: x  Gluc: 94 mg/dL / Ketone: x  / Bili: x / Urobili: x   Blood: x / Protein: x / Nitrite: x   Leuk Esterase: x / RBC: x / WBC x   Sq Epi: x / Non Sq Epi: x / Bacteria: x

## 2024-12-28 NOTE — PROGRESS NOTE ADULT - SUBJECTIVE AND OBJECTIVE BOX
INTERVAL EVENTS: No acute events overnight.  SUBJECTIVE: Patient seen and examined at bedside with surgical team, patient without complaints. Denies fever, chills, CP, SOB nausea, vomiting, abdominal pain.    OBJECTIVE:    Vital Signs Last 24 Hrs  T(C): 37.1 (28 Dec 2024 13:06), Max: 37.1 (27 Dec 2024 17:08)  T(F): 98.7 (28 Dec 2024 13:06), Max: 98.8 (27 Dec 2024 17:08)  HR: 80 (28 Dec 2024 13:06) (76 - 97)  BP: 126/61 (28 Dec 2024 13:06) (116/63 - 158/68)  BP(mean): --  RR: 18 (28 Dec 2024 13:06) (18 - 18)  SpO2: 91% (28 Dec 2024 13:06) (91% - 95%)    Parameters below as of 28 Dec 2024 13:06  Patient On (Oxygen Delivery Method): room air    I&O's Detail    27 Dec 2024 07:01  -  28 Dec 2024 07:00  --------------------------------------------------------  IN:    Heparin: 153 mL    Oral Fluid: 960 mL  Total IN: 1113 mL    OUT:    Bulb (mL): 50 mL    Voided (mL): 300 mL  Total OUT: 350 mL    Total NET: 763 mL      28 Dec 2024 07:01  -  28 Dec 2024 14:22  --------------------------------------------------------  IN:    Oral Fluid: 480 mL  Total IN: 480 mL    OUT:  Total OUT: 0 mL    Total NET: 480 mL      MEDICATIONS  (STANDING):  acetaminophen     Tablet .. 975 milliGRAM(s) Oral every 6 hours  aspirin  chewable 81 milliGRAM(s) Oral daily  hydrochlorothiazide 12.5 milliGRAM(s) Oral daily  influenza  Vaccine (HIGH DOSE) 0.5 milliLiter(s) IntraMuscular once  losartan 100 milliGRAM(s) Oral daily  NIFEdipine XL 60 milliGRAM(s) Oral daily  pantoprazole    Tablet 40 milliGRAM(s) Oral before breakfast  polyethylene glycol 3350 17 Gram(s) Oral daily  rivaroxaban 20 milliGRAM(s) Oral with dinner  senna 1 Tablet(s) Oral at bedtime    MEDICATIONS  (PRN):  clonazePAM  Tablet 1 milliGRAM(s) Oral daily PRN anxiety  oxyCODONE    IR 2.5 milliGRAM(s) Oral every 6 hours PRN Moderate Pain (4 - 6)      PHYSICAL EXAM:  General: NAD, resting comfortably in bed  Pulmonary: Nonlabored breathing, no respiratory distress  Cardiovascular: NSR  Extremities: WWP  Pulses: palpable L PT, dopplerable PT on right and dopp DP b/l     LABS:                        12.0   12.52 )-----------( 226      ( 28 Dec 2024 07:35 )             35.7     12-28    134[L]  |  100  |  16  ----------------------------<  94  3.6   |  22  |  1.21    Ca    9.1      28 Dec 2024 07:35  Phos  3.2     12-28  Mg     1.8     12-28      PTT - ( 27 Dec 2024 15:44 )  PTT:83.6 sec    Urinalysis Basic - ( 28 Dec 2024 07:35 )    Color: x / Appearance: x / SG: x / pH: x  Gluc: 94 mg/dL / Ketone: x  / Bili: x / Urobili: x   Blood: x / Protein: x / Nitrite: x   Leuk Esterase: x / RBC: x / WBC x   Sq Epi: x / Non Sq Epi: x / Bacteria: x

## 2024-12-28 NOTE — PROGRESS NOTE ADULT - SUBJECTIVE AND OBJECTIVE BOX
Subjective: Patient seen and examined. No new events except as noted.     REVIEW OF SYSTEMS:    CONSTITUTIONAL: + weakness, fevers or chills  EYES/ENT: No visual changes;  No vertigo or throat pain   NECK: No pain or stiffness  RESPIRATORY: No cough, wheezing, hemoptysis; No shortness of breath  CARDIOVASCULAR: No chest pain or palpitations  GASTROINTESTINAL: No abdominal or epigastric pain. No nausea, vomiting, or hematemesis; No diarrhea or constipation. No melena or hematochezia.  GENITOURINARY: No dysuria, frequency or hematuria  NEUROLOGICAL: No numbness or weakness  SKIN: No itching, burning, rashes, or lesions   All other review of systems is negative unless indicated above.    MEDICATIONS:  MEDICATIONS  (STANDING):  acetaminophen     Tablet .. 975 milliGRAM(s) Oral every 6 hours  aspirin  chewable 81 milliGRAM(s) Oral daily  hydrochlorothiazide 12.5 milliGRAM(s) Oral daily  influenza  Vaccine (HIGH DOSE) 0.5 milliLiter(s) IntraMuscular once  losartan 100 milliGRAM(s) Oral daily  NIFEdipine XL 60 milliGRAM(s) Oral daily  pantoprazole    Tablet 40 milliGRAM(s) Oral before breakfast  polyethylene glycol 3350 17 Gram(s) Oral daily  rivaroxaban 20 milliGRAM(s) Oral with dinner  senna 1 Tablet(s) Oral at bedtime      PHYSICAL EXAM:  T(C): 37.4 (12-28-24 @ 16:50), Max: 37.4 (12-28-24 @ 16:50)  HR: 79 (12-28-24 @ 16:50) (76 - 97)  BP: 125/60 (12-28-24 @ 16:50) (116/63 - 158/68)  RR: 18 (12-28-24 @ 16:50) (18 - 18)  SpO2: 92% (12-28-24 @ 16:50) (91% - 95%)  Wt(kg): --  I&O's Summary    27 Dec 2024 07:01  -  28 Dec 2024 07:00  --------------------------------------------------------  IN: 1113 mL / OUT: 350 mL / NET: 763 mL    28 Dec 2024 07:01  -  28 Dec 2024 20:01  --------------------------------------------------------  IN: 480 mL / OUT: 0 mL / NET: 480 mL            Appearance: Normal	  HEENT:   Normal oral mucosa, PERRL, EOMI	  Lymphatic: No lymphadenopathy , no edema  Cardiovascular: Normal S1 S2, No JVD, No murmurs , Peripheral pulses palpable 2+ bilaterally  Respiratory: Lungs clear to auscultation, normal effort 	  Gastrointestinal:  Soft, Non-tender, + BS	  Skin: No rashes, No ecchymoses, No cyanosis, warm to touch  Musculoskeletal: Normal range of motion, normal strength  Psychiatry:  Mood & affect appropriate  Ext: No edema  Vascular: palpable L PT pulse    LABS:    CARDIAC MARKERS:                                12.0   12.52 )-----------( 226      ( 28 Dec 2024 07:35 )             35.7     12-28    134[L]  |  100  |  16  ----------------------------<  94  3.6   |  22  |  1.21    Ca    9.1      28 Dec 2024 07:35  Phos  3.2     12-28  Mg     1.8     12-28          TELEMETRY: 	    ECG:  	  RADIOLOGY:   DIAGNOSTIC TESTING:  [ ] Echocardiogram:  [ ]  Catheterization:  [ ] Stress Test:    OTHER:

## 2024-12-28 NOTE — PROGRESS NOTE ADULT - ASSESSMENT
84M PMH HTN, HLD, PAD s/p L fem to PT arterial bypass with GSV, L CFA endarterectomy, L profunda femoral endarterectomy with hemashield patch angioplasty (Koby, 11/2015), L profunda to PTA PTFE bypass, L profunda endarterectomy, L PT endarterectomy (Luna 05/2019), reop R femoral to L femoral bypass with PTFE graft, right profunda femoral endarterectomy (Koby, 05/2021), who presents with worsening RLE pain and reduced velocities in the fem-fem bypass - sent in by Dr. Carrasco for operative planning of L ax-fem bypass.    s/p L ax-fem bypass with 8mm PTFE on 12/24.    Plan:  - ASA/Xarelto 20 mg QD   - Pain: APAP, oxy, dilaudid  - Diet: regular  - home BP meds restarted   - DVT ppx: SCD's   - passed TOV  - OOB to chair  - CLARI removed 12/27  - PT: Home PT w/ RW    - Dispo: [facility] tomorrow     Vascular Surgery   [id number]

## 2024-12-28 NOTE — PROVIDER CONTACT NOTE (MEDICATION) - SITUATION
Pt having acute anxiety, requested an order for Clonazepam 1mg Pt having episode of acute anxiety, daughter requested an order for Clonazepam 1mg per home dose

## 2024-12-28 NOTE — PROVIDER CONTACT NOTE (MEDICATION) - ASSESSMENT
AOx4, VSS, denies chest pain, SOB, or dizziness AOx4, VSS, denies chest pain, SOB, or dizziness. Pt complaining about the weather being too cold and asked to have his blood pressure checked. Pt stated he felt unsettled but no pain or other discomforts other than room being cold. Pt takes 1 mg Clonazepam prn home dose.

## 2024-12-28 NOTE — PROVIDER CONTACT NOTE (MEDICATION) - BACKGROUND
Pt had worsening right lower extremity pain, had a left ax- femoral bypass. Pt had worsening right lower extremity pain, had a left axillary femoral bypass. Has a hx of anxiety, GERD, HTN, PAD.

## 2024-12-29 VITALS
RESPIRATION RATE: 18 BRPM | SYSTOLIC BLOOD PRESSURE: 120 MMHG | TEMPERATURE: 98 F | OXYGEN SATURATION: 93 % | HEART RATE: 82 BPM | DIASTOLIC BLOOD PRESSURE: 57 MMHG

## 2024-12-29 LAB
ANION GAP SERPL CALC-SCNC: 20 MMOL/L — HIGH (ref 5–17)
BUN SERPL-MCNC: 22 MG/DL — SIGNIFICANT CHANGE UP (ref 7–23)
CALCIUM SERPL-MCNC: 9.7 MG/DL — SIGNIFICANT CHANGE UP (ref 8.4–10.5)
CHLORIDE SERPL-SCNC: 97 MMOL/L — SIGNIFICANT CHANGE UP (ref 96–108)
CO2 SERPL-SCNC: 19 MMOL/L — LOW (ref 22–31)
CREAT SERPL-MCNC: 1.35 MG/DL — HIGH (ref 0.5–1.3)
EGFR: 52 ML/MIN/1.73M2 — LOW
GLUCOSE SERPL-MCNC: 72 MG/DL — SIGNIFICANT CHANGE UP (ref 70–99)
HCT VFR BLD CALC: 40.2 % — SIGNIFICANT CHANGE UP (ref 39–50)
HGB BLD-MCNC: 13.4 G/DL — SIGNIFICANT CHANGE UP (ref 13–17)
MAGNESIUM SERPL-MCNC: 2 MG/DL — SIGNIFICANT CHANGE UP (ref 1.6–2.6)
MCHC RBC-ENTMCNC: 33 PG — SIGNIFICANT CHANGE UP (ref 27–34)
MCHC RBC-ENTMCNC: 33.3 G/DL — SIGNIFICANT CHANGE UP (ref 32–36)
MCV RBC AUTO: 99 FL — SIGNIFICANT CHANGE UP (ref 80–100)
NRBC # BLD: 0 /100 WBCS — SIGNIFICANT CHANGE UP (ref 0–0)
PHOSPHATE SERPL-MCNC: 3.6 MG/DL — SIGNIFICANT CHANGE UP (ref 2.5–4.5)
PLATELET # BLD AUTO: 233 K/UL — SIGNIFICANT CHANGE UP (ref 150–400)
POTASSIUM SERPL-MCNC: 4 MMOL/L — SIGNIFICANT CHANGE UP (ref 3.5–5.3)
POTASSIUM SERPL-SCNC: 4 MMOL/L — SIGNIFICANT CHANGE UP (ref 3.5–5.3)
RBC # BLD: 4.06 M/UL — LOW (ref 4.2–5.8)
RBC # FLD: 12.6 % — SIGNIFICANT CHANGE UP (ref 10.3–14.5)
SODIUM SERPL-SCNC: 136 MMOL/L — SIGNIFICANT CHANGE UP (ref 135–145)
WBC # BLD: 12.36 K/UL — HIGH (ref 3.8–10.5)
WBC # FLD AUTO: 12.36 K/UL — HIGH (ref 3.8–10.5)

## 2024-12-29 PROCEDURE — 83735 ASSAY OF MAGNESIUM: CPT

## 2024-12-29 PROCEDURE — 93356 MYOCRD STRAIN IMG SPCKL TRCK: CPT

## 2024-12-29 PROCEDURE — 85014 HEMATOCRIT: CPT

## 2024-12-29 PROCEDURE — 85018 HEMOGLOBIN: CPT

## 2024-12-29 PROCEDURE — C1769: CPT

## 2024-12-29 PROCEDURE — 97161 PT EVAL LOW COMPLEX 20 MIN: CPT

## 2024-12-29 PROCEDURE — C1889: CPT

## 2024-12-29 PROCEDURE — 84295 ASSAY OF SERUM SODIUM: CPT

## 2024-12-29 PROCEDURE — 80048 BASIC METABOLIC PNL TOTAL CA: CPT

## 2024-12-29 PROCEDURE — 82435 ASSAY OF BLOOD CHLORIDE: CPT

## 2024-12-29 PROCEDURE — 96374 THER/PROPH/DIAG INJ IV PUSH: CPT

## 2024-12-29 PROCEDURE — 93306 TTE W/DOPPLER COMPLETE: CPT

## 2024-12-29 PROCEDURE — 84100 ASSAY OF PHOSPHORUS: CPT

## 2024-12-29 PROCEDURE — C1768: CPT

## 2024-12-29 PROCEDURE — 85027 COMPLETE CBC AUTOMATED: CPT

## 2024-12-29 PROCEDURE — 84156 ASSAY OF PROTEIN URINE: CPT

## 2024-12-29 PROCEDURE — 82803 BLOOD GASES ANY COMBINATION: CPT

## 2024-12-29 PROCEDURE — 84132 ASSAY OF SERUM POTASSIUM: CPT

## 2024-12-29 PROCEDURE — 83605 ASSAY OF LACTIC ACID: CPT

## 2024-12-29 PROCEDURE — 82947 ASSAY GLUCOSE BLOOD QUANT: CPT

## 2024-12-29 PROCEDURE — 82570 ASSAY OF URINE CREATININE: CPT

## 2024-12-29 PROCEDURE — C9399: CPT

## 2024-12-29 PROCEDURE — 85025 COMPLETE CBC W/AUTO DIFF WBC: CPT

## 2024-12-29 PROCEDURE — 84300 ASSAY OF URINE SODIUM: CPT

## 2024-12-29 PROCEDURE — 86900 BLOOD TYPING SEROLOGIC ABO: CPT

## 2024-12-29 PROCEDURE — 99285 EMERGENCY DEPT VISIT HI MDM: CPT | Mod: 25

## 2024-12-29 PROCEDURE — 86901 BLOOD TYPING SEROLOGIC RH(D): CPT

## 2024-12-29 PROCEDURE — 83935 ASSAY OF URINE OSMOLALITY: CPT

## 2024-12-29 PROCEDURE — 84540 ASSAY OF URINE/UREA-N: CPT

## 2024-12-29 PROCEDURE — 81001 URINALYSIS AUTO W/SCOPE: CPT

## 2024-12-29 PROCEDURE — 82330 ASSAY OF CALCIUM: CPT

## 2024-12-29 PROCEDURE — 80053 COMPREHEN METABOLIC PANEL: CPT

## 2024-12-29 PROCEDURE — 84133 ASSAY OF URINE POTASSIUM: CPT

## 2024-12-29 PROCEDURE — 36415 COLL VENOUS BLD VENIPUNCTURE: CPT

## 2024-12-29 PROCEDURE — 85730 THROMBOPLASTIN TIME PARTIAL: CPT

## 2024-12-29 PROCEDURE — 85610 PROTHROMBIN TIME: CPT

## 2024-12-29 PROCEDURE — 86850 RBC ANTIBODY SCREEN: CPT

## 2024-12-29 RX ORDER — CLONAZEPAM 2 MG
0 TABLET ORAL
Qty: 0 | Refills: 0 | DISCHARGE

## 2024-12-29 RX ORDER — RIVAROXABAN 10 MG/1
1 TABLET, FILM COATED ORAL
Qty: 30 | Refills: 0 | DISCHARGE
Start: 2024-12-29 | End: 2025-01-27

## 2024-12-29 RX ORDER — RIVAROXABAN 2.5 MG/1
1 TABLET, FILM COATED ORAL
Qty: 30 | Refills: 0
Start: 2024-12-29 | End: 2025-01-27

## 2024-12-29 RX ORDER — CLONAZEPAM 2 MG
1 TABLET ORAL
Qty: 0 | Refills: 0 | DISCHARGE
Start: 2024-12-29

## 2024-12-29 RX ADMIN — LOSARTAN POTASSIUM 100 MILLIGRAM(S): 100 TABLET, FILM COATED ORAL at 05:09

## 2024-12-29 RX ADMIN — ACETAMINOPHEN 975 MILLIGRAM(S): 80 SOLUTION/ DROPS ORAL at 05:08

## 2024-12-29 RX ADMIN — PANTOPRAZOLE 40 MILLIGRAM(S): 40 TABLET, DELAYED RELEASE ORAL at 05:09

## 2024-12-29 RX ADMIN — ACETAMINOPHEN 975 MILLIGRAM(S): 80 SOLUTION/ DROPS ORAL at 11:15

## 2024-12-29 RX ADMIN — ACETAMINOPHEN 975 MILLIGRAM(S): 80 SOLUTION/ DROPS ORAL at 05:38

## 2024-12-29 RX ADMIN — ACETAMINOPHEN 975 MILLIGRAM(S): 80 SOLUTION/ DROPS ORAL at 00:41

## 2024-12-29 RX ADMIN — Medication 81 MILLIGRAM(S): at 11:16

## 2024-12-29 RX ADMIN — ACETAMINOPHEN 975 MILLIGRAM(S): 80 SOLUTION/ DROPS ORAL at 12:14

## 2024-12-29 RX ADMIN — ACETAMINOPHEN 975 MILLIGRAM(S): 80 SOLUTION/ DROPS ORAL at 01:11

## 2024-12-29 RX ADMIN — NIFEDIPINE 60 MILLIGRAM(S): 60 TABLET, EXTENDED RELEASE ORAL at 05:09

## 2024-12-29 NOTE — PROGRESS NOTE ADULT - ASSESSMENT
84M PMH HTN, HLD, PAD s/p L fem to PT arterial bypass with GSV, L CFA endarterectomy, L profunda femoral endarterectomy with hemashield patch angioplasty (Koby, 11/2015), L profunda to PTA PTFE bypass, L profunda endarterectomy, L PT endarterectomy (Luna 05/2019), reop R femoral to L femoral bypass with PTFE graft, right profunda femoral endarterectomy (Koby, 05/2021), who presents with worsening RLE pain and reduced velocities in the fem-fem bypass - sent in by Dr. Carrasco for operative planning of L ax-fem bypass.    s/p L ax-fem bypass with 8mm PTFE on 12/24.    Plan:  - ASA/Xarelto 20 mg QD   - Pain: APAP, oxy, dilaudid  - Diet: regular  - home BP meds restarted   - DVT ppx: SCD's   - passed TOV  - OOB to chair  - CLARI removed 12/27  - PT: Home PT w/ RW    - Dispo: [place] home today    Vascular Surgery   [id number]

## 2024-12-29 NOTE — PROGRESS NOTE ADULT - SUBJECTIVE AND OBJECTIVE BOX
INTERVAL EVENTS: No acute events overnight.  - HD and clinically stable for DC today   SUBJECTIVE: Patient seen and examined at bedside with surgical team, patient without complaints. Denies fever, chills, CP, SOB nausea, vomiting, abdominal pain.    OBJECTIVE:    Vital Signs Last 24 Hrs  T(C): 37.2 (29 Dec 2024 05:00), Max: 37.4 (28 Dec 2024 16:50)  T(F): 98.9 (29 Dec 2024 05:00), Max: 99.3 (28 Dec 2024 16:50)  HR: 82 (29 Dec 2024 05:00) (79 - 87)  BP: 141/69 (29 Dec 2024 05:00) (125/60 - 141/69)  BP(mean): --  RR: 18 (29 Dec 2024 05:00) (18 - 18)  SpO2: 93% (29 Dec 2024 05:00) (91% - 94%)    Parameters below as of 29 Dec 2024 05:00  Patient On (Oxygen Delivery Method): room air    I&O's Detail    28 Dec 2024 07:01  -  29 Dec 2024 07:00  --------------------------------------------------------  IN:    Oral Fluid: 920 mL  Total IN: 920 mL    OUT:    Voided (mL): 600 mL  Total OUT: 600 mL    Total NET: 320 mL      29 Dec 2024 07:01  -  29 Dec 2024 09:01  --------------------------------------------------------  IN:    Oral Fluid: 240 mL  Total IN: 240 mL    OUT:  Total OUT: 0 mL    Total NET: 240 mL      MEDICATIONS  (STANDING):  acetaminophen     Tablet .. 975 milliGRAM(s) Oral every 6 hours  aspirin  chewable 81 milliGRAM(s) Oral daily  hydrochlorothiazide 12.5 milliGRAM(s) Oral daily  influenza  Vaccine (HIGH DOSE) 0.5 milliLiter(s) IntraMuscular once  losartan 100 milliGRAM(s) Oral daily  NIFEdipine XL 60 milliGRAM(s) Oral daily  pantoprazole    Tablet 40 milliGRAM(s) Oral before breakfast  polyethylene glycol 3350 17 Gram(s) Oral daily  rivaroxaban 20 milliGRAM(s) Oral with dinner  senna 1 Tablet(s) Oral at bedtime    MEDICATIONS  (PRN):  clonazePAM  Tablet 1 milliGRAM(s) Oral daily PRN anxiety  oxyCODONE    IR 2.5 milliGRAM(s) Oral every 6 hours PRN Moderate Pain (4 - 6)      PHYSICAL EXAM:  General: NAD, resting comfortably in bed  Pulmonary: Nonlabored breathing, no respiratory distress  Cardiovascular: NSR  Extremities: WWP  Pulses: palpable L PT, dopplerable PT on right and dopp DP b/l     LABS:                        13.4   12.36 )-----------( 233      ( 29 Dec 2024 08:17 )             40.2     12-28    134[L]  |  100  |  16  ----------------------------<  94  3.6   |  22  |  1.21    Ca    9.1      28 Dec 2024 07:35  Phos  3.2     12-28  Mg     1.8     12-28      PTT - ( 27 Dec 2024 15:44 )  PTT:83.6 sec    Urinalysis Basic - ( 28 Dec 2024 07:35 )    Color: x / Appearance: x / SG: x / pH: x  Gluc: 94 mg/dL / Ketone: x  / Bili: x / Urobili: x   Blood: x / Protein: x / Nitrite: x   Leuk Esterase: x / RBC: x / WBC x   Sq Epi: x / Non Sq Epi: x / Bacteria: x

## 2024-12-29 NOTE — PROGRESS NOTE ADULT - SUBJECTIVE AND OBJECTIVE BOX
Name of Patient : ZEYNEP LUCIO  MRN: [id number]      Subjective: Patient seen and examined. No new events except as noted.     REVIEW OF SYSTEMS:    CONSTITUTIONAL: No weakness, fevers or chills  EYES/ENT: No visual changes;  No vertigo or throat pain   NECK: No pain or stiffness  RESPIRATORY: No cough, wheezing, hemoptysis; No shortness of breath  CARDIOVASCULAR: No chest pain or palpitations  GASTROINTESTINAL: No abdominal or epigastric pain. No nausea, vomiting, or hematemesis; No diarrhea or constipation. No melena or hematochezia.  GENITOURINARY: No dysuria, frequency or hematuria  NEUROLOGICAL: No numbness or weakness  SKIN: No itching, burning, rashes, or lesions   All other review of systems is negative unless indicated above.    MEDICATIONS:  MEDICATIONS  (STANDING):  acetaminophen     Tablet .. 975 milliGRAM(s) Oral every 6 hours  aspirin  chewable 81 milliGRAM(s) Oral daily  hydrochlorothiazide 12.5 milliGRAM(s) Oral daily  influenza  Vaccine (HIGH DOSE) 0.5 milliLiter(s) IntraMuscular once  losartan 100 milliGRAM(s) Oral daily  NIFEdipine XL 60 milliGRAM(s) Oral daily  pantoprazole    Tablet 40 milliGRAM(s) Oral before breakfast  polyethylene glycol 3350 17 Gram(s) Oral daily  rivaroxaban 20 milliGRAM(s) Oral with dinner  senna 1 Tablet(s) Oral at bedtime      PHYSICAL EXAM:  T(C): 36.9 (12-29-24 @ 13:14), Max: 37.7 (12-29-24 @ 10:43)  HR: 82 (12-29-24 @ 13:14) (82 - 86)  BP: 120/57 (12-29-24 @ 13:14) (120/57 - 147/67)  RR: 18 (12-29-24 @ 13:14) (18 - 18)  SpO2: 93% (12-29-24 @ 13:14) (93% - 93%)  Wt(kg): --  I&O's Summary    28 Dec 2024 07:01  -  29 Dec 2024 07:00  --------------------------------------------------------  IN: 920 mL / OUT: 600 mL / NET: 320 mL    29 Dec 2024 07:01  -  30 Dec 2024 01:27  --------------------------------------------------------  IN: 440 mL / OUT: 0 mL / NET: 440 mL          Appearance: Normal	  HEENT:  PERRLA   Lymphatic: No lymphadenopathy   Cardiovascular: Normal S1 S2, no JVD  Respiratory: normal effort , clear  Gastrointestinal:  Soft, Non-tender  Skin: No rashes,  warm to touch  Psychiatry:  Mood & affect appropriate  Musculuskeletal: No edema    recent labs, Imaging and EKGs personally reviewed     12-28-24 @ 07:01  -  12-29-24 @ 07:00  --------------------------------------------------------  IN: 920 mL / OUT: 600 mL / NET: 320 mL    12-29-24 @ 07:01  -  12-30-24 @ 01:27  --------------------------------------------------------  IN: 440 mL / OUT: 0 mL / NET: 440 mL                          13.4   12.36 )-----------( 233      ( 29 Dec 2024 08:17 )             40.2               12-29    136  |  97  |  22  ----------------------------<  72  4.0   |  19[L]  |  1.35[H]    Ca    9.7      29 Dec 2024 08:17  Phos  3.6     12-29  Mg     2.0     12-29                         Urinalysis Basic - ( 29 Dec 2024 08:17 )    Color: x / Appearance: x / SG: x / pH: x  Gluc: 72 mg/dL / Ketone: x  / Bili: x / Urobili: x   Blood: x / Protein: x / Nitrite: x   Leuk Esterase: x / RBC: x / WBC x   Sq Epi: x / Non Sq Epi: x / Bacteria: x

## 2024-12-29 NOTE — PROGRESS NOTE ADULT - PROVIDER SPECIALTY LIST ADULT
Internal Medicine
Vascular Surgery
Vascular Surgery
Cardiology
Cardiology
Internal Medicine
Vascular Surgery
Vascular Surgery
Internal Medicine
Internal Medicine
Vascular Surgery
Cardiology

## 2024-12-29 NOTE — PROGRESS NOTE ADULT - SUBJECTIVE AND OBJECTIVE BOX
Subjective: Patient seen and examined. No new events except as noted.   Pt feeling well    REVIEW OF SYSTEMS:    CONSTITUTIONAL: +weakness, fevers or chills  EYES/ENT: No visual changes;  No vertigo or throat pain   NECK: No pain or stiffness  RESPIRATORY: No cough, wheezing, hemoptysis; No shortness of breath  CARDIOVASCULAR: No chest pain or palpitations  GASTROINTESTINAL: No abdominal or epigastric pain. No nausea, vomiting, or hematemesis; No diarrhea or constipation. No melena or hematochezia.  GENITOURINARY: No dysuria, frequency or hematuria  NEUROLOGICAL: No numbness or weakness  SKIN: No itching, burning, rashes, or lesions   All other review of systems is negative unless indicated above.    MEDICATIONS:  MEDICATIONS  (STANDING):  acetaminophen     Tablet .. 975 milliGRAM(s) Oral every 6 hours  aspirin  chewable 81 milliGRAM(s) Oral daily  hydrochlorothiazide 12.5 milliGRAM(s) Oral daily  influenza  Vaccine (HIGH DOSE) 0.5 milliLiter(s) IntraMuscular once  losartan 100 milliGRAM(s) Oral daily  NIFEdipine XL 60 milliGRAM(s) Oral daily  pantoprazole    Tablet 40 milliGRAM(s) Oral before breakfast  polyethylene glycol 3350 17 Gram(s) Oral daily  rivaroxaban 20 milliGRAM(s) Oral with dinner  senna 1 Tablet(s) Oral at bedtime      PHYSICAL EXAM:  T(C): 37.7 (12-29-24 @ 10:43), Max: 37.7 (12-29-24 @ 10:43)  HR: 86 (12-29-24 @ 10:43) (79 - 87)  BP: 147/67 (12-29-24 @ 10:43) (125/60 - 147/67)  RR: 18 (12-29-24 @ 10:43) (18 - 18)  SpO2: 93% (12-29-24 @ 10:43) (91% - 94%)  Wt(kg): --  I&O's Summary    28 Dec 2024 07:01  -  29 Dec 2024 07:00  --------------------------------------------------------  IN: 920 mL / OUT: 600 mL / NET: 320 mL    29 Dec 2024 07:01  -  29 Dec 2024 11:22  --------------------------------------------------------  IN: 240 mL / OUT: 0 mL / NET: 240 mL            Appearance: Normal	  HEENT:   Normal oral mucosa, PERRL, EOMI	  Lymphatic: No lymphadenopathy , no edema  Cardiovascular: Normal S1 S2, No JVD, No murmurs , Peripheral pulses palpable 2+ bilaterally  Respiratory: Lungs clear to auscultation, normal effort 	  Gastrointestinal:  Soft, Non-tender, + BS	  Skin: No rashes, No ecchymoses, No cyanosis, warm to touch  Musculoskeletal: Normal range of motion, normal strength  Psychiatry:  Mood & affect appropriate  Ext: No edema  Vascular: palpable L PT pulse      LABS:    CARDIAC MARKERS:                                13.4   12.36 )-----------( 233      ( 29 Dec 2024 08:17 )             40.2     12-29    136  |  97  |  22  ----------------------------<  72  4.0   |  19[L]  |  1.35[H]    Ca    9.7      29 Dec 2024 08:17  Phos  3.6     12-29  Mg     2.0     12-29      proBNP:   Lipid Profile:   HgA1c:   TSH:             TELEMETRY: 	    ECG:  	  RADIOLOGY:   DIAGNOSTIC TESTING:  [ ] Echocardiogram:  [ ]  Catheterization:  [ ] Stress Test:    OTHER:

## 2024-12-29 NOTE — DISCHARGE NOTE NURSING/CASE MANAGEMENT/SOCIAL WORK - PATIENT PORTAL LINK FT
You can access the FollowMyHealth Patient Portal offered by [facility] by registering at the following website: [email or web address]. By joining [facility]’s FollowMyHealth portal, you will also be able to view your health information using other applications (apps) compatible with our system.

## 2024-12-29 NOTE — DISCHARGE NOTE NURSING/CASE MANAGEMENT/SOCIAL WORK - FINANCIAL ASSISTANCE
Anesthesia Post Evaluation    Patient: Yolis Lacy    Procedure(s) Performed: * No procedures listed *    Final Anesthesia Type: CSE    Patient location during evaluation: labor & delivery  Patient participation: Yes- Able to Participate  Level of consciousness: awake and alert and oriented  Post-procedure vital signs: reviewed and stable  Pain management: adequate  Airway patency: patent  TRISTEN mitigation strategies: Multimodal analgesia  PONV status at discharge: No PONV  Anesthetic complications: no      Cardiovascular status: blood pressure returned to baseline  Respiratory status: unassisted  Hydration status: euvolemic  Follow-up not needed.          Vitals Value Taken Time   /78 12/5/2019 12:00 PM   Temp 36.5 °C (97.7 °F) 12/5/2019 12:00 PM   Pulse 80 12/5/2019 12:00 PM   Resp 18 12/5/2019 12:00 PM   SpO2 98 % 12/5/2019 12:00 PM         No case tracking events are documented in the log.      Pain/Christopher Score: Pain Rating Prior to Med Admin: 5 (12/5/2019 10:30 AM)  Pain Rating Post Med Admin: 1 (12/5/2019 11:00 AM)        
[facility] provides services at a reduced cost to those who are determined to be eligible through [facility]’s financial assistance program. Information regarding [facility]’s financial assistance program can be found by going to [email or web address] or by calling [phone].

## 2025-01-17 ENCOUNTER — APPOINTMENT (OUTPATIENT)
Dept: VASCULAR SURGERY | Facility: CLINIC | Age: 85
End: 2025-01-17
Payer: MEDICARE

## 2025-01-17 PROCEDURE — 99024 POSTOP FOLLOW-UP VISIT: CPT

## 2025-01-17 PROCEDURE — 93926 LOWER EXTREMITY STUDY: CPT | Mod: LT

## 2025-03-28 ENCOUNTER — APPOINTMENT (OUTPATIENT)
Dept: VASCULAR SURGERY | Facility: CLINIC | Age: 85
End: 2025-03-28

## 2025-04-06 NOTE — ED ADULT TRIAGE NOTE - AS HEIGHT TYPE
[facility] provides services at a reduced cost to those who are determined to be eligible through [facility]’s financial assistance program. Information regarding [facility]’s financial assistance program can be found by going to [email or web address] or by calling [phone]. stated

## 2025-05-09 ENCOUNTER — APPOINTMENT (OUTPATIENT)
Dept: VASCULAR SURGERY | Facility: CLINIC | Age: 85
End: 2025-05-09
Payer: MEDICARE

## 2025-05-09 DIAGNOSIS — I74.4 EMBOLISM AND THROMBOSIS OF ARTERIES OF EXTREMITIES, UNSPECIFIED: ICD-10-CM

## 2025-05-09 LAB
ALBUMIN SERPL ELPH-MCNC: 4.4 G/DL
ALP BLD-CCNC: 81 U/L
ALT SERPL-CCNC: 15 U/L
ANION GAP SERPL CALC-SCNC: 14 MMOL/L
APTT BLD: 42.6 SEC
AST SERPL-CCNC: 25 U/L
BILIRUB SERPL-MCNC: 0.9 MG/DL
BUN SERPL-MCNC: 12 MG/DL
CALCIUM SERPL-MCNC: 9.7 MG/DL
CHLORIDE SERPL-SCNC: 99 MMOL/L
CO2 SERPL-SCNC: 24 MMOL/L
CREAT SERPL-MCNC: 1.24 MG/DL
EGFRCR SERPLBLD CKD-EPI 2021: 58 ML/MIN/1.73M2
GLUCOSE SERPL-MCNC: 81 MG/DL
HCT VFR BLD CALC: 44.8 %
HGB BLD-MCNC: 15.3 G/DL
INR PPP: 1.89 RATIO
MCHC RBC-ENTMCNC: 33.7 PG
MCHC RBC-ENTMCNC: 34.2 G/DL
MCV RBC AUTO: 98.7 FL
PLATELET # BLD AUTO: 179 K/UL
POTASSIUM SERPL-SCNC: 4.1 MMOL/L
PROT SERPL-MCNC: 7.3 G/DL
PT BLD: 22.2 SEC
RBC # BLD: 4.54 M/UL
RBC # FLD: 14.2 %
SODIUM SERPL-SCNC: 136 MMOL/L
WBC # FLD AUTO: 7.27 K/UL

## 2025-05-09 PROCEDURE — 99215 OFFICE O/P EST HI 40 MIN: CPT

## 2025-05-09 PROCEDURE — 93880 EXTRACRANIAL BILAT STUDY: CPT

## 2025-05-09 PROCEDURE — 93925 LOWER EXTREMITY STUDY: CPT

## 2025-05-21 ENCOUNTER — RESULT REVIEW (OUTPATIENT)
Age: 85
End: 2025-05-21

## 2025-05-21 ENCOUNTER — APPOINTMENT (OUTPATIENT)
Dept: ENDOVASCULAR SURGERY | Facility: CLINIC | Age: 85
End: 2025-05-21
Payer: MEDICARE

## 2025-05-21 VITALS
RESPIRATION RATE: 16 BRPM | OXYGEN SATURATION: 97 % | HEIGHT: 70 IN | HEART RATE: 63 BPM | TEMPERATURE: 98.7 F | WEIGHT: 175 LBS | BODY MASS INDEX: 25.05 KG/M2 | SYSTOLIC BLOOD PRESSURE: 127 MMHG | DIASTOLIC BLOOD PRESSURE: 62 MMHG

## 2025-05-21 DIAGNOSIS — I73.9 PERIPHERAL VASCULAR DISEASE, UNSPECIFIED: ICD-10-CM

## 2025-05-21 PROCEDURE — 37225Z: CUSTOM | Mod: 82,RT

## 2025-05-21 PROCEDURE — 76937 US GUIDE VASCULAR ACCESS: CPT

## 2025-05-21 PROCEDURE — 37186Z: CUSTOM | Mod: 59,RT

## 2025-05-21 PROCEDURE — 37225Z: CUSTOM | Mod: RT

## 2025-06-04 ENCOUNTER — APPOINTMENT (OUTPATIENT)
Dept: VASCULAR SURGERY | Facility: CLINIC | Age: 85
End: 2025-06-04
Payer: MEDICARE

## 2025-06-04 PROCEDURE — 93925 LOWER EXTREMITY STUDY: CPT

## 2025-06-04 PROCEDURE — 93923 UPR/LXTR ART STDY 3+ LVLS: CPT

## 2025-06-04 PROCEDURE — 99215 OFFICE O/P EST HI 40 MIN: CPT

## 2025-06-10 ENCOUNTER — INPATIENT (INPATIENT)
Facility: HOSPITAL | Age: 85
LOS: 2 days | Discharge: HOME | DRG: 204 | End: 2025-06-13
Attending: STUDENT IN AN ORGANIZED HEALTH CARE EDUCATION/TRAINING PROGRAM | Admitting: STUDENT IN AN ORGANIZED HEALTH CARE EDUCATION/TRAINING PROGRAM
Payer: MEDICARE

## 2025-06-10 VITALS
HEIGHT: 69 IN | OXYGEN SATURATION: 97 % | WEIGHT: 169.98 LBS | SYSTOLIC BLOOD PRESSURE: 147 MMHG | HEART RATE: 76 BPM | RESPIRATION RATE: 18 BRPM | DIASTOLIC BLOOD PRESSURE: 77 MMHG | TEMPERATURE: 98 F

## 2025-06-10 DIAGNOSIS — Z98.89 OTHER SPECIFIED POSTPROCEDURAL STATES: Chronic | ICD-10-CM

## 2025-06-10 DIAGNOSIS — I73.9 PERIPHERAL VASCULAR DISEASE, UNSPECIFIED: ICD-10-CM

## 2025-06-10 DIAGNOSIS — Z29.9 ENCOUNTER FOR PROPHYLACTIC MEASURES, UNSPECIFIED: ICD-10-CM

## 2025-06-10 DIAGNOSIS — R06.00 DYSPNEA, UNSPECIFIED: ICD-10-CM

## 2025-06-10 DIAGNOSIS — Z98.890 OTHER SPECIFIED POSTPROCEDURAL STATES: Chronic | ICD-10-CM

## 2025-06-10 DIAGNOSIS — E78.5 HYPERLIPIDEMIA, UNSPECIFIED: ICD-10-CM

## 2025-06-10 DIAGNOSIS — N17.9 ACUTE KIDNEY FAILURE, UNSPECIFIED: ICD-10-CM

## 2025-06-10 DIAGNOSIS — I10 ESSENTIAL (PRIMARY) HYPERTENSION: ICD-10-CM

## 2025-06-10 DIAGNOSIS — R93.89 ABNORMAL FINDINGS ON DIAGNOSTIC IMAGING OF OTHER SPECIFIED BODY STRUCTURES: ICD-10-CM

## 2025-06-10 DIAGNOSIS — Z87.898 PERSONAL HISTORY OF OTHER SPECIFIED CONDITIONS: ICD-10-CM

## 2025-06-10 DIAGNOSIS — E87.1 HYPO-OSMOLALITY AND HYPONATREMIA: ICD-10-CM

## 2025-06-10 LAB
ALBUMIN SERPL ELPH-MCNC: 3.7 G/DL — SIGNIFICANT CHANGE UP (ref 3.5–5)
ALP SERPL-CCNC: 70 U/L — SIGNIFICANT CHANGE UP (ref 40–120)
ALT FLD-CCNC: 29 U/L DA — SIGNIFICANT CHANGE UP (ref 10–60)
ANION GAP SERPL CALC-SCNC: 8 MMOL/L — SIGNIFICANT CHANGE UP (ref 5–17)
APTT BLD: 35.3 SEC — SIGNIFICANT CHANGE UP (ref 26.1–36.8)
AST SERPL-CCNC: 34 U/L — SIGNIFICANT CHANGE UP (ref 10–40)
BASOPHILS # BLD AUTO: 0.02 K/UL — SIGNIFICANT CHANGE UP (ref 0–0.2)
BASOPHILS NFR BLD AUTO: 0.2 % — SIGNIFICANT CHANGE UP (ref 0–2)
BILIRUB SERPL-MCNC: 1.1 MG/DL — SIGNIFICANT CHANGE UP (ref 0.2–1.2)
BUN SERPL-MCNC: 21 MG/DL — HIGH (ref 7–18)
CALCIUM SERPL-MCNC: 9.2 MG/DL — SIGNIFICANT CHANGE UP (ref 8.4–10.5)
CHLORIDE SERPL-SCNC: 93 MMOL/L — LOW (ref 96–108)
CO2 SERPL-SCNC: 24 MMOL/L — SIGNIFICANT CHANGE UP (ref 22–31)
CREAT ?TM UR-MCNC: 144 MG/DL — SIGNIFICANT CHANGE UP
CREAT SERPL-MCNC: 1.46 MG/DL — HIGH (ref 0.5–1.3)
EGFR: 47 ML/MIN/1.73M2 — LOW
EGFR: 47 ML/MIN/1.73M2 — LOW
EOSINOPHIL # BLD AUTO: 0.03 K/UL — SIGNIFICANT CHANGE UP (ref 0–0.5)
EOSINOPHIL NFR BLD AUTO: 0.3 % — SIGNIFICANT CHANGE UP (ref 0–6)
ETHANOL SERPL-MCNC: <3 MG/DL — SIGNIFICANT CHANGE UP (ref 0–10)
GLUCOSE SERPL-MCNC: 97 MG/DL — SIGNIFICANT CHANGE UP (ref 70–99)
HCT VFR BLD CALC: 41.9 % — SIGNIFICANT CHANGE UP (ref 39–50)
HGB BLD-MCNC: 15.3 G/DL — SIGNIFICANT CHANGE UP (ref 13–17)
IMM GRANULOCYTES NFR BLD AUTO: 0.7 % — SIGNIFICANT CHANGE UP (ref 0–0.9)
INR BLD: 1.36 RATIO — HIGH (ref 0.85–1.16)
LYMPHOCYTES # BLD AUTO: 0.93 K/UL — LOW (ref 1–3.3)
LYMPHOCYTES # BLD AUTO: 8.5 % — LOW (ref 13–44)
MCHC RBC-ENTMCNC: 33.9 PG — SIGNIFICANT CHANGE UP (ref 27–34)
MCHC RBC-ENTMCNC: 36.5 G/DL — HIGH (ref 32–36)
MCV RBC AUTO: 92.9 FL — SIGNIFICANT CHANGE UP (ref 80–100)
MONOCYTES # BLD AUTO: 1.51 K/UL — HIGH (ref 0–0.9)
MONOCYTES NFR BLD AUTO: 13.8 % — SIGNIFICANT CHANGE UP (ref 2–14)
NEUTROPHILS # BLD AUTO: 8.4 K/UL — HIGH (ref 1.8–7.4)
NEUTROPHILS NFR BLD AUTO: 76.5 % — SIGNIFICANT CHANGE UP (ref 43–77)
NRBC BLD AUTO-RTO: 0 /100 WBCS — SIGNIFICANT CHANGE UP (ref 0–0)
NT-PROBNP SERPL-SCNC: 150 PG/ML — SIGNIFICANT CHANGE UP (ref 0–450)
OSMOLALITY SERPL: 272 MOSMOL/KG — LOW (ref 280–301)
OSMOLALITY UR: 401 MOS/KG — SIGNIFICANT CHANGE UP (ref 50–1200)
PLATELET # BLD AUTO: 235 K/UL — SIGNIFICANT CHANGE UP (ref 150–400)
POTASSIUM SERPL-MCNC: 4 MMOL/L — SIGNIFICANT CHANGE UP (ref 3.5–5.3)
POTASSIUM SERPL-SCNC: 4 MMOL/L — SIGNIFICANT CHANGE UP (ref 3.5–5.3)
PROT SERPL-MCNC: 7.7 G/DL — SIGNIFICANT CHANGE UP (ref 6–8.3)
PROTHROM AB SERPL-ACNC: 15.9 SEC — HIGH (ref 9.9–13.4)
RBC # BLD: 4.51 M/UL — SIGNIFICANT CHANGE UP (ref 4.2–5.8)
RBC # FLD: 12.1 % — SIGNIFICANT CHANGE UP (ref 10.3–14.5)
SODIUM SERPL-SCNC: 125 MMOL/L — LOW (ref 135–145)
SODIUM UR-SCNC: 36 MMOL/L — SIGNIFICANT CHANGE UP
TROPONIN I, HIGH SENSITIVITY RESULT: 70.2 NG/L — SIGNIFICANT CHANGE UP
WBC # BLD: 10.97 K/UL — HIGH (ref 3.8–10.5)
WBC # FLD AUTO: 10.97 K/UL — HIGH (ref 3.8–10.5)

## 2025-06-10 PROCEDURE — 71250 CT THORAX DX C-: CPT | Mod: 26

## 2025-06-10 PROCEDURE — 99223 1ST HOSP IP/OBS HIGH 75: CPT | Mod: GC

## 2025-06-10 PROCEDURE — 71045 X-RAY EXAM CHEST 1 VIEW: CPT | Mod: 26

## 2025-06-10 PROCEDURE — 99285 EMERGENCY DEPT VISIT HI MDM: CPT

## 2025-06-10 RX ORDER — ACETAMINOPHEN 500 MG/5ML
650 LIQUID (ML) ORAL EVERY 6 HOURS
Refills: 0 | Status: DISCONTINUED | OUTPATIENT
Start: 2025-06-10 | End: 2025-06-13

## 2025-06-10 RX ORDER — ATORVASTATIN CALCIUM 80 MG/1
40 TABLET, FILM COATED ORAL AT BEDTIME
Refills: 0 | Status: DISCONTINUED | OUTPATIENT
Start: 2025-06-10 | End: 2025-06-13

## 2025-06-10 RX ORDER — CLONAZEPAM 0.5 MG/1
1 TABLET ORAL ONCE
Refills: 0 | Status: DISCONTINUED | OUTPATIENT
Start: 2025-06-10 | End: 2025-06-10

## 2025-06-10 RX ORDER — SENNA 187 MG
2 TABLET ORAL AT BEDTIME
Refills: 0 | Status: DISCONTINUED | OUTPATIENT
Start: 2025-06-10 | End: 2025-06-13

## 2025-06-10 RX ORDER — AMLODIPINE BESYLATE 10 MG/1
10 TABLET ORAL DAILY
Refills: 0 | Status: DISCONTINUED | OUTPATIENT
Start: 2025-06-10 | End: 2025-06-13

## 2025-06-10 RX ORDER — IPRATROPIUM BROMIDE AND ALBUTEROL SULFATE .5; 2.5 MG/3ML; MG/3ML
3 SOLUTION RESPIRATORY (INHALATION) EVERY 6 HOURS
Refills: 0 | Status: COMPLETED | OUTPATIENT
Start: 2025-06-10 | End: 2025-06-12

## 2025-06-10 RX ORDER — LORAZEPAM 4 MG/ML
1.5 VIAL (ML) INJECTION
Refills: 0 | Status: DISCONTINUED | OUTPATIENT
Start: 2025-06-11 | End: 2025-06-11

## 2025-06-10 RX ORDER — ASPIRIN 325 MG
81 TABLET ORAL DAILY
Refills: 0 | Status: DISCONTINUED | OUTPATIENT
Start: 2025-06-10 | End: 2025-06-13

## 2025-06-10 RX ORDER — RIVAROXABAN 10 MG/1
2.5 TABLET, FILM COATED ORAL
Refills: 0 | Status: DISCONTINUED | OUTPATIENT
Start: 2025-06-10 | End: 2025-06-13

## 2025-06-10 RX ORDER — ONDANSETRON HCL/PF 4 MG/2 ML
4 VIAL (ML) INJECTION EVERY 8 HOURS
Refills: 0 | Status: DISCONTINUED | OUTPATIENT
Start: 2025-06-10 | End: 2025-06-13

## 2025-06-10 RX ADMIN — Medication 2 TABLET(S): at 22:56

## 2025-06-10 RX ADMIN — Medication 25 MILLIGRAM(S): at 22:56

## 2025-06-10 RX ADMIN — AMLODIPINE BESYLATE 10 MILLIGRAM(S): 10 TABLET ORAL at 22:56

## 2025-06-10 RX ADMIN — CLONAZEPAM 1 MILLIGRAM(S): 0.5 TABLET ORAL at 18:34

## 2025-06-10 NOTE — H&P ADULT - HISTORY OF PRESENT ILLNESS
84M PMH HTN, HLD, PAD that comes for generalized malaise. Patient endorses that for the past week or so has been having worsening anxiety that has been assoicated with symptoms such as anxiety, depression, muscle cramps, decreased PO intake, SOB, and decreased sleep. Patient atributes this symptoms to not having his usual clonazepam dose that has been taking it for the past 10 years. Patient takes this medication for depression 1.5 tabs of 1mg before sleep and in the morning and since May 20 has not been able to take meds since his PCP has moved location. Patient went to a new PCP and his new PCP did not want to prescribe it and requiring a psych consult. Patient denies any fevers, chills, CP, N/V/D or any urinary symptoms.

## 2025-06-10 NOTE — ED ADULT NURSE NOTE - NSFALLRISK_ED_ALL_ED
[facility] -- Casper Mcgee, Hai, Briana, Eligio Ryan Savella, Goodger  Office # [phone]    Follow Up:  chest pain    Subjective/Observations: No events overnight resting comfortably in bed.  Reports chest pain is now improved. No dyspnea, or palpitations reported. No signs of orthopnea or PND. Tolerating RA.     REVIEW OF SYSTEMS: All other review of systems is negative unless indicated above  PAST MEDICAL & SURGICAL HISTORY:  H/O vertebral fracture repair      History of vertebral fracture      Dyslipidemia      DM type 2 with diabetic dyslipidemia      H/O gastroesophageal reflux (GERD)      Costochondritis      Coronary arteriosclerosis in native artery  s/p 2 stents. last placed 2 years ago      Gastroparesis      History of laminectomy      S/P hip hemiarthroplasty      S/P appendectomy        MEDICATIONS  (STANDING):  amitriptyline 30 milliGRAM(s) Oral at bedtime  aspirin  chewable 81 milliGRAM(s) Oral daily  atorvastatin 40 milliGRAM(s) Oral at bedtime  dextrose 5%. 1000 milliLiter(s) (50 mL/Hr) IV Continuous <Continuous>  dextrose 5%. 1000 milliLiter(s) (100 mL/Hr) IV Continuous <Continuous>  dextrose 50% Injectable 25 Gram(s) IV Push once  dextrose 50% Injectable 12.5 Gram(s) IV Push once  dextrose 50% Injectable 25 Gram(s) IV Push once  ferrous    sulfate 325 milliGRAM(s) Oral daily  glucagon  Injectable 1 milliGRAM(s) IntraMuscular once  heparin   Injectable 5000 Unit(s) SubCutaneous every 8 hours  insulin lispro (ADMELOG) corrective regimen sliding scale   SubCutaneous three times a day before meals  insulin lispro (ADMELOG) corrective regimen sliding scale   SubCutaneous at bedtime  metoprolol succinate ER 25 milliGRAM(s) Oral daily  polyethylene glycol 3350 17 Gram(s) Oral daily  senna 2 Tablet(s) Oral at bedtime    MEDICATIONS  (PRN):  acetaminophen     Tablet .. 650 milliGRAM(s) Oral every 6 hours PRN Temp greater or equal to 38C (100.4F), Mild Pain (1 - 3)  aluminum hydroxide/magnesium hydroxide/simethicone Suspension 30 milliLiter(s) Oral every 4 hours PRN Dyspepsia  dextrose Oral Gel 15 Gram(s) Oral once PRN Blood Glucose LESS THAN 70 milliGRAM(s)/deciliter  melatonin 3 milliGRAM(s) Oral at bedtime PRN Insomnia  ondansetron Injectable 4 milliGRAM(s) IV Push every 8 hours PRN Nausea and/or Vomiting    Allergies    morphine (Other)    Intolerances      Vital Signs Last 24 Hrs  T(C): 36.7 (07 Jul 2022 05:39), Max: 37.5 (06 Jul 2022 21:16)  T(F): 98 (07 Jul 2022 05:39), Max: 99.5 (06 Jul 2022 21:16)  HR: 75 (07 Jul 2022 05:39) (74 - 90)  BP: 125/71 (07 Jul 2022 05:39) (123/69 - 133/63)  BP(mean): --  RR: 17 (07 Jul 2022 05:39) (17 - 18)  SpO2: 92% (07 Jul 2022 05:39) (92% - 96%)  I&O's Summary    06 Jul 2022 07:01  -  07 Jul 2022 07:00  --------------------------------------------------------  IN: 0 mL / OUT: 1000 mL / NET: -1000 mL    Tele: sinus; 90-120s bpm    PHYSICAL EXAM:  GENERAL: NAD  HEAD:  Atraumatic, Normocephalic  EYES: EOMI, PERRLA, conjunctiva and sclera clear  NECK: Supple, No JVD  CHEST/LUNG: Diminished bilaterally ; No wheeze, rales  HEART: Regular rate and rhythm; No murmurs, rubs, or gallops  ABDOMEN: Soft, Nontender, Nondistended; Bowel sounds present  Extremities: 1-2+ b/l LE dependent pitting edema, R>L, chronic appearing venous changes R>L  PSYCH: AAOx3  NEUROLOGY: non-focal  SKIN: No rashes or lesions                          10.5   4.83  )-----------( 215      ( 07 Jul 2022 09:04 )             32.7                         10.6   4.68  )-----------( 220      ( 06 Jul 2022 07:14 )             32.9                         10.2   6.64  )-----------( 229      ( 05 Jul 2022 19:05 )             32.5     07 Jul 2022 09:04    135    |  98     |  30     ----------------------------<  97     3.9     |  27     |  2.30   06 Jul 2022 07:14    135    |  100    |  29     ----------------------------<  100    4.1     |  27     |  2.10   05 Jul 2022 19:05    132    |  99     |  30     ----------------------------<  97     5.5     |  26     |  2.00     Ca    8.5        07 Jul 2022 09:04  Ca    8.8        06 Jul 2022 07:14  Ca    8.9        05 Jul 2022 19:05  Phos  3.6       06 Jul 2022 07:14  Mg     2.1       06 Jul 2022 07:14    TPro  6.4    /  Alb  2.7    /  TBili  0.3    /  DBili  x      /  AST  28     /  ALT  12     /  AlkPhos  68     07 Jul 2022 09:04  TPro  6.0    /  Alb  2.6    /  TBili  0.4    /  DBili  x      /  AST  22     /  ALT  12     /  AlkPhos  65     06 Jul 2022 07:14  TPro  5.9    /  Alb  2.7    /  TBili  0.3    /  DBili  x      /  AST  30     /  ALT  10     /  AlkPhos  67     05 Jul 2022 19:05    PT/INR - ( 05 Jul 2022 19:05 )   PT: 12.2 sec;   INR: 1.04 ratio         PTT - ( 05 Jul 2022 19:05 )  PTT:28.6 sec      EKG: NSR 80    RADIOLOGY:  < from: NM Hepatobiliary Imaging (07.06.22 @ 11:35) >    ACC: [id number] EXAM:  NM HEPATOBILIARY IMG                          PROCEDURE DATE:  07/06/2022          INTERPRETATION:  RADIOPHARMACEUTICAL: 3.2 mCi Tc-99m-Mebrofenin, I.V.    CLINICAL INFORMATION: 89 year old female with cholelithiasis, nausea, and   distended gallbladder with pericholecystic fluid on CT; referred to   evaluate for acute cholecystitis.    TECHNIQUE:  Dynamic imaging of the anterior abdomen was performed for   approximately 20 minutes following radiopharmaceutical injection. The   patient was unable to cooperate for continued dynamic imaging and   consequently static images of the abdomen were obtained in the anterior   and right anterior oblique projections at approximately 1 hour.    COMPARISON: Hepatobiliary scan performed on 2/25/2022 was reviewed.    FINDINGS: There is prompt, homogeneous uptake of radiopharmaceutical by   the hepatocytes. The gallbladder and bowel are visualized on the one hour   images. There is good clearance of activity from the liver by the end of   the study.    IMPRESSION: Normal hepatobiliary scan. No radionuclide evidence of acute   cholecystitis.    No significant interval change since the previous hepatobiliary scan of   2/25/2022.    --- End of Report ---      < end of copied text >  < from: US Duplex Venous Lower Ext Complete, Bilateral (07.05.22 @ 22:03) >    ACC: [id number] EXAM:  US DPLX LWR EXT VEINS COMPL BI                          PROCEDURE DATE:  07/05/2022          INTERPRETATION:  CLINICAL INFORMATION: Bilateral leg swelling    COMPARISON: None available.    TECHNIQUE: Duplex sonography of the BILATERAL LOWER extremity veins with   color and spectral Doppler, with and without compression.    FINDINGS:    RIGHT:  Normal compressibility of the RIGHT common femoral, femoral and popliteal   veins.  Doppler examination shows normal spontaneous andphasic flow.  No RIGHT calf vein thrombosis is detected.  Subcutaneous edema in the right calf.    LEFT:  Normal compressibility of the LEFT common femoral, femoral and popliteal   veins.  Doppler examination shows normal spontaneous and phasic flow.  No LEFT calf vein thrombosis is detected.    IMPRESSION:  No evidence of deep venous thrombosis in either lower extremity.          --- End of Report ---      < end of copied text >  < from: US Abdomen Upper Quadrant Right (07.05.22 @ 21:10) >    ACC: [id number] EXAM:  US ABDOMEN RT UPR QUADRANT                          PROCEDURE DATE:  07/05/2022          INTERPRETATION:  CLINICAL INFORMATION: Nausea    COMPARISON: CT abdomen pelvis from earlier the same day.    TECHNIQUE: Sonography of the right upper quadrant.    FINDINGS:  Liver: Within normal limits.  Bile ducts: Normal caliber. Common bile duct measures 12 mm.  Gallbladder: Distended gallbladder containing stones and sludge.   Pericholecystic fluid is noted. Sonographic Scott sign is negative.  Pancreas: Visualized portions are within normal limits.  Right kidney: 9.8 cm. Mild hydronephrosis..  Ascites: None.  IVC: Visualized portions are within normal limits.  Miscellaneous: Right pleural effusion noted.    IMPRESSION:  Cholelithiasis with nonspecific pericholecystic fluid. Negative   sonographic Scott sign.    Dilated common bowel duct. Recommend correlating with LFTs. Cannot   exclude distal choledocholithiasis.    --- End of Report ---    < end of copied text >  < from: CT Chest No Cont (07.05.22 @ 19:46) >    ACC: [id number] EXAM:  CT ABDOMEN AND PELVIS                        ACC: [id number] EXAM:  CT CHEST                          PROCEDURE DATE:  07/05/2022          INTERPRETATION:  CLINICAL INFORMATION: Epigastric abdominal pain and   chest pain.    COMPARISON: CT angiogram chest abdomen pelvis 6/20/2022.    CONTRAST/COMPLICATIONS:  IV Contrast: NONE  0 cc administered   0 cc discarded  Oral Contrast: NONE  Complications: None reported at time of study completion    PROCEDURE:  CT of the Chest, Abdomen and Pelvis was performed.  Sagittal and coronal reformats were performed.    FINDINGS:    CHEST:    LUNGS AND LARGE AIRWAYS:  PLEURA:  There are moderate-sized bilateral pleural effusions with partial   atelectasis bilateral lower lobes.    The central airways are patent.    VESSELS: Atherosclerotic changes thoracic aorta with coronary artery   calcification.    HEART: Heart size is normal. Dense mitral calcification.  No pericardial effusion.    MEDIASTINUM AND VAL:  No enlarged lymphadenopathy.    CHEST WALL AND LOWER NECK: Within normal limits.    ABDOMEN AND PELVIS:    Streak artifact degrades image quality.    The evaluation of the solid organ parenchyma is limited without   intravenous contrast.    LIVER: Within normal limits.  BILE DUCTS: Normal caliber.  GALLBLADDER: Mild gallbladder distention with cholelithiasis.  Right upper quadrant stranding.  SPLEEN: Within normal limits.  PANCREAS: Atrophic.  ADRENALS: Within normal limits.  KIDNEYS/URETERS:  Punctate nonobstructing intrarenal calcification upper pole left kidney.  Right-sided hydronephrosis.    BLADDER: Bladder wall thickening.  REPRODUCTIVE ORGANS: No pelvic mass.    BOWEL:  Evaluation of the stomach is limited without distention.  Prominent colonic fecal retentionwith mild distention of the colon; no   bowel obstruction.  Mild perirectal and presacral stranding.  Sigmoid diverticulosis, without CT evidence of diverticulitis.  PERITONEUM:  Small perihepatic ascites.    VESSELS: Within normal limits.  RETROPERITONEUM/LYMPH NODES: Atherosclerotic changes.    ABDOMINAL WALL:  Small fat-containing umbilical hernia.  Subcutaneous edema.    BONES:  Degenerative changes.  Vertebroplasty with compression deformities T11 and T12 vertebral bodies.  Chronic compression deformity L5 vertebral body.  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  Chronic deformity of the sternum.    IMPRESSION:    Moderate-sized bilateral pleural effusion with partial bilateral lower   lobe atelectasis.      Cholelithiasis with distended gallbladder and right upper quadrant   inflammatory changes, findings suspicious for acute cholecystitis.  Recommend further evaluation with right upper quadrant ultrasonography.    Other findings as discussed above.    --- End of Report ---    < end of copied text >            Yes

## 2025-06-10 NOTE — ED ADULT NURSE NOTE - OBJECTIVE STATEMENT
Patient c/o of withdrawal symptoms after not taking clonazepam for 10 days. Previous PCP moved and new PCP will not refill. Endorses SOB and nausea.    Denies chest pain, blood in urine or stool, vomiting or diarrhea. No acute distress noted

## 2025-06-10 NOTE — ED PROVIDER NOTE - PHYSICAL EXAMINATION
CONSTITUTIONAL: non-toxic, well appearing  SKIN: no rash, no petechiae.  EYES: pink conjunctiva, anicteric  ENT: tongue and uvular midline, no exudates, moist mucous membranes  NECK: Supple; no meningismus, no JVD  CARD: RRR, no murmurs, equal radial pulses bilaterally 2+  RESP: CTAB, no respiratory distress  ABD: Soft, non-tender, non-distended, no peritoneal signs  EXT: Normal ROM x4. No edema. No calf tenderness.  NEURO: Alert, oriented. Neuro exam nonfocal  PSYCH: Cooperative, appropriate.

## 2025-06-10 NOTE — H&P ADULT - ASSESSMENT
84M PMH HTN, HLD, PAD that comes for generalized malaise. Patient to be admitted for benzo withdrawal and hyponatremia

## 2025-06-10 NOTE — H&P ADULT - PROBLEM SELECTOR PLAN 3
-on ED CT non con done showing Occluded distal left main bronchus with nearly complete left upper lobe atelectasis and thick-walled left lower lobe bronchi and impacted small airways.   -patient comfortable on RA with no resp distress   -could follow up OP -on ED CT non con done showing Occluded distal left main bronchus with nearly complete left upper lobe atelectasis and thick-walled left lower lobe bronchi and impacted small airways.   -patient comfortable on RA with no resp distress   -will do duonebs   -chest PT  -pulm consult

## 2025-06-10 NOTE — H&P ADULT - PROBLEM SELECTOR PLAN 2
-noted on labs with 125   -patient endorsing that has not been eating as much after stopping the benzos which could be the most likely cause   -case report noted in which benzo withdrawal has been associated with SIADH from aron dysregulation  -f/u urine sodium, uosm and serum osm   -f/u bmp

## 2025-06-10 NOTE — ED PROVIDER NOTE - PROGRESS NOTE DETAILS
[place]-DO: Chest x-ray with abnormal left-sided opacity.  CT chest obtained showing occlusion of left main bronchus, no clear mass.  Patient with hyponatremia, agreeable with admission.  Discussed with hospitalist and MAR regarding admission.

## 2025-06-10 NOTE — ED ADULT NURSE NOTE - NSFALLHARMRISKINTERV_ED_ALL_ED
Assistance OOB with selected safe patient handling equipment if applicable/Assistance with ambulation/Communicate risk of Fall with Harm to all staff, patient, and family/Encourage patient to sit up slowly, dangle for a short time, stand at bedside before walking/Monitor gait and stability/Orthostatic vital signs/Provide patient with walking aids/Provide visual cue: red socks, yellow wristband, yellow gown, etc/Reinforce activity limits and safety measures with patient and family/Bed in lowest position, wheels locked, appropriate side rails in place/Call bell, personal items and telephone in reach/Instruct patient to call for assistance before getting out of bed/chair/stretcher/Non-slip footwear applied when patient is off stretcher/[place] to call system/Physically safe environment - no spills, clutter or unnecessary equipment/Purposeful Proactive Rounding/Room/bathroom lighting operational, light cord in reach

## 2025-06-10 NOTE — ED PROVIDER NOTE - NSICDXPASTSURGICALHX_GEN_ALL_CORE_FT
PAST SURGICAL HISTORY:  H/O endarterectomy Right Femoral, 2007    H/O shoulder surgery left    S/P femoral-tibial bypass [phone] as per pt

## 2025-06-10 NOTE — H&P ADULT - NSICDXPASTSURGICALHX_GEN_ALL_CORE_FT
PAST SURGICAL HISTORY:  H/O endarterectomy Right Femoral, 2007    H/O shoulder surgery left    S/P femoral-tibial bypass [phone] as per pt

## 2025-06-10 NOTE — ED PROVIDER NOTE - IN ACCORDANCE WITH [place] LAW, WE OFFER EVERY PATIENT A HEPATITIS C TEST. WOULD YOU LIKE TO BE TESTED TODAY?
Patient was advised and in agreement they do not meet Urgent Care criteria based on triage symptoms.   
Patient with diarrhea that began last Wednesday. Denies abdominal pain or fever. Reports temp of 99 this morning. No recent antibiotics.  
Opt out

## 2025-06-10 NOTE — H&P ADULT - NSHPPHYSICALEXAM_GEN_ALL_CORE
T(C): 36.9 (06-10-25 @ 17:07), Max: 36.9 (06-10-25 @ 17:07)  HR: 76 (06-10-25 @ 17:07) (76 - 76)  BP: 147/77 (06-10-25 @ 17:07) (147/77 - 147/77)  RR: 18 (06-10-25 @ 17:07) (18 - 18)  SpO2: 97% (06-10-25 @ 17:07) (97% - 97%)    GENERAL: NAD  HEAD:  Atraumatic, Normocephalic  EYES:  ptosis in the left eye   NECK: Supple, No JVD, Normal thyroid  CHEST/LUNG: Clear to auscultation. Clear to percussion bilaterally; No rales, rhonchi, wheezing, or rubs  HEART: Regular rate and rhythm; No murmurs, rubs, or gallops  ABDOMEN: surgical scars in the abdomen   NERVOUS SYSTEM:  Alert & Oriented X3,    EXTREMITIES:  2+ Peripheral Pulses, No clubbing, cyanosis, or edema  SKIN: chronic skin changes

## 2025-06-10 NOTE — ED PROVIDER NOTE - CLINICAL SUMMARY MEDICAL DECISION MAKING FREE TEXT BOX
Carlos: 84-year-old male with past medical history anxiety, hypertension, hyperlipidemia, peripheral arterial disease on Xarelto presents with anxiety x 1 week.  Patient states he was previously taking clonazepam 1 mg over the past 10 years, states his primary care doctor recently retired and started seeing a new primary care doctor.  Patient states his new primary care doctor is not comfortable with refilling his clonazepam, advised him to follow-up with psychiatrist, but states he does not follow-up until July.  Patient complaining of anxiety, difficulty sleeping, tremulousness, and occasionally shortness of breath.  Denies any fevers, chest pain, abdominal pain, vomiting, diarrhea, bloody stools, dysuria, numbness, focal weakness, rash.  Denies any alcohol or substance use.  Denies any suicidal ideations, homicidal ideations, auditory or visual hallucinations.  Physical exam per above. I-STOP reference #[id number] showing 1 prescriber and 1 pharmacy, last prescription dispensed April 21, low suspicion for abuse/misuse.  Offered dose of clonazepam while in emergency room, patient agreeable.  Plan includes labs, imaging, supportive treatments with dispo pending workup.

## 2025-06-10 NOTE — H&P ADULT - PROBLEM SELECTOR PLAN 1
-patient presenting with generalized malaise, SOB, insomnia, anxiety and depression   -has been taking clonopin for 10years for depression and stopped taking on may 20 due to PCP change   -took at home 1mg 1.5 tab BID   -s/p 1mg of clonopin in the ED with improved symptoms   -will continue 1mg of clonopin  -psych consult

## 2025-06-10 NOTE — H&P ADULT - ATTENDING COMMENTS
Non-Contrast CT Chest IMPRESSION:  Occluded distal left main bronchus with nearly complete left upper lobe atelectasis and thick-walled left lower lobe bronchi and impacted small airways. No discrete mass identified but limited in the absence of intravenous contrast. Suggest pulmonary consultation.    Chest X-Ray: Pending official read; on my read there is significant opacification on left lung. No effusion.    EKG: Normal Sinus Rhythm, 64 bpm, QTc 435ms, AR 150ms, on my read TWI in V1    84 year old male patient with pmhx anxiety, hypertension, hyperlipidemia, peripheral arterial disease on Xarelto who presented to the ER due to shortness of breath, tremulousness, nausea, and body aches after not taking his home medication Clonazepam for a week.  The patient's PCP recently retired and he started seeing a new PCP who was not comfortable refilling his Clonazepam and recommended he follow up with his Psychiatrist. His next follow up is not until July. He takes Clonazepam 1mg only at night about 3 pills spaced out through the night. He states he does not usually take Clonazepam during the day; only at night. He states he has taken Clonazepam for the past 10 years. His last dose he states was 5/20/25. He denies any history of benzo withdrawal in the past. In the ER serum Sodium of 125. Patient denies prior history of hyponatremia. He drinks two to three 8 oz bottles of water per day. He endorses loss of appetite. CT Chest with left occluded bronchus. Patient denies dyspnea on exertion, although he has chronic leg claudications. He denies dysphagia or swallowing anything down the wrong pipe recently. He denies prior history of occluded bronchus.    Review Of Systems included: + shortness of breath, + tremulousness, + nausea, + body aches, + loss of appetite,   No chest pain, no vomiting, no fever, no diarrhea, no dizziness, no headache, no itching or tactile disturbance, no sensitivity to light or sound, no dysphagia or recent swallowing of food down wrong pipe, no polydipsia (drinks two or three 8 oz bottles per day), no dyspnea on exertion, no orthopnea, no numbness, no weakness, no prior withdrawal, no prior hyponatremia, no prior occluded bronchus    General: Awake, Alert, Oriented  Cardiac: RRR  Pulmonary: Diminished breath sounds on left lung fields, CTA on right lung fields  Abdominal: Soft, ND, NT  Extremities: No edema b/l, + very mild tremor    # Occluded distal left main bronchus  - Consult Pulmonology  - Nebusal with DuoNebs  - Chest PT    # Hyponatremia  > Serum sodium of 125 in the ER  - IV Fluid Hydration  - Monitor Serum Sodium  - Avoid increasing no more than 6-8 mEq/L/day  - Check Urine Osmolality, Urine Sodium    # Benzodiazepine Use  > iSTOP reviewed, Patient has been on Clonazepam since at least 7/2024. Last prescription was Clonazepam 1mg 120 pills for 30 days dispensed on 4/21/2025 (prescribed by Dr. Greg Lynch)  > CIWA Score of 2  > Patient states last dose prior to ER visit was on 5/20/2025  - Valium or Oral Ativan prn  - Neuro-checks    # MELINDA  > Creatinine of 1.46 in the ER; was 0.98 on 12/26/2024  - IV Fluid Hydration    # Hx of anxiety, HTN, HLD, DVT (11/2013), peripheral arterial disease on Xarelto s/p L fem to PT arterial bypass with GSV, L CFA endarterectomy, L profunda femoral endarterectomy with hemashield patch angioplasty (Koby, 11/2015), L profunda to PTA PTFE bypass, L profunda endarterectomy, L PT endarterectomy ([facility] 05/2019), reop R femoral to L femoral bypass with PTFE graft, right profunda femoral endarterectomy (Koby, 05/2021), L ax-fem bypass (12/2024)  - Continue home medications    # DVT PPx: Xarelto  IMPROVE Score: 4 pts    # FEN  - IV Fluid Hydration  - Monitor and replete electrolytes as needed  - DASH Diet    Previous Admissions in Past 6 Years Included  6/4/2025: Surgery Clinic Visit: Peripheral vascular disease follow-up visit. Patient continues to have left leg symptoms but the bypass in the left lower extremity is widely patent with no evidence of stenosis.  12/22/2024 - 12/29/2024: s/p L ax-fem bypass on 12/24/2024 4/25/2022: Ortho Clinic Visit: Lumbosacral stenosis  4/7/2022: ER Visit for hip pain  5/15/2021 - 5/20/2021: s/p fem to fem crossover bypass with synthetic graft  5/30/2019 - 6/2/2019: s/p left femoral to posterior tibial bypass with polytetrafluorethylene    Patient case and management was discussed with ER Attending  I did examine all labs (including CBC, PT/INR, APTT, CMP, Troponin, pro-BNP, Serum ETOH), imaging, prior notes    Time-based billing (NON-critical care).  76 minutes spent on total encounter excluding any time spent teaching residents. The necessity of the time spent during the encounter on this date of service was due to: Review of records, vital signs, labs, microbiology, and imaging, Ordering labs and/or tests, General physical examination performed, Generation of treatment plan, Counseling of the patient and/or family members Non-Contrast CT Chest IMPRESSION:  Occluded distal left main bronchus with nearly complete left upper lobe atelectasis and thick-walled left lower lobe bronchi and impacted small airways. No discrete mass identified but limited in the absence of intravenous contrast. Suggest pulmonary consultation.    Chest X-Ray: Pending official read; on my read there is significant opacification on left lung. No effusion.    EKG: Normal Sinus Rhythm, 64 bpm, QTc 435ms, UT 150ms, on my read TWI in V1    84 year old male patient with pmhx anxiety, HTN, HLD, DVT (11/2013), peripheral arterial disease on Xarelto s/p L fem to PT arterial bypass with GSV, L CFA endarterectomy, L profunda femoral endarterectomy with hemashield patch angioplasty (Koby, 11/2015), L profunda to PTA PTFE bypass, L profunda endarterectomy, L PT endarterectomy ([facility] 05/2019), reop R femoral to L femoral bypass with PTFE graft, right profunda femoral endarterectomy (Koby, 05/2021), L ax-fem bypass (12/2024) who presented to the ER due to shortness of breath, tremulousness, nausea, and body aches after not taking his home medication Clonazepam for a week.  The patient's PCP recently retired and he started seeing a new PCP who was not comfortable refilling his Clonazepam and recommended he follow up with his Psychiatrist. His next follow up is not until July. He takes Clonazepam 1mg only at night about 3 pills spaced out through the night. He states he does not usually take Clonazepam during the day; only at night. He states he has taken Clonazepam for the past 10 years. His last dose he states was 5/20/25. He denies any history of benzo withdrawal in the past. In the ER serum Sodium of 125. Patient denies prior history of hyponatremia. He drinks two to three 8 oz bottles of water per day. He endorses loss of appetite. CT Chest with left occluded bronchus. Patient denies dyspnea on exertion, although he has chronic leg claudications. He denies dysphagia or swallowing anything down the wrong pipe recently. He denies prior history of occluded bronchus.  Review Of Systems included: + shortness of breath, + tremulousness, + nausea, + body aches, + loss of appetite, no chest pain, no vomiting, no fever, no diarrhea, no dizziness, no headache, no itching or tactile disturbance, no sensitivity to light or sound, no dysphagia or recent swallowing of food down wrong pipe, no polydipsia (drinks two or three 8 oz bottles per day), no dyspnea on exertion, no orthopnea, no numbness, no weakness, no prior withdrawal, no prior hyponatremia, no prior occluded bronchus    General: Awake, Alert, Oriented  Cardiac: RRR  Pulmonary: Diminished breath sounds on left lung fields, CTA on right lung fields  Abdominal: Soft, ND, NT  Extremities: No edema b/l, + very mild tremor    # Occluded distal left main bronchus  - Consult Pulmonology  - Nebusal with DuoNebs  - Chest PT    # Hyponatremia  > Serum sodium of 125 in the ER  - IV Fluid Hydration  - Monitor Serum Sodium  - Avoid increasing no more than 6-8 mEq/L/day  - Check Urine Osmolality, Urine Sodium    # Benzodiazepine Use  > iSTOP reviewed, Patient has been on Clonazepam since at least 7/2024. Last prescription was Clonazepam 1mg 120 pills for 30 days dispensed on 4/21/2025 (prescribed by Dr. Greg Lynch)  > CIWA Score of 2  > Patient states last dose prior to ER visit was on 5/20/2025  - Can consult Psych  - Valium or Oral Ativan prn  - Neuro-checks    # MELINDA  > Creatinine of 1.46 in the ER; was 0.98 on 12/26/2024  - IV Fluid Hydration    # Hx of anxiety, HTN, HLD, DVT (11/2013), peripheral arterial disease on Xarelto s/p L fem to PT arterial bypass with GSV, L CFA endarterectomy, L profunda femoral endarterectomy with hemashield patch angioplasty (Koby, 11/2015), L profunda to PTA PTFE bypass, L profunda endarterectomy, L PT endarterectomy ([facility] 05/2019), reop R femoral to L femoral bypass with PTFE graft, right profunda femoral endarterectomy (Koby, 05/2021), L ax-fem bypass (12/2024)  - Continue home medications    # DVT PPx: Xarelto  IMPROVE Score: 4 pts    # FEN  - IV Fluid Hydration  - Monitor and replete electrolytes as needed  - DASH Diet    Previous Admissions in Past 6 Years Included  6/4/2025: Surgery Clinic Visit: Peripheral vascular disease follow-up visit. Patient continues to have left leg symptoms but the bypass in the left lower extremity is widely patent with no evidence of stenosis.  12/22/2024 - 12/29/2024: s/p L ax-fem bypass on 12/24/2024 4/25/2022: Ortho Clinic Visit: Lumbosacral stenosis  4/7/2022: ER Visit for hip pain  5/15/2021 - 5/20/2021: s/p fem to fem crossover bypass with synthetic graft  5/30/2019 - 6/2/2019: s/p left femoral to posterior tibial bypass with polytetrafluorethylene    Patient case and management was discussed with ER Attending. I did examine all labs (including CBC, PT/INR, APTT, CMP, Troponin, pro-BNP, Serum ETOH), imaging, prior notes  Time-based billing (NON-critical care).  76 minutes spent on total encounter excluding any time spent teaching residents. The necessity of the time spent during the encounter on this date of service was due to: Review of records, vital signs, labs, microbiology, and imaging, Ordering labs and/or tests, General physical examination performed, Generation of treatment plan, Counseling of the patient and/or family members Non-Contrast CT Chest IMPRESSION:  Occluded distal left main bronchus with nearly complete left upper lobe atelectasis and thick-walled left lower lobe bronchi and impacted small airways. No discrete mass identified but limited in the absence of intravenous contrast. Suggest pulmonary consultation.    Chest X-Ray: Pending official read; on my read there is significant opacification on left lung. No effusion.    EKG: Normal Sinus Rhythm, 64 bpm, QTc 435ms, LA 150ms, on my read TWI in V1    84 year old male patient with pmhx anxiety, HTN, HLD, DVT (11/2013), peripheral arterial disease on Xarelto s/p L fem to PT arterial bypass with GSV, L CFA endarterectomy, L profunda femoral endarterectomy with hemashield patch angioplasty (Koby, 11/2015), L profunda to PTA PTFE bypass, L profunda endarterectomy, L PT endarterectomy ([facility] 05/2019), reop R femoral to L femoral bypass with PTFE graft, right profunda femoral endarterectomy (Koby, 05/2021), L ax-fem bypass (12/2024) who presented to the ER due to shortness of breath, tremulousness, nausea, and body aches after not taking his home medication Clonazepam for a week.  The patient's PCP recently retired and he started seeing a new PCP who was not comfortable refilling his Clonazepam and recommended he follow up with his Psychiatrist. His next follow up is not until July. He takes Clonazepam 1mg only at night about 3 pills spaced out through the night. He states he does not usually take Clonazepam during the day; only at night. He states he has taken Clonazepam for the past 10 years. His last dose he states was 5/20/25. He denies any history of benzo withdrawal in the past. In the ER serum Sodium of 125. Patient denies prior history of hyponatremia. He drinks two to three 8 oz bottles of water per day. He endorses loss of appetite. CT Chest with left occluded bronchus. Patient denies dyspnea on exertion, although he has chronic leg claudications. He denies dysphagia or swallowing anything down the wrong pipe recently. He denies prior history of occluded bronchus.  Review Of Systems included: + shortness of breath, + tremulousness, + nausea, + body aches, + loss of appetite, no chest pain, no vomiting, no fever, no diarrhea, no dizziness, no headache, no itching or tactile disturbance, no sensitivity to light or sound, no dysphagia or recent swallowing of food down wrong pipe, no polydipsia (drinks two or three 8 oz bottles per day), no dyspnea on exertion, no orthopnea, no numbness, no weakness, no prior withdrawal, no prior hyponatremia, no prior occluded bronchus    General: Awake, Alert, Oriented  Cardiac: RRR  Pulmonary: Diminished breath sounds on left lung fields, CTA on right lung fields  Abdominal: Soft, ND, NT  Extremities: No edema b/l, + very mild tremor    # Occluded distal left main bronchus  - Consult Pulmonology  - Nebusal with DuoNebs  - Chest PT    # Hyponatremia  > Serum sodium of 125 in the ER with elevated Urine Sodium and Urine Osmolality suggestive of SIADH  - Fluid Restriction  - Monitor Serum Sodium  - Avoid increasing no more than 6-8 mEq/L/day    # Benzodiazepine Use  > iSTOP reviewed, Patient has been on Clonazepam since at least 7/2024. Last prescription was Clonazepam 1mg 120 pills for 30 days dispensed on 4/21/2025 (prescribed by Dr. Greg Lynch)  > CIWA Score of 2  > Patient states last dose prior to ER visit was on 5/20/2025  - Can consult Psych  - Valium or Oral Ativan prn  - Neuro-checks    # Hx of anxiety, HTN, HLD, DVT (11/2013), peripheral arterial disease on Xarelto s/p L fem to PT arterial bypass with GSV, L CFA endarterectomy, L profunda femoral endarterectomy with hemashield patch angioplasty (Koby, 11/2015), L profunda to PTA PTFE bypass, L profunda endarterectomy, L PT endarterectomy ([facility] 05/2019), reop R femoral to L femoral bypass with PTFE graft, right profunda femoral endarterectomy (Koby, 05/2021), L ax-fem bypass (12/2024)  - Continue home medications    # DVT PPx: Xarelto  IMPROVE Score: 4 pts    # FEN  - Monitor and replete electrolytes as needed  - DASH Diet, Fluid Restriction    Previous Admissions in Past 6 Years Included  6/4/2025: Surgery Clinic Visit: Peripheral vascular disease follow-up visit. Patient continues to have left leg symptoms but the bypass in the left lower extremity is widely patent with no evidence of stenosis.  12/22/2024 - 12/29/2024: s/p L ax-fem bypass on 12/24/2024 4/25/2022: Ortho Clinic Visit: Lumbosacral stenosis  4/7/2022: ER Visit for hip pain  5/15/2021 - 5/20/2021: s/p fem to fem crossover bypass with synthetic graft  5/30/2019 - 6/2/2019: s/p left femoral to posterior tibial bypass with polytetrafluorethylene    Patient case and management was discussed with ER Attending. I did examine all labs (including CBC, PT/INR, APTT, CMP, Troponin, pro-BNP, Serum ETOH, Urine Sodium, Urine Osmolality), imaging, prior notes  Time-based billing (NON-critical care).  76 minutes spent on total encounter excluding any time spent teaching residents. The necessity of the time spent during the encounter on this date of service was due to: Review of records, vital signs, labs, microbiology, and imaging, Ordering labs and/or tests, General physical examination performed, Generation of treatment plan, Counseling of the patient and/or family members Non-Contrast CT Chest IMPRESSION:  Occluded distal left main bronchus with nearly complete left upper lobe atelectasis and thick-walled left lower lobe bronchi and impacted small airways. No discrete mass identified but limited in the absence of intravenous contrast. Suggest pulmonary consultation.    Chest X-Ray: Pending official read; on my read there is significant opacification on left lung. No effusion.    EKG: Normal Sinus Rhythm, 64 bpm, QTc 435ms, TX 150ms, on my read TWI in V1    84 year old male patient with pmhx anxiety, HTN, HLD, DVT (11/2013), peripheral arterial disease on Xarelto s/p L fem to PT arterial bypass with GSV, L CFA endarterectomy, L profunda femoral endarterectomy with hemashield patch angioplasty (Koby, 11/2015), L profunda to PTA PTFE bypass, L profunda endarterectomy, L PT endarterectomy ([facility] 05/2019), reop R femoral to L femoral bypass with PTFE graft, right profunda femoral endarterectomy (Koby, 05/2021), L ax-fem bypass (12/2024) who presented to the ER due to shortness of breath, tremulousness, nausea, and body aches after not taking his home medication Clonazepam for a week.  The patient's PCP recently retired and he started seeing a new PCP who was not comfortable refilling his Clonazepam and recommended he follow up with his Psychiatrist. His next follow up is not until July. He takes Clonazepam 1mg only at night about 3 pills spaced out through the night. He states he does not usually take Clonazepam during the day; only at night. He states he has taken Clonazepam for the past 10 years. His last dose he states was 5/20/25. He denies any history of benzo withdrawal in the past. In the ER serum Sodium of 125. Patient denies prior history of hyponatremia. He drinks two to three 8 oz bottles of water per day. He endorses loss of appetite. CT Chest with left occluded bronchus. Patient denies dyspnea on exertion, although he has chronic leg claudications. He denies dysphagia or swallowing anything down the wrong pipe recently. He denies prior history of occluded bronchus.  Review Of Systems included: + shortness of breath, + tremulousness, + nausea, + body aches, + loss of appetite, no chest pain, no vomiting, no fever, no diarrhea, no dizziness, no headache, no itching or tactile disturbance, no sensitivity to light or sound, no dysphagia or recent swallowing of food down wrong pipe, no polydipsia (drinks two or three 8 oz bottles per day), no dyspnea on exertion, no orthopnea, no numbness, no weakness, no prior withdrawal, no prior hyponatremia, no prior occluded bronchus    General: Awake, Alert, Oriented  Cardiac: RRR  Pulmonary: Diminished breath sounds on left lung fields, CTA on right lung fields  Abdominal: Soft, ND, NT  Extremities: No edema b/l, + very mild tremor    # Occluded distal left main bronchus  - Consult Pulmonology  - Nebusal with DuoNebs  - Chest PT    # Hyponatremia  > Serum sodium of 125 in the ER with elevated Urine Sodium and Urine Osmolality suggestive of SIADH  - Fluid Restriction  - Monitor Serum Sodium  - Avoid increasing no more than 6-8 mEq/L/day    # Benzodiazepine Use  > iSTOP reviewed, Patient has been on Clonazepam since at least 7/2024. Last prescription was Clonazepam 1mg 120 pills for 30 days dispensed on 4/21/2025 (prescribed by Dr. Greg Lynch)  > CIWA Score of 2  > Patient states last dose prior to ER visit was on 5/20/2025  - Can consult Psych  - Valium or Oral Ativan prn  - Neuro-checks    # Hx of anxiety, HTN, HLD, DVT (11/2013), peripheral arterial disease on Xarelto s/p L fem to PT arterial bypass with GSV, L CFA endarterectomy, L profunda femoral endarterectomy with hemashield patch angioplasty (Koby, 11/2015), L profunda to PTA PTFE bypass, L profunda endarterectomy, L PT endarterectomy ([facility] 05/2019), reop R femoral to L femoral bypass with PTFE graft, right profunda femoral endarterectomy (Koby, 05/2021), L ax-fem bypass (12/2024)  - Continue home medications Amlodipine, Atorvastatin, Hydralazine, Xarelto, Aspirin    # DVT PPx: Xarelto  IMPROVE Score: 4 pts    # FEN  - Monitor and replete electrolytes as needed  - DASH Diet, Fluid Restriction    Previous Admissions in Past 6 Years Included  6/4/2025: Surgery Clinic Visit: Peripheral vascular disease follow-up visit. Patient continues to have left leg symptoms but the bypass in the left lower extremity is widely patent with no evidence of stenosis.  12/22/2024 - 12/29/2024: s/p L ax-fem bypass on 12/24/2024 4/25/2022: Ortho Clinic Visit: Lumbosacral stenosis  4/7/2022: ER Visit for hip pain  5/15/2021 - 5/20/2021: s/p fem to fem crossover bypass with synthetic graft  5/30/2019 - 6/2/2019: s/p left femoral to posterior tibial bypass with polytetrafluorethylene    Patient case and management was discussed with ER Attending. I did examine all labs (including CBC, PT/INR, APTT, CMP, Troponin, pro-BNP, Serum ETOH, Urine Sodium, Urine Osmolality), imaging, prior notes  Time-based billing (NON-critical care).  76 minutes spent on total encounter excluding any time spent teaching residents. The necessity of the time spent during the encounter on this date of service was due to: Review of records, vital signs, labs, microbiology, and imaging, Ordering labs and/or tests, General physical examination performed, Generation of treatment plan, Counseling of the patient and/or family members Non-Contrast CT Chest IMPRESSION:  Occluded distal left main bronchus with nearly complete left upper lobe atelectasis and thick-walled left lower lobe bronchi and impacted small airways. No discrete mass identified but limited in the absence of intravenous contrast. Suggest pulmonary consultation.    Chest X-Ray: Pending official read; on my read there is significant opacification on left lung. No effusion.    EKG: Normal Sinus Rhythm, 64 bpm, QTc 435ms, ID 150ms, on my read TWI in V1    84 year old male patient with pmhx anxiety, HTN, HLD, DVT (11/2013), peripheral arterial disease on Xarelto s/p L fem to PT arterial bypass with GSV, L CFA endarterectomy, L profunda femoral endarterectomy with hemashield patch angioplasty (Koby, 11/2015), L profunda to PTA PTFE bypass, L profunda endarterectomy, L PT endarterectomy ([facility] 05/2019), reop R femoral to L femoral bypass with PTFE graft, right profunda femoral endarterectomy (Koby, 05/2021), L ax-fem bypass (12/2024) who presented to the ER due to shortness of breath, tremulousness, nausea, and body aches after not taking his home medication Clonazepam for a week.  The patient's PCP recently retired and he started seeing a new PCP who was not comfortable refilling his Clonazepam and recommended he follow up with his Psychiatrist. His next follow up is not until July. He takes Clonazepam 1mg only at night about 3 pills spaced out through the night. He states he does not usually take Clonazepam during the day; only at night. He states he has taken Clonazepam for the past 10 years. His last dose he states was 5/20/25. He denies any history of benzo withdrawal in the past. In the ER serum Sodium of 125. Patient denies prior history of hyponatremia. He drinks two to three 8 oz bottles of water per day. He endorses loss of appetite. CT Chest with left occluded bronchus. Patient denies dyspnea on exertion, although he has chronic leg claudications. He denies dysphagia or swallowing anything down the wrong pipe recently. He denies prior history of occluded bronchus.  Review Of Systems included: + shortness of breath, + tremulousness, + nausea, + body aches, + loss of appetite, no chest pain, no vomiting, no fever, no diarrhea, no dizziness, no headache, no itching or tactile disturbance, no sensitivity to light or sound, no dysphagia or recent swallowing of food down wrong pipe, no polydipsia (drinks two or three 8 oz bottles per day), no dyspnea on exertion, no orthopnea, no numbness, no weakness, no prior withdrawal, no prior hyponatremia, no prior occluded bronchus    General: Awake, Alert, Oriented  Cardiac: RRR  Pulmonary: Diminished breath sounds on left lung fields, CTA on right lung fields  Abdominal: Soft, ND, NT  Extremities: No edema b/l, + very mild tremor    # Occluded distal left main bronchus  - Consult Pulmonology  - Nebusal with DuoNebs  - Chest PT    # Hyponatremia  > Serum sodium of 125 in the ER with elevated Urine Sodium and Urine Osmolality suggestive of SIADH  - Fluid Restriction  - Monitor Serum Sodium  - Avoid increasing no more than 6-8 mEq/L/day    # Benzodiazepine Use  > iSTOP reviewed, Patient has been on Clonazepam since at least 7/2024. Last prescription was Clonazepam 1mg 120 pills for 30 days dispensed on 4/21/2025 (prescribed by Dr. Greg Lynch)  > CIWA Score of 2  > Patient states last dose prior to ER visit was on 5/20/2025  - Can consult Psych  - Oral Ativan prn  - Neuro-checks    # Hx of anxiety, HTN, HLD, DVT (11/2013), peripheral arterial disease on Xarelto s/p L fem to PT arterial bypass with GSV, L CFA endarterectomy, L profunda femoral endarterectomy with hemashield patch angioplasty ([facility], 11/2015), L profunda to PTA PTFE bypass, L profunda endarterectomy, L PT endarterectomy ([facility] 05/2019), reop R femoral to L femoral bypass with PTFE graft, right profunda femoral endarterectomy (Koby, 05/2021), L ax-fem bypass (12/2024)  - Continue home medications Amlodipine, Atorvastatin, Hydralazine, Xarelto, Aspirin    # DVT PPx: Xarelto  IMPROVE Score: 4 pts    # FEN  - Monitor and replete electrolytes as needed  - DASH Diet, Fluid Restriction    Previous Admissions in Past 6 Years Included  6/4/2025: Surgery Clinic Visit: Peripheral vascular disease follow-up visit. Patient continues to have left leg symptoms but the bypass in the left lower extremity is widely patent with no evidence of stenosis.  12/22/2024 - 12/29/2024: s/p L ax-fem bypass on 12/24/2024 4/25/2022: Ortho Clinic Visit: Lumbosacral stenosis  4/7/2022: ER Visit for hip pain  5/15/2021 - 5/20/2021: s/p fem to fem crossover bypass with synthetic graft  5/30/2019 - 6/2/2019: s/p left femoral to posterior tibial bypass with polytetrafluorethylene    Patient case and management was discussed with ER Attending. I did examine all labs (including CBC, PT/INR, APTT, CMP, Troponin, pro-BNP, Serum ETOH, Urine Sodium, Urine Osmolality), imaging, prior notes  Time-based billing (NON-critical care).  76 minutes spent on total encounter excluding any time spent teaching residents. The necessity of the time spent during the encounter on this date of service was due to: Review of records, vital signs, labs, microbiology, and imaging, Ordering labs and/or tests, General physical examination performed, Generation of treatment plan, Counseling of the patient and/or family members

## 2025-06-10 NOTE — ED PROVIDER NOTE - OBJECTIVE STATEMENT
84-year-old male with past medical history anxiety, hypertension, hyperlipidemia, peripheral arterial disease on Xarelto presents with anxiety x 1 week.  Patient states he was previously taking clonazepam 1 mg over the past 10 years, states his primary care doctor recently retired and started seeing a new primary care doctor.  Patient states his new primary care doctor is not comfortable with refilling his clonazepam, advised him to follow-up with psychiatrist, but states he does not follow-up until July.  Patient complaining of anxiety, difficulty sleeping, tremulousness, and occasionally shortness of breath.  Denies any fevers, chest pain, abdominal pain, vomiting, diarrhea, bloody stools, dysuria, numbness, focal weakness, rash.  Denies any alcohol or substance use.  Denies any suicidal ideations, homicidal ideations, auditory or visual hallucinations.  Denies any additional complaints.

## 2025-06-10 NOTE — ED ADULT TRIAGE NOTE - CHIEF COMPLAINT QUOTE
PMD wont refill clonazepam 1mg, stop taking it 10 days ago  c/o SOB, body aches, no SI/HI  Hx anxiety/ depression

## 2025-06-10 NOTE — H&P ADULT - PROBLEM SELECTOR PLAN 5
hx of HTN on oral agents   -will continue amlodipine for now   -holding ACE/ARB and HCTZ due to joellen  -BP within normal ranges   -dash diet

## 2025-06-10 NOTE — H&P ADULT - PROBLEM SELECTOR PLAN 4
-noted on labs with a cr of 1.4 with a prior baseline of 0.9-1.2 in 2024  -patient with multiple risk factors for CKD could be near baseline, athersclerotic kidney disease given past hx   -f/u urine lytes   -f/u bladder scan  -f/u bmp

## 2025-06-10 NOTE — H&P ADULT - NSHPREVIEWOFSYSTEMS_GEN_ALL_CORE
- CONSTITUTIONAL: Denies fever and chills  - HEENT: Denies changes in vision and hearing.  - RESPIRATORY: +SOB  - CV: Denies chest pain and palpitations  - GI: Denies abdominal pain, nausea, vomiting and diarrhea.  - : Denies dysuria and urinary frequency.  - SKIN: Denies rash and pruritus.  - NEUROLOGICAL: Denies headache and syncope.  - PSYCHIATRIC: +depression and anxiety

## 2025-06-11 LAB
ANION GAP SERPL CALC-SCNC: 7 MMOL/L — SIGNIFICANT CHANGE UP (ref 5–17)
ANION GAP SERPL CALC-SCNC: 9 MMOL/L — SIGNIFICANT CHANGE UP (ref 5–17)
BUN SERPL-MCNC: 21 MG/DL — HIGH (ref 7–18)
BUN SERPL-MCNC: 24 MG/DL — HIGH (ref 7–18)
CALCIUM SERPL-MCNC: 8.9 MG/DL — SIGNIFICANT CHANGE UP (ref 8.4–10.5)
CALCIUM SERPL-MCNC: 9.2 MG/DL — SIGNIFICANT CHANGE UP (ref 8.4–10.5)
CHLORIDE SERPL-SCNC: 92 MMOL/L — LOW (ref 96–108)
CHLORIDE SERPL-SCNC: 93 MMOL/L — LOW (ref 96–108)
CO2 SERPL-SCNC: 24 MMOL/L — SIGNIFICANT CHANGE UP (ref 22–31)
CO2 SERPL-SCNC: 25 MMOL/L — SIGNIFICANT CHANGE UP (ref 22–31)
CREAT SERPL-MCNC: 1.19 MG/DL — SIGNIFICANT CHANGE UP (ref 0.5–1.3)
CREAT SERPL-MCNC: 1.37 MG/DL — HIGH (ref 0.5–1.3)
EGFR: 51 ML/MIN/1.73M2 — LOW
EGFR: 51 ML/MIN/1.73M2 — LOW
EGFR: 61 ML/MIN/1.73M2 — SIGNIFICANT CHANGE UP
EGFR: 61 ML/MIN/1.73M2 — SIGNIFICANT CHANGE UP
GLUCOSE SERPL-MCNC: 93 MG/DL — SIGNIFICANT CHANGE UP (ref 70–99)
GLUCOSE SERPL-MCNC: 98 MG/DL — SIGNIFICANT CHANGE UP (ref 70–99)
HCT VFR BLD CALC: 39.6 % — SIGNIFICANT CHANGE UP (ref 39–50)
HGB BLD-MCNC: 14.6 G/DL — SIGNIFICANT CHANGE UP (ref 13–17)
MAGNESIUM SERPL-MCNC: 2.1 MG/DL — SIGNIFICANT CHANGE UP (ref 1.6–2.6)
MCHC RBC-ENTMCNC: 34.3 PG — HIGH (ref 27–34)
MCHC RBC-ENTMCNC: 36.9 G/DL — HIGH (ref 32–36)
MCV RBC AUTO: 93 FL — SIGNIFICANT CHANGE UP (ref 80–100)
NRBC BLD AUTO-RTO: 0 /100 WBCS — SIGNIFICANT CHANGE UP (ref 0–0)
PHOSPHATE SERPL-MCNC: 4.1 MG/DL — SIGNIFICANT CHANGE UP (ref 2.5–4.5)
PLATELET # BLD AUTO: 229 K/UL — SIGNIFICANT CHANGE UP (ref 150–400)
POTASSIUM SERPL-MCNC: 3 MMOL/L — LOW (ref 3.5–5.3)
POTASSIUM SERPL-MCNC: 4.3 MMOL/L — SIGNIFICANT CHANGE UP (ref 3.5–5.3)
POTASSIUM SERPL-SCNC: 3 MMOL/L — LOW (ref 3.5–5.3)
POTASSIUM SERPL-SCNC: 4.3 MMOL/L — SIGNIFICANT CHANGE UP (ref 3.5–5.3)
RBC # BLD: 4.26 M/UL — SIGNIFICANT CHANGE UP (ref 4.2–5.8)
RBC # FLD: 12.1 % — SIGNIFICANT CHANGE UP (ref 10.3–14.5)
SODIUM SERPL-SCNC: 125 MMOL/L — LOW (ref 135–145)
SODIUM SERPL-SCNC: 125 MMOL/L — LOW (ref 135–145)
WBC # BLD: 10.34 K/UL — SIGNIFICANT CHANGE UP (ref 3.8–10.5)
WBC # FLD AUTO: 10.34 K/UL — SIGNIFICANT CHANGE UP (ref 3.8–10.5)

## 2025-06-11 PROCEDURE — 90792 PSYCH DIAG EVAL W/MED SRVCS: CPT

## 2025-06-11 PROCEDURE — 99233 SBSQ HOSP IP/OBS HIGH 50: CPT

## 2025-06-11 PROCEDURE — 99222 1ST HOSP IP/OBS MODERATE 55: CPT

## 2025-06-11 RX ORDER — LORAZEPAM 4 MG/ML
0.5 VIAL (ML) INJECTION AT BEDTIME
Refills: 0 | Status: DISCONTINUED | OUTPATIENT
Start: 2025-06-11 | End: 2025-06-12

## 2025-06-11 RX ORDER — MIRTAZAPINE 30 MG/1
7.5 TABLET, FILM COATED ORAL AT BEDTIME
Refills: 0 | Status: DISCONTINUED | OUTPATIENT
Start: 2025-06-11 | End: 2025-06-13

## 2025-06-11 RX ORDER — DEXTROMETHORPHAN HBR, GUAIFENESIN 200 MG/10ML
600 LIQUID ORAL EVERY 12 HOURS
Refills: 0 | Status: DISCONTINUED | OUTPATIENT
Start: 2025-06-11 | End: 2025-06-13

## 2025-06-11 RX ADMIN — IPRATROPIUM BROMIDE AND ALBUTEROL SULFATE 3 MILLILITER(S): .5; 2.5 SOLUTION RESPIRATORY (INHALATION) at 20:03

## 2025-06-11 RX ADMIN — Medication 25 MILLIGRAM(S): at 06:06

## 2025-06-11 RX ADMIN — DEXTROMETHORPHAN HBR, GUAIFENESIN 600 MILLIGRAM(S): 200 LIQUID ORAL at 18:14

## 2025-06-11 RX ADMIN — Medication 40 MILLIEQUIVALENT(S): at 12:31

## 2025-06-11 RX ADMIN — Medication 0.5 MILLIGRAM(S): at 21:27

## 2025-06-11 RX ADMIN — Medication 4 MILLILITER(S): at 20:04

## 2025-06-11 RX ADMIN — Medication 2 TABLET(S): at 21:28

## 2025-06-11 RX ADMIN — RIVAROXABAN 2.5 MILLIGRAM(S): 10 TABLET, FILM COATED ORAL at 18:15

## 2025-06-11 RX ADMIN — Medication 4 MILLILITER(S): at 15:41

## 2025-06-11 RX ADMIN — Medication 25 MILLIGRAM(S): at 14:49

## 2025-06-11 RX ADMIN — AMLODIPINE BESYLATE 10 MILLIGRAM(S): 10 TABLET ORAL at 06:06

## 2025-06-11 RX ADMIN — RIVAROXABAN 2.5 MILLIGRAM(S): 10 TABLET, FILM COATED ORAL at 06:05

## 2025-06-11 RX ADMIN — ATORVASTATIN CALCIUM 40 MILLIGRAM(S): 80 TABLET, FILM COATED ORAL at 21:28

## 2025-06-11 RX ADMIN — Medication 81 MILLIGRAM(S): at 12:32

## 2025-06-11 RX ADMIN — IPRATROPIUM BROMIDE AND ALBUTEROL SULFATE 3 MILLILITER(S): .5; 2.5 SOLUTION RESPIRATORY (INHALATION) at 15:41

## 2025-06-11 RX ADMIN — MIRTAZAPINE 7.5 MILLIGRAM(S): 30 TABLET, FILM COATED ORAL at 22:18

## 2025-06-11 RX ADMIN — Medication 40 MILLIEQUIVALENT(S): at 14:49

## 2025-06-11 RX ADMIN — Medication 25 MILLIGRAM(S): at 21:27

## 2025-06-11 RX ADMIN — Medication 650 MILLIGRAM(S): at 04:27

## 2025-06-11 RX ADMIN — Medication 650 MILLIGRAM(S): at 05:02

## 2025-06-11 NOTE — BH CONSULTATION LIAISON ASSESSMENT NOTE - CURRENT MEDICATION
MEDICATIONS  (STANDING):  albuterol/ipratropium for Nebulization 3 milliLiter(s) Nebulizer every 6 hours  amLODIPine   Tablet 10 milliGRAM(s) Oral daily  aspirin  chewable 81 milliGRAM(s) Oral daily  atorvastatin 40 milliGRAM(s) Oral at bedtime  hydrALAZINE 25 milliGRAM(s) Oral every 8 hours  mirtazapine 7.5 milliGRAM(s) Oral at bedtime  potassium chloride    Tablet ER 40 milliEquivalent(s) Oral every 4 hours  rivaroxaban 2.5 milliGRAM(s) Oral two times a day  senna 2 Tablet(s) Oral at bedtime  sodium chloride 3%  Inhalation 4 milliLiter(s) Inhalation every 6 hours    MEDICATIONS  (PRN):  acetaminophen     Tablet .. 650 milliGRAM(s) Oral every 6 hours PRN Temp greater or equal to 38C (100.4F), Mild Pain (1 - 3)  LORazepam     Tablet 0.5 milliGRAM(s) Oral at bedtime PRN Anxiety  ondansetron Injectable 4 milliGRAM(s) IV Push every 8 hours PRN Nausea and/or Vomiting

## 2025-06-11 NOTE — PATIENT PROFILE ADULT - FALL HARM RISK - CONCLUSION
Patient has an appointment to see neurosurgery on 9/26.  They would be able to comment on what the patient can and cannot do.   Fall with Harm Risk

## 2025-06-11 NOTE — PATIENT PROFILE ADULT - NSPROIMPLANTSMEDDEV_GEN_A_NUR
left axilla- femoral bypass with synthetic graft left axilla- femoral bypass with synthetic graft; axillobifemoral bypass graft with prosthesis

## 2025-06-11 NOTE — BH CONSULTATION LIAISON ASSESSMENT NOTE - NSBHCONSULTFOLLOWAFTERCARE_PSY_A_CORE FT
[facility] of [facility]s Adult Outpatient [facility]iatry Department   [address] [place], [place]   [phone]     [facility]  [address] [place] [address] [phone]    [facility]  [address] [place] [address] [phone]

## 2025-06-11 NOTE — DIETITIAN INITIAL EVALUATION ADULT - NSICDXPASTSURGICALHX_GEN_ALL_CORE_FT
PAST SURGICAL HISTORY:  H/O endarterectomy Right Femoral, 2007    H/O shoulder surgery left    S/P femoral-tibial bypass [id number] as per pt

## 2025-06-11 NOTE — BH CONSULTATION LIAISON ASSESSMENT NOTE - SUMMARY
83 y/o M with a hx of anxiety, HTN, LD and PAD, who is admitted due to suspected benzodiazepine withdrawal and hyponatremia. He is consulted for medication management. Although Klonopin has a relatively long-half life, the patient's dose of 1 mg was not very high, and several days have passed since his last dose, it is still possible for a subacute withdrawal with rebound anxiety is possible. In addition, an underlying mild to moderate clinical depression could be affecting him and would explain some of his other neurovegetative symptoms, especially if organic etiologies are ruled out. He is not suicidal, homicidal or psychotic.    -Would settle for 0.5 mg of Klonopin HS, to be gradually tapered by his outpatient PCP, ideally within several weeks/months  -Consider Remeron 7.5 mg PO HS  -Would benefit from outpatient psychiatric f/u  -No need for an inpatient psychiatric admission or 1:1  -SW f/u  -Case discussed with the primary team  -Will follow as needed

## 2025-06-11 NOTE — BH CONSULTATION LIAISON ASSESSMENT NOTE - NSBHCHARTREVIEWVS_PSY_A_CORE FT
Vital Signs Last 24 Hrs  T(C): 36.9 (11 Jun 2025 05:31), Max: 36.9 (10 Sedrick 2025 17:07)  T(F): 98.4 (11 Jun 2025 05:31), Max: 98.4 (10 Sedrick 2025 17:07)  HR: 88 (11 Jun 2025 05:31) (56 - 94)  BP: 128/78 (11 Jun 2025 05:31) (124/69 - 147/77)  BP(mean): 94 (11 Jun 2025 05:31) (94 - 94)  RR: 18 (11 Jun 2025 05:31) (17 - 18)  SpO2: 99% (11 Jun 2025 05:31) (96% - 99%)    Parameters below as of 11 Jun 2025 05:31  Patient On (Oxygen Delivery Method): room air

## 2025-06-11 NOTE — PROGRESS NOTE ADULT - NS ATTEND AMEND GEN_ALL_CORE FT
Patient seen and examined at bedside this morning. He notes feeling a bit less fatigued today after sleeping. Overnight he had received clonazepam. Denies any dyspnea currently. He endorses recent weight loss of 8lb over the past 2-3 weeks due to low appetite.    Am labs reviewed, BP stable, afebrile, noted Na 125. Cr 1.19   PE – elderly male, NAD, nontoxic appearing, calm and comfortable, RRR, +s1/s2, lungs with slightly less clear breath sounds on L, abd soft ntnd, extremities wwp, no edema     A/P   85yo M with pmhx anxiety, HTN, HLD, DVT (11/2013), peripheral arterial disease on Xarelto s/p L fem to PT arterial bypass with GSV, L CFA endarterectomy, L profunda femoral endarterectomy with hemashield patch angioplasty ([facility]oby, 11/2015), L profunda to PTA PTFE bypass, L profunda endarterectomy, L PT endarterectomy ([facility] 05/2019), reop R femoral to L femoral bypass with PTFE graft, right profunda femoral endarterectomy (Koby, 05/2021), L ax-fem bypass (12/2024) who presented due to shortness of breath, tremulousness, nausea, and body aches after not taking his home medication Clonazepam for a week. CT Chest with left occluded bronchus.   #Occluded distal left main bronchus   #Hyponatremia   #Benzodiazepine Use   #Hx of anxiety, HTN, HLD, DVT (11/2013), peripheral arterial disease on Xarelto s/p bypass   #MELINDA – resolved   #Hypokalemia     -Consulted Pulmonology, f/u recs   -c/w trial of DuoNebs, monitor sats, currently stable on room air   -noted with hyponatremia, with elevated Urine Sodium and Urine Osmolality suggestive of SIADH, trend BMP, repeat at 3pm   -continue with fluid restriction   -Avoid increasing Na no more than 6-8 mEq/L/day   -replete K   -iSTOP reviewed, patient has been on Clonazepam since at least 7/2024 ( he notes that he had been on it for 10y). Last prescription was Clonazepam 1mg 120 pills for 30 days dispensed on 4/21/2025 (prescribed by Dr. Greg Lynch), per patient last dose prior was on 5/20/2025   -psych consulted/Dr Pino   -can consider smaller dose of 0.5mg Ativan qhs   -continue home medications Amlodipine, Atorvastatin, Hydralazine, Xarelto, Aspirin   -DASH diet   -DVT PPx: Xarelto; IMPROVE Score: 4 pts

## 2025-06-11 NOTE — CONSULT NOTE ADULT - ASSESSMENT
85yo man w/ trivial smoking hx as teenager who was admitted for management of benzodiazepine withdrawal and incidentally noted on admission CT chest w/o contrast to have occluded distal LMS bronchus and JOSH atelectasis possibly from ?mucus impaction as no discrete mass was identified. Asked to evaluate for further management recommendations.    #Mucus impacted airways - suspected  #Atelectasis    Recommendations:  - agree with trial of airway clearance regimen:  --> duonebs q4-6h standing ATC  --> sodium chloride 3% nebs q6h timed with duoneb  --> chest PT  - would also add Chest Vest therapy via respiratory therapy to also assist in expectoration of potential occluded airways  - trial of guaifenesin 600-1200mg ER q12h standing x 3-5 days for mucolytic effect  - trend CXR daily  - pt would also benefit from aggressive mobilization OOBTC daily and frequent ambulation for pulmonary hygiene  - if not improving with airway clearance, he would benefit from outpatient referral to interventional pulmonary for potential bronch w/ biopsy as outpatient

## 2025-06-11 NOTE — DIETITIAN INITIAL EVALUATION ADULT - PROBLEM SELECTOR PLAN 3
-on ED CT non con done showing Occluded distal left main bronchus with nearly complete left upper lobe atelectasis and thick-walled left lower lobe bronchi and impacted small airways.   -patient comfortable on RA with no resp distress   -will do duonebs   -chest PT  -pulm consult

## 2025-06-11 NOTE — BH CONSULTATION LIAISON ASSESSMENT NOTE - NSICDXBHSECONDARYDX_PSY_ALL_CORE
Hyponatremia   E87.1  History of benzodiazepine use   Z87.898  HTN (hypertension)   I10  HLD (hyperlipidemia)   E78.5  PAD (peripheral artery disease)   I73.9  MELINDA (acute kidney injury)   N17.9  Prophylactic measure   Z29.9  Abnormal chest CT   R93.89

## 2025-06-11 NOTE — DIETITIAN INITIAL EVALUATION ADULT - ORAL INTAKE PTA/DIET HISTORY
Pt reported no new food allergies or intolerances. Pt did not take any vitamins or supplements PTA. Pt's intake was decreased x 3-4 PTA. Reported UBW: 180 lbs, no recent significant weight changes. HIE: 175 lbs - 11/13/124  No recent episodes of nausea, vomiting, diarrhea or constipation per pt. Last BM noted on 6/8 per pt. Denies any chewing/swallowing difficulties with current diet consistency. Intake is % per pt, has improved since admission. Food preferences explored and forwarded to dietary. Hyponatremia (125).

## 2025-06-11 NOTE — PATIENT PROFILE ADULT - FALL HARM RISK - HARM RISK INTERVENTIONS
Assistance with ambulation/Assistance OOB with selected safe patient handling equipment/Communicate Risk of Fall with Harm to all staff/Discuss with provider need for PT consult/Monitor for mental status changes/Monitor gait and stability/Provide patient with walking aids - walker, cane, crutches/Reinforce activity limits and safety measures with patient and family/Reorient to person, place and time as needed/Review medications for side effects contributing to fall risk/Sit up slowly, dangle for a short time, stand at bedside before walking/Tailored Fall Risk Interventions/Use of alarms - bed, chair and/or voice tab/Visual Cue: Yellow wristband and red socks/Bed in lowest position, wheels locked, appropriate side rails in place/Call bell, personal items and telephone in reach/Instruct patient to call for assistance before getting out of bed or chair/Non-slip footwear when patient is out of bed/[place] to call system/Physically safe environment - no spills, clutter or unnecessary equipment/Purposeful Proactive Rounding/Room/bathroom lighting operational, light cord in reach

## 2025-06-11 NOTE — BH CONSULTATION LIAISON ASSESSMENT NOTE - NSICDXPASTSURGICALHX_GEN_ALL_CORE_FT
PAST SURGICAL HISTORY:  H/O endarterectomy Right Femoral, 2007    H/O shoulder surgery left    S/P femoral-tibial bypass [id number] as per pt

## 2025-06-11 NOTE — CONSULT NOTE ADULT - SUBJECTIVE AND OBJECTIVE BOX
PULMONARY SERVICE INITIAL CONSULT NOTE    HPI:  84M PMH HTN, HLD, PAD that comes for generalized malaise. Patient endorses that for the past week or so has been having worsening anxiety that has been assoicated with symptoms such as anxiety, depression, muscle cramps, decreased PO intake, SOB, and decreased sleep. Patient atributes this symptoms to not having his usual clonazepam dose that has been taking it for the past 10 years. Patient takes this medication for depression 1.5 tabs of 1mg before sleep and in the morning and since May 20 has not been able to take meds since his PCP has moved location. Patient went to a new PCP and his new PCP did not want to prescribe it and requiring a psych consult. Patient denies any fevers, chills, CP, N/V/D or any urinary symptoms.     REVIEW OF SYSTEMS:  Constitutional: No fever, weight loss or fatigue  Eyes: No eye pain, visual disturbances, or discharge  ENMT:  No difficulty hearing, tinnitus, vertigo; No sinus or throat pain  Neck: No pain, stiffness or neck swelling  Respiratory: see HPI  Cardiovascular: No chest pain, palpitations, dizziness or leg swelling  Gastrointestinal: No abdominal or epigastric pain. No nausea, vomiting or hematemesis; No diarrhea or constipation. No melena or hematochezia.  Genitourinary: No dysuria, frequency, hematuria or incontinence  Neurological: No headaches, memory loss, loss of strength, numbness or tremors  Skin: No itching, burning, rashes or lesions   Lymph Nodes: No enlarged glands  Endocrine: No heat or cold intolerance; No hair loss  Musculoskeletal: No joint pain or swelling; No muscle, back or extremity pain  Psychiatric: No depression, anxiety, mood swings or difficulty sleeping  Heme/Lymph: No easy bruising or bleeding gums  Allergy and Immunologic: No hives or eczema    PAST MEDICAL & SURGICAL HISTORY:  Pedal edema      Claudication in peripheral vascular disease  on Eliquis once a day for DVT prophylaxis and for LE blood flow as per pt      Anxiety      ED (erectile dysfunction)      DVT (deep venous thrombosis)  11/2013, left      Seasonal allergies      Dyslipidemia      Hypertension      H/O gastroesophageal reflux (GERD)      H/O shoulder surgery  left      H/O endarterectomy  Right Femoral, 2007    S/P femoral-tibial bypass  [phone] as per pt    FAMILY HISTORY:  Denies FHx lung disease M/F    SOCIAL HISTORY:  Smoking Status: [ ] Current, [ ] Former, [X ] Never  Pack Years:    MEDICATIONS:  Pulmonary:  albuterol/ipratropium for Nebulization 3 milliLiter(s) Nebulizer every 6 hours  sodium chloride 3%  Inhalation 4 milliLiter(s) Inhalation every 6 hours    Antimicrobials:    Anticoagulants:  aspirin  chewable 81 milliGRAM(s) Oral daily  rivaroxaban 2.5 milliGRAM(s) Oral two times a day    Onc:    GI/:  senna 2 Tablet(s) Oral at bedtime    Endocrine:  atorvastatin 40 milliGRAM(s) Oral at bedtime    Cardiac:  amLODIPine   Tablet 10 milliGRAM(s) Oral daily  hydrALAZINE 25 milliGRAM(s) Oral every 8 hours    Other Medications:  acetaminophen     Tablet .. 650 milliGRAM(s) Oral every 6 hours PRN  LORazepam     Tablet 0.5 milliGRAM(s) Oral at bedtime PRN  mirtazapine 7.5 milliGRAM(s) Oral at bedtime  ondansetron Injectable 4 milliGRAM(s) IV Push every 8 hours PRN    Allergies    No Known Allergies    Intolerances    Vital Signs Last 24 Hrs  T(C): 36.5 (11 Jun 2025 14:50), Max: 36.9 (10 Sedrick 2025 17:07)  T(F): 97.7 (11 Jun 2025 14:50), Max: 98.4 (10 Sedrick 2025 17:07)  HR: 76 (11 Jun 2025 14:50) (56 - 94)  BP: 158/68 (11 Jun 2025 14:50) (124/69 - 158/68)  BP(mean): 98 (11 Jun 2025 14:50) (94 - 98)  RR: 18 (11 Jun 2025 14:50) (17 - 18)  SpO2: 95% (11 Jun 2025 14:50) (95% - 99%)    Parameters below as of 11 Jun 2025 14:50  Patient On (Oxygen Delivery Method): room air    PHYSICAL EXAM:  Constitutional: well-appearing  Head: NC/AT  EENT: PERRL, anicteric sclera; oropharynx clear, MMM  Neck: supple, no appreciable JVD  Respiratory: CTA B/L; no W/R/R  Cardiovascular: +S1/S2, RRR  Gastrointestinal: soft, NT/ND  Extremities: WWP; no edema, clubbing or cyanosis  Vascular: 2+ radial pulses B/L  Neurological: awake and alert; FABIAN    LABS:  CBC Full  -  ( 11 Jun 2025 07:15 )  WBC Count : 10.34 K/uL  RBC Count : 4.26 M/uL  Hemoglobin : 14.6 g/dL  Hematocrit : 39.6 %  Platelet Count - Automated : 229 K/uL  Mean Cell Volume : 93.0 fl  Mean Cell Hemoglobin : 34.3 pg  Mean Cell Hemoglobin Concentration : 36.9 g/dL  Auto Neutrophil # : x  Auto Lymphocyte # : x  Auto Monocyte # : x  Auto Eosinophil # : x  Auto Basophil # : x  Auto Neutrophil % : x  Auto Lymphocyte % : x  Auto Monocyte % : x  Auto Eosinophil % : x  Auto Basophil % : x    06-11    125[L]  |  92[L]  |  21[H]  ----------------------------<  93  3.0[L]   |  24  |  1.19    Ca    8.9      11 Jun 2025 07:15  Phos  4.1     06-11  Mg     2.1     06-11    TPro  7.7  /  Alb  3.7  /  TBili  1.1  /  DBili  x   /  AST  34  /  ALT  29  /  AlkPhos  70  06-10    PT/INR - ( 10 Sedrick 2025 18:07 )   PT: 15.9 sec;   INR: 1.36 ratio         PTT - ( 10 Sedrick 2025 18:07 )  PTT:35.3 sec      Urinalysis Basic - ( 11 Jun 2025 07:15 )    Color: x / Appearance: x / SG: x / pH: x  Gluc: 93 mg/dL / Ketone: x  / Bili: x / Urobili: x   Blood: x / Protein: x / Nitrite: x   Leuk Esterase: x / RBC: x / WBC x   Sq Epi: x / Non Sq Epi: x / Bacteria: x    RADIOLOGY & ADDITIONAL STUDIES (personally reviewed):  < from: CT Chest No Cont (06.10.25 @ 19:46) >  IMPRESSION:  Occluded distal left main bronchus with nearly complete left upper lobe   atelectasis and thick-walled left lower lobe bronchi and impacted small   airways. No discrete mass identified but limited in the absence of  intravenous contrast.

## 2025-06-11 NOTE — DIETITIAN INITIAL EVALUATION ADULT - PERTINENT MEDS FT
MEDICATIONS  (STANDING):  albuterol/ipratropium for Nebulization 3 milliLiter(s) Nebulizer every 6 hours  amLODIPine   Tablet 10 milliGRAM(s) Oral daily  aspirin  chewable 81 milliGRAM(s) Oral daily  atorvastatin 40 milliGRAM(s) Oral at bedtime  hydrALAZINE 25 milliGRAM(s) Oral every 8 hours  potassium chloride    Tablet ER 40 milliEquivalent(s) Oral every 4 hours  rivaroxaban 2.5 milliGRAM(s) Oral two times a day  senna 2 Tablet(s) Oral at bedtime  sodium chloride 3%  Inhalation 4 milliLiter(s) Inhalation every 6 hours    MEDICATIONS  (PRN):  acetaminophen     Tablet .. 650 milliGRAM(s) Oral every 6 hours PRN Temp greater or equal to 38C (100.4F), Mild Pain (1 - 3)  LORazepam     Tablet 1.5 milliGRAM(s) Oral two times a day PRN Anxiety  ondansetron Injectable 4 milliGRAM(s) IV Push every 8 hours PRN Nausea and/or Vomiting

## 2025-06-11 NOTE — DIETITIAN INITIAL EVALUATION ADULT - PERTINENT LABORATORY DATA
06-11    125[L]  |  92[L]  |  21[H]  ----------------------------<  93  3.0[L]   |  24  |  1.19    Ca    8.9      11 Jun 2025 07:15  Phos  4.1     06-11  Mg     2.1     06-11    TPro  7.7  /  Alb  3.7  /  TBili  1.1  /  DBili  x   /  AST  34  /  ALT  29  /  AlkPhos  70  06-10

## 2025-06-11 NOTE — DIETITIAN INITIAL EVALUATION ADULT - NS FNS DIET ORDER
Diet, Regular:   DASH/TLC {Sodium & Cholesterol Restricted}  1200mL Fluid Restriction ([id number]) (06-10-25 @ 23:56)

## 2025-06-11 NOTE — BH CONSULTATION LIAISON ASSESSMENT NOTE - HPI (INCLUDE ILLNESS QUALITY, SEVERITY, DURATION, TIMING, CONTEXT, MODIFYING FACTORS, ASSOCIATED SIGNS AND SYMPTOMS)
85 y/o M with a hx of anxiety, HTN, LD and PAD, who is admitted due to suspected benzodiazepine withdrawal and hyponatremia. He is consulted for medication management. Patient was found awake, alert and fully oriented. His daughter was on the phone and provided collateral information. Patient reports that around 1-2 weeks ago his new PCP stopped the Klonopin he had been taking for the past decade. Says that after that he started to suffer from tremors, restlessness, malaise and increased anxiety. Reports that eventually he has suffered from continued anxiety, difficulty sleeping, and decreased appetite. Daughter says that the patient sometimes took more than the prescribed 1 mg PO HS, event taking 3-4 mg. Daughter thinks that the patient is also depressed since his wife  several years ago. Patient reports intermittent depression, but denies hopelessness, worthlessness and denies any death wishes, SI, HI or AVH.

## 2025-06-11 NOTE — BH CONSULTATION LIAISON ASSESSMENT NOTE - NSBHCHARTREVIEWLAB_PSY_A_CORE FT
CBC Full  -  ( 11 Jun 2025 07:15 )  WBC Count : 10.34 K/uL  RBC Count : 4.26 M/uL  Hemoglobin : 14.6 g/dL  Hematocrit : 39.6 %  Platelet Count - Automated : 229 K/uL  Mean Cell Volume : 93.0 fl  Mean Cell Hemoglobin : 34.3 pg  Mean Cell Hemoglobin Concentration : 36.9 g/dL  Auto Neutrophil # : x  Auto Lymphocyte # : x  Auto Monocyte # : x  Auto Eosinophil # : x  Auto Basophil # : x  Auto Neutrophil % : x  Auto Lymphocyte % : x  Auto Monocyte % : x  Auto Eosinophil % : x  Auto Basophil % : x  06-11    125[L]  |  92[L]  |  21[H]  ----------------------------<  93  3.0[L]   |  24  |  1.19    Ca    8.9      11 Jun 2025 07:15  Phos  4.1     06-11  Mg     2.1     06-11    TPro  7.7  /  Alb  3.7  /  TBili  1.1  /  DBili  x   /  AST  34  /  ALT  29  /  AlkPhos  70  06-10

## 2025-06-11 NOTE — DIETITIAN INITIAL EVALUATION ADULT - ADD RECOMMEND
1) Continue current diet as medically feasible.  2) Encourage PO intake and honor food preferences as able.  3) Monitor PO intake, labs, weights, BM's, and skin integrity.   4) Recommend ensure QD.

## 2025-06-12 DIAGNOSIS — Z75.8 OTHER PROBLEMS RELATED TO MEDICAL FACILITIES AND OTHER HEALTH CARE: ICD-10-CM

## 2025-06-12 LAB
ANION GAP SERPL CALC-SCNC: 6 MMOL/L — SIGNIFICANT CHANGE UP (ref 5–17)
ANION GAP SERPL CALC-SCNC: 8 MMOL/L — SIGNIFICANT CHANGE UP (ref 5–17)
BUN SERPL-MCNC: 20 MG/DL — HIGH (ref 7–18)
BUN SERPL-MCNC: 20 MG/DL — HIGH (ref 7–18)
CALCIUM SERPL-MCNC: 8.7 MG/DL — SIGNIFICANT CHANGE UP (ref 8.4–10.5)
CALCIUM SERPL-MCNC: 9.1 MG/DL — SIGNIFICANT CHANGE UP (ref 8.4–10.5)
CHLORIDE SERPL-SCNC: 96 MMOL/L — SIGNIFICANT CHANGE UP (ref 96–108)
CHLORIDE SERPL-SCNC: 99 MMOL/L — SIGNIFICANT CHANGE UP (ref 96–108)
CO2 SERPL-SCNC: 25 MMOL/L — SIGNIFICANT CHANGE UP (ref 22–31)
CO2 SERPL-SCNC: 25 MMOL/L — SIGNIFICANT CHANGE UP (ref 22–31)
CREAT SERPL-MCNC: 1.09 MG/DL — SIGNIFICANT CHANGE UP (ref 0.5–1.3)
CREAT SERPL-MCNC: 1.39 MG/DL — HIGH (ref 0.5–1.3)
EGFR: 50 ML/MIN/1.73M2 — LOW
EGFR: 50 ML/MIN/1.73M2 — LOW
EGFR: 67 ML/MIN/1.73M2 — SIGNIFICANT CHANGE UP
EGFR: 67 ML/MIN/1.73M2 — SIGNIFICANT CHANGE UP
GLUCOSE SERPL-MCNC: 94 MG/DL — SIGNIFICANT CHANGE UP (ref 70–99)
GLUCOSE SERPL-MCNC: 97 MG/DL — SIGNIFICANT CHANGE UP (ref 70–99)
HCT VFR BLD CALC: 40.2 % — SIGNIFICANT CHANGE UP (ref 39–50)
HGB BLD-MCNC: 14.8 G/DL — SIGNIFICANT CHANGE UP (ref 13–17)
MAGNESIUM SERPL-MCNC: 2.1 MG/DL — SIGNIFICANT CHANGE UP (ref 1.6–2.6)
MCHC RBC-ENTMCNC: 34.1 PG — HIGH (ref 27–34)
MCHC RBC-ENTMCNC: 36.8 G/DL — HIGH (ref 32–36)
MCV RBC AUTO: 92.6 FL — SIGNIFICANT CHANGE UP (ref 80–100)
NRBC BLD AUTO-RTO: 0 /100 WBCS — SIGNIFICANT CHANGE UP (ref 0–0)
PHOSPHATE SERPL-MCNC: 3.6 MG/DL — SIGNIFICANT CHANGE UP (ref 2.5–4.5)
PLATELET # BLD AUTO: 237 K/UL — SIGNIFICANT CHANGE UP (ref 150–400)
POTASSIUM SERPL-MCNC: 3.5 MMOL/L — SIGNIFICANT CHANGE UP (ref 3.5–5.3)
POTASSIUM SERPL-MCNC: 3.8 MMOL/L — SIGNIFICANT CHANGE UP (ref 3.5–5.3)
POTASSIUM SERPL-SCNC: 3.5 MMOL/L — SIGNIFICANT CHANGE UP (ref 3.5–5.3)
POTASSIUM SERPL-SCNC: 3.8 MMOL/L — SIGNIFICANT CHANGE UP (ref 3.5–5.3)
RBC # BLD: 4.34 M/UL — SIGNIFICANT CHANGE UP (ref 4.2–5.8)
RBC # FLD: 12.1 % — SIGNIFICANT CHANGE UP (ref 10.3–14.5)
SODIUM SERPL-SCNC: 129 MMOL/L — LOW (ref 135–145)
SODIUM SERPL-SCNC: 130 MMOL/L — LOW (ref 135–145)
WBC # BLD: 9.79 K/UL — SIGNIFICANT CHANGE UP (ref 3.8–10.5)
WBC # FLD AUTO: 9.79 K/UL — SIGNIFICANT CHANGE UP (ref 3.8–10.5)

## 2025-06-12 PROCEDURE — 99233 SBSQ HOSP IP/OBS HIGH 50: CPT

## 2025-06-12 PROCEDURE — 99232 SBSQ HOSP IP/OBS MODERATE 35: CPT

## 2025-06-12 RX ORDER — CLONAZEPAM 0.5 MG/1
0.5 TABLET ORAL AT BEDTIME
Refills: 0 | Status: DISCONTINUED | OUTPATIENT
Start: 2025-06-12 | End: 2025-06-13

## 2025-06-12 RX ADMIN — DEXTROMETHORPHAN HBR, GUAIFENESIN 600 MILLIGRAM(S): 200 LIQUID ORAL at 06:23

## 2025-06-12 RX ADMIN — RIVAROXABAN 2.5 MILLIGRAM(S): 10 TABLET, FILM COATED ORAL at 17:34

## 2025-06-12 RX ADMIN — DEXTROMETHORPHAN HBR, GUAIFENESIN 600 MILLIGRAM(S): 200 LIQUID ORAL at 17:30

## 2025-06-12 RX ADMIN — IPRATROPIUM BROMIDE AND ALBUTEROL SULFATE 3 MILLILITER(S): .5; 2.5 SOLUTION RESPIRATORY (INHALATION) at 14:43

## 2025-06-12 RX ADMIN — Medication 25 MILLIGRAM(S): at 21:39

## 2025-06-12 RX ADMIN — Medication 4 MILLILITER(S): at 20:13

## 2025-06-12 RX ADMIN — Medication 2 TABLET(S): at 21:39

## 2025-06-12 RX ADMIN — ATORVASTATIN CALCIUM 40 MILLIGRAM(S): 80 TABLET, FILM COATED ORAL at 21:40

## 2025-06-12 RX ADMIN — RIVAROXABAN 2.5 MILLIGRAM(S): 10 TABLET, FILM COATED ORAL at 06:23

## 2025-06-12 RX ADMIN — Medication 81 MILLIGRAM(S): at 16:10

## 2025-06-12 RX ADMIN — IPRATROPIUM BROMIDE AND ALBUTEROL SULFATE 3 MILLILITER(S): .5; 2.5 SOLUTION RESPIRATORY (INHALATION) at 02:43

## 2025-06-12 RX ADMIN — Medication 4 MILLILITER(S): at 14:43

## 2025-06-12 RX ADMIN — MIRTAZAPINE 7.5 MILLIGRAM(S): 30 TABLET, FILM COATED ORAL at 21:41

## 2025-06-12 RX ADMIN — IPRATROPIUM BROMIDE AND ALBUTEROL SULFATE 3 MILLILITER(S): .5; 2.5 SOLUTION RESPIRATORY (INHALATION) at 09:33

## 2025-06-12 RX ADMIN — Medication 25 MILLIGRAM(S): at 06:22

## 2025-06-12 RX ADMIN — IPRATROPIUM BROMIDE AND ALBUTEROL SULFATE 3 MILLILITER(S): .5; 2.5 SOLUTION RESPIRATORY (INHALATION) at 20:12

## 2025-06-12 RX ADMIN — Medication 25 MILLIGRAM(S): at 16:10

## 2025-06-12 RX ADMIN — CLONAZEPAM 0.5 MILLIGRAM(S): 0.5 TABLET ORAL at 21:44

## 2025-06-12 RX ADMIN — Medication 4 MILLILITER(S): at 02:43

## 2025-06-12 RX ADMIN — AMLODIPINE BESYLATE 10 MILLIGRAM(S): 10 TABLET ORAL at 06:23

## 2025-06-12 RX ADMIN — Medication 4 MILLILITER(S): at 09:33

## 2025-06-12 NOTE — PROGRESS NOTE ADULT - PROBLEM SELECTOR PLAN 3
CT noted above  Duonebs  Hypertonic saline  Chest vest  Daily CXR  Consider CT with con if no improvement on 6/13 CXR  If not improving with airway clearance, he would benefit from outpatient referral to interventional pulmonary for potential bronch w/ biopsy as outpatient  Dr. Lozoya, Pulm, following
CT noted above  Dr. Lozoya Pulm, consulted

## 2025-06-12 NOTE — PROGRESS NOTE ADULT - ASSESSMENT
Patient seen and examined at bedside this morning. He notes feeling better after sleeping today. He continues to deny any cough, fever, chills, dyspnea.   Am labs reviewed, BP stable, afebrile, noted Na 129. Cr 1.09. AM CXR reviewed with white out noted on L side.   PE – elderly male, NAD, nontoxic appearing, calm and comfortable, RRR, +s1/s2, lungs with less breath sounds on L, abd soft ntnd, extremities wwp, no edema     A/P   83yo M with pmhx anxiety, HTN, HLD, DVT (11/2013), peripheral arterial disease on Xarelto s/p L fem to PT arterial bypass with GSV, L CFA endarterectomy, L profunda femoral endarterectomy with hemashield patch angioplasty ([facility], 11/2015), L profunda to PTA PTFE bypass, L profunda endarterectomy, L PT endarterectomy ([facility] 05/2019), reop R femoral to L femoral bypass with PTFE graft, right profunda femoral endarterectomy ([facility], 05/2021), L ax-fem bypass (12/2024) who presented due to shortness of breath, tremulousness, nausea, and body aches after not taking his home medication Clonazepam for a week. CT Chest with left occluded bronchus.   #Occluded distal left main bronchus   #Hyponatremia   #Benzodiazepine Use   #Hx of anxiety, HTN, HLD, DVT (11/2013), peripheral arterial disease on Xarelto s/p bypass   #MELINDA – resolved   #Hypokalemia     -Consulted Pulmonology, discussed with Dr Lozoya, will use chest vest today, c/w nebs, and reassess AM cxr, if still showing no aeration on L will obtain CT chest IV con  -c/w trial of DuoNebs, monitor sats, currently stable on room air   -noted with hyponatremia, with elevated Urine Sodium and Urine Osmolality suggestive of SIADH, trend BMP, repeat at 3pm   -continue with fluid restriction   -Avoid increasing Na, no more than 6-8 mEq/L/day   -replete K   -iSTOP reviewed, patient has been on Clonazepam since at least 7/2024 (he notes that he had been on it for 10y). Last prescription was Clonazepam 1mg 120 pills for 30 days dispensed on 4/21/2025 (prescribed by Dr. Greg Lynch), per patient last dose prior was on 5/20/2025   -psych consulted/Dr Pino, per discussion will have klonopin 0.5mg qhs, and add remeron 7.5mg qhs   -continue home medications Amlodipine, Atorvastatin, Hydralazine, Xarelto, Aspirin   -DASH diet   -DVT PPx: Xarelto; IMPROVE Score: 4 pts.      
85yo man w/ trivial smoking hx as teenager who was admitted for management of benzodiazepine withdrawal and incidentally noted on admission CT chest w/o contrast to have occluded distal LMS bronchus and JOSH atelectasis possibly from ?mucus impaction as no discrete mass was identified. Asked to evaluate for further management recommendations.    #Mucus impacted airways - suspected  #Atelectasis    Recommendations:  - agree with trial of airway clearance regimen:  --> duonebs q4-6h standing ATC  --> sodium chloride 3% nebs q6h timed with duoneb  --> chest PT  --> please ensure Chest Vest therapy initiated today via respiratory therapy to also assist in expectoration of potential occluded airways  - trial of guaifenesin 600-1200mg ER q12h standing x 3-5 days for mucolytic effect  - check CT chest w/ IVC tomorrow 6/13  - pt would also benefit from aggressive mobilization OOBTC daily and frequent ambulation for pulmonary hygiene  - if not improving with airway clearance, he would benefit from outpatient referral to interventional pulmonary for potential bronch w/ biopsy as outpatient
84M PMH HTN, HLD, PAD that comes for generalized malaise. Patient to be admitted for benzo withdrawal and hyponatremia
84M PMH HTN, HLD, PAD that comes for generalized malaise. Patient to be admitted for benzo withdrawal and hyponatremia

## 2025-06-12 NOTE — PROGRESS NOTE ADULT - PROBLEM SELECTOR PLAN 5
Controlled   Takes Amlodipine and Hydralazine at home  Continue home regimen Monitor BP  DASH diet   Monitor BP
Controlled   Takes Amlodipine and Hydralazine at home  Continue home regimen Monitor BP  DASH diet   Monitor BP

## 2025-06-12 NOTE — PROGRESS NOTE ADULT - PROBLEM SELECTOR PLAN 2
Na 125  ISO poor PO intake vs SIADH secondary to benzo withdrawal  Avoid rapid correction  Trend BMP
Na 129 (uptrending)  ISO poor PO intake vs SIADH secondary to benzo withdrawal  Avoid rapid correction  Trend BMP

## 2025-06-12 NOTE — PROGRESS NOTE ADULT - SUBJECTIVE AND OBJECTIVE BOX
Vital Signs Last 24 Hrs  T(C): 36.7 (12 Jun 2025 12:46), Max: 36.7 (11 Jun 2025 20:39)  T(F): 98 (12 Jun 2025 12:46), Max: 98.1 (11 Jun 2025 20:39)  HR: 73 (12 Jun 2025 12:46) (71 - 77)  BP: 113/69 (12 Jun 2025 12:46) (113/69 - 171/81)  BP(mean): 114 (12 Jun 2025 05:33) (114 - 114)  RR: 18 (12 Jun 2025 12:46) (18 - 18)  SpO2: 98% (12 Jun 2025 12:46) (97% - 98%)    Parameters below as of 12 Jun 2025 12:46  Patient On (Oxygen Delivery Method): room air                          14.8   9.79  )-----------( 237      ( 12 Jun 2025 05:55 )             40.2     06-12    129[L]  |  96  |  20[H]  ----------------------------<  97  3.5   |  25  |  1.09    Ca    9.1      12 Jun 2025 05:55  Phos  3.6     06-12  Mg     2.1     06-12    TPro  7.7  /  Alb  3.7  /  TBili  1.1  /  DBili  x   /  AST  34  /  ALT  29  /  AlkPhos  70  06-10    MEDICATIONS  (STANDING):  albuterol/ipratropium for Nebulization 3 milliLiter(s) Nebulizer every 6 hours  amLODIPine   Tablet 10 milliGRAM(s) Oral daily  aspirin  chewable 81 milliGRAM(s) Oral daily  atorvastatin 40 milliGRAM(s) Oral at bedtime  guaiFENesin  milliGRAM(s) Oral every 12 hours  hydrALAZINE 25 milliGRAM(s) Oral every 8 hours  mirtazapine 7.5 milliGRAM(s) Oral at bedtime  rivaroxaban 2.5 milliGRAM(s) Oral two times a day  senna 2 Tablet(s) Oral at bedtime  sodium chloride 3%  Inhalation 4 milliLiter(s) Inhalation every 6 hours    MEDICATIONS  (PRN):  acetaminophen     Tablet .. 650 milliGRAM(s) Oral every 6 hours PRN Temp greater or equal to 38C (100.4F), Mild Pain (1 - 3)  clonazePAM  Tablet 0.5 milliGRAM(s) Oral at bedtime PRN anxiety  ondansetron Injectable 4 milliGRAM(s) IV Push every 8 hours PRN Nausea and/or Vomiting  
PULMONARY CONSULT SERVICE FOLLOW-UP NOTE    INTERVAL HPI:  Reviewed chart and overnight events; patient seen and examined at bedside.    MEDICATIONS:  Pulmonary:  guaiFENesin  milliGRAM(s) Oral every 12 hours  sodium chloride 3%  Inhalation 4 milliLiter(s) Inhalation every 6 hours    Antimicrobials:    Anticoagulants:  aspirin  chewable 81 milliGRAM(s) Oral daily  rivaroxaban 2.5 milliGRAM(s) Oral two times a day    Cardiac:  amLODIPine   Tablet 10 milliGRAM(s) Oral daily  hydrALAZINE 25 milliGRAM(s) Oral every 8 hours    Allergies    No Known Allergies    Intolerances    Vital Signs Last 24 Hrs  T(C): 36.8 (12 Jun 2025 20:38), Max: 36.8 (12 Jun 2025 20:38)  T(F): 98.3 (12 Jun 2025 20:38), Max: 98.3 (12 Jun 2025 20:38)  HR: 69 (12 Jun 2025 20:38) (69 - 73)  BP: 131/71 (12 Jun 2025 20:38) (113/69 - 171/81)  BP(mean): 114 (12 Jun 2025 05:33) (114 - 114)  RR: 18 (12 Jun 2025 20:38) (18 - 18)  SpO2: 99% (12 Jun 2025 20:38) (98% - 99%)    Parameters below as of 12 Jun 2025 20:38  Patient On (Oxygen Delivery Method): room air    PHYSICAL EXAM:  Constitutional: non-toxic appearing man in bed; NAD  HEENT: atraumatic; PERRL, anicteric sclera; MMM  Neck: supple  Cardiovascular: +S1/S2, RRR  Respiratory: diminished BS in left mid/upper field, remaining fields CTAB  Gastrointestinal: soft, NT/ND  Extremities: WWP; no edema, clubbing or cyanosis  Vascular: 2+ radial pulses B/L  Neurological: awake and alert; FABIAN    LABS:  CBC Full  -  ( 12 Jun 2025 05:55 )  WBC Count : 9.79 K/uL  RBC Count : 4.34 M/uL  Hemoglobin : 14.8 g/dL  Hematocrit : 40.2 %  Platelet Count - Automated : 237 K/uL  Mean Cell Volume : 92.6 fl  Mean Cell Hemoglobin : 34.1 pg  Mean Cell Hemoglobin Concentration : 36.8 g/dL  Auto Neutrophil # : x  Auto Lymphocyte # : x  Auto Monocyte # : x  Auto Eosinophil # : x  Auto Basophil # : x  Auto Neutrophil % : x  Auto Lymphocyte % : x  Auto Monocyte % : x  Auto Eosinophil % : x  Auto Basophil % : x    06-12    130[L]  |  99  |  20[H]  ----------------------------<  94  3.8   |  25  |  1.39[H]    Ca    8.7      12 Jun 2025 16:15  Phos  3.6     06-12  Mg     2.1     06-12    Urinalysis Basic - ( 12 Jun 2025 16:15 )    Color: x / Appearance: x / SG: x / pH: x  Gluc: 94 mg/dL / Ketone: x  / Bili: x / Urobili: x   Blood: x / Protein: x / Nitrite: x   Leuk Esterase: x / RBC: x / WBC x   Sq Epi: x / Non Sq Epi: x / Bacteria: x    RADIOLOGY & ADDITIONAL STUDIES:  CXR AP 1-view 6/12 AM personally reviewed - minimally unchanged
HPI:  84M PMH HTN, HLD, PAD that comes for generalized malaise. Patient endorses that for the past week or so has been having worsening anxiety that has been assoicated with symptoms such as anxiety, depression, muscle cramps, decreased PO intake, SOB, and decreased sleep. Patient atributes this symptoms to not having his usual clonazepam dose that has been taking it for the past 10 years. Patient takes this medication for depression 1.5 tabs of 1mg before sleep and in the morning and since May 20 has not been able to take meds since his PCP has moved location. Patient went to a new PCP and his new PCP did not want to prescribe it and requiring a psych consult. Patient denies any fevers, chills, CP, N/V/D or any urinary symptoms.  (10 Sedrick 2025 22:30)      OVERNIGHT EVENTS:    No new overnight events.  Seen and examined at bedside.     REVIEW OF SYSTEMS:      EYES: no acute visual disturbances  NECK: No pain or stiffness  RESPIRATORY: No cough; No shortness of breath  CARDIOVASCULAR: No chest pain, no palpitations  GASTROINTESTINAL: No pain. No nausea, vomiting or diarrhea   NEUROLOGICAL: No headache or numbness, no tremors  MUSCULOSKELETAL: No joint pain, no muscle pain  GENITOURINARY: no dysuria, no frequency, no hesitancy  PSYCHIATRY: no depression, no anxiety  ALL OTHER  ROS negative        Vital Signs Last 24 Hrs  T(C): 36.7 (12 Jun 2025 12:46), Max: 36.7 (11 Jun 2025 20:39)  T(F): 98 (12 Jun 2025 12:46), Max: 98.1 (11 Jun 2025 20:39)  HR: 73 (12 Jun 2025 12:46) (71 - 77)  BP: 113/69 (12 Jun 2025 12:46) (113/69 - 171/81)  BP(mean): 114 (12 Jun 2025 05:33) (114 - 114)  RR: 18 (12 Jun 2025 12:46) (18 - 18)  SpO2: 98% (12 Jun 2025 12:46) (97% - 98%)    Parameters below as of 12 Jun 2025 12:46  Patient On (Oxygen Delivery Method): room air        ________________________________________________  PHYSICAL EXAM:    GENERAL: NAD  HEENT: Normocephalic; conjunctivae and sclerae clear;  NECK : supple, no JVD  CHEST/LUNG: Clear to auscultation; Nonlabored  HEART: S1 S2  regular  ABDOMEN: Soft, Nontender, Nondistended; Bowel sounds present  EXTREMITIES: no cyanosis; no LE edema; no calf tenderness  NERVOUS SYSTEM:  Alert; no new deficits  SKIN: warm and dry; No new rashes or lesions    _________________________________________________  CURRENT MEDICATIONS:    MEDICATIONS  (STANDING):  albuterol/ipratropium for Nebulization 3 milliLiter(s) Nebulizer every 6 hours  amLODIPine   Tablet 10 milliGRAM(s) Oral daily  aspirin  chewable 81 milliGRAM(s) Oral daily  atorvastatin 40 milliGRAM(s) Oral at bedtime  guaiFENesin  milliGRAM(s) Oral every 12 hours  hydrALAZINE 25 milliGRAM(s) Oral every 8 hours  mirtazapine 7.5 milliGRAM(s) Oral at bedtime  rivaroxaban 2.5 milliGRAM(s) Oral two times a day  senna 2 Tablet(s) Oral at bedtime  sodium chloride 3%  Inhalation 4 milliLiter(s) Inhalation every 6 hours    MEDICATIONS  (PRN):  acetaminophen     Tablet .. 650 milliGRAM(s) Oral every 6 hours PRN Temp greater or equal to 38C (100.4F), Mild Pain (1 - 3)  clonazePAM  Tablet 0.5 milliGRAM(s) Oral at bedtime PRN anxiety  ondansetron Injectable 4 milliGRAM(s) IV Push every 8 hours PRN Nausea and/or Vomiting      __________________________________________________  LABS:                          14.8   9.79  )-----------( 237      ( 12 Jun 2025 05:55 )             40.2     06-12    129[L]  |  96  |  20[H]  ----------------------------<  97  3.5   |  25  |  1.09    Ca    9.1      12 Jun 2025 05:55  Phos  3.6     06-12  Mg     2.1     06-12    TPro  7.7  /  Alb  3.7  /  TBili  1.1  /  DBili  x   /  AST  34  /  ALT  29  /  AlkPhos  70  06-10    PT/INR - ( 10 Sedrick 2025 18:07 )   PT: 15.9 sec;   INR: 1.36 ratio         PTT - ( 10 Sedrick 2025 18:07 )  PTT:35.3 sec  Urinalysis Basic - ( 12 Jun 2025 05:55 )    Color: x / Appearance: x / SG: x / pH: x  Gluc: 97 mg/dL / Ketone: x  / Bili: x / Urobili: x   Blood: x / Protein: x / Nitrite: x   Leuk Esterase: x / RBC: x / WBC x   Sq Epi: x / Non Sq Epi: x / Bacteria: x      CAPILLARY BLOOD GLUCOSE          __________________________________________________  RADIOLOGY & ADDITIONAL TESTS:    Imaging Personally Reviewed:  YES    < from: CT Chest No Cont (06.10.25 @ 19:46) >  IMPRESSION:  Occluded distal left main bronchus with nearly complete left upper lobe   atelectasis and thick-walled left lower lobe bronchi and impacted small   airways. No discrete mass identified but limited in the absence of  intravenous contrast. Suggest pulmonary consultation.    < end of copied text >    Consultant(s) Notes Reviewed:   YES     Plan of care was discussed with patient and /or primary care giver; all questions and concerns were addressed and care was aligned with patient's wishes.    Plan discussed with attending and consulting physicians.  
HPI:  84M PMH HTN, HLD, PAD that comes for generalized malaise. Patient endorses that for the past week or so has been having worsening anxiety that has been assoicated with symptoms such as anxiety, depression, muscle cramps, decreased PO intake, SOB, and decreased sleep. Patient atributes this symptoms to not having his usual clonazepam dose that has been taking it for the past 10 years. Patient takes this medication for depression 1.5 tabs of 1mg before sleep and in the morning and since May 20 has not been able to take meds since his PCP has moved location. Patient went to a new PCP and his new PCP did not want to prescribe it and requiring a psych consult. Patient denies any fevers, chills, CP, N/V/D or any urinary symptoms.  (10 Sedrick 2025 22:30)      OVERNIGHT EVENTS:    No new overnight events.  Seen and examined at bedside.     REVIEW OF SYSTEMS:      EYES: no acute visual disturbances  NECK: No pain or stiffness  RESPIRATORY: No cough; No shortness of breath  CARDIOVASCULAR: No chest pain, no palpitations  GASTROINTESTINAL: No pain. No nausea, vomiting or diarrhea   NEUROLOGICAL: No headache or numbness, no tremors  MUSCULOSKELETAL: No joint pain, no muscle pain  GENITOURINARY: no dysuria, no frequency, no hesitancy  PSYCHIATRY: no depression, no anxiety  ALL OTHER  ROS negative        Vital Signs Last 24 Hrs  T(C): 36.9 (11 Jun 2025 05:31), Max: 36.9 (10 Sedrick 2025 17:07)  T(F): 98.4 (11 Jun 2025 05:31), Max: 98.4 (10 Sedrick 2025 17:07)  HR: 88 (11 Jun 2025 05:31) (56 - 94)  BP: 128/78 (11 Jun 2025 05:31) (124/69 - 147/77)  BP(mean): 94 (11 Jun 2025 05:31) (94 - 94)  RR: 18 (11 Jun 2025 05:31) (17 - 18)  SpO2: 99% (11 Jun 2025 05:31) (96% - 99%)    Parameters below as of 11 Jun 2025 05:31  Patient On (Oxygen Delivery Method): room air        ________________________________________________  PHYSICAL EXAM:    GENERAL: NAD  HEENT: Normocephalic; conjunctivae and sclerae clear;  NECK : supple, no JVD  CHEST/LUNG: Clear to auscultation; Nonlabored  HEART: S1 S2  regular  ABDOMEN: Soft, Nontender, Nondistended; Bowel sounds present  EXTREMITIES: no cyanosis; no LE edema; no calf tenderness  NERVOUS SYSTEM:  Alert; no new deficits  SKIN: warm and dry; No new rashes or lesions    _________________________________________________  CURRENT MEDICATIONS:    MEDICATIONS  (STANDING):  albuterol/ipratropium for Nebulization 3 milliLiter(s) Nebulizer every 6 hours  amLODIPine   Tablet 10 milliGRAM(s) Oral daily  aspirin  chewable 81 milliGRAM(s) Oral daily  atorvastatin 40 milliGRAM(s) Oral at bedtime  hydrALAZINE 25 milliGRAM(s) Oral every 8 hours  potassium chloride    Tablet ER 40 milliEquivalent(s) Oral every 4 hours  rivaroxaban 2.5 milliGRAM(s) Oral two times a day  senna 2 Tablet(s) Oral at bedtime  sodium chloride 3%  Inhalation 4 milliLiter(s) Inhalation every 6 hours    MEDICATIONS  (PRN):  acetaminophen     Tablet .. 650 milliGRAM(s) Oral every 6 hours PRN Temp greater or equal to 38C (100.4F), Mild Pain (1 - 3)  LORazepam     Tablet 1.5 milliGRAM(s) Oral two times a day PRN Anxiety  ondansetron Injectable 4 milliGRAM(s) IV Push every 8 hours PRN Nausea and/or Vomiting      __________________________________________________  LABS:                          14.6   10.34 )-----------( 229      ( 11 Jun 2025 07:15 )             39.6     06-11    125[L]  |  92[L]  |  21[H]  ----------------------------<  93  3.0[L]   |  24  |  1.19    Ca    8.9      11 Jun 2025 07:15  Phos  4.1     06-11  Mg     2.1     06-11    TPro  7.7  /  Alb  3.7  /  TBili  1.1  /  DBili  x   /  AST  34  /  ALT  29  /  AlkPhos  70  06-10    PT/INR - ( 10 Sedrick 2025 18:07 )   PT: 15.9 sec;   INR: 1.36 ratio         PTT - ( 10 Sedrick 2025 18:07 )  PTT:35.3 sec  Urinalysis Basic - ( 11 Jun 2025 07:15 )    Color: x / Appearance: x / SG: x / pH: x  Gluc: 93 mg/dL / Ketone: x  / Bili: x / Urobili: x   Blood: x / Protein: x / Nitrite: x   Leuk Esterase: x / RBC: x / WBC x   Sq Epi: x / Non Sq Epi: x / Bacteria: x      CAPILLARY BLOOD GLUCOSE          __________________________________________________  RADIOLOGY & ADDITIONAL TESTS:    Imaging Personally Reviewed:  YES    < from: CT Chest No Cont (06.10.25 @ 19:46) >  IMPRESSION:  Occluded distal left main bronchus with nearly complete left upper lobe   atelectasis and thick-walled left lower lobe bronchi and impacted small   airways. No discrete mass identified but limited in the absence of  intravenous contrast. Suggest pulmonary consultation.    < end of copied text >    Consultant(s) Notes Reviewed:   YES     Plan of care was discussed with patient and /or primary care giver; all questions and concerns were addressed and care was aligned with patient's wishes.    Plan discussed with attending and consulting physicians.

## 2025-06-12 NOTE — PROGRESS NOTE ADULT - PROBLEM SELECTOR PLAN 7
Takes AC, asa and statin at home  Continue home regimen
Takes AC, asa and statin at home  Continue home regimen

## 2025-06-12 NOTE — PROGRESS NOTE ADULT - PROBLEM SELECTOR PLAN 1
Continue Clonopin taper  Dr. Pino, Psych, following  Recommendations appreciated
Continue Clonopin  Dr. Pino, Psych, consulted

## 2025-06-13 ENCOUNTER — TRANSCRIPTION ENCOUNTER (OUTPATIENT)
Age: 85
End: 2025-06-13

## 2025-06-13 VITALS
TEMPERATURE: 97 F | RESPIRATION RATE: 18 BRPM | OXYGEN SATURATION: 96 % | SYSTOLIC BLOOD PRESSURE: 156 MMHG | DIASTOLIC BLOOD PRESSURE: 70 MMHG | HEART RATE: 74 BPM

## 2025-06-13 LAB
ANION GAP SERPL CALC-SCNC: 8 MMOL/L — SIGNIFICANT CHANGE UP (ref 5–17)
BUN SERPL-MCNC: 20 MG/DL — HIGH (ref 7–18)
CALCIUM SERPL-MCNC: 8.7 MG/DL — SIGNIFICANT CHANGE UP (ref 8.4–10.5)
CHLORIDE SERPL-SCNC: 99 MMOL/L — SIGNIFICANT CHANGE UP (ref 96–108)
CO2 SERPL-SCNC: 23 MMOL/L — SIGNIFICANT CHANGE UP (ref 22–31)
CREAT SERPL-MCNC: 1.21 MG/DL — SIGNIFICANT CHANGE UP (ref 0.5–1.3)
EGFR: 59 ML/MIN/1.73M2 — LOW
EGFR: 59 ML/MIN/1.73M2 — LOW
GLUCOSE SERPL-MCNC: 107 MG/DL — HIGH (ref 70–99)
HCT VFR BLD CALC: 37.8 % — LOW (ref 39–50)
HGB BLD-MCNC: 13.6 G/DL — SIGNIFICANT CHANGE UP (ref 13–17)
MAGNESIUM SERPL-MCNC: 2 MG/DL — SIGNIFICANT CHANGE UP (ref 1.6–2.6)
MCHC RBC-ENTMCNC: 33.9 PG — SIGNIFICANT CHANGE UP (ref 27–34)
MCHC RBC-ENTMCNC: 36 G/DL — SIGNIFICANT CHANGE UP (ref 32–36)
MCV RBC AUTO: 94.3 FL — SIGNIFICANT CHANGE UP (ref 80–100)
NRBC BLD AUTO-RTO: 0 /100 WBCS — SIGNIFICANT CHANGE UP (ref 0–0)
PHOSPHATE SERPL-MCNC: 3.5 MG/DL — SIGNIFICANT CHANGE UP (ref 2.5–4.5)
PLATELET # BLD AUTO: 227 K/UL — SIGNIFICANT CHANGE UP (ref 150–400)
POTASSIUM SERPL-MCNC: 3.5 MMOL/L — SIGNIFICANT CHANGE UP (ref 3.5–5.3)
POTASSIUM SERPL-SCNC: 3.5 MMOL/L — SIGNIFICANT CHANGE UP (ref 3.5–5.3)
RBC # BLD: 4.01 M/UL — LOW (ref 4.2–5.8)
RBC # FLD: 12.5 % — SIGNIFICANT CHANGE UP (ref 10.3–14.5)
SODIUM SERPL-SCNC: 130 MMOL/L — LOW (ref 135–145)
WBC # BLD: 9.32 K/UL — SIGNIFICANT CHANGE UP (ref 3.8–10.5)
WBC # FLD AUTO: 9.32 K/UL — SIGNIFICANT CHANGE UP (ref 3.8–10.5)

## 2025-06-13 PROCEDURE — 80307 DRUG TEST PRSMV CHEM ANLYZR: CPT

## 2025-06-13 PROCEDURE — 36415 COLL VENOUS BLD VENIPUNCTURE: CPT

## 2025-06-13 PROCEDURE — 85025 COMPLETE CBC W/AUTO DIFF WBC: CPT

## 2025-06-13 PROCEDURE — 83930 ASSAY OF BLOOD OSMOLALITY: CPT

## 2025-06-13 PROCEDURE — 99232 SBSQ HOSP IP/OBS MODERATE 35: CPT

## 2025-06-13 PROCEDURE — 71260 CT THORAX DX C+: CPT | Mod: 26

## 2025-06-13 PROCEDURE — 84484 ASSAY OF TROPONIN QUANT: CPT

## 2025-06-13 PROCEDURE — 83935 ASSAY OF URINE OSMOLALITY: CPT

## 2025-06-13 PROCEDURE — 94640 AIRWAY INHALATION TREATMENT: CPT

## 2025-06-13 PROCEDURE — 93005 ELECTROCARDIOGRAM TRACING: CPT

## 2025-06-13 PROCEDURE — 71260 CT THORAX DX C+: CPT

## 2025-06-13 PROCEDURE — 80053 COMPREHEN METABOLIC PANEL: CPT

## 2025-06-13 PROCEDURE — 71045 X-RAY EXAM CHEST 1 VIEW: CPT

## 2025-06-13 PROCEDURE — 85610 PROTHROMBIN TIME: CPT

## 2025-06-13 PROCEDURE — 84100 ASSAY OF PHOSPHORUS: CPT

## 2025-06-13 PROCEDURE — 83735 ASSAY OF MAGNESIUM: CPT

## 2025-06-13 PROCEDURE — 71250 CT THORAX DX C-: CPT

## 2025-06-13 PROCEDURE — 99239 HOSP IP/OBS DSCHRG MGMT >30: CPT

## 2025-06-13 PROCEDURE — 71045 X-RAY EXAM CHEST 1 VIEW: CPT | Mod: 26,76

## 2025-06-13 PROCEDURE — 82570 ASSAY OF URINE CREATININE: CPT

## 2025-06-13 PROCEDURE — 99285 EMERGENCY DEPT VISIT HI MDM: CPT | Mod: 25

## 2025-06-13 PROCEDURE — 85730 THROMBOPLASTIN TIME PARTIAL: CPT

## 2025-06-13 PROCEDURE — 85027 COMPLETE CBC AUTOMATED: CPT

## 2025-06-13 PROCEDURE — 80048 BASIC METABOLIC PNL TOTAL CA: CPT

## 2025-06-13 PROCEDURE — 84300 ASSAY OF URINE SODIUM: CPT

## 2025-06-13 PROCEDURE — 83880 ASSAY OF NATRIURETIC PEPTIDE: CPT

## 2025-06-13 RX ORDER — CLONAZEPAM 0.5 MG/1
1 TABLET ORAL
Qty: 30 | Refills: 0
Start: 2025-06-13 | End: 2025-07-12

## 2025-06-13 RX ORDER — MIRTAZAPINE 30 MG/1
1 TABLET, FILM COATED ORAL
Qty: 30 | Refills: 0 | DISCHARGE
Start: 2025-06-13 | End: 2025-07-12

## 2025-06-13 RX ORDER — MIRTAZAPINE 30 MG/1
1 TABLET, FILM COATED ORAL
Qty: 30 | Refills: 0
Start: 2025-06-13 | End: 2025-07-12

## 2025-06-13 RX ADMIN — RIVAROXABAN 2.5 MILLIGRAM(S): 10 TABLET, FILM COATED ORAL at 17:40

## 2025-06-13 RX ADMIN — Medication 81 MILLIGRAM(S): at 11:39

## 2025-06-13 RX ADMIN — Medication 25 MILLIGRAM(S): at 05:32

## 2025-06-13 RX ADMIN — Medication 4 MILLILITER(S): at 08:44

## 2025-06-13 RX ADMIN — DEXTROMETHORPHAN HBR, GUAIFENESIN 600 MILLIGRAM(S): 200 LIQUID ORAL at 17:41

## 2025-06-13 RX ADMIN — DEXTROMETHORPHAN HBR, GUAIFENESIN 600 MILLIGRAM(S): 200 LIQUID ORAL at 05:31

## 2025-06-13 RX ADMIN — AMLODIPINE BESYLATE 10 MILLIGRAM(S): 10 TABLET ORAL at 05:31

## 2025-06-13 RX ADMIN — Medication 25 MILLIGRAM(S): at 13:26

## 2025-06-13 RX ADMIN — RIVAROXABAN 2.5 MILLIGRAM(S): 10 TABLET, FILM COATED ORAL at 05:31

## 2025-06-13 NOTE — DISCHARGE NOTE PROVIDER - NSFOLLOWUPCLINICS_GEN_ALL_ED_FT
[facility]  Psychiatry  [address] [place] [address]  Phone: [phone]  Fax:

## 2025-06-13 NOTE — DISCHARGE NOTE PROVIDER - NSDCMRMEDTOKEN_GEN_ALL_CORE_FT
acetaminophen 325 mg oral tablet: 3 tab(s) orally every 6 hours, As needed, Mild Pain (1 - 3), Moderate Pain (4 - 6)  amLODIPine 10 mg oral tablet: 1 tab(s) orally once a day  aspirin 81 mg oral delayed release tablet: 1 tab(s) orally once a day  hydrALAZINE 25 mg oral tablet: 1 tab(s) orally 3 times a day  KlonoPIN 0.5 mg oral tablet: 1 tab(s) orally once a day (at bedtime) as needed for anxiety MDD: 1  Lipitor 40 mg oral tablet: orally once a day (at bedtime)  mirtazapine 7.5 mg oral tablet: 1 tab(s) orally once a day (at bedtime) MDD: 1  rivaroxaban 20 mg oral tablet: 1 tab(s) orally once a day (before a meal) MDD: 1 tablet  scopolamine: 1 patch transdermal once behind ear as needed for vertigo  senna leaf extract oral tablet: 1 tab(s) orally once a day (at bedtime)

## 2025-06-13 NOTE — DISCHARGE NOTE NURSING/CASE MANAGEMENT/SOCIAL WORK - NSDCPEFALRISK_GEN_ALL_CORE
For information on Fall & Injury Prevention, visit: [email or web address] OR  [email or web address] OR  https://www.cdc.gov/steadi/patient.html

## 2025-06-13 NOTE — DISCHARGE NOTE PROVIDER - CARE PROVIDER_API CALL
Gregory Khoury  Critical Care Medicine  [address] [place] [address], [address]  [place] [address]  Phone: [phone]  Fax: [phone]  Follow Up Time: 1 week    Zen Alvarez  Phone: (   )    -  Fax: (   )    -  Follow Up Time: 1 month

## 2025-06-13 NOTE — DISCHARGE NOTE PROVIDER - NSDCCPCAREPLAN_GEN_ALL_CORE_FT
PRINCIPAL DISCHARGE DIAGNOSIS  Diagnosis: History of benzodiazepine use  Assessment and Plan of Treatment: You came to the hospital after abruptly stopping your long standing benzodiazepine.  You were restarted on your medication and evaluated by psychiatry.  It is recommended that you continue a slow taper of this medication until you no longer need it.  Your primary care provider will adjust your medication dose over the next few months to safely wean you off of the medication.     The psychiatrist who evaluated you in the hospital recommends that you continue to follow up with psychiatry in the community and provided some locations for you to consider.      SECONDARY DISCHARGE DIAGNOSES  Diagnosis: Lung mass  Assessment and Plan of Treatment: During the course of your stay you were found to have a mass on your lung.  You did not demonstrate any acute symptoms associated with the mass.  You will need to follow up with an Interventional Radiologist, Dr. Gamaliel Khoury, for further evaluation.     PRINCIPAL DISCHARGE DIAGNOSIS  Diagnosis: History of benzodiazepine use  Assessment and Plan of Treatment: You came to the hospital after abruptly stopping your long standing benzodiazepine.  You were restarted on your medication and evaluated by psychiatry.  It is recommended that you continue a slow taper of this medication until you no longer need it. We had put you on clonazepam 0.5mg at bedtime. Your primary care provider will adjust your medication dose over the next few months to safely wean you off of the medication.     The psychiatrist who evaluated you in the hospital recommends that you continue to follow up with psychiatry in the community and provided some locations for you to consider.      SECONDARY DISCHARGE DIAGNOSES  Diagnosis: Hyponatremia  Assessment and Plan of Treatment: You were noted to have low sodium levels when you came to the hospital, sodium level of 125. This was likely in the setting of drinking too much. We restricted fluid intake and your sodium level improved to 130. Please follow up with your primary doctor for further monitoring of your sodium levels on your blood tests.    Diagnosis: Lung mass  Assessment and Plan of Treatment: During the course of your stay you were found to have a mass on your lung.  You did not demonstrate any acute symptoms associated with the mass, no trouble breathing, nor any chest pain.   You had a CT scan on the chest and were given the report to take with you. You will need to follow up with an Interventional Pulmonologist (lung specialist) Dr. Gamaliel Khoury, for further evaluation. We have provided you with information for Dr Khoury's office, please call them to make an appointment soon.     PRINCIPAL DISCHARGE DIAGNOSIS  Diagnosis: History of benzodiazepine use  Assessment and Plan of Treatment: You came to the hospital after abruptly stopping your long standing benzodiazepine.  You were restarted on your medication and evaluated by psychiatry while in the hospital.  It is recommended that you continue a slow taper of this medication until you no longer need it. We had put you on clonazepam 0.5mg at bedtime. Your primary care provider will adjust your medication dose over the next few months to safely wean you off of the medication.     The psychiatrist who evaluated you in the hospital recommends that you continue to follow up with psychiatry in the community and provided some locations for you to consider. See below for some places you can call for an appointment.  *[facility]   [address] [place], [place]   [phone]   *[facility]  [address] [place] [address] [phone]  *[facility]  [address] [place] [address] [phone]        SECONDARY DISCHARGE DIAGNOSES  Diagnosis: Lung mass  Assessment and Plan of Treatment: During the course of your stay you were found to have a mass on your lung.  You did not demonstrate any acute symptoms associated with the mass, no trouble breathing, nor any chest pain.   You had a CT scan on the chest and were given the report to take with you. You will need to follow up with an Interventional Pulmonologist (lung specialist) Dr. Gamaliel Khoury, for further evaluation. We have provided you with information for Dr Khoury's office, please call them to make an appointment soon. [phone]    Diagnosis: Hyponatremia  Assessment and Plan of Treatment: You were noted to have low sodium levels when you came to the hospital, sodium level of 125. This was likely in the setting of drinking too much. We restricted fluid intake and your sodium level improved to 130. Please follow up with your primary doctor for further monitoring of your sodium levels on your blood tests.     PRINCIPAL DISCHARGE DIAGNOSIS  Diagnosis: History of benzodiazepine use  Assessment and Plan of Treatment: You came to the hospital after abruptly stopping your long standing benzodiazepine.  You were restarted on your medication and evaluated by psychiatry while in the hospital.  It is recommended that you continue a slow taper of this medication until you no longer need it. We had put you on clonazepam 0.5mg at bedtime. Your primary care provider will adjust your medication dose over the next few months to safely wean you off of the medication.     The psychiatrist who evaluated you in the hospital recommends that you continue to follow up with psychiatry in the community and provided some locations for you to consider. See below for some places you can call for an appointment.  *[facility]   [address] [place], [place]   [phone]   *[facility]  [address] [place] [address] [phone]  *[facility]  [address] [place] [address] [phone]        SECONDARY DISCHARGE DIAGNOSES  Diagnosis: Lung mass  Assessment and Plan of Treatment: During the course of your stay you were found to have a mass on your lung.  You did not demonstrate any acute symptoms associated with the mass, no trouble breathing, nor any chest pain.  You were seen by our lung doctor while in hospital, who recommended you go see interventional pulmonoloy in office.   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 Complete collapse/atelectasis of left upper lobe. Small left pleural effusion.  You given a copy of the report to take with you. You will need to follow up with an Interventional Pulmonologist (lung specialist) Dr. Gamaliel Khoury, for further evaluation. We have provided you with information for Dr Khoury's office, please call them to make an appointment soon. [phone]    Diagnosis: Hyponatremia  Assessment and Plan of Treatment: You were noted to have low sodium levels when you came to the hospital, sodium level of 125. This was likely in the setting of drinking too much. We restricted fluid intake and your sodium level improved to 130. Please follow up with your primary doctor for further monitoring of your sodium levels on your blood tests.

## 2025-06-13 NOTE — DISCHARGE NOTE PROVIDER - PROVIDER TOKENS
PROVIDER:[TOKEN:[[id number]],FOLLOWUP:[1 week]],FREE:[LAST:[Alvarez],FIRST:[Zen],PHONE:[(   )    -],FAX:[(   )    -],FOLLOWUP:[1 month]]

## 2025-06-13 NOTE — DISCHARGE NOTE PROVIDER - ATTENDING DISCHARGE PHYSICAL EXAMINATION:
elderly male, NAD  NC/AT  RRR, +s1/s2  Lungs notable for diminished breath sounds on L  abdomen soft ntnd  extremities wwp, no edema noted

## 2025-06-13 NOTE — PHARMACOTHERAPY INTERVENTION NOTE - COMMENTS
Provided patient counseling on pt's new medications and side effects of those medications. Also provided pt with printed information. Briefly went over inpatient medications as well. Answered pt's questions.  
Patient identified by [facility] 67 y/o and Old[facility] Report. No intervention at this time.  
Patient identified by [facility] 67 y/o and Old[facility]. No intervention at this time.  (prn for anxiety).

## 2025-06-13 NOTE — DISCHARGE NOTE PROVIDER - NSDCFUADDAPPT_GEN_ALL_CORE_FT
APPTS ARE READY TO BE MADE: [X] YES    Best Family or Patient Contact (if needed):    Additional Information about above appointments (if needed):    1: Dr. Khoury, 1 week  2:   3:     Other comments or requests:     [facility] Department   [address] [place], [place]   [phone]     [facility]  [address] [place] [address] [phone]    [facility]  [address] [place], [place] [address] [phone]   APPTS ARE READY TO BE MADE: [X] YES    Best Family or Patient Contact (if needed):    Additional Information about above appointments (if needed):    1: Dr. Khoury, 1 week  2:   3:     Other comments or requests:     [facility] Adult Out[facility] Department   [address] [place], [place]   [phone]     [facility]  [address] [place] [address] [phone]    [facility]  [address] [place] [address] [phone]    Patient advised they did not want to proceed with scheduling appointments with the providers on their referrals. They will coordinate care on their own.     Patient was outreached but declined scheduling assistance, however there is an appointment reflected on [facility] for the patient.   07/03 9:30a with Dr. Khoury; Pulmonary at [address]

## 2025-06-13 NOTE — DISCHARGE NOTE NURSING/CASE MANAGEMENT/SOCIAL WORK - FINANCIAL ASSISTANCE
[facility] provides services at a reduced cost to those who are determined to be eligible through [facility]’s financial assistance program. Information regarding [facility]’s financial assistance program can be found by going to [email or web address] or by calling [phone].

## 2025-06-13 NOTE — DISCHARGE NOTE PROVIDER - NSDCFUSCHEDAPPT_GEN_ALL_CORE_FT
[facility]  VASCULAR [address] [facility]  Scheduled Appointment: 08/22/2025    [facility]  VASCULAR [address] [facility]  Scheduled Appointment: 08/22/2025    Gilbert Carrasco  [facility]  VASCULAR [address] [facility]  Scheduled Appointment: 08/22/2025     [facility] Physician [facility]  PULMMED [address] [facility]  Scheduled Appointment: 07/03/2025    [facility]  [facility]ASCULAR [address] [facility]  Scheduled Appointment: 08/22/2025    [facility]  VASCULAR [address] [facility]  Scheduled Appointment: 08/22/2025    Gilbert Carrasco  [facility]  VASCULAR [address] [facility]  Scheduled Appointment: 08/22/2025

## 2025-06-13 NOTE — DISCHARGE NOTE NURSING/CASE MANAGEMENT/SOCIAL WORK - NSDCFUADDAPPT_GEN_ALL_CORE_FT
APPTS ARE READY TO BE MADE: [X] YES    Best Family or Patient Contact (if needed):    Additional Information about above appointments (if needed):    1: Dr. Khoury, 1 week  2:   3:     Other comments or requests:     [facility] Adult Outpatient [facility]ry Department   [address] [place], [place]   [phone]     [facility]  [address] [place] [address] [phone]    [facility]  [address] [place], [place] [address] [phone]

## 2025-06-13 NOTE — DISCHARGE NOTE PROVIDER - CARE PROVIDERS DIRECT ADDRESSES
,[email or web address]_Unknown

## 2025-06-13 NOTE — DISCHARGE NOTE PROVIDER - HOSPITAL COURSE
84M PMH HTN, HLD, PAD that comes for generalized malaise. Patient endorses that for the past week or so has been having worsening anxiety that has been associated with symptoms such as anxiety, depression, muscle cramps, decreased PO intake, SOB, and decreased sleep. Patient attributes this symptoms to not having his usual clonazepam dose that has been taking it for the past 10 years. Patient takes this medication for depression 1.5 tabs of 1mg before sleep and in the morning and since May 20 has not been able to take meds since his PCP has moved location. Patient went to a new PCP and his new PCP did not want to prescribe it and requiring a psych consult.    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  Complete collapse/atelectasis of left upper lobe. Small left pleural effusion.    Psych recommends 0.5 mg of Klonopin HS, to be gradually tapered by his outpatient PCP, ideally within several weeks/months. Remeron 7.5 mg PO HS.    Would benefit from outpatient psychiatric f/u    Pulm recommends follow up with interventional pulmonology as outpatient for bronch and biopsy.    Case discussed with attending.  Given patient's improved clinical status and current hemodynamic stability, the decision was made for discharge.   84M PMH HTN, HLD, PAD that comes for generalized malaise. Patient endorses that for the past week or so has been having worsening anxiety that has been associated with symptoms such as anxiety, depression, muscle cramps, decreased PO intake, SOB, and decreased sleep. Patient attributes this symptoms to not having his usual clonazepam dose that has been taking it for the past 10 years. Patient takes this medication for depression 1.5 tabs of 1mg before sleep and in the morning and since May 20 has not been able to take meds since his PCP has moved location. Patient went to a new PCP and his new PCP did not want to prescribe it and requiring a psych consult.    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 Complete collapse/atelectasis of left upper lobe. Small left pleural effusion.  The patient has remained stable and asymptomatic throughout, noted to be able to ambulate without any chest pain or dyspnea. Also noted with normal sats on room air.     Psych recommends 0.5 mg of Klonopin HS, to be gradually tapered by his outpatient PCP, ideally within several weeks/months. Remeron 7.5 mg PO HS. Would benefit from outpatient psychiatric f/u.     Pulm recommends follow up with interventional pulmonology as outpatient for bronch and biopsy.    Case discussed with attending.  Given patient's improved clinical status and current hemodynamic stability, the decision was made for discharge.

## 2025-07-03 ENCOUNTER — APPOINTMENT (OUTPATIENT)
Dept: PULMONOLOGY | Facility: CLINIC | Age: 85
End: 2025-07-03
Payer: MEDICARE

## 2025-07-03 VITALS
RESPIRATION RATE: 16 BRPM | DIASTOLIC BLOOD PRESSURE: 69 MMHG | OXYGEN SATURATION: 94 % | WEIGHT: 166 LBS | HEIGHT: 70 IN | HEART RATE: 79 BPM | BODY MASS INDEX: 23.77 KG/M2 | TEMPERATURE: 97.8 F | SYSTOLIC BLOOD PRESSURE: 154 MMHG

## 2025-07-03 PROBLEM — R59.0 MEDIASTINAL ADENOPATHY: Status: ACTIVE | Noted: 2025-07-03

## 2025-07-03 PROCEDURE — 99215 OFFICE O/P EST HI 40 MIN: CPT

## 2025-07-08 LAB
ALBUMIN SERPL ELPH-MCNC: 4 G/DL
ALP BLD-CCNC: 63 U/L
ALT SERPL-CCNC: 17 U/L
ANION GAP SERPL CALC-SCNC: 16 MMOL/L
APTT BLD: 38.1 SEC
AST SERPL-CCNC: 26 U/L
BASOPHILS # BLD AUTO: 0.04 K/UL
BASOPHILS NFR BLD AUTO: 0.5 %
BILIRUB SERPL-MCNC: 1 MG/DL
BUN SERPL-MCNC: 13 MG/DL
CALCIUM SERPL-MCNC: 9.4 MG/DL
CHLORIDE SERPL-SCNC: 106 MMOL/L
CO2 SERPL-SCNC: 19 MMOL/L
CREAT SERPL-MCNC: 1.18 MG/DL
EGFRCR SERPLBLD CKD-EPI 2021: 61 ML/MIN/1.73M2
EOSINOPHIL # BLD AUTO: 0.33 K/UL
EOSINOPHIL NFR BLD AUTO: 3.8 %
GLUCOSE SERPL-MCNC: 91 MG/DL
HCT VFR BLD CALC: 36.4 %
HGB BLD-MCNC: 12.3 G/DL
IMM GRANULOCYTES NFR BLD AUTO: 0.1 %
INR PPP: 2.15 RATIO
LYMPHOCYTES # BLD AUTO: 0.94 K/UL
LYMPHOCYTES NFR BLD AUTO: 10.9 %
MAN DIFF?: NORMAL
MCHC RBC-ENTMCNC: 33.2 PG
MCHC RBC-ENTMCNC: 33.8 G/DL
MCV RBC AUTO: 98.1 FL
MONOCYTES # BLD AUTO: 0.81 K/UL
MONOCYTES NFR BLD AUTO: 9.4 %
NEUTROPHILS # BLD AUTO: 6.49 K/UL
NEUTROPHILS NFR BLD AUTO: 75.3 %
PLATELET # BLD AUTO: 198 K/UL
POTASSIUM SERPL-SCNC: 4 MMOL/L
PROT SERPL-MCNC: 6.5 G/DL
PT BLD: 25.2 SEC
RBC # BLD: 3.71 M/UL
RBC # FLD: 12.9 %
SODIUM SERPL-SCNC: 140 MMOL/L
WBC # FLD AUTO: 8.62 K/UL

## 2025-07-09 ENCOUNTER — NON-APPOINTMENT (OUTPATIENT)
Age: 85
End: 2025-07-09

## 2025-07-22 ENCOUNTER — NON-APPOINTMENT (OUTPATIENT)
Age: 85
End: 2025-07-22

## 2025-07-23 ENCOUNTER — OUTPATIENT (OUTPATIENT)
Dept: OUTPATIENT SERVICES | Facility: HOSPITAL | Age: 85
LOS: 1 days | End: 2025-07-23

## 2025-07-23 VITALS
HEART RATE: 76 BPM | WEIGHT: 162.92 LBS | OXYGEN SATURATION: 97 % | RESPIRATION RATE: 16 BRPM | HEIGHT: 69 IN | SYSTOLIC BLOOD PRESSURE: 136 MMHG | DIASTOLIC BLOOD PRESSURE: 70 MMHG | TEMPERATURE: 97 F

## 2025-07-23 DIAGNOSIS — I73.9 PERIPHERAL VASCULAR DISEASE, UNSPECIFIED: ICD-10-CM

## 2025-07-23 DIAGNOSIS — Z98.890 OTHER SPECIFIED POSTPROCEDURAL STATES: Chronic | ICD-10-CM

## 2025-07-23 DIAGNOSIS — Z98.89 OTHER SPECIFIED POSTPROCEDURAL STATES: Chronic | ICD-10-CM

## 2025-07-23 DIAGNOSIS — R59.0 LOCALIZED ENLARGED LYMPH NODES: ICD-10-CM

## 2025-07-23 DIAGNOSIS — I10 ESSENTIAL (PRIMARY) HYPERTENSION: ICD-10-CM

## 2025-07-23 RX ORDER — SODIUM CHLORIDE 9 G/1000ML
1000 INJECTION, SOLUTION INTRAVENOUS
Refills: 0 | Status: DISCONTINUED | OUTPATIENT
Start: 2025-07-28 | End: 2025-08-11

## 2025-07-28 ENCOUNTER — RESULT REVIEW (OUTPATIENT)
Age: 85
End: 2025-07-28

## 2025-07-28 ENCOUNTER — TRANSCRIPTION ENCOUNTER (OUTPATIENT)
Age: 85
End: 2025-07-28

## 2025-07-28 ENCOUNTER — APPOINTMENT (OUTPATIENT)
Dept: PULMONOLOGY | Facility: HOSPITAL | Age: 85
End: 2025-07-28

## 2025-07-28 ENCOUNTER — OUTPATIENT (OUTPATIENT)
Dept: OUTPATIENT SERVICES | Facility: HOSPITAL | Age: 85
LOS: 1 days | End: 2025-07-28
Payer: MEDICARE

## 2025-07-28 VITALS
WEIGHT: 162.04 LBS | HEIGHT: 69 IN | SYSTOLIC BLOOD PRESSURE: 155 MMHG | OXYGEN SATURATION: 100 % | HEART RATE: 69 BPM | DIASTOLIC BLOOD PRESSURE: 62 MMHG | TEMPERATURE: 98 F | RESPIRATION RATE: 16 BRPM

## 2025-07-28 VITALS
RESPIRATION RATE: 16 BRPM | DIASTOLIC BLOOD PRESSURE: 70 MMHG | TEMPERATURE: 98 F | HEART RATE: 72 BPM | OXYGEN SATURATION: 95 % | SYSTOLIC BLOOD PRESSURE: 145 MMHG

## 2025-07-28 DIAGNOSIS — R59.0 LOCALIZED ENLARGED LYMPH NODES: ICD-10-CM

## 2025-07-28 DIAGNOSIS — Z98.89 OTHER SPECIFIED POSTPROCEDURAL STATES: Chronic | ICD-10-CM

## 2025-07-28 DIAGNOSIS — Z98.890 OTHER SPECIFIED POSTPROCEDURAL STATES: Chronic | ICD-10-CM

## 2025-07-28 LAB
B PERT IGG+IGM PNL SER: ABNORMAL
COLOR FLD: ABNORMAL
EOSINOPHIL # FLD: 3 % — SIGNIFICANT CHANGE UP
FLUID INTAKE SUBSTANCE CLASS: SIGNIFICANT CHANGE UP
FOLATE+VIT B12 SERBLD-IMP: 0 % — SIGNIFICANT CHANGE UP
GRAM STN FLD: SIGNIFICANT CHANGE UP
LYMPHOCYTES # FLD: 10 % — SIGNIFICANT CHANGE UP
MESOTHL CELL # FLD: 0 % — SIGNIFICANT CHANGE UP
MONOS+MACROS # FLD: 2 % — SIGNIFICANT CHANGE UP
NEUTROPHILS-BODY FLUID: 85 % — SIGNIFICANT CHANGE UP
OTHER CELLS FLD MANUAL: 0 % — SIGNIFICANT CHANGE UP
RCV VOL RI: SIGNIFICANT CHANGE UP CELLS/UL
SPECIMEN SOURCE FLD: SIGNIFICANT CHANGE UP
SPECIMEN SOURCE: SIGNIFICANT CHANGE UP
TOTAL CELLS COUNTED, BODY FLUID: 100 CELLS — SIGNIFICANT CHANGE UP
TOTAL NUCLEATED CELL COUNT, BODY FLUID: SIGNIFICANT CHANGE UP CELLS/UL
TUBE TYPE: SIGNIFICANT CHANGE UP
WBC COUNT.: SIGNIFICANT CHANGE UP CELLS/UL

## 2025-07-28 PROCEDURE — 31630 BRONCHOSCOPY DILATE/FX REPR: CPT | Mod: GC

## 2025-07-28 PROCEDURE — 88112 CYTOPATH CELL ENHANCE TECH: CPT | Mod: 26,59

## 2025-07-28 PROCEDURE — 88333 PATH CONSLTJ SURG CYTO XM 1: CPT | Mod: 26,59

## 2025-07-28 PROCEDURE — 88360 TUMOR IMMUNOHISTOCHEM/MANUAL: CPT | Mod: 26,59

## 2025-07-28 PROCEDURE — 88342 IMHCHEM/IMCYTCHM 1ST ANTB: CPT | Mod: 26

## 2025-07-28 PROCEDURE — 31624 DX BRONCHOSCOPE/LAVAGE: CPT | Mod: GC

## 2025-07-28 PROCEDURE — 88305 TISSUE EXAM BY PATHOLOGIST: CPT | Mod: 26

## 2025-07-28 PROCEDURE — 31653 BRONCH EBUS SAMPLNG 3/> NODE: CPT | Mod: GC

## 2025-07-28 PROCEDURE — 31641 BRONCHOSCOPY TREAT BLOCKAGE: CPT | Mod: GC

## 2025-07-28 PROCEDURE — 71045 X-RAY EXAM CHEST 1 VIEW: CPT | Mod: 26

## 2025-07-28 PROCEDURE — 88341 IMHCHEM/IMCYTCHM EA ADD ANTB: CPT | Mod: 26

## 2025-07-28 PROCEDURE — 88173 CYTOPATH EVAL FNA REPORT: CPT | Mod: 26

## 2025-07-28 PROCEDURE — 31645 BRNCHSC W/THER ASPIR 1ST: CPT | Mod: GC

## 2025-07-28 DEVICE — PROBE CRYO FLEX 1.7X1150MM SNGL USE: Type: IMPLANTABLE DEVICE | Status: FUNCTIONAL

## 2025-07-28 DEVICE — PROBE CRYO FLEX 2.4X1150 MM SNGL USE: Type: IMPLANTABLE DEVICE | Status: FUNCTIONAL

## 2025-07-28 DEVICE — CATH EMB FOGARTY 7FRX80CM: Type: IMPLANTABLE DEVICE | Status: FUNCTIONAL

## 2025-07-28 DEVICE — PROBE FIAPC   1.5ML: Type: IMPLANTABLE DEVICE | Status: FUNCTIONAL

## 2025-07-28 RX ORDER — FENTANYL CITRATE-0.9 % NACL/PF 100MCG/2ML
25 SYRINGE (ML) INTRAVENOUS
Refills: 0 | Status: DISCONTINUED | OUTPATIENT
Start: 2025-07-28 | End: 2025-07-28

## 2025-07-28 RX ORDER — ONDANSETRON HCL/PF 4 MG/2 ML
4 VIAL (ML) INJECTION ONCE
Refills: 0 | Status: DISCONTINUED | OUTPATIENT
Start: 2025-07-28 | End: 2025-08-11

## 2025-07-29 ENCOUNTER — NON-APPOINTMENT (OUTPATIENT)
Age: 85
End: 2025-07-29

## 2025-07-29 LAB
CULTURE RESULTS: SIGNIFICANT CHANGE UP
NIGHT BLUE STAIN TISS: SIGNIFICANT CHANGE UP
SPECIMEN SOURCE: SIGNIFICANT CHANGE UP
SPECIMEN SOURCE: SIGNIFICANT CHANGE UP

## 2025-07-30 LAB — NON-GYNECOLOGICAL CYTOLOGY STUDY: SIGNIFICANT CHANGE UP

## 2025-08-04 ENCOUNTER — NON-APPOINTMENT (OUTPATIENT)
Age: 85
End: 2025-08-04

## 2025-08-05 ENCOUNTER — NON-APPOINTMENT (OUTPATIENT)
Age: 85
End: 2025-08-05

## 2025-08-06 ENCOUNTER — NON-APPOINTMENT (OUTPATIENT)
Age: 85
End: 2025-08-06

## 2025-08-06 PROBLEM — R42 DIZZINESS AND GIDDINESS: Chronic | Status: ACTIVE | Noted: 2025-07-23

## 2025-08-06 PROBLEM — I73.9 PERIPHERAL VASCULAR DISEASE, UNSPECIFIED: Chronic | Status: ACTIVE | Noted: 2025-07-23

## 2025-08-06 PROBLEM — R59.0 LOCALIZED ENLARGED LYMPH NODES: Chronic | Status: ACTIVE | Noted: 2025-07-23

## 2025-08-07 ENCOUNTER — APPOINTMENT (OUTPATIENT)
Dept: PULMONOLOGY | Facility: CLINIC | Age: 85
End: 2025-08-07
Payer: MEDICARE

## 2025-08-07 ENCOUNTER — NON-APPOINTMENT (OUTPATIENT)
Age: 85
End: 2025-08-07

## 2025-08-07 DIAGNOSIS — C34.90 MALIGNANT NEOPLASM OF UNSPECIFIED PART OF UNSPECIFIED BRONCHUS OR LUNG: ICD-10-CM

## 2025-08-07 PROCEDURE — 99214 OFFICE O/P EST MOD 30 MIN: CPT | Mod: 2W

## 2025-08-10 ENCOUNTER — INPATIENT (INPATIENT)
Facility: HOSPITAL | Age: 85
LOS: 8 days | Discharge: HOME HEALTH | End: 2025-08-19
Attending: INTERNAL MEDICINE | Admitting: INTERNAL MEDICINE
Payer: MEDICARE

## 2025-08-10 VITALS
OXYGEN SATURATION: 95 % | WEIGHT: 158.07 LBS | SYSTOLIC BLOOD PRESSURE: 133 MMHG | RESPIRATION RATE: 16 BRPM | HEIGHT: 69 IN | HEART RATE: 88 BPM | DIASTOLIC BLOOD PRESSURE: 66 MMHG | TEMPERATURE: 98 F

## 2025-08-10 DIAGNOSIS — J18.9 PNEUMONIA, UNSPECIFIED ORGANISM: ICD-10-CM

## 2025-08-10 DIAGNOSIS — R09.89 OTHER SPECIFIED SYMPTOMS AND SIGNS INVOLVING THE CIRCULATORY AND RESPIRATORY SYSTEMS: ICD-10-CM

## 2025-08-10 DIAGNOSIS — C44.92 SQUAMOUS CELL CARCINOMA OF SKIN, UNSPECIFIED: ICD-10-CM

## 2025-08-10 DIAGNOSIS — Z29.9 ENCOUNTER FOR PROPHYLACTIC MEASURES, UNSPECIFIED: ICD-10-CM

## 2025-08-10 DIAGNOSIS — Z79.899 OTHER LONG TERM (CURRENT) DRUG THERAPY: ICD-10-CM

## 2025-08-10 DIAGNOSIS — Z98.89 OTHER SPECIFIED POSTPROCEDURAL STATES: Chronic | ICD-10-CM

## 2025-08-10 DIAGNOSIS — Z98.890 OTHER SPECIFIED POSTPROCEDURAL STATES: Chronic | ICD-10-CM

## 2025-08-10 DIAGNOSIS — I10 ESSENTIAL (PRIMARY) HYPERTENSION: ICD-10-CM

## 2025-08-10 DIAGNOSIS — E78.5 HYPERLIPIDEMIA, UNSPECIFIED: ICD-10-CM

## 2025-08-10 LAB
ALBUMIN SERPL ELPH-MCNC: 2.9 G/DL — LOW (ref 3.3–5)
ALP SERPL-CCNC: 82 U/L — SIGNIFICANT CHANGE UP (ref 40–120)
ALT FLD-CCNC: 64 U/L — HIGH (ref 4–41)
ANION GAP SERPL CALC-SCNC: 17 MMOL/L — HIGH (ref 7–14)
APTT BLD: 24.8 SEC — LOW (ref 26.1–36.8)
AST SERPL-CCNC: 46 U/L — HIGH (ref 4–40)
BILIRUB SERPL-MCNC: 0.6 MG/DL — SIGNIFICANT CHANGE UP (ref 0.2–1.2)
BLD GP AB SCN SERPL QL: NEGATIVE — SIGNIFICANT CHANGE UP
BUN SERPL-MCNC: 29 MG/DL — HIGH (ref 7–23)
CALCIUM SERPL-MCNC: 9.5 MG/DL — SIGNIFICANT CHANGE UP (ref 8.4–10.5)
CHLORIDE SERPL-SCNC: 99 MMOL/L — SIGNIFICANT CHANGE UP (ref 98–107)
CO2 SERPL-SCNC: 21 MMOL/L — LOW (ref 22–31)
CREAT SERPL-MCNC: 1.08 MG/DL — SIGNIFICANT CHANGE UP (ref 0.5–1.3)
EGFR: 68 ML/MIN/1.73M2 — SIGNIFICANT CHANGE UP
EGFR: 68 ML/MIN/1.73M2 — SIGNIFICANT CHANGE UP
GAS PNL BLDV: SIGNIFICANT CHANGE UP
GLUCOSE SERPL-MCNC: 106 MG/DL — HIGH (ref 70–99)
HCT VFR BLD CALC: 31.7 % — LOW (ref 39–50)
HGB BLD-MCNC: 10.2 G/DL — LOW (ref 13–17)
INR BLD: 1.17 RATIO — HIGH (ref 0.85–1.16)
MCHC RBC-ENTMCNC: 29.9 PG — SIGNIFICANT CHANGE UP (ref 27–34)
MCHC RBC-ENTMCNC: 32.2 G/DL — SIGNIFICANT CHANGE UP (ref 32–36)
MCV RBC AUTO: 93 FL — SIGNIFICANT CHANGE UP (ref 80–100)
NRBC # BLD AUTO: 0 K/UL — SIGNIFICANT CHANGE UP (ref 0–0)
NRBC # FLD: 0 K/UL — SIGNIFICANT CHANGE UP (ref 0–0)
NRBC BLD AUTO-RTO: 0 /100 WBCS — SIGNIFICANT CHANGE UP (ref 0–0)
PLATELET # BLD AUTO: 369 K/UL — SIGNIFICANT CHANGE UP (ref 150–400)
PMV BLD: 9.3 FL — SIGNIFICANT CHANGE UP (ref 7–13)
POTASSIUM SERPL-MCNC: 4.3 MMOL/L — SIGNIFICANT CHANGE UP (ref 3.5–5.3)
POTASSIUM SERPL-SCNC: 4.3 MMOL/L — SIGNIFICANT CHANGE UP (ref 3.5–5.3)
PROT SERPL-MCNC: 7.4 G/DL — SIGNIFICANT CHANGE UP (ref 6–8.3)
PROTHROM AB SERPL-ACNC: 13.5 SEC — HIGH (ref 9.9–13.4)
RBC # BLD: 3.41 M/UL — LOW (ref 4.2–5.8)
RBC # FLD: 14 % — SIGNIFICANT CHANGE UP (ref 10.3–14.5)
RH IG SCN BLD-IMP: POSITIVE — SIGNIFICANT CHANGE UP
SARS-COV-2 RNA SPEC QL NAA+PROBE: SIGNIFICANT CHANGE UP
SODIUM SERPL-SCNC: 137 MMOL/L — SIGNIFICANT CHANGE UP (ref 135–145)
WBC # BLD: 24 K/UL — HIGH (ref 3.8–10.5)
WBC # FLD AUTO: 24 K/UL — HIGH (ref 3.8–10.5)

## 2025-08-10 PROCEDURE — 99285 EMERGENCY DEPT VISIT HI MDM: CPT

## 2025-08-10 PROCEDURE — 99223 1ST HOSP IP/OBS HIGH 75: CPT

## 2025-08-10 PROCEDURE — 71250 CT THORAX DX C-: CPT | Mod: 26

## 2025-08-10 PROCEDURE — 36410 VNPNXR 3YR/> PHY/QHP DX/THER: CPT

## 2025-08-10 PROCEDURE — 71046 X-RAY EXAM CHEST 2 VIEWS: CPT | Mod: 26

## 2025-08-10 RX ORDER — ACETAMINOPHEN 500 MG/5ML
650 LIQUID (ML) ORAL EVERY 6 HOURS
Refills: 0 | Status: DISCONTINUED | OUTPATIENT
Start: 2025-08-10 | End: 2025-08-19

## 2025-08-10 RX ORDER — ALBUTEROL SULFATE 2.5 MG/3ML
2 VIAL, NEBULIZER (ML) INHALATION EVERY 6 HOURS
Refills: 0 | Status: DISCONTINUED | OUTPATIENT
Start: 2025-08-10 | End: 2025-08-11

## 2025-08-10 RX ORDER — LOSARTAN POTASSIUM 100 MG/1
100 TABLET, FILM COATED ORAL DAILY
Refills: 0 | Status: DISCONTINUED | OUTPATIENT
Start: 2025-08-10 | End: 2025-08-19

## 2025-08-10 RX ORDER — ONDANSETRON HCL/PF 4 MG/2 ML
4 VIAL (ML) INJECTION EVERY 8 HOURS
Refills: 0 | Status: DISCONTINUED | OUTPATIENT
Start: 2025-08-10 | End: 2025-08-19

## 2025-08-10 RX ORDER — VANCOMYCIN HCL IN 5 % DEXTROSE 1.5G/250ML
1000 PLASTIC BAG, INJECTION (ML) INTRAVENOUS ONCE
Refills: 0 | Status: COMPLETED | OUTPATIENT
Start: 2025-08-10 | End: 2025-08-10

## 2025-08-10 RX ORDER — AMLODIPINE BESYLATE 10 MG/1
10 TABLET ORAL DAILY
Refills: 0 | Status: DISCONTINUED | OUTPATIENT
Start: 2025-08-10 | End: 2025-08-19

## 2025-08-10 RX ORDER — TIOTROPIUM BROMIDE INHALATION SPRAY 3.12 UG/1
2 SPRAY, METERED RESPIRATORY (INHALATION) DAILY
Refills: 0 | Status: DISCONTINUED | OUTPATIENT
Start: 2025-08-10 | End: 2025-08-19

## 2025-08-10 RX ORDER — MAGNESIUM, ALUMINUM HYDROXIDE 200-200 MG
30 TABLET,CHEWABLE ORAL EVERY 4 HOURS
Refills: 0 | Status: DISCONTINUED | OUTPATIENT
Start: 2025-08-10 | End: 2025-08-19

## 2025-08-10 RX ORDER — MELATONIN 5 MG
3 TABLET ORAL AT BEDTIME
Refills: 0 | Status: DISCONTINUED | OUTPATIENT
Start: 2025-08-10 | End: 2025-08-19

## 2025-08-10 RX ORDER — ESCITALOPRAM OXALATE 20 MG/1
5 TABLET ORAL DAILY
Refills: 0 | Status: DISCONTINUED | OUTPATIENT
Start: 2025-08-10 | End: 2025-08-19

## 2025-08-10 RX ORDER — MIRTAZAPINE 30 MG/1
7.5 TABLET, FILM COATED ORAL DAILY
Refills: 0 | Status: DISCONTINUED | OUTPATIENT
Start: 2025-08-10 | End: 2025-08-19

## 2025-08-10 RX ORDER — ATORVASTATIN CALCIUM 80 MG/1
40 TABLET, FILM COATED ORAL AT BEDTIME
Refills: 0 | Status: DISCONTINUED | OUTPATIENT
Start: 2025-08-10 | End: 2025-08-19

## 2025-08-10 RX ORDER — DEXTROMETHORPHAN HBR, GUAIFENESIN 200 MG/10ML
600 LIQUID ORAL EVERY 12 HOURS
Refills: 0 | Status: DISCONTINUED | OUTPATIENT
Start: 2025-08-10 | End: 2025-08-19

## 2025-08-10 RX ORDER — CEFEPIME 2 G/20ML
1000 INJECTION, POWDER, FOR SOLUTION INTRAVENOUS ONCE
Refills: 0 | Status: COMPLETED | OUTPATIENT
Start: 2025-08-10 | End: 2025-08-10

## 2025-08-10 RX ORDER — CLONAZEPAM 0.5 MG/1
0.5 TABLET ORAL DAILY
Refills: 0 | Status: DISCONTINUED | OUTPATIENT
Start: 2025-08-10 | End: 2025-08-10

## 2025-08-10 RX ADMIN — Medication 4 MILLILITER(S): at 23:42

## 2025-08-10 RX ADMIN — Medication 25 MILLIGRAM(S): at 23:54

## 2025-08-10 RX ADMIN — CEFEPIME 100 MILLIGRAM(S): 2 INJECTION, POWDER, FOR SOLUTION INTRAVENOUS at 19:21

## 2025-08-10 RX ADMIN — ATORVASTATIN CALCIUM 40 MILLIGRAM(S): 80 TABLET, FILM COATED ORAL at 23:54

## 2025-08-10 RX ADMIN — DEXTROMETHORPHAN HBR, GUAIFENESIN 600 MILLIGRAM(S): 200 LIQUID ORAL at 23:54

## 2025-08-10 RX ADMIN — LOSARTAN POTASSIUM 100 MILLIGRAM(S): 100 TABLET, FILM COATED ORAL at 23:53

## 2025-08-10 RX ADMIN — AMLODIPINE BESYLATE 10 MILLIGRAM(S): 10 TABLET ORAL at 23:54

## 2025-08-10 RX ADMIN — Medication 1 DOSE(S): at 23:54

## 2025-08-10 RX ADMIN — Medication 250 MILLIGRAM(S): at 19:51

## 2025-08-11 ENCOUNTER — RESULT REVIEW (OUTPATIENT)
Age: 85
End: 2025-08-11

## 2025-08-11 ENCOUNTER — APPOINTMENT (OUTPATIENT)
Dept: PULMONOLOGY | Facility: HOSPITAL | Age: 85
End: 2025-08-11

## 2025-08-11 DIAGNOSIS — D64.9 ANEMIA, UNSPECIFIED: ICD-10-CM

## 2025-08-11 LAB
-  COAGULASE NEGATIVE STAPHYLOCOCCUS: SIGNIFICANT CHANGE UP
ADD ON TEST-SPECIMEN IN LAB: SIGNIFICANT CHANGE UP
ADD ON TEST-SPECIMEN IN LAB: SIGNIFICANT CHANGE UP
ALBUMIN SERPL ELPH-MCNC: 2.8 G/DL — LOW (ref 3.3–5)
ALP SERPL-CCNC: 72 U/L — SIGNIFICANT CHANGE UP (ref 40–120)
ALT FLD-CCNC: 57 U/L — HIGH (ref 4–41)
ANION GAP SERPL CALC-SCNC: 16 MMOL/L — HIGH (ref 7–14)
ANISOCYTOSIS BLD QL: SLIGHT — SIGNIFICANT CHANGE UP
APTT BLD: 25.7 SEC — LOW (ref 26.1–36.8)
AST SERPL-CCNC: 44 U/L — HIGH (ref 4–40)
B PERT IGG+IGM PNL SER: ABNORMAL
B PERT IGG+IGM PNL SER: ABNORMAL
BASOPHILS # BLD AUTO: 0.05 K/UL — SIGNIFICANT CHANGE UP (ref 0–0.2)
BASOPHILS # BLD MANUAL: 0 K/UL — SIGNIFICANT CHANGE UP (ref 0–0.2)
BASOPHILS NFR BLD AUTO: 0.2 % — SIGNIFICANT CHANGE UP (ref 0–2)
BASOPHILS NFR BLD MANUAL: 0 % — SIGNIFICANT CHANGE UP (ref 0–2)
BILIRUB SERPL-MCNC: 0.6 MG/DL — SIGNIFICANT CHANGE UP (ref 0.2–1.2)
BLD GP AB SCN SERPL QL: NEGATIVE — SIGNIFICANT CHANGE UP
BUN SERPL-MCNC: 26 MG/DL — HIGH (ref 7–23)
CALCIUM SERPL-MCNC: 9.1 MG/DL — SIGNIFICANT CHANGE UP (ref 8.4–10.5)
CHLORIDE SERPL-SCNC: 99 MMOL/L — SIGNIFICANT CHANGE UP (ref 98–107)
CO2 SERPL-SCNC: 23 MMOL/L — SIGNIFICANT CHANGE UP (ref 22–31)
COLOR FLD: SIGNIFICANT CHANGE UP
COLOR FLD: YELLOW
CREAT SERPL-MCNC: 0.91 MG/DL — SIGNIFICANT CHANGE UP (ref 0.5–1.3)
EGFR: 83 ML/MIN/1.73M2 — SIGNIFICANT CHANGE UP
EGFR: 83 ML/MIN/1.73M2 — SIGNIFICANT CHANGE UP
EOSINOPHIL # BLD AUTO: 0.06 K/UL — SIGNIFICANT CHANGE UP (ref 0–0.5)
EOSINOPHIL # BLD MANUAL: 0 K/UL — SIGNIFICANT CHANGE UP (ref 0–0.5)
EOSINOPHIL NFR BLD AUTO: 0.2 % — SIGNIFICANT CHANGE UP (ref 0–6)
EOSINOPHIL NFR BLD MANUAL: 0 % — SIGNIFICANT CHANGE UP (ref 0–6)
FERRITIN SERPL-MCNC: 2156 NG/ML — HIGH (ref 30–400)
FLUID INTAKE SUBSTANCE CLASS: SIGNIFICANT CHANGE UP
FLUID INTAKE SUBSTANCE CLASS: SIGNIFICANT CHANGE UP
GLUCOSE FLD-MCNC: 101 MG/DL — SIGNIFICANT CHANGE UP
GLUCOSE SERPL-MCNC: 123 MG/DL — HIGH (ref 70–99)
GRAM STN FLD: ABNORMAL
HCT VFR BLD CALC: 28.7 % — LOW (ref 39–50)
HGB BLD-MCNC: 9.3 G/DL — LOW (ref 13–17)
IMM GRANULOCYTES # BLD AUTO: 0.19 K/UL — HIGH (ref 0–0.07)
IMM GRANULOCYTES NFR BLD AUTO: 0.8 % — SIGNIFICANT CHANGE UP (ref 0–0.9)
INR BLD: 1.14 RATIO — SIGNIFICANT CHANGE UP (ref 0.85–1.16)
IRON SATN MFR SERPL: 12 % — LOW (ref 14–50)
IRON SATN MFR SERPL: 18 UG/DL — LOW (ref 45–165)
LDH SERPL L TO P-CCNC: 222 U/L — SIGNIFICANT CHANGE UP
LYMPHOCYTES # BLD AUTO: 0.59 K/UL — LOW (ref 1–3.3)
LYMPHOCYTES # BLD MANUAL: 0.43 K/UL — LOW (ref 1–3.3)
LYMPHOCYTES # FLD: 1 % — SIGNIFICANT CHANGE UP
LYMPHOCYTES # FLD: 58 % — SIGNIFICANT CHANGE UP
LYMPHOCYTES NFR BLD AUTO: 2.3 % — LOW (ref 13–44)
LYMPHOCYTES NFR BLD MANUAL: 1.7 % — LOW (ref 13–44)
MACROCYTES BLD QL: SLIGHT — SIGNIFICANT CHANGE UP
MAGNESIUM SERPL-MCNC: 2.1 MG/DL — SIGNIFICANT CHANGE UP (ref 1.6–2.6)
MANUAL SMEAR VERIFICATION: SIGNIFICANT CHANGE UP
MCHC RBC-ENTMCNC: 30.3 PG — SIGNIFICANT CHANGE UP (ref 27–34)
MCHC RBC-ENTMCNC: 32.4 G/DL — SIGNIFICANT CHANGE UP (ref 32–36)
MCV RBC AUTO: 93.5 FL — SIGNIFICANT CHANGE UP (ref 80–100)
MESOTHL CELL # FLD: 2 % — SIGNIFICANT CHANGE UP
METHOD TYPE: SIGNIFICANT CHANGE UP
MONOCYTES # BLD AUTO: 2.08 K/UL — HIGH (ref 0–0.9)
MONOCYTES # BLD MANUAL: 1.08 K/UL — HIGH (ref 0–0.9)
MONOCYTES NFR BLD AUTO: 8.3 % — SIGNIFICANT CHANGE UP (ref 2–14)
MONOCYTES NFR BLD MANUAL: 4.3 % — SIGNIFICANT CHANGE UP (ref 2–14)
MONOS+MACROS # FLD: 10 % — SIGNIFICANT CHANGE UP
MONOS+MACROS # FLD: 19 % — SIGNIFICANT CHANGE UP
NEUTROPHILS # BLD AUTO: 22.23 K/UL — HIGH (ref 1.8–7.4)
NEUTROPHILS # BLD MANUAL: 23.69 K/UL — HIGH (ref 1.8–7.4)
NEUTROPHILS NFR BLD AUTO: 88.2 % — HIGH (ref 43–77)
NEUTROPHILS NFR BLD MANUAL: 94 % — HIGH (ref 43–77)
NEUTROPHILS-BODY FLUID: 21 % — SIGNIFICANT CHANGE UP
NEUTROPHILS-BODY FLUID: 89 % — SIGNIFICANT CHANGE UP
NRBC # BLD AUTO: 0 K/UL — SIGNIFICANT CHANGE UP (ref 0–0)
NRBC # FLD: 0 K/UL — SIGNIFICANT CHANGE UP (ref 0–0)
NRBC BLD AUTO-RTO: 0 /100 WBCS — SIGNIFICANT CHANGE UP (ref 0–0)
OVALOCYTES BLD QL SMEAR: SLIGHT — SIGNIFICANT CHANGE UP
PHOSPHATE SERPL-MCNC: 3.5 MG/DL — SIGNIFICANT CHANGE UP (ref 2.5–4.5)
PLAT MORPH BLD: NORMAL — SIGNIFICANT CHANGE UP
PLATELET # BLD AUTO: 340 K/UL — SIGNIFICANT CHANGE UP (ref 150–400)
PLATELET COUNT - ESTIMATE: NORMAL — SIGNIFICANT CHANGE UP
PMV BLD: 9.2 FL — SIGNIFICANT CHANGE UP (ref 7–13)
POIKILOCYTOSIS BLD QL AUTO: SLIGHT — SIGNIFICANT CHANGE UP
POLYCHROMASIA BLD QL SMEAR: SLIGHT — SIGNIFICANT CHANGE UP
POTASSIUM SERPL-MCNC: 3.5 MMOL/L — SIGNIFICANT CHANGE UP (ref 3.5–5.3)
POTASSIUM SERPL-SCNC: 3.5 MMOL/L — SIGNIFICANT CHANGE UP (ref 3.5–5.3)
PROT FLD-MCNC: 4.3 G/DL — SIGNIFICANT CHANGE UP
PROT SERPL-MCNC: 7 G/DL — SIGNIFICANT CHANGE UP (ref 6–8.3)
PROTHROM AB SERPL-ACNC: 13.6 SEC — HIGH (ref 9.9–13.4)
RBC # BLD: 3.07 M/UL — LOW (ref 4.2–5.8)
RBC # FLD: 14 % — SIGNIFICANT CHANGE UP (ref 10.3–14.5)
RBC BLD AUTO: ABNORMAL
RCV VOL RI: 3000 CELLS/UL — SIGNIFICANT CHANGE UP
RCV VOL RI: SIGNIFICANT CHANGE UP CELLS/UL
RH IG SCN BLD-IMP: POSITIVE — SIGNIFICANT CHANGE UP
SODIUM SERPL-SCNC: 138 MMOL/L — SIGNIFICANT CHANGE UP (ref 135–145)
SPECIMEN SOURCE FLD: SIGNIFICANT CHANGE UP
SPECIMEN SOURCE FLD: SIGNIFICANT CHANGE UP
TIBC SERPL-MCNC: 156 UG/DL — LOW (ref 220–430)
TOTAL CELLS COUNTED, BODY FLUID: 100 CELLS — SIGNIFICANT CHANGE UP
TOTAL CELLS COUNTED, BODY FLUID: 100 CELLS — SIGNIFICANT CHANGE UP
TOTAL NUCLEATED CELL COUNT, BODY FLUID: 2658 CELLS/UL — SIGNIFICANT CHANGE UP
TOTAL NUCLEATED CELL COUNT, BODY FLUID: SIGNIFICANT CHANGE UP CELLS/UL
TRIGL FLD-MCNC: 19 MG/DL — SIGNIFICANT CHANGE UP
TUBE TYPE: SIGNIFICANT CHANGE UP
TUBE TYPE: SIGNIFICANT CHANGE UP
UIBC SERPL-MCNC: 138 UG/DL — SIGNIFICANT CHANGE UP (ref 110–370)
WBC # BLD: 25.2 K/UL — HIGH (ref 3.8–10.5)
WBC # FLD AUTO: 25.2 K/UL — HIGH (ref 3.8–10.5)
WBC COUNT.: 2283 CELLS/UL — SIGNIFICANT CHANGE UP
WBC COUNT.: SIGNIFICANT CHANGE UP CELLS/UL

## 2025-08-11 PROCEDURE — 32557 INSERT CATH PLEURA W/ IMAGE: CPT | Mod: GC,LT

## 2025-08-11 PROCEDURE — 31641 BRONCHOSCOPY TREAT BLOCKAGE: CPT | Mod: GC

## 2025-08-11 PROCEDURE — 31624 DX BRONCHOSCOPE/LAVAGE: CPT | Mod: GC

## 2025-08-11 PROCEDURE — 76604 US EXAM CHEST: CPT | Mod: 26,GC

## 2025-08-11 PROCEDURE — 31636 BRONCHOSCOPY BRONCH STENTS: CPT | Mod: GC

## 2025-08-11 PROCEDURE — 88305 TISSUE EXAM BY PATHOLOGIST: CPT | Mod: 26

## 2025-08-11 PROCEDURE — 88112 CYTOPATH CELL ENHANCE TECH: CPT | Mod: 26

## 2025-08-11 PROCEDURE — 99233 SBSQ HOSP IP/OBS HIGH 50: CPT | Mod: GC,25

## 2025-08-11 PROCEDURE — 99233 SBSQ HOSP IP/OBS HIGH 50: CPT | Mod: GC

## 2025-08-11 PROCEDURE — 31645 BRNCHSC W/THER ASPIR 1ST: CPT | Mod: GC

## 2025-08-11 RX ORDER — ESCITALOPRAM OXALATE 20 MG/1
1 TABLET ORAL
Refills: 0 | DISCHARGE

## 2025-08-11 RX ORDER — ALBUTEROL SULFATE 2.5 MG/3ML
2 VIAL, NEBULIZER (ML) INHALATION
Refills: 0 | DISCHARGE

## 2025-08-11 RX ORDER — ONDANSETRON HCL/PF 4 MG/2 ML
4 VIAL (ML) INJECTION ONCE
Refills: 0 | Status: DISCONTINUED | OUTPATIENT
Start: 2025-08-11 | End: 2025-08-11

## 2025-08-11 RX ORDER — CLONAZEPAM 0.5 MG/1
1 TABLET ORAL
Refills: 0 | DISCHARGE

## 2025-08-11 RX ORDER — FLUTICASONE FUROATE, UMECLIDINIUM BROMIDE AND VILANTEROL TRIFENATATE 100; 62.5; 25 UG/1; UG/1; UG/1
1 POWDER RESPIRATORY (INHALATION)
Refills: 0 | DISCHARGE

## 2025-08-11 RX ORDER — ACETYLCYSTEINE 200 MG/ML
4 INHALANT RESPIRATORY (INHALATION) EVERY 6 HOURS
Refills: 0 | Status: DISCONTINUED | OUTPATIENT
Start: 2025-08-11 | End: 2025-08-19

## 2025-08-11 RX ORDER — ALBUTEROL SULFATE 2.5 MG/3ML
2.5 VIAL, NEBULIZER (ML) INHALATION EVERY 6 HOURS
Refills: 0 | Status: DISCONTINUED | OUTPATIENT
Start: 2025-08-11 | End: 2025-08-19

## 2025-08-11 RX ORDER — ERGOCALCIFEROL 1.25 MG/1
1 CAPSULE ORAL
Refills: 0 | DISCHARGE

## 2025-08-11 RX ORDER — LOSARTAN POTASSIUM AND HYDROCHLOROTHIAZIDE 12.5; 5 MG/1; MG/1
1 TABLET ORAL
Refills: 0 | DISCHARGE

## 2025-08-11 RX ADMIN — LOSARTAN POTASSIUM 100 MILLIGRAM(S): 100 TABLET, FILM COATED ORAL at 05:28

## 2025-08-11 RX ADMIN — Medication 25 MILLIGRAM(S): at 05:28

## 2025-08-11 RX ADMIN — Medication 4 MILLILITER(S): at 22:18

## 2025-08-11 RX ADMIN — Medication 25 MILLIGRAM(S): at 12:11

## 2025-08-11 RX ADMIN — Medication 25 MILLIGRAM(S): at 22:17

## 2025-08-11 RX ADMIN — ESCITALOPRAM OXALATE 5 MILLIGRAM(S): 20 TABLET ORAL at 12:10

## 2025-08-11 RX ADMIN — Medication 4 MILLILITER(S): at 09:02

## 2025-08-11 RX ADMIN — MIRTAZAPINE 7.5 MILLIGRAM(S): 30 TABLET, FILM COATED ORAL at 12:10

## 2025-08-11 RX ADMIN — Medication 1 DOSE(S): at 22:36

## 2025-08-11 RX ADMIN — AMLODIPINE BESYLATE 10 MILLIGRAM(S): 10 TABLET ORAL at 05:29

## 2025-08-11 RX ADMIN — DEXTROMETHORPHAN HBR, GUAIFENESIN 600 MILLIGRAM(S): 200 LIQUID ORAL at 22:17

## 2025-08-11 RX ADMIN — Medication 1 DOSE(S): at 12:10

## 2025-08-11 RX ADMIN — Medication 2.5 MILLIGRAM(S): at 22:21

## 2025-08-11 RX ADMIN — DEXTROMETHORPHAN HBR, GUAIFENESIN 600 MILLIGRAM(S): 200 LIQUID ORAL at 05:28

## 2025-08-12 DIAGNOSIS — J18.9 PNEUMONIA, UNSPECIFIED ORGANISM: ICD-10-CM

## 2025-08-12 DIAGNOSIS — D72.829 ELEVATED WHITE BLOOD CELL COUNT, UNSPECIFIED: ICD-10-CM

## 2025-08-12 LAB
ALBUMIN SERPL ELPH-MCNC: 2.6 G/DL — LOW (ref 3.3–5)
ALP SERPL-CCNC: 67 U/L — SIGNIFICANT CHANGE UP (ref 40–120)
ALT FLD-CCNC: 43 U/L — HIGH (ref 4–41)
ANION GAP SERPL CALC-SCNC: 16 MMOL/L — HIGH (ref 7–14)
APTT BLD: 26.1 SEC — SIGNIFICANT CHANGE UP (ref 26.1–36.8)
APTT BLD: 28.7 SEC — SIGNIFICANT CHANGE UP (ref 26.1–36.8)
AST SERPL-CCNC: 33 U/L — SIGNIFICANT CHANGE UP (ref 4–40)
BASOPHILS # BLD AUTO: 0.05 K/UL — SIGNIFICANT CHANGE UP (ref 0–0.2)
BASOPHILS NFR BLD AUTO: 0.2 % — SIGNIFICANT CHANGE UP (ref 0–2)
BILIRUB SERPL-MCNC: 0.4 MG/DL — SIGNIFICANT CHANGE UP (ref 0.2–1.2)
BUN SERPL-MCNC: 32 MG/DL — HIGH (ref 7–23)
CALCIUM SERPL-MCNC: 8.8 MG/DL — SIGNIFICANT CHANGE UP (ref 8.4–10.5)
CHLORIDE SERPL-SCNC: 100 MMOL/L — SIGNIFICANT CHANGE UP (ref 98–107)
CHOLEST FLD-MCNC: 68 MG/DL — SIGNIFICANT CHANGE UP
CO2 SERPL-SCNC: 22 MMOL/L — SIGNIFICANT CHANGE UP (ref 22–31)
CREAT SERPL-MCNC: 1.25 MG/DL — SIGNIFICANT CHANGE UP (ref 0.5–1.3)
CULTURE RESULTS: ABNORMAL
EGFR: 57 ML/MIN/1.73M2 — LOW
EGFR: 57 ML/MIN/1.73M2 — LOW
EOSINOPHIL # BLD AUTO: 0.03 K/UL — SIGNIFICANT CHANGE UP (ref 0–0.5)
EOSINOPHIL NFR BLD AUTO: 0.1 % — SIGNIFICANT CHANGE UP (ref 0–6)
GLUCOSE SERPL-MCNC: 118 MG/DL — HIGH (ref 70–99)
GRAM STN FLD: SIGNIFICANT CHANGE UP
GRAM STN FLD: SIGNIFICANT CHANGE UP
HCT VFR BLD CALC: 29.1 % — LOW (ref 39–50)
HCT VFR BLD CALC: 29.5 % — LOW (ref 39–50)
HGB BLD-MCNC: 9.1 G/DL — LOW (ref 13–17)
HGB BLD-MCNC: 9.2 G/DL — LOW (ref 13–17)
IMM GRANULOCYTES # BLD AUTO: 0.21 K/UL — HIGH (ref 0–0.07)
IMM GRANULOCYTES NFR BLD AUTO: 0.9 % — SIGNIFICANT CHANGE UP (ref 0–0.9)
INR BLD: 1.04 RATIO — SIGNIFICANT CHANGE UP (ref 0.85–1.16)
LYMPHOCYTES # BLD AUTO: 0.53 K/UL — LOW (ref 1–3.3)
LYMPHOCYTES NFR BLD AUTO: 2.3 % — LOW (ref 13–44)
MAGNESIUM SERPL-MCNC: 2.4 MG/DL — SIGNIFICANT CHANGE UP (ref 1.6–2.6)
MCHC RBC-ENTMCNC: 29.7 PG — SIGNIFICANT CHANGE UP (ref 27–34)
MCHC RBC-ENTMCNC: 29.9 PG — SIGNIFICANT CHANGE UP (ref 27–34)
MCHC RBC-ENTMCNC: 31.2 G/DL — LOW (ref 32–36)
MCHC RBC-ENTMCNC: 31.3 G/DL — LOW (ref 32–36)
MCV RBC AUTO: 95.2 FL — SIGNIFICANT CHANGE UP (ref 80–100)
MCV RBC AUTO: 95.7 FL — SIGNIFICANT CHANGE UP (ref 80–100)
MONOCYTES # BLD AUTO: 1.61 K/UL — HIGH (ref 0–0.9)
MONOCYTES NFR BLD AUTO: 7 % — SIGNIFICANT CHANGE UP (ref 2–14)
NEUTROPHILS # BLD AUTO: 20.65 K/UL — HIGH (ref 1.8–7.4)
NEUTROPHILS NFR BLD AUTO: 89.5 % — HIGH (ref 43–77)
NIGHT BLUE STAIN TISS: SIGNIFICANT CHANGE UP
NRBC # BLD AUTO: 0 K/UL — SIGNIFICANT CHANGE UP (ref 0–0)
NRBC # BLD AUTO: 0 K/UL — SIGNIFICANT CHANGE UP (ref 0–0)
NRBC # FLD: 0 K/UL — SIGNIFICANT CHANGE UP (ref 0–0)
NRBC # FLD: 0 K/UL — SIGNIFICANT CHANGE UP (ref 0–0)
NRBC BLD AUTO-RTO: 0 /100 WBCS — SIGNIFICANT CHANGE UP (ref 0–0)
NRBC BLD AUTO-RTO: 0 /100 WBCS — SIGNIFICANT CHANGE UP (ref 0–0)
ORGANISM # SPEC MICROSCOPIC CNT: ABNORMAL
ORGANISM # SPEC MICROSCOPIC CNT: ABNORMAL
PH FLD: 7.6 — SIGNIFICANT CHANGE UP
PHOSPHATE SERPL-MCNC: 4.6 MG/DL — HIGH (ref 2.5–4.5)
PLATELET # BLD AUTO: 383 K/UL — SIGNIFICANT CHANGE UP (ref 150–400)
PLATELET # BLD AUTO: 417 K/UL — HIGH (ref 150–400)
PMV BLD: 9.1 FL — SIGNIFICANT CHANGE UP (ref 7–13)
PMV BLD: 9.2 FL — SIGNIFICANT CHANGE UP (ref 7–13)
POTASSIUM SERPL-MCNC: 3.5 MMOL/L — SIGNIFICANT CHANGE UP (ref 3.5–5.3)
POTASSIUM SERPL-SCNC: 3.5 MMOL/L — SIGNIFICANT CHANGE UP (ref 3.5–5.3)
PROT SERPL-MCNC: 7 G/DL — SIGNIFICANT CHANGE UP (ref 6–8.3)
PROTHROM AB SERPL-ACNC: 12.1 SEC — SIGNIFICANT CHANGE UP (ref 9.9–13.4)
RBC # BLD: 3.04 M/UL — LOW (ref 4.2–5.8)
RBC # BLD: 3.1 M/UL — LOW (ref 4.2–5.8)
RBC # FLD: 14.3 % — SIGNIFICANT CHANGE UP (ref 10.3–14.5)
RBC # FLD: 14.4 % — SIGNIFICANT CHANGE UP (ref 10.3–14.5)
SODIUM SERPL-SCNC: 138 MMOL/L — SIGNIFICANT CHANGE UP (ref 135–145)
SPECIMEN SOURCE FLD: SIGNIFICANT CHANGE UP
SPECIMEN SOURCE: SIGNIFICANT CHANGE UP
WBC # BLD: 23.08 K/UL — HIGH (ref 3.8–10.5)
WBC # BLD: 23.68 K/UL — HIGH (ref 3.8–10.5)
WBC # FLD AUTO: 23.08 K/UL — HIGH (ref 3.8–10.5)
WBC # FLD AUTO: 23.68 K/UL — HIGH (ref 3.8–10.5)

## 2025-08-12 PROCEDURE — 99222 1ST HOSP IP/OBS MODERATE 55: CPT

## 2025-08-12 PROCEDURE — G0545: CPT

## 2025-08-12 PROCEDURE — 99233 SBSQ HOSP IP/OBS HIGH 50: CPT | Mod: GC

## 2025-08-12 PROCEDURE — 71045 X-RAY EXAM CHEST 1 VIEW: CPT | Mod: 26

## 2025-08-12 RX ORDER — POLYETHYLENE GLYCOL 3350 17 G/17G
17 POWDER, FOR SOLUTION ORAL DAILY
Refills: 0 | Status: DISCONTINUED | OUTPATIENT
Start: 2025-08-12 | End: 2025-08-19

## 2025-08-12 RX ORDER — PIPERACILLIN-TAZO-DEXTROSE,ISO 3.375G/5
3.38 IV SOLUTION, PIGGYBACK PREMIX FROZEN(ML) INTRAVENOUS EVERY 8 HOURS
Refills: 0 | Status: DISCONTINUED | OUTPATIENT
Start: 2025-08-12 | End: 2025-08-19

## 2025-08-12 RX ORDER — PIPERACILLIN-TAZO-DEXTROSE,ISO 3.375G/5
3.38 IV SOLUTION, PIGGYBACK PREMIX FROZEN(ML) INTRAVENOUS ONCE
Refills: 0 | Status: COMPLETED | OUTPATIENT
Start: 2025-08-12 | End: 2025-08-12

## 2025-08-12 RX ORDER — HEPARIN SODIUM 1000 [USP'U]/ML
INJECTION INTRAVENOUS; SUBCUTANEOUS
Qty: 25000 | Refills: 0 | Status: DISCONTINUED | OUTPATIENT
Start: 2025-08-12 | End: 2025-08-15

## 2025-08-12 RX ADMIN — ACETYLCYSTEINE 4 MILLILITER(S): 200 INHALANT RESPIRATORY (INHALATION) at 17:04

## 2025-08-12 RX ADMIN — Medication 200 GRAM(S): at 12:38

## 2025-08-12 RX ADMIN — MIRTAZAPINE 7.5 MILLIGRAM(S): 30 TABLET, FILM COATED ORAL at 12:39

## 2025-08-12 RX ADMIN — DEXTROMETHORPHAN HBR, GUAIFENESIN 600 MILLIGRAM(S): 200 LIQUID ORAL at 05:33

## 2025-08-12 RX ADMIN — Medication 4 MILLILITER(S): at 17:04

## 2025-08-12 RX ADMIN — ACETYLCYSTEINE 4 MILLILITER(S): 200 INHALANT RESPIRATORY (INHALATION) at 19:45

## 2025-08-12 RX ADMIN — Medication 650 MILLIGRAM(S): at 16:30

## 2025-08-12 RX ADMIN — Medication 25 GRAM(S): at 21:24

## 2025-08-12 RX ADMIN — TIOTROPIUM BROMIDE INHALATION SPRAY 2 PUFF(S): 3.12 SPRAY, METERED RESPIRATORY (INHALATION) at 18:17

## 2025-08-12 RX ADMIN — HEPARIN SODIUM 1300 UNIT(S)/HR: 1000 INJECTION INTRAVENOUS; SUBCUTANEOUS at 19:20

## 2025-08-12 RX ADMIN — Medication 25 MILLIGRAM(S): at 15:29

## 2025-08-12 RX ADMIN — Medication 25 GRAM(S): at 15:28

## 2025-08-12 RX ADMIN — AMLODIPINE BESYLATE 10 MILLIGRAM(S): 10 TABLET ORAL at 05:34

## 2025-08-12 RX ADMIN — ATORVASTATIN CALCIUM 40 MILLIGRAM(S): 80 TABLET, FILM COATED ORAL at 21:24

## 2025-08-12 RX ADMIN — Medication 25 MILLIGRAM(S): at 21:24

## 2025-08-12 RX ADMIN — HEPARIN SODIUM 1300 UNIT(S)/HR: 1000 INJECTION INTRAVENOUS; SUBCUTANEOUS at 15:25

## 2025-08-12 RX ADMIN — HEPARIN SODIUM 1600 UNIT(S)/HR: 1000 INJECTION INTRAVENOUS; SUBCUTANEOUS at 22:13

## 2025-08-12 RX ADMIN — Medication 1 DOSE(S): at 21:24

## 2025-08-12 RX ADMIN — LOSARTAN POTASSIUM 100 MILLIGRAM(S): 100 TABLET, FILM COATED ORAL at 05:33

## 2025-08-12 RX ADMIN — Medication 1 DOSE(S): at 12:39

## 2025-08-12 RX ADMIN — Medication 25 MILLIGRAM(S): at 05:33

## 2025-08-12 RX ADMIN — ESCITALOPRAM OXALATE 5 MILLIGRAM(S): 20 TABLET ORAL at 12:39

## 2025-08-12 RX ADMIN — ACETYLCYSTEINE 4 MILLILITER(S): 200 INHALANT RESPIRATORY (INHALATION) at 09:21

## 2025-08-12 RX ADMIN — DEXTROMETHORPHAN HBR, GUAIFENESIN 600 MILLIGRAM(S): 200 LIQUID ORAL at 18:16

## 2025-08-12 RX ADMIN — Medication 2.5 MILLIGRAM(S): at 09:21

## 2025-08-12 RX ADMIN — Medication 2.5 MILLIGRAM(S): at 17:04

## 2025-08-12 RX ADMIN — Medication 2.5 MILLIGRAM(S): at 04:29

## 2025-08-12 RX ADMIN — Medication 4 MILLILITER(S): at 19:45

## 2025-08-12 RX ADMIN — Medication 4 MILLILITER(S): at 04:29

## 2025-08-12 RX ADMIN — Medication 4 MILLILITER(S): at 09:15

## 2025-08-12 RX ADMIN — Medication 650 MILLIGRAM(S): at 05:32

## 2025-08-12 RX ADMIN — Medication 650 MILLIGRAM(S): at 15:34

## 2025-08-12 RX ADMIN — Medication 650 MILLIGRAM(S): at 06:32

## 2025-08-12 RX ADMIN — Medication 2.5 MILLIGRAM(S): at 19:44

## 2025-08-13 LAB
ALBUMIN SERPL ELPH-MCNC: 2.7 G/DL — LOW (ref 3.3–5)
ALP SERPL-CCNC: 69 U/L — SIGNIFICANT CHANGE UP (ref 40–120)
ALT FLD-CCNC: 44 U/L — HIGH (ref 4–41)
ANION GAP SERPL CALC-SCNC: 13 MMOL/L — SIGNIFICANT CHANGE UP (ref 7–14)
APTT BLD: 25.1 SEC — LOW (ref 26.1–36.8)
APTT BLD: 34.4 SEC — SIGNIFICANT CHANGE UP (ref 26.1–36.8)
APTT BLD: 54.9 SEC — HIGH (ref 26.1–36.8)
AST SERPL-CCNC: 35 U/L — SIGNIFICANT CHANGE UP (ref 4–40)
BASOPHILS # BLD AUTO: 0.04 K/UL — SIGNIFICANT CHANGE UP (ref 0–0.2)
BASOPHILS NFR BLD AUTO: 0.2 % — SIGNIFICANT CHANGE UP (ref 0–2)
BILIRUB SERPL-MCNC: 0.4 MG/DL — SIGNIFICANT CHANGE UP (ref 0.2–1.2)
BUN SERPL-MCNC: 27 MG/DL — HIGH (ref 7–23)
CALCIUM SERPL-MCNC: 9 MG/DL — SIGNIFICANT CHANGE UP (ref 8.4–10.5)
CHLORIDE SERPL-SCNC: 102 MMOL/L — SIGNIFICANT CHANGE UP (ref 98–107)
CO2 SERPL-SCNC: 23 MMOL/L — SIGNIFICANT CHANGE UP (ref 22–31)
CREAT SERPL-MCNC: 1.09 MG/DL — SIGNIFICANT CHANGE UP (ref 0.5–1.3)
CULTURE RESULTS: SIGNIFICANT CHANGE UP
EGFR: 67 ML/MIN/1.73M2 — SIGNIFICANT CHANGE UP
EGFR: 67 ML/MIN/1.73M2 — SIGNIFICANT CHANGE UP
EOSINOPHIL # BLD AUTO: 0.09 K/UL — SIGNIFICANT CHANGE UP (ref 0–0.5)
EOSINOPHIL NFR BLD AUTO: 0.4 % — SIGNIFICANT CHANGE UP (ref 0–6)
GLUCOSE SERPL-MCNC: 121 MG/DL — HIGH (ref 70–99)
HCT VFR BLD CALC: 29.2 % — LOW (ref 39–50)
HGB BLD-MCNC: 9 G/DL — LOW (ref 13–17)
IMM GRANULOCYTES # BLD AUTO: 0.13 K/UL — HIGH (ref 0–0.07)
IMM GRANULOCYTES NFR BLD AUTO: 0.6 % — SIGNIFICANT CHANGE UP (ref 0–0.9)
LYMPHOCYTES # BLD AUTO: 0.55 K/UL — LOW (ref 1–3.3)
LYMPHOCYTES NFR BLD AUTO: 2.6 % — LOW (ref 13–44)
MAGNESIUM SERPL-MCNC: 2.3 MG/DL — SIGNIFICANT CHANGE UP (ref 1.6–2.6)
MCHC RBC-ENTMCNC: 29.5 PG — SIGNIFICANT CHANGE UP (ref 27–34)
MCHC RBC-ENTMCNC: 30.8 G/DL — LOW (ref 32–36)
MCV RBC AUTO: 95.7 FL — SIGNIFICANT CHANGE UP (ref 80–100)
MONOCYTES # BLD AUTO: 1.64 K/UL — HIGH (ref 0–0.9)
MONOCYTES NFR BLD AUTO: 7.7 % — SIGNIFICANT CHANGE UP (ref 2–14)
NEUTROPHILS # BLD AUTO: 18.72 K/UL — HIGH (ref 1.8–7.4)
NEUTROPHILS NFR BLD AUTO: 88.5 % — HIGH (ref 43–77)
NRBC # BLD AUTO: 0 K/UL — SIGNIFICANT CHANGE UP (ref 0–0)
NRBC # FLD: 0 K/UL — SIGNIFICANT CHANGE UP (ref 0–0)
NRBC BLD AUTO-RTO: 0 /100 WBCS — SIGNIFICANT CHANGE UP (ref 0–0)
PHOSPHATE SERPL-MCNC: 3.1 MG/DL — SIGNIFICANT CHANGE UP (ref 2.5–4.5)
PLATELET # BLD AUTO: 391 K/UL — SIGNIFICANT CHANGE UP (ref 150–400)
PMV BLD: 9.2 FL — SIGNIFICANT CHANGE UP (ref 7–13)
POTASSIUM SERPL-MCNC: 3.4 MMOL/L — LOW (ref 3.5–5.3)
POTASSIUM SERPL-SCNC: 3.4 MMOL/L — LOW (ref 3.5–5.3)
PROT SERPL-MCNC: 6.9 G/DL — SIGNIFICANT CHANGE UP (ref 6–8.3)
RBC # BLD: 3.05 M/UL — LOW (ref 4.2–5.8)
RBC # FLD: 14.3 % — SIGNIFICANT CHANGE UP (ref 10.3–14.5)
SODIUM SERPL-SCNC: 138 MMOL/L — SIGNIFICANT CHANGE UP (ref 135–145)
SPECIMEN SOURCE: SIGNIFICANT CHANGE UP
SURGICAL PATHOLOGY STUDY: SIGNIFICANT CHANGE UP
WBC # BLD: 21.17 K/UL — HIGH (ref 3.8–10.5)
WBC # FLD AUTO: 21.17 K/UL — HIGH (ref 3.8–10.5)

## 2025-08-13 PROCEDURE — 76604 US EXAM CHEST: CPT | Mod: 26,GC

## 2025-08-13 PROCEDURE — 99233 SBSQ HOSP IP/OBS HIGH 50: CPT | Mod: GC,25

## 2025-08-13 PROCEDURE — 99232 SBSQ HOSP IP/OBS MODERATE 35: CPT

## 2025-08-13 PROCEDURE — 71045 X-RAY EXAM CHEST 1 VIEW: CPT | Mod: 26

## 2025-08-13 PROCEDURE — G0545: CPT

## 2025-08-13 RX ADMIN — Medication 4 MILLILITER(S): at 08:47

## 2025-08-13 RX ADMIN — MIRTAZAPINE 7.5 MILLIGRAM(S): 30 TABLET, FILM COATED ORAL at 13:06

## 2025-08-13 RX ADMIN — Medication 25 GRAM(S): at 21:50

## 2025-08-13 RX ADMIN — Medication 1 DOSE(S): at 13:07

## 2025-08-13 RX ADMIN — Medication 25 GRAM(S): at 05:16

## 2025-08-13 RX ADMIN — TIOTROPIUM BROMIDE INHALATION SPRAY 2 PUFF(S): 3.12 SPRAY, METERED RESPIRATORY (INHALATION) at 10:17

## 2025-08-13 RX ADMIN — ACETYLCYSTEINE 4 MILLILITER(S): 200 INHALANT RESPIRATORY (INHALATION) at 08:48

## 2025-08-13 RX ADMIN — LOSARTAN POTASSIUM 100 MILLIGRAM(S): 100 TABLET, FILM COATED ORAL at 05:17

## 2025-08-13 RX ADMIN — Medication 25 MILLIGRAM(S): at 05:17

## 2025-08-13 RX ADMIN — AMLODIPINE BESYLATE 10 MILLIGRAM(S): 10 TABLET ORAL at 05:17

## 2025-08-13 RX ADMIN — Medication 1 DOSE(S): at 21:19

## 2025-08-13 RX ADMIN — ESCITALOPRAM OXALATE 5 MILLIGRAM(S): 20 TABLET ORAL at 13:07

## 2025-08-13 RX ADMIN — DEXTROMETHORPHAN HBR, GUAIFENESIN 600 MILLIGRAM(S): 200 LIQUID ORAL at 17:40

## 2025-08-13 RX ADMIN — ATORVASTATIN CALCIUM 40 MILLIGRAM(S): 80 TABLET, FILM COATED ORAL at 21:20

## 2025-08-13 RX ADMIN — Medication 25 MILLIGRAM(S): at 21:19

## 2025-08-13 RX ADMIN — Medication 40 MILLIEQUIVALENT(S): at 10:17

## 2025-08-13 RX ADMIN — Medication 2.5 MILLIGRAM(S): at 08:48

## 2025-08-13 RX ADMIN — ACETYLCYSTEINE 4 MILLILITER(S): 200 INHALANT RESPIRATORY (INHALATION) at 21:13

## 2025-08-13 RX ADMIN — HEPARIN SODIUM 2200 UNIT(S)/HR: 1000 INJECTION INTRAVENOUS; SUBCUTANEOUS at 14:40

## 2025-08-13 RX ADMIN — Medication 4 MILLILITER(S): at 21:14

## 2025-08-13 RX ADMIN — Medication 2.5 MILLIGRAM(S): at 21:13

## 2025-08-13 RX ADMIN — HEPARIN SODIUM 2200 UNIT(S)/HR: 1000 INJECTION INTRAVENOUS; SUBCUTANEOUS at 21:15

## 2025-08-13 RX ADMIN — Medication 4 MILLILITER(S): at 03:42

## 2025-08-13 RX ADMIN — Medication 25 GRAM(S): at 13:12

## 2025-08-13 RX ADMIN — HEPARIN SODIUM 1900 UNIT(S)/HR: 1000 INJECTION INTRAVENOUS; SUBCUTANEOUS at 05:56

## 2025-08-13 RX ADMIN — ACETYLCYSTEINE 4 MILLILITER(S): 200 INHALANT RESPIRATORY (INHALATION) at 03:42

## 2025-08-13 RX ADMIN — DEXTROMETHORPHAN HBR, GUAIFENESIN 600 MILLIGRAM(S): 200 LIQUID ORAL at 05:17

## 2025-08-13 RX ADMIN — HEPARIN SODIUM 2400 UNIT(S)/HR: 1000 INJECTION INTRAVENOUS; SUBCUTANEOUS at 21:53

## 2025-08-13 RX ADMIN — POLYETHYLENE GLYCOL 3350 17 GRAM(S): 17 POWDER, FOR SOLUTION ORAL at 13:10

## 2025-08-13 RX ADMIN — HEPARIN SODIUM 2200 UNIT(S)/HR: 1000 INJECTION INTRAVENOUS; SUBCUTANEOUS at 19:20

## 2025-08-13 RX ADMIN — Medication 25 MILLIGRAM(S): at 13:07

## 2025-08-13 RX ADMIN — Medication 2.5 MILLIGRAM(S): at 03:42

## 2025-08-14 ENCOUNTER — TRANSCRIPTION ENCOUNTER (OUTPATIENT)
Age: 85
End: 2025-08-14

## 2025-08-14 LAB
ALBUMIN SERPL ELPH-MCNC: 2.6 G/DL — LOW (ref 3.3–5)
ALP SERPL-CCNC: 65 U/L — SIGNIFICANT CHANGE UP (ref 40–120)
ALT FLD-CCNC: 41 U/L — SIGNIFICANT CHANGE UP (ref 4–41)
ANION GAP SERPL CALC-SCNC: 13 MMOL/L — SIGNIFICANT CHANGE UP (ref 7–14)
APTT BLD: 73.9 SEC — HIGH (ref 26.1–36.8)
APTT BLD: 77.3 SEC — HIGH (ref 26.1–36.8)
AST SERPL-CCNC: 25 U/L — SIGNIFICANT CHANGE UP (ref 4–40)
BASOPHILS # BLD AUTO: 0.04 K/UL — SIGNIFICANT CHANGE UP (ref 0–0.2)
BASOPHILS NFR BLD AUTO: 0.2 % — SIGNIFICANT CHANGE UP (ref 0–2)
BILIRUB SERPL-MCNC: 0.4 MG/DL — SIGNIFICANT CHANGE UP (ref 0.2–1.2)
BUN SERPL-MCNC: 22 MG/DL — SIGNIFICANT CHANGE UP (ref 7–23)
CALCIUM SERPL-MCNC: 9 MG/DL — SIGNIFICANT CHANGE UP (ref 8.4–10.5)
CHLORIDE SERPL-SCNC: 101 MMOL/L — SIGNIFICANT CHANGE UP (ref 98–107)
CO2 SERPL-SCNC: 25 MMOL/L — SIGNIFICANT CHANGE UP (ref 22–31)
CREAT SERPL-MCNC: 0.99 MG/DL — SIGNIFICANT CHANGE UP (ref 0.5–1.3)
EGFR: 75 ML/MIN/1.73M2 — SIGNIFICANT CHANGE UP
EGFR: 75 ML/MIN/1.73M2 — SIGNIFICANT CHANGE UP
EOSINOPHIL # BLD AUTO: 0.12 K/UL — SIGNIFICANT CHANGE UP (ref 0–0.5)
EOSINOPHIL NFR BLD AUTO: 0.7 % — SIGNIFICANT CHANGE UP (ref 0–6)
GLUCOSE SERPL-MCNC: 115 MG/DL — HIGH (ref 70–99)
HCT VFR BLD CALC: 28 % — LOW (ref 39–50)
HGB BLD-MCNC: 8.9 G/DL — LOW (ref 13–17)
IMM GRANULOCYTES # BLD AUTO: 0.16 K/UL — HIGH (ref 0–0.07)
IMM GRANULOCYTES NFR BLD AUTO: 0.9 % — SIGNIFICANT CHANGE UP (ref 0–0.9)
LYMPHOCYTES # BLD AUTO: 0.62 K/UL — LOW (ref 1–3.3)
LYMPHOCYTES NFR BLD AUTO: 3.4 % — LOW (ref 13–44)
MAGNESIUM SERPL-MCNC: 2.3 MG/DL — SIGNIFICANT CHANGE UP (ref 1.6–2.6)
MCHC RBC-ENTMCNC: 29.5 PG — SIGNIFICANT CHANGE UP (ref 27–34)
MCHC RBC-ENTMCNC: 31.8 G/DL — LOW (ref 32–36)
MCV RBC AUTO: 92.7 FL — SIGNIFICANT CHANGE UP (ref 80–100)
MONOCYTES # BLD AUTO: 1.41 K/UL — HIGH (ref 0–0.9)
MONOCYTES NFR BLD AUTO: 7.7 % — SIGNIFICANT CHANGE UP (ref 2–14)
NEUTROPHILS # BLD AUTO: 16.05 K/UL — HIGH (ref 1.8–7.4)
NEUTROPHILS NFR BLD AUTO: 87.1 % — HIGH (ref 43–77)
NRBC # BLD AUTO: 0 K/UL — SIGNIFICANT CHANGE UP (ref 0–0)
NRBC # FLD: 0 K/UL — SIGNIFICANT CHANGE UP (ref 0–0)
NRBC BLD AUTO-RTO: 0 /100 WBCS — SIGNIFICANT CHANGE UP (ref 0–0)
PHOSPHATE SERPL-MCNC: 3 MG/DL — SIGNIFICANT CHANGE UP (ref 2.5–4.5)
PLATELET # BLD AUTO: 421 K/UL — HIGH (ref 150–400)
PMV BLD: 9.2 FL — SIGNIFICANT CHANGE UP (ref 7–13)
POTASSIUM SERPL-MCNC: 3.3 MMOL/L — LOW (ref 3.5–5.3)
POTASSIUM SERPL-SCNC: 3.3 MMOL/L — LOW (ref 3.5–5.3)
PROT SERPL-MCNC: 6.7 G/DL — SIGNIFICANT CHANGE UP (ref 6–8.3)
RBC # BLD: 3.02 M/UL — LOW (ref 4.2–5.8)
RBC # FLD: 14.3 % — SIGNIFICANT CHANGE UP (ref 10.3–14.5)
SODIUM SERPL-SCNC: 139 MMOL/L — SIGNIFICANT CHANGE UP (ref 135–145)
WBC # BLD: 18.4 K/UL — HIGH (ref 3.8–10.5)
WBC # FLD AUTO: 18.4 K/UL — HIGH (ref 3.8–10.5)

## 2025-08-14 PROCEDURE — 99233 SBSQ HOSP IP/OBS HIGH 50: CPT | Mod: GC

## 2025-08-14 PROCEDURE — 99232 SBSQ HOSP IP/OBS MODERATE 35: CPT

## 2025-08-14 PROCEDURE — 71045 X-RAY EXAM CHEST 1 VIEW: CPT | Mod: 26,76

## 2025-08-14 PROCEDURE — G0545: CPT

## 2025-08-14 RX ADMIN — Medication 1 DOSE(S): at 21:14

## 2025-08-14 RX ADMIN — LOSARTAN POTASSIUM 100 MILLIGRAM(S): 100 TABLET, FILM COATED ORAL at 05:13

## 2025-08-14 RX ADMIN — TIOTROPIUM BROMIDE INHALATION SPRAY 2 PUFF(S): 3.12 SPRAY, METERED RESPIRATORY (INHALATION) at 11:27

## 2025-08-14 RX ADMIN — POLYETHYLENE GLYCOL 3350 17 GRAM(S): 17 POWDER, FOR SOLUTION ORAL at 11:27

## 2025-08-14 RX ADMIN — Medication 25 GRAM(S): at 05:11

## 2025-08-14 RX ADMIN — Medication 25 GRAM(S): at 15:27

## 2025-08-14 RX ADMIN — Medication 25 GRAM(S): at 21:13

## 2025-08-14 RX ADMIN — DEXTROMETHORPHAN HBR, GUAIFENESIN 600 MILLIGRAM(S): 200 LIQUID ORAL at 05:13

## 2025-08-14 RX ADMIN — HEPARIN SODIUM 2400 UNIT(S)/HR: 1000 INJECTION INTRAVENOUS; SUBCUTANEOUS at 12:03

## 2025-08-14 RX ADMIN — Medication 2.5 MILLIGRAM(S): at 03:29

## 2025-08-14 RX ADMIN — DEXTROMETHORPHAN HBR, GUAIFENESIN 600 MILLIGRAM(S): 200 LIQUID ORAL at 17:46

## 2025-08-14 RX ADMIN — Medication 2.5 MILLIGRAM(S): at 08:01

## 2025-08-14 RX ADMIN — HEPARIN SODIUM 2400 UNIT(S)/HR: 1000 INJECTION INTRAVENOUS; SUBCUTANEOUS at 05:08

## 2025-08-14 RX ADMIN — ESCITALOPRAM OXALATE 5 MILLIGRAM(S): 20 TABLET ORAL at 11:27

## 2025-08-14 RX ADMIN — Medication 2.5 MILLIGRAM(S): at 20:57

## 2025-08-14 RX ADMIN — Medication 2.5 MILLIGRAM(S): at 14:53

## 2025-08-14 RX ADMIN — ATORVASTATIN CALCIUM 40 MILLIGRAM(S): 80 TABLET, FILM COATED ORAL at 21:14

## 2025-08-14 RX ADMIN — AMLODIPINE BESYLATE 10 MILLIGRAM(S): 10 TABLET ORAL at 05:13

## 2025-08-14 RX ADMIN — Medication 25 MILLIGRAM(S): at 21:14

## 2025-08-14 RX ADMIN — Medication 25 MILLIGRAM(S): at 15:27

## 2025-08-14 RX ADMIN — Medication 2 MILLIGRAM(S): at 05:29

## 2025-08-14 RX ADMIN — ACETYLCYSTEINE 4 MILLILITER(S): 200 INHALANT RESPIRATORY (INHALATION) at 21:00

## 2025-08-14 RX ADMIN — Medication 1 DOSE(S): at 11:26

## 2025-08-14 RX ADMIN — HEPARIN SODIUM 2400 UNIT(S)/HR: 1000 INJECTION INTRAVENOUS; SUBCUTANEOUS at 19:23

## 2025-08-14 RX ADMIN — MIRTAZAPINE 7.5 MILLIGRAM(S): 30 TABLET, FILM COATED ORAL at 11:27

## 2025-08-14 RX ADMIN — Medication 4 MILLILITER(S): at 03:29

## 2025-08-14 RX ADMIN — Medication 4 MILLILITER(S): at 20:57

## 2025-08-14 RX ADMIN — Medication 2 MILLIGRAM(S): at 06:00

## 2025-08-14 RX ADMIN — Medication 25 MILLIGRAM(S): at 05:12

## 2025-08-14 RX ADMIN — Medication 4 MILLILITER(S): at 08:02

## 2025-08-14 RX ADMIN — ACETYLCYSTEINE 4 MILLILITER(S): 200 INHALANT RESPIRATORY (INHALATION) at 08:02

## 2025-08-14 RX ADMIN — Medication 40 MILLIEQUIVALENT(S): at 11:25

## 2025-08-14 RX ADMIN — HEPARIN SODIUM 2400 UNIT(S)/HR: 1000 INJECTION INTRAVENOUS; SUBCUTANEOUS at 07:18

## 2025-08-14 RX ADMIN — ACETYLCYSTEINE 4 MILLILITER(S): 200 INHALANT RESPIRATORY (INHALATION) at 03:29

## 2025-08-15 ENCOUNTER — APPOINTMENT (OUTPATIENT)
Dept: RADIATION ONCOLOGY | Facility: CLINIC | Age: 85
End: 2025-08-15

## 2025-08-15 LAB
ALBUMIN SERPL ELPH-MCNC: 2.5 G/DL — LOW (ref 3.3–5)
ALP SERPL-CCNC: 59 U/L — SIGNIFICANT CHANGE UP (ref 40–120)
ALT FLD-CCNC: 39 U/L — SIGNIFICANT CHANGE UP (ref 4–41)
ANION GAP SERPL CALC-SCNC: 14 MMOL/L — SIGNIFICANT CHANGE UP (ref 7–14)
APTT BLD: 76.8 SEC — HIGH (ref 26.1–36.8)
AST SERPL-CCNC: 24 U/L — SIGNIFICANT CHANGE UP (ref 4–40)
BASOPHILS # BLD AUTO: 0.03 K/UL — SIGNIFICANT CHANGE UP (ref 0–0.2)
BASOPHILS NFR BLD AUTO: 0.2 % — SIGNIFICANT CHANGE UP (ref 0–2)
BILIRUB SERPL-MCNC: 0.3 MG/DL — SIGNIFICANT CHANGE UP (ref 0.2–1.2)
BUN SERPL-MCNC: 24 MG/DL — HIGH (ref 7–23)
CALCIUM SERPL-MCNC: 9 MG/DL — SIGNIFICANT CHANGE UP (ref 8.4–10.5)
CHLORIDE SERPL-SCNC: 102 MMOL/L — SIGNIFICANT CHANGE UP (ref 98–107)
CO2 SERPL-SCNC: 24 MMOL/L — SIGNIFICANT CHANGE UP (ref 22–31)
CREAT SERPL-MCNC: 1.13 MG/DL — SIGNIFICANT CHANGE UP (ref 0.5–1.3)
CULTURE RESULTS: SIGNIFICANT CHANGE UP
EGFR: 64 ML/MIN/1.73M2 — SIGNIFICANT CHANGE UP
EGFR: 64 ML/MIN/1.73M2 — SIGNIFICANT CHANGE UP
EOSINOPHIL # BLD AUTO: 0.13 K/UL — SIGNIFICANT CHANGE UP (ref 0–0.5)
EOSINOPHIL NFR BLD AUTO: 0.8 % — SIGNIFICANT CHANGE UP (ref 0–6)
GLUCOSE SERPL-MCNC: 110 MG/DL — HIGH (ref 70–99)
HCT VFR BLD CALC: 27.8 % — LOW (ref 39–50)
HGB BLD-MCNC: 8.6 G/DL — LOW (ref 13–17)
IMM GRANULOCYTES # BLD AUTO: 0.11 K/UL — HIGH (ref 0–0.07)
IMM GRANULOCYTES NFR BLD AUTO: 0.7 % — SIGNIFICANT CHANGE UP (ref 0–0.9)
LYMPHOCYTES # BLD AUTO: 0.7 K/UL — LOW (ref 1–3.3)
LYMPHOCYTES NFR BLD AUTO: 4.4 % — LOW (ref 13–44)
MAGNESIUM SERPL-MCNC: 2.3 MG/DL — SIGNIFICANT CHANGE UP (ref 1.6–2.6)
MCHC RBC-ENTMCNC: 29.7 PG — SIGNIFICANT CHANGE UP (ref 27–34)
MCHC RBC-ENTMCNC: 30.9 G/DL — LOW (ref 32–36)
MCV RBC AUTO: 95.9 FL — SIGNIFICANT CHANGE UP (ref 80–100)
MONOCYTES # BLD AUTO: 1.31 K/UL — HIGH (ref 0–0.9)
MONOCYTES NFR BLD AUTO: 8.1 % — SIGNIFICANT CHANGE UP (ref 2–14)
NEUTROPHILS # BLD AUTO: 13.81 K/UL — HIGH (ref 1.8–7.4)
NEUTROPHILS NFR BLD AUTO: 85.8 % — HIGH (ref 43–77)
NON-GYNECOLOGICAL CYTOLOGY STUDY: SIGNIFICANT CHANGE UP
NRBC # BLD AUTO: 0 K/UL — SIGNIFICANT CHANGE UP (ref 0–0)
NRBC # FLD: 0 K/UL — SIGNIFICANT CHANGE UP (ref 0–0)
NRBC BLD AUTO-RTO: 0 /100 WBCS — SIGNIFICANT CHANGE UP (ref 0–0)
PHOSPHATE SERPL-MCNC: 3.1 MG/DL — SIGNIFICANT CHANGE UP (ref 2.5–4.5)
PLATELET # BLD AUTO: 427 K/UL — HIGH (ref 150–400)
PMV BLD: 9.4 FL — SIGNIFICANT CHANGE UP (ref 7–13)
POTASSIUM SERPL-MCNC: 3.4 MMOL/L — LOW (ref 3.5–5.3)
POTASSIUM SERPL-SCNC: 3.4 MMOL/L — LOW (ref 3.5–5.3)
PROT SERPL-MCNC: 6.5 G/DL — SIGNIFICANT CHANGE UP (ref 6–8.3)
RBC # BLD: 2.9 M/UL — LOW (ref 4.2–5.8)
RBC # FLD: 14.5 % — SIGNIFICANT CHANGE UP (ref 10.3–14.5)
SODIUM SERPL-SCNC: 140 MMOL/L — SIGNIFICANT CHANGE UP (ref 135–145)
SPECIMEN SOURCE: SIGNIFICANT CHANGE UP
WBC # BLD: 16.09 K/UL — HIGH (ref 3.8–10.5)
WBC # FLD AUTO: 16.09 K/UL — HIGH (ref 3.8–10.5)

## 2025-08-15 PROCEDURE — G0545: CPT

## 2025-08-15 PROCEDURE — 74018 RADEX ABDOMEN 1 VIEW: CPT | Mod: 26

## 2025-08-15 PROCEDURE — 99232 SBSQ HOSP IP/OBS MODERATE 35: CPT

## 2025-08-15 PROCEDURE — 71045 X-RAY EXAM CHEST 1 VIEW: CPT | Mod: 26

## 2025-08-15 RX ORDER — SENNA 187 MG
2 TABLET ORAL AT BEDTIME
Refills: 0 | Status: DISCONTINUED | OUTPATIENT
Start: 2025-08-15 | End: 2025-08-19

## 2025-08-15 RX ORDER — RIVAROXABAN 10 MG/1
20 TABLET, FILM COATED ORAL
Refills: 0 | Status: DISCONTINUED | OUTPATIENT
Start: 2025-08-15 | End: 2025-08-19

## 2025-08-15 RX ORDER — SIMETHICONE 80 MG
80 TABLET,CHEWABLE ORAL DAILY
Refills: 0 | Status: DISCONTINUED | OUTPATIENT
Start: 2025-08-15 | End: 2025-08-19

## 2025-08-15 RX ORDER — LACTULOSE 10 G/15ML
10 SOLUTION ORAL EVERY 6 HOURS
Refills: 0 | Status: DISCONTINUED | OUTPATIENT
Start: 2025-08-15 | End: 2025-08-19

## 2025-08-15 RX ORDER — LACTULOSE 10 G/15ML
200 SOLUTION ORAL ONCE
Refills: 0 | Status: COMPLETED | OUTPATIENT
Start: 2025-08-15 | End: 2025-08-15

## 2025-08-15 RX ADMIN — LOSARTAN POTASSIUM 100 MILLIGRAM(S): 100 TABLET, FILM COATED ORAL at 05:23

## 2025-08-15 RX ADMIN — Medication 2 MILLIGRAM(S): at 21:56

## 2025-08-15 RX ADMIN — DEXTROMETHORPHAN HBR, GUAIFENESIN 600 MILLIGRAM(S): 200 LIQUID ORAL at 05:22

## 2025-08-15 RX ADMIN — Medication 2 TABLET(S): at 22:16

## 2025-08-15 RX ADMIN — Medication 25 GRAM(S): at 21:54

## 2025-08-15 RX ADMIN — Medication 25 MILLIGRAM(S): at 21:55

## 2025-08-15 RX ADMIN — Medication 2.5 MILLIGRAM(S): at 03:16

## 2025-08-15 RX ADMIN — Medication 2.5 MILLIGRAM(S): at 10:47

## 2025-08-15 RX ADMIN — Medication 80 MILLIGRAM(S): at 22:17

## 2025-08-15 RX ADMIN — ESCITALOPRAM OXALATE 5 MILLIGRAM(S): 20 TABLET ORAL at 13:44

## 2025-08-15 RX ADMIN — DEXTROMETHORPHAN HBR, GUAIFENESIN 600 MILLIGRAM(S): 200 LIQUID ORAL at 19:02

## 2025-08-15 RX ADMIN — Medication 25 GRAM(S): at 13:46

## 2025-08-15 RX ADMIN — HEPARIN SODIUM 2400 UNIT(S)/HR: 1000 INJECTION INTRAVENOUS; SUBCUTANEOUS at 08:27

## 2025-08-15 RX ADMIN — Medication 4 MILLILITER(S): at 10:47

## 2025-08-15 RX ADMIN — ACETYLCYSTEINE 4 MILLILITER(S): 200 INHALANT RESPIRATORY (INHALATION) at 03:16

## 2025-08-15 RX ADMIN — LACTULOSE 10 GRAM(S): 10 SOLUTION ORAL at 16:22

## 2025-08-15 RX ADMIN — LACTULOSE 200 GRAM(S): 10 SOLUTION ORAL at 19:00

## 2025-08-15 RX ADMIN — Medication 2 MILLIGRAM(S): at 22:30

## 2025-08-15 RX ADMIN — POLYETHYLENE GLYCOL 3350 17 GRAM(S): 17 POWDER, FOR SOLUTION ORAL at 13:44

## 2025-08-15 RX ADMIN — ACETYLCYSTEINE 4 MILLILITER(S): 200 INHALANT RESPIRATORY (INHALATION) at 10:47

## 2025-08-15 RX ADMIN — Medication 40 MILLIGRAM(S): at 19:04

## 2025-08-15 RX ADMIN — TIOTROPIUM BROMIDE INHALATION SPRAY 2 PUFF(S): 3.12 SPRAY, METERED RESPIRATORY (INHALATION) at 10:23

## 2025-08-15 RX ADMIN — Medication 25 MILLIGRAM(S): at 05:22

## 2025-08-15 RX ADMIN — MIRTAZAPINE 7.5 MILLIGRAM(S): 30 TABLET, FILM COATED ORAL at 13:44

## 2025-08-15 RX ADMIN — Medication 1 DOSE(S): at 10:22

## 2025-08-15 RX ADMIN — ATORVASTATIN CALCIUM 40 MILLIGRAM(S): 80 TABLET, FILM COATED ORAL at 21:55

## 2025-08-15 RX ADMIN — AMLODIPINE BESYLATE 10 MILLIGRAM(S): 10 TABLET ORAL at 05:22

## 2025-08-15 RX ADMIN — Medication 1 DOSE(S): at 21:54

## 2025-08-15 RX ADMIN — Medication 4 MILLILITER(S): at 03:17

## 2025-08-15 RX ADMIN — Medication 4 MILLIGRAM(S): at 10:18

## 2025-08-15 RX ADMIN — Medication 25 MILLIGRAM(S): at 13:43

## 2025-08-15 RX ADMIN — Medication 25 GRAM(S): at 05:23

## 2025-08-15 RX ADMIN — HEPARIN SODIUM 2400 UNIT(S)/HR: 1000 INJECTION INTRAVENOUS; SUBCUTANEOUS at 07:17

## 2025-08-15 RX ADMIN — RIVAROXABAN 20 MILLIGRAM(S): 10 TABLET, FILM COATED ORAL at 19:00

## 2025-08-16 LAB
ALBUMIN SERPL ELPH-MCNC: 2.7 G/DL — LOW (ref 3.3–5)
ALP SERPL-CCNC: 67 U/L — SIGNIFICANT CHANGE UP (ref 40–120)
ALT FLD-CCNC: 48 U/L — HIGH (ref 4–41)
ANION GAP SERPL CALC-SCNC: 15 MMOL/L — HIGH (ref 7–14)
APTT BLD: 32.4 SEC — SIGNIFICANT CHANGE UP (ref 26.1–36.8)
AST SERPL-CCNC: 34 U/L — SIGNIFICANT CHANGE UP (ref 4–40)
BASOPHILS # BLD AUTO: 0.03 K/UL — SIGNIFICANT CHANGE UP (ref 0–0.2)
BASOPHILS NFR BLD AUTO: 0.1 % — SIGNIFICANT CHANGE UP (ref 0–2)
BILIRUB SERPL-MCNC: 0.4 MG/DL — SIGNIFICANT CHANGE UP (ref 0.2–1.2)
BUN SERPL-MCNC: 26 MG/DL — HIGH (ref 7–23)
CALCIUM SERPL-MCNC: 9.9 MG/DL — SIGNIFICANT CHANGE UP (ref 8.4–10.5)
CHLORIDE SERPL-SCNC: 105 MMOL/L — SIGNIFICANT CHANGE UP (ref 98–107)
CO2 SERPL-SCNC: 24 MMOL/L — SIGNIFICANT CHANGE UP (ref 22–31)
CREAT SERPL-MCNC: 1.19 MG/DL — SIGNIFICANT CHANGE UP (ref 0.5–1.3)
EGFR: 60 ML/MIN/1.73M2 — SIGNIFICANT CHANGE UP
EGFR: 60 ML/MIN/1.73M2 — SIGNIFICANT CHANGE UP
EOSINOPHIL # BLD AUTO: 0.01 K/UL — SIGNIFICANT CHANGE UP (ref 0–0.5)
EOSINOPHIL NFR BLD AUTO: 0 % — SIGNIFICANT CHANGE UP (ref 0–6)
GLUCOSE SERPL-MCNC: 113 MG/DL — HIGH (ref 70–99)
HCT VFR BLD CALC: 29.5 % — LOW (ref 39–50)
HGB BLD-MCNC: 9.1 G/DL — LOW (ref 13–17)
IMM GRANULOCYTES # BLD AUTO: 0.18 K/UL — HIGH (ref 0–0.07)
IMM GRANULOCYTES NFR BLD AUTO: 0.9 % — SIGNIFICANT CHANGE UP (ref 0–0.9)
LYMPHOCYTES # BLD AUTO: 0.54 K/UL — LOW (ref 1–3.3)
LYMPHOCYTES NFR BLD AUTO: 2.7 % — LOW (ref 13–44)
MAGNESIUM SERPL-MCNC: 2.4 MG/DL — SIGNIFICANT CHANGE UP (ref 1.6–2.6)
MCHC RBC-ENTMCNC: 29.4 PG — SIGNIFICANT CHANGE UP (ref 27–34)
MCHC RBC-ENTMCNC: 30.8 G/DL — LOW (ref 32–36)
MCV RBC AUTO: 95.2 FL — SIGNIFICANT CHANGE UP (ref 80–100)
MONOCYTES # BLD AUTO: 1.21 K/UL — HIGH (ref 0–0.9)
MONOCYTES NFR BLD AUTO: 6 % — SIGNIFICANT CHANGE UP (ref 2–14)
NEUTROPHILS # BLD AUTO: 18.22 K/UL — HIGH (ref 1.8–7.4)
NEUTROPHILS NFR BLD AUTO: 90.3 % — HIGH (ref 43–77)
NRBC # BLD AUTO: 0 K/UL — SIGNIFICANT CHANGE UP (ref 0–0)
NRBC # FLD: 0 K/UL — SIGNIFICANT CHANGE UP (ref 0–0)
NRBC BLD AUTO-RTO: 0 /100 WBCS — SIGNIFICANT CHANGE UP (ref 0–0)
PHOSPHATE SERPL-MCNC: 3.6 MG/DL — SIGNIFICANT CHANGE UP (ref 2.5–4.5)
PLATELET # BLD AUTO: 424 K/UL — HIGH (ref 150–400)
PMV BLD: 9.6 FL — SIGNIFICANT CHANGE UP (ref 7–13)
POTASSIUM SERPL-MCNC: 3.5 MMOL/L — SIGNIFICANT CHANGE UP (ref 3.5–5.3)
POTASSIUM SERPL-SCNC: 3.5 MMOL/L — SIGNIFICANT CHANGE UP (ref 3.5–5.3)
PROT SERPL-MCNC: 6.7 G/DL — SIGNIFICANT CHANGE UP (ref 6–8.3)
RBC # BLD: 3.1 M/UL — LOW (ref 4.2–5.8)
RBC # FLD: 14.5 % — SIGNIFICANT CHANGE UP (ref 10.3–14.5)
SODIUM SERPL-SCNC: 144 MMOL/L — SIGNIFICANT CHANGE UP (ref 135–145)
WBC # BLD: 20.19 K/UL — HIGH (ref 3.8–10.5)
WBC # FLD AUTO: 20.19 K/UL — HIGH (ref 3.8–10.5)

## 2025-08-16 RX ORDER — AMOXICILLIN AND CLAVULANATE POTASSIUM 500; 125 MG/1; MG/1
1 TABLET, FILM COATED ORAL
Qty: 4 | Refills: 0
Start: 2025-08-16 | End: 2025-08-17

## 2025-08-16 RX ORDER — DEXTROMETHORPHAN HBR, GUAIFENESIN 200 MG/10ML
1 LIQUID ORAL
Qty: 60 | Refills: 0
Start: 2025-08-16 | End: 2025-09-14

## 2025-08-16 RX ORDER — ALBUTEROL SULFATE 2.5 MG/3ML
3 VIAL, NEBULIZER (ML) INHALATION
Qty: 360 | Refills: 0
Start: 2025-08-16 | End: 2025-09-14

## 2025-08-16 RX ORDER — POLYETHYLENE GLYCOL 3350 17 G/17G
17 POWDER, FOR SOLUTION ORAL
Qty: 1020 | Refills: 0
Start: 2025-08-16 | End: 2025-09-14

## 2025-08-16 RX ORDER — ALBUTEROL SULFATE 2.5 MG/3ML
3 VIAL, NEBULIZER (ML) INHALATION
Refills: 0 | DISCHARGE

## 2025-08-16 RX ORDER — ACETYLCYSTEINE 200 MG/ML
4 INHALANT RESPIRATORY (INHALATION)
Qty: 480 | Refills: 0
Start: 2025-08-16 | End: 2025-09-14

## 2025-08-16 RX ORDER — SENNA 187 MG
2 TABLET ORAL
Qty: 60 | Refills: 0
Start: 2025-08-16 | End: 2025-09-14

## 2025-08-16 RX ADMIN — Medication 25 GRAM(S): at 22:25

## 2025-08-16 RX ADMIN — TIOTROPIUM BROMIDE INHALATION SPRAY 2 PUFF(S): 3.12 SPRAY, METERED RESPIRATORY (INHALATION) at 12:57

## 2025-08-16 RX ADMIN — LOSARTAN POTASSIUM 100 MILLIGRAM(S): 100 TABLET, FILM COATED ORAL at 05:25

## 2025-08-16 RX ADMIN — Medication 40 MILLIGRAM(S): at 18:21

## 2025-08-16 RX ADMIN — Medication 4 MILLILITER(S): at 21:46

## 2025-08-16 RX ADMIN — LACTULOSE 10 GRAM(S): 10 SOLUTION ORAL at 00:15

## 2025-08-16 RX ADMIN — Medication 40 MILLIGRAM(S): at 05:29

## 2025-08-16 RX ADMIN — AMLODIPINE BESYLATE 10 MILLIGRAM(S): 10 TABLET ORAL at 05:25

## 2025-08-16 RX ADMIN — Medication 25 MILLIGRAM(S): at 13:00

## 2025-08-16 RX ADMIN — Medication 25 MILLIGRAM(S): at 05:25

## 2025-08-16 RX ADMIN — ATORVASTATIN CALCIUM 40 MILLIGRAM(S): 80 TABLET, FILM COATED ORAL at 22:24

## 2025-08-16 RX ADMIN — Medication 80 MILLIGRAM(S): at 12:59

## 2025-08-16 RX ADMIN — DEXTROMETHORPHAN HBR, GUAIFENESIN 600 MILLIGRAM(S): 200 LIQUID ORAL at 18:21

## 2025-08-16 RX ADMIN — Medication 25 MILLIGRAM(S): at 22:24

## 2025-08-16 RX ADMIN — MIRTAZAPINE 7.5 MILLIGRAM(S): 30 TABLET, FILM COATED ORAL at 12:59

## 2025-08-16 RX ADMIN — Medication 25 GRAM(S): at 13:17

## 2025-08-16 RX ADMIN — Medication 2.5 MILLIGRAM(S): at 21:51

## 2025-08-16 RX ADMIN — Medication 1 DOSE(S): at 12:58

## 2025-08-16 RX ADMIN — ESCITALOPRAM OXALATE 5 MILLIGRAM(S): 20 TABLET ORAL at 12:59

## 2025-08-16 RX ADMIN — Medication 1 DOSE(S): at 22:25

## 2025-08-16 RX ADMIN — DEXTROMETHORPHAN HBR, GUAIFENESIN 600 MILLIGRAM(S): 200 LIQUID ORAL at 05:25

## 2025-08-16 RX ADMIN — ACETYLCYSTEINE 4 MILLILITER(S): 200 INHALANT RESPIRATORY (INHALATION) at 21:46

## 2025-08-16 RX ADMIN — Medication 2 TABLET(S): at 22:24

## 2025-08-16 RX ADMIN — Medication 25 GRAM(S): at 05:26

## 2025-08-16 RX ADMIN — RIVAROXABAN 20 MILLIGRAM(S): 10 TABLET, FILM COATED ORAL at 18:21

## 2025-08-17 DIAGNOSIS — N17.9 ACUTE KIDNEY FAILURE, UNSPECIFIED: ICD-10-CM

## 2025-08-17 LAB
ALBUMIN SERPL ELPH-MCNC: 2.9 G/DL — LOW (ref 3.3–5)
ALP SERPL-CCNC: 61 U/L — SIGNIFICANT CHANGE UP (ref 40–120)
ALT FLD-CCNC: 50 U/L — HIGH (ref 4–41)
ANION GAP SERPL CALC-SCNC: 13 MMOL/L — SIGNIFICANT CHANGE UP (ref 7–14)
ANION GAP SERPL CALC-SCNC: 13 MMOL/L — SIGNIFICANT CHANGE UP (ref 7–14)
AST SERPL-CCNC: 36 U/L — SIGNIFICANT CHANGE UP (ref 4–40)
BASOPHILS # BLD AUTO: 0.03 K/UL — SIGNIFICANT CHANGE UP (ref 0–0.2)
BASOPHILS # BLD AUTO: 0.03 K/UL — SIGNIFICANT CHANGE UP (ref 0–0.2)
BASOPHILS NFR BLD AUTO: 0.2 % — SIGNIFICANT CHANGE UP (ref 0–2)
BASOPHILS NFR BLD AUTO: 0.2 % — SIGNIFICANT CHANGE UP (ref 0–2)
BILIRUB SERPL-MCNC: 0.2 MG/DL — SIGNIFICANT CHANGE UP (ref 0.2–1.2)
BUN SERPL-MCNC: 36 MG/DL — HIGH (ref 7–23)
BUN SERPL-MCNC: 37 MG/DL — HIGH (ref 7–23)
CALCIUM SERPL-MCNC: 9.5 MG/DL — SIGNIFICANT CHANGE UP (ref 8.4–10.5)
CALCIUM SERPL-MCNC: 9.8 MG/DL — SIGNIFICANT CHANGE UP (ref 8.4–10.5)
CHLORIDE SERPL-SCNC: 106 MMOL/L — SIGNIFICANT CHANGE UP (ref 98–107)
CHLORIDE SERPL-SCNC: 107 MMOL/L — SIGNIFICANT CHANGE UP (ref 98–107)
CO2 SERPL-SCNC: 23 MMOL/L — SIGNIFICANT CHANGE UP (ref 22–31)
CO2 SERPL-SCNC: 24 MMOL/L — SIGNIFICANT CHANGE UP (ref 22–31)
CREAT ?TM UR-MCNC: 173 MG/DL — SIGNIFICANT CHANGE UP
CREAT SERPL-MCNC: 1.46 MG/DL — HIGH (ref 0.5–1.3)
CREAT SERPL-MCNC: 1.54 MG/DL — HIGH (ref 0.5–1.3)
CULTURE RESULTS: SIGNIFICANT CHANGE UP
EGFR: 44 ML/MIN/1.73M2 — LOW
EGFR: 44 ML/MIN/1.73M2 — LOW
EGFR: 47 ML/MIN/1.73M2 — LOW
EGFR: 47 ML/MIN/1.73M2 — LOW
EOSINOPHIL # BLD AUTO: 0.07 K/UL — SIGNIFICANT CHANGE UP (ref 0–0.5)
EOSINOPHIL # BLD AUTO: 0.08 K/UL — SIGNIFICANT CHANGE UP (ref 0–0.5)
EOSINOPHIL NFR BLD AUTO: 0.5 % — SIGNIFICANT CHANGE UP (ref 0–6)
EOSINOPHIL NFR BLD AUTO: 0.6 % — SIGNIFICANT CHANGE UP (ref 0–6)
GLUCOSE SERPL-MCNC: 115 MG/DL — HIGH (ref 70–99)
GLUCOSE SERPL-MCNC: 123 MG/DL — HIGH (ref 70–99)
HCT VFR BLD CALC: 25.6 % — LOW (ref 39–50)
HCT VFR BLD CALC: 28.3 % — LOW (ref 39–50)
HGB BLD-MCNC: 7.9 G/DL — LOW (ref 13–17)
HGB BLD-MCNC: 8.7 G/DL — LOW (ref 13–17)
IMM GRANULOCYTES # BLD AUTO: 0.07 K/UL — SIGNIFICANT CHANGE UP (ref 0–0.07)
IMM GRANULOCYTES # BLD AUTO: 0.12 K/UL — HIGH (ref 0–0.07)
IMM GRANULOCYTES NFR BLD AUTO: 0.6 % — SIGNIFICANT CHANGE UP (ref 0–0.9)
IMM GRANULOCYTES NFR BLD AUTO: 0.8 % — SIGNIFICANT CHANGE UP (ref 0–0.9)
LYMPHOCYTES # BLD AUTO: 0.58 K/UL — LOW (ref 1–3.3)
LYMPHOCYTES # BLD AUTO: 0.77 K/UL — LOW (ref 1–3.3)
LYMPHOCYTES NFR BLD AUTO: 4.6 % — LOW (ref 13–44)
LYMPHOCYTES NFR BLD AUTO: 5.2 % — LOW (ref 13–44)
MAGNESIUM SERPL-MCNC: 2.3 MG/DL — SIGNIFICANT CHANGE UP (ref 1.6–2.6)
MCHC RBC-ENTMCNC: 29 PG — SIGNIFICANT CHANGE UP (ref 27–34)
MCHC RBC-ENTMCNC: 29.2 PG — SIGNIFICANT CHANGE UP (ref 27–34)
MCHC RBC-ENTMCNC: 30.7 G/DL — LOW (ref 32–36)
MCHC RBC-ENTMCNC: 30.9 G/DL — LOW (ref 32–36)
MCV RBC AUTO: 94.1 FL — SIGNIFICANT CHANGE UP (ref 80–100)
MCV RBC AUTO: 95 FL — SIGNIFICANT CHANGE UP (ref 80–100)
MONOCYTES # BLD AUTO: 0.99 K/UL — HIGH (ref 0–0.9)
MONOCYTES # BLD AUTO: 1.17 K/UL — HIGH (ref 0–0.9)
MONOCYTES NFR BLD AUTO: 7.8 % — SIGNIFICANT CHANGE UP (ref 2–14)
MONOCYTES NFR BLD AUTO: 8 % — SIGNIFICANT CHANGE UP (ref 2–14)
NEUTROPHILS # BLD AUTO: 10.91 K/UL — HIGH (ref 1.8–7.4)
NEUTROPHILS # BLD AUTO: 12.54 K/UL — HIGH (ref 1.8–7.4)
NEUTROPHILS NFR BLD AUTO: 85.3 % — HIGH (ref 43–77)
NEUTROPHILS NFR BLD AUTO: 86.2 % — HIGH (ref 43–77)
NRBC # BLD AUTO: 0 K/UL — SIGNIFICANT CHANGE UP (ref 0–0)
NRBC # BLD AUTO: 0 K/UL — SIGNIFICANT CHANGE UP (ref 0–0)
NRBC # FLD: 0 K/UL — SIGNIFICANT CHANGE UP (ref 0–0)
NRBC # FLD: 0 K/UL — SIGNIFICANT CHANGE UP (ref 0–0)
NRBC BLD AUTO-RTO: 0 /100 WBCS — SIGNIFICANT CHANGE UP (ref 0–0)
NRBC BLD AUTO-RTO: 0 /100 WBCS — SIGNIFICANT CHANGE UP (ref 0–0)
OSMOLALITY UR: 425 MOSM/KG — SIGNIFICANT CHANGE UP (ref 50–1200)
PHOSPHATE SERPL-MCNC: 2.9 MG/DL — SIGNIFICANT CHANGE UP (ref 2.5–4.5)
PLATELET # BLD AUTO: 363 K/UL — SIGNIFICANT CHANGE UP (ref 150–400)
PLATELET # BLD AUTO: 408 K/UL — HIGH (ref 150–400)
PMV BLD: 9.4 FL — SIGNIFICANT CHANGE UP (ref 7–13)
PMV BLD: 9.5 FL — SIGNIFICANT CHANGE UP (ref 7–13)
POTASSIUM SERPL-MCNC: 3 MMOL/L — LOW (ref 3.5–5.3)
POTASSIUM SERPL-MCNC: 3.6 MMOL/L — SIGNIFICANT CHANGE UP (ref 3.5–5.3)
POTASSIUM SERPL-SCNC: 3 MMOL/L — LOW (ref 3.5–5.3)
POTASSIUM SERPL-SCNC: 3.6 MMOL/L — SIGNIFICANT CHANGE UP (ref 3.5–5.3)
POTASSIUM UR-SCNC: 64.1 MMOL/L — SIGNIFICANT CHANGE UP
PROT ?TM UR-MCNC: 42 MG/DL — SIGNIFICANT CHANGE UP
PROT SERPL-MCNC: 6.6 G/DL — SIGNIFICANT CHANGE UP (ref 6–8.3)
PROT/CREAT UR-RTO: 0.2 RATIO — SIGNIFICANT CHANGE UP (ref 0–0.2)
RBC # BLD: 2.72 M/UL — LOW (ref 4.2–5.8)
RBC # BLD: 2.98 M/UL — LOW (ref 4.2–5.8)
RBC # FLD: 14.5 % — SIGNIFICANT CHANGE UP (ref 10.3–14.5)
RBC # FLD: 14.8 % — HIGH (ref 10.3–14.5)
SODIUM SERPL-SCNC: 143 MMOL/L — SIGNIFICANT CHANGE UP (ref 135–145)
SODIUM SERPL-SCNC: 143 MMOL/L — SIGNIFICANT CHANGE UP (ref 135–145)
SODIUM UR-SCNC: 33 MMOL/L — SIGNIFICANT CHANGE UP
SPECIMEN SOURCE: SIGNIFICANT CHANGE UP
UUN UR-MCNC: 533.1 MG/DL — SIGNIFICANT CHANGE UP
WBC # BLD: 12.65 K/UL — HIGH (ref 3.8–10.5)
WBC # BLD: 14.71 K/UL — HIGH (ref 3.8–10.5)
WBC # FLD AUTO: 12.65 K/UL — HIGH (ref 3.8–10.5)
WBC # FLD AUTO: 14.71 K/UL — HIGH (ref 3.8–10.5)

## 2025-08-17 RX ORDER — SODIUM CHLORIDE 9 G/1000ML
1000 INJECTION, SOLUTION INTRAVENOUS
Refills: 0 | Status: DISCONTINUED | OUTPATIENT
Start: 2025-08-17 | End: 2025-08-18

## 2025-08-17 RX ADMIN — DEXTROMETHORPHAN HBR, GUAIFENESIN 600 MILLIGRAM(S): 200 LIQUID ORAL at 17:14

## 2025-08-17 RX ADMIN — TIOTROPIUM BROMIDE INHALATION SPRAY 2 PUFF(S): 3.12 SPRAY, METERED RESPIRATORY (INHALATION) at 11:19

## 2025-08-17 RX ADMIN — ATORVASTATIN CALCIUM 40 MILLIGRAM(S): 80 TABLET, FILM COATED ORAL at 22:49

## 2025-08-17 RX ADMIN — Medication 25 MILLIGRAM(S): at 13:55

## 2025-08-17 RX ADMIN — Medication 4 MILLILITER(S): at 09:14

## 2025-08-17 RX ADMIN — MIRTAZAPINE 7.5 MILLIGRAM(S): 30 TABLET, FILM COATED ORAL at 11:18

## 2025-08-17 RX ADMIN — Medication 4 MILLILITER(S): at 15:43

## 2025-08-17 RX ADMIN — Medication 25 GRAM(S): at 05:08

## 2025-08-17 RX ADMIN — ACETYLCYSTEINE 4 MILLILITER(S): 200 INHALANT RESPIRATORY (INHALATION) at 15:43

## 2025-08-17 RX ADMIN — SODIUM CHLORIDE 75 MILLILITER(S): 9 INJECTION, SOLUTION INTRAVENOUS at 13:55

## 2025-08-17 RX ADMIN — Medication 2 TABLET(S): at 22:49

## 2025-08-17 RX ADMIN — Medication 25 MILLIGRAM(S): at 22:49

## 2025-08-17 RX ADMIN — ESCITALOPRAM OXALATE 5 MILLIGRAM(S): 20 TABLET ORAL at 11:17

## 2025-08-17 RX ADMIN — Medication 2.5 MILLIGRAM(S): at 22:23

## 2025-08-17 RX ADMIN — Medication 25 MILLIGRAM(S): at 05:07

## 2025-08-17 RX ADMIN — ACETYLCYSTEINE 4 MILLILITER(S): 200 INHALANT RESPIRATORY (INHALATION) at 09:14

## 2025-08-17 RX ADMIN — Medication 1 DOSE(S): at 11:19

## 2025-08-17 RX ADMIN — Medication 2.5 MILLIGRAM(S): at 15:43

## 2025-08-17 RX ADMIN — Medication 40 MILLIGRAM(S): at 05:07

## 2025-08-17 RX ADMIN — ACETYLCYSTEINE 4 MILLILITER(S): 200 INHALANT RESPIRATORY (INHALATION) at 22:23

## 2025-08-17 RX ADMIN — Medication 40 MILLIEQUIVALENT(S): at 11:15

## 2025-08-17 RX ADMIN — Medication 40 MILLIGRAM(S): at 17:15

## 2025-08-17 RX ADMIN — Medication 25 GRAM(S): at 13:55

## 2025-08-17 RX ADMIN — AMLODIPINE BESYLATE 10 MILLIGRAM(S): 10 TABLET ORAL at 05:07

## 2025-08-17 RX ADMIN — Medication 4 MILLILITER(S): at 22:23

## 2025-08-17 RX ADMIN — Medication 650 MILLIGRAM(S): at 05:46

## 2025-08-17 RX ADMIN — LOSARTAN POTASSIUM 100 MILLIGRAM(S): 100 TABLET, FILM COATED ORAL at 05:07

## 2025-08-17 RX ADMIN — DEXTROMETHORPHAN HBR, GUAIFENESIN 600 MILLIGRAM(S): 200 LIQUID ORAL at 05:08

## 2025-08-17 RX ADMIN — Medication 80 MILLIGRAM(S): at 11:17

## 2025-08-17 RX ADMIN — Medication 25 GRAM(S): at 22:48

## 2025-08-17 RX ADMIN — RIVAROXABAN 20 MILLIGRAM(S): 10 TABLET, FILM COATED ORAL at 17:16

## 2025-08-17 RX ADMIN — Medication 1 DOSE(S): at 22:49

## 2025-08-17 RX ADMIN — Medication 650 MILLIGRAM(S): at 06:16

## 2025-08-18 LAB
ANION GAP SERPL CALC-SCNC: 14 MMOL/L — SIGNIFICANT CHANGE UP (ref 7–14)
BASOPHILS # BLD AUTO: 0.03 K/UL — SIGNIFICANT CHANGE UP (ref 0–0.2)
BASOPHILS NFR BLD AUTO: 0.2 % — SIGNIFICANT CHANGE UP (ref 0–2)
BUN SERPL-MCNC: 32 MG/DL — HIGH (ref 7–23)
CALCIUM SERPL-MCNC: 9.5 MG/DL — SIGNIFICANT CHANGE UP (ref 8.4–10.5)
CHLORIDE SERPL-SCNC: 107 MMOL/L — SIGNIFICANT CHANGE UP (ref 98–107)
CO2 SERPL-SCNC: 24 MMOL/L — SIGNIFICANT CHANGE UP (ref 22–31)
CREAT SERPL-MCNC: 1.28 MG/DL — SIGNIFICANT CHANGE UP (ref 0.5–1.3)
EGFR: 55 ML/MIN/1.73M2 — LOW
EGFR: 55 ML/MIN/1.73M2 — LOW
EOSINOPHIL # BLD AUTO: 0.1 K/UL — SIGNIFICANT CHANGE UP (ref 0–0.5)
EOSINOPHIL NFR BLD AUTO: 0.7 % — SIGNIFICANT CHANGE UP (ref 0–6)
GLUCOSE SERPL-MCNC: 92 MG/DL — SIGNIFICANT CHANGE UP (ref 70–99)
HCT VFR BLD CALC: 26.9 % — LOW (ref 39–50)
HGB BLD-MCNC: 8.3 G/DL — LOW (ref 13–17)
IMM GRANULOCYTES # BLD AUTO: 0.1 K/UL — HIGH (ref 0–0.07)
IMM GRANULOCYTES NFR BLD AUTO: 0.7 % — SIGNIFICANT CHANGE UP (ref 0–0.9)
LYMPHOCYTES # BLD AUTO: 0.75 K/UL — LOW (ref 1–3.3)
LYMPHOCYTES NFR BLD AUTO: 5.1 % — LOW (ref 13–44)
MAGNESIUM SERPL-MCNC: 2 MG/DL — SIGNIFICANT CHANGE UP (ref 1.6–2.6)
MCHC RBC-ENTMCNC: 29.3 PG — SIGNIFICANT CHANGE UP (ref 27–34)
MCHC RBC-ENTMCNC: 30.9 G/DL — LOW (ref 32–36)
MCV RBC AUTO: 95.1 FL — SIGNIFICANT CHANGE UP (ref 80–100)
MONOCYTES # BLD AUTO: 1.28 K/UL — HIGH (ref 0–0.9)
MONOCYTES NFR BLD AUTO: 8.7 % — SIGNIFICANT CHANGE UP (ref 2–14)
NEUTROPHILS # BLD AUTO: 12.49 K/UL — HIGH (ref 1.8–7.4)
NEUTROPHILS NFR BLD AUTO: 84.6 % — HIGH (ref 43–77)
NRBC # BLD AUTO: 0 K/UL — SIGNIFICANT CHANGE UP (ref 0–0)
NRBC # FLD: 0 K/UL — SIGNIFICANT CHANGE UP (ref 0–0)
NRBC BLD AUTO-RTO: 0 /100 WBCS — SIGNIFICANT CHANGE UP (ref 0–0)
PHOSPHATE SERPL-MCNC: 2.7 MG/DL — SIGNIFICANT CHANGE UP (ref 2.5–4.5)
PLATELET # BLD AUTO: 401 K/UL — HIGH (ref 150–400)
PMV BLD: 9.5 FL — SIGNIFICANT CHANGE UP (ref 7–13)
POTASSIUM SERPL-MCNC: 3.2 MMOL/L — LOW (ref 3.5–5.3)
POTASSIUM SERPL-SCNC: 3.2 MMOL/L — LOW (ref 3.5–5.3)
RBC # BLD: 2.83 M/UL — LOW (ref 4.2–5.8)
RBC # FLD: 14.7 % — HIGH (ref 10.3–14.5)
SODIUM SERPL-SCNC: 145 MMOL/L — SIGNIFICANT CHANGE UP (ref 135–145)
WBC # BLD: 14.75 K/UL — HIGH (ref 3.8–10.5)
WBC # FLD AUTO: 14.75 K/UL — HIGH (ref 3.8–10.5)

## 2025-08-18 PROCEDURE — G0545: CPT

## 2025-08-18 PROCEDURE — 99232 SBSQ HOSP IP/OBS MODERATE 35: CPT

## 2025-08-18 PROCEDURE — 93010 ELECTROCARDIOGRAM REPORT: CPT

## 2025-08-18 RX ORDER — SODIUM CHLORIDE 9 G/1000ML
1000 INJECTION, SOLUTION INTRAVENOUS
Refills: 0 | Status: DISCONTINUED | OUTPATIENT
Start: 2025-08-18 | End: 2025-08-19

## 2025-08-18 RX ADMIN — Medication 25 GRAM(S): at 13:29

## 2025-08-18 RX ADMIN — Medication 1 DOSE(S): at 21:15

## 2025-08-18 RX ADMIN — Medication 80 MILLIGRAM(S): at 13:31

## 2025-08-18 RX ADMIN — ESCITALOPRAM OXALATE 5 MILLIGRAM(S): 20 TABLET ORAL at 13:31

## 2025-08-18 RX ADMIN — ATORVASTATIN CALCIUM 40 MILLIGRAM(S): 80 TABLET, FILM COATED ORAL at 21:15

## 2025-08-18 RX ADMIN — Medication 40 MILLIGRAM(S): at 05:01

## 2025-08-18 RX ADMIN — SODIUM CHLORIDE 75 MILLILITER(S): 9 INJECTION, SOLUTION INTRAVENOUS at 18:28

## 2025-08-18 RX ADMIN — TIOTROPIUM BROMIDE INHALATION SPRAY 2 PUFF(S): 3.12 SPRAY, METERED RESPIRATORY (INHALATION) at 13:28

## 2025-08-18 RX ADMIN — RIVAROXABAN 20 MILLIGRAM(S): 10 TABLET, FILM COATED ORAL at 17:13

## 2025-08-18 RX ADMIN — Medication 40 MILLIEQUIVALENT(S): at 13:30

## 2025-08-18 RX ADMIN — Medication 4 MILLILITER(S): at 16:14

## 2025-08-18 RX ADMIN — Medication 25 MILLIGRAM(S): at 21:15

## 2025-08-18 RX ADMIN — Medication 25 GRAM(S): at 21:15

## 2025-08-18 RX ADMIN — Medication 25 GRAM(S): at 05:01

## 2025-08-18 RX ADMIN — AMLODIPINE BESYLATE 10 MILLIGRAM(S): 10 TABLET ORAL at 05:01

## 2025-08-18 RX ADMIN — Medication 2.5 MILLIGRAM(S): at 20:52

## 2025-08-18 RX ADMIN — Medication 4 MILLILITER(S): at 09:49

## 2025-08-18 RX ADMIN — Medication 25 MILLIGRAM(S): at 13:31

## 2025-08-18 RX ADMIN — ACETYLCYSTEINE 4 MILLILITER(S): 200 INHALANT RESPIRATORY (INHALATION) at 20:52

## 2025-08-18 RX ADMIN — ACETYLCYSTEINE 4 MILLILITER(S): 200 INHALANT RESPIRATORY (INHALATION) at 16:14

## 2025-08-18 RX ADMIN — DEXTROMETHORPHAN HBR, GUAIFENESIN 600 MILLIGRAM(S): 200 LIQUID ORAL at 05:01

## 2025-08-18 RX ADMIN — DEXTROMETHORPHAN HBR, GUAIFENESIN 600 MILLIGRAM(S): 200 LIQUID ORAL at 17:12

## 2025-08-18 RX ADMIN — Medication 1 DOSE(S): at 13:28

## 2025-08-18 RX ADMIN — LOSARTAN POTASSIUM 100 MILLIGRAM(S): 100 TABLET, FILM COATED ORAL at 05:01

## 2025-08-18 RX ADMIN — Medication 40 MILLIGRAM(S): at 17:12

## 2025-08-18 RX ADMIN — ACETYLCYSTEINE 4 MILLILITER(S): 200 INHALANT RESPIRATORY (INHALATION) at 09:50

## 2025-08-18 RX ADMIN — MIRTAZAPINE 7.5 MILLIGRAM(S): 30 TABLET, FILM COATED ORAL at 13:31

## 2025-08-18 RX ADMIN — Medication 4 MILLILITER(S): at 20:52

## 2025-08-18 RX ADMIN — Medication 2.5 MILLIGRAM(S): at 09:49

## 2025-08-18 RX ADMIN — Medication 25 MILLIGRAM(S): at 05:01

## 2025-08-18 RX ADMIN — Medication 2.5 MILLIGRAM(S): at 16:14

## 2025-08-19 ENCOUNTER — APPOINTMENT (OUTPATIENT)
Dept: HEMATOLOGY ONCOLOGY | Facility: CLINIC | Age: 85
End: 2025-08-19

## 2025-08-19 ENCOUNTER — TRANSCRIPTION ENCOUNTER (OUTPATIENT)
Age: 85
End: 2025-08-19

## 2025-08-19 VITALS
SYSTOLIC BLOOD PRESSURE: 129 MMHG | OXYGEN SATURATION: 100 % | TEMPERATURE: 99 F | RESPIRATION RATE: 17 BRPM | DIASTOLIC BLOOD PRESSURE: 59 MMHG | HEART RATE: 77 BPM

## 2025-08-19 LAB
ALBUMIN SERPL ELPH-MCNC: 2.7 G/DL — LOW (ref 3.3–5)
ALP SERPL-CCNC: 55 U/L — SIGNIFICANT CHANGE UP (ref 40–120)
ALT FLD-CCNC: 50 U/L — HIGH (ref 4–41)
ANION GAP SERPL CALC-SCNC: 11 MMOL/L — SIGNIFICANT CHANGE UP (ref 7–14)
AST SERPL-CCNC: 31 U/L — SIGNIFICANT CHANGE UP (ref 4–40)
BASOPHILS # BLD AUTO: 0.04 K/UL — SIGNIFICANT CHANGE UP (ref 0–0.2)
BASOPHILS NFR BLD AUTO: 0.3 % — SIGNIFICANT CHANGE UP (ref 0–2)
BILIRUB SERPL-MCNC: 0.2 MG/DL — SIGNIFICANT CHANGE UP (ref 0.2–1.2)
BUN SERPL-MCNC: 27 MG/DL — HIGH (ref 7–23)
CALCIUM SERPL-MCNC: 9.6 MG/DL — SIGNIFICANT CHANGE UP (ref 8.4–10.5)
CHLORIDE SERPL-SCNC: 107 MMOL/L — SIGNIFICANT CHANGE UP (ref 98–107)
CO2 SERPL-SCNC: 24 MMOL/L — SIGNIFICANT CHANGE UP (ref 22–31)
CREAT SERPL-MCNC: 1.26 MG/DL — SIGNIFICANT CHANGE UP (ref 0.5–1.3)
CULTURE RESULTS: SIGNIFICANT CHANGE UP
CULTURE RESULTS: SIGNIFICANT CHANGE UP
EGFR: 56 ML/MIN/1.73M2 — LOW
EGFR: 56 ML/MIN/1.73M2 — LOW
EOSINOPHIL # BLD AUTO: 0.15 K/UL — SIGNIFICANT CHANGE UP (ref 0–0.5)
EOSINOPHIL NFR BLD AUTO: 1 % — SIGNIFICANT CHANGE UP (ref 0–6)
GLUCOSE SERPL-MCNC: 95 MG/DL — SIGNIFICANT CHANGE UP (ref 70–99)
HCT VFR BLD CALC: 25.7 % — LOW (ref 39–50)
HGB BLD-MCNC: 8 G/DL — LOW (ref 13–17)
IMM GRANULOCYTES # BLD AUTO: 0.1 K/UL — HIGH (ref 0–0.07)
IMM GRANULOCYTES NFR BLD AUTO: 0.7 % — SIGNIFICANT CHANGE UP (ref 0–0.9)
LYMPHOCYTES # BLD AUTO: 0.79 K/UL — LOW (ref 1–3.3)
LYMPHOCYTES NFR BLD AUTO: 5.5 % — LOW (ref 13–44)
MAGNESIUM SERPL-MCNC: 1.9 MG/DL — SIGNIFICANT CHANGE UP (ref 1.6–2.6)
MCHC RBC-ENTMCNC: 29.1 PG — SIGNIFICANT CHANGE UP (ref 27–34)
MCHC RBC-ENTMCNC: 31.1 G/DL — LOW (ref 32–36)
MCV RBC AUTO: 93.5 FL — SIGNIFICANT CHANGE UP (ref 80–100)
MONOCYTES # BLD AUTO: 1.2 K/UL — HIGH (ref 0–0.9)
MONOCYTES NFR BLD AUTO: 8.4 % — SIGNIFICANT CHANGE UP (ref 2–14)
NEUTROPHILS # BLD AUTO: 12.05 K/UL — HIGH (ref 1.8–7.4)
NEUTROPHILS NFR BLD AUTO: 84.1 % — HIGH (ref 43–77)
NRBC # BLD AUTO: 0 K/UL — SIGNIFICANT CHANGE UP (ref 0–0)
NRBC # FLD: 0 K/UL — SIGNIFICANT CHANGE UP (ref 0–0)
NRBC BLD AUTO-RTO: 0 /100 WBCS — SIGNIFICANT CHANGE UP (ref 0–0)
PHOSPHATE SERPL-MCNC: 2.8 MG/DL — SIGNIFICANT CHANGE UP (ref 2.5–4.5)
PLATELET # BLD AUTO: 351 K/UL — SIGNIFICANT CHANGE UP (ref 150–400)
PMV BLD: 9.6 FL — SIGNIFICANT CHANGE UP (ref 7–13)
POTASSIUM SERPL-MCNC: 3.5 MMOL/L — SIGNIFICANT CHANGE UP (ref 3.5–5.3)
POTASSIUM SERPL-SCNC: 3.5 MMOL/L — SIGNIFICANT CHANGE UP (ref 3.5–5.3)
PROT SERPL-MCNC: 6.2 G/DL — SIGNIFICANT CHANGE UP (ref 6–8.3)
RBC # BLD: 2.75 M/UL — LOW (ref 4.2–5.8)
RBC # FLD: 14.8 % — HIGH (ref 10.3–14.5)
SODIUM SERPL-SCNC: 142 MMOL/L — SIGNIFICANT CHANGE UP (ref 135–145)
SPECIMEN SOURCE: SIGNIFICANT CHANGE UP
SPECIMEN SOURCE: SIGNIFICANT CHANGE UP
WBC # BLD: 14.33 K/UL — HIGH (ref 3.8–10.5)
WBC # FLD AUTO: 14.33 K/UL — HIGH (ref 3.8–10.5)

## 2025-08-19 RX ADMIN — LOSARTAN POTASSIUM 100 MILLIGRAM(S): 100 TABLET, FILM COATED ORAL at 05:36

## 2025-08-19 RX ADMIN — Medication 4 MILLILITER(S): at 09:46

## 2025-08-19 RX ADMIN — Medication 40 MILLIGRAM(S): at 05:36

## 2025-08-19 RX ADMIN — ESCITALOPRAM OXALATE 5 MILLIGRAM(S): 20 TABLET ORAL at 11:04

## 2025-08-19 RX ADMIN — Medication 2.5 MILLIGRAM(S): at 15:19

## 2025-08-19 RX ADMIN — Medication 2.5 MILLIGRAM(S): at 09:46

## 2025-08-19 RX ADMIN — Medication 4 MILLILITER(S): at 15:20

## 2025-08-19 RX ADMIN — Medication 1 DOSE(S): at 08:46

## 2025-08-19 RX ADMIN — Medication 80 MILLIGRAM(S): at 11:05

## 2025-08-19 RX ADMIN — Medication 25 MILLIGRAM(S): at 13:22

## 2025-08-19 RX ADMIN — Medication 25 MILLIGRAM(S): at 05:36

## 2025-08-19 RX ADMIN — Medication 25 GRAM(S): at 05:35

## 2025-08-19 RX ADMIN — TIOTROPIUM BROMIDE INHALATION SPRAY 2 PUFF(S): 3.12 SPRAY, METERED RESPIRATORY (INHALATION) at 11:03

## 2025-08-19 RX ADMIN — DEXTROMETHORPHAN HBR, GUAIFENESIN 600 MILLIGRAM(S): 200 LIQUID ORAL at 05:36

## 2025-08-19 RX ADMIN — Medication 40 MILLIGRAM(S): at 16:59

## 2025-08-19 RX ADMIN — AMLODIPINE BESYLATE 10 MILLIGRAM(S): 10 TABLET ORAL at 05:36

## 2025-08-19 RX ADMIN — DEXTROMETHORPHAN HBR, GUAIFENESIN 600 MILLIGRAM(S): 200 LIQUID ORAL at 17:00

## 2025-08-19 RX ADMIN — MIRTAZAPINE 7.5 MILLIGRAM(S): 30 TABLET, FILM COATED ORAL at 11:04

## 2025-08-21 ENCOUNTER — APPOINTMENT (OUTPATIENT)
Dept: PULMONOLOGY | Facility: CLINIC | Age: 85
End: 2025-08-21
Payer: MEDICARE

## 2025-08-21 ENCOUNTER — APPOINTMENT (OUTPATIENT)
Dept: PULMONOLOGY | Facility: CLINIC | Age: 85
End: 2025-08-21
Payer: SELF-PAY

## 2025-08-21 VITALS
DIASTOLIC BLOOD PRESSURE: 57 MMHG | WEIGHT: 142 LBS | RESPIRATION RATE: 16 BRPM | BODY MASS INDEX: 20.33 KG/M2 | SYSTOLIC BLOOD PRESSURE: 111 MMHG | HEART RATE: 84 BPM | OXYGEN SATURATION: 95 % | TEMPERATURE: 97.1 F | HEIGHT: 70 IN

## 2025-08-21 PROCEDURE — G2211 COMPLEX E/M VISIT ADD ON: CPT

## 2025-08-21 PROCEDURE — 94799 UNLISTED PULMONARY SVC/PX: CPT

## 2025-08-21 PROCEDURE — 99215 OFFICE O/P EST HI 40 MIN: CPT

## 2025-08-21 PROCEDURE — 76604 US EXAM CHEST: CPT

## 2025-08-25 ENCOUNTER — NON-APPOINTMENT (OUTPATIENT)
Age: 85
End: 2025-08-25

## 2025-08-26 ENCOUNTER — APPOINTMENT (OUTPATIENT)
Dept: NUCLEAR MEDICINE | Facility: IMAGING CENTER | Age: 85
End: 2025-08-26

## 2025-08-26 ENCOUNTER — OUTPATIENT (OUTPATIENT)
Dept: OUTPATIENT SERVICES | Facility: HOSPITAL | Age: 85
LOS: 1 days | End: 2025-08-26
Payer: COMMERCIAL

## 2025-08-26 DIAGNOSIS — Z00.8 ENCOUNTER FOR OTHER GENERAL EXAMINATION: ICD-10-CM

## 2025-08-26 DIAGNOSIS — C34.90 MALIGNANT NEOPLASM OF UNSPECIFIED PART OF UNSPECIFIED BRONCHUS OR LUNG: ICD-10-CM

## 2025-08-26 PROCEDURE — 78815 PET IMAGE W/CT SKULL-THIGH: CPT | Mod: 26,PI

## 2025-08-26 PROCEDURE — 78815 PET IMAGE W/CT SKULL-THIGH: CPT

## 2025-08-26 PROCEDURE — A9552: CPT

## 2025-09-05 ENCOUNTER — RESULT REVIEW (OUTPATIENT)
Age: 85
End: 2025-09-05

## 2025-09-05 ENCOUNTER — APPOINTMENT (OUTPATIENT)
Dept: HEMATOLOGY ONCOLOGY | Facility: CLINIC | Age: 85
End: 2025-09-05
Payer: MEDICARE

## 2025-09-05 VITALS
HEIGHT: 70 IN | RESPIRATION RATE: 18 BRPM | DIASTOLIC BLOOD PRESSURE: 73 MMHG | TEMPERATURE: 98.8 F | WEIGHT: 147 LBS | BODY MASS INDEX: 21.05 KG/M2 | SYSTOLIC BLOOD PRESSURE: 165 MMHG | HEART RATE: 84 BPM | OXYGEN SATURATION: 93 %

## 2025-09-05 DIAGNOSIS — C78.2 SECONDARY MALIGNANT NEOPLASM OF PLEURA: ICD-10-CM

## 2025-09-05 DIAGNOSIS — Z80.0 FAMILY HISTORY OF MALIGNANT NEOPLASM OF DIGESTIVE ORGANS: ICD-10-CM

## 2025-09-05 DIAGNOSIS — Z01.812 ENCOUNTER FOR PREPROCEDURAL LABORATORY EXAMINATION: ICD-10-CM

## 2025-09-05 DIAGNOSIS — J90 PLEURAL EFFUSION, NOT ELSEWHERE CLASSIFIED: ICD-10-CM

## 2025-09-05 DIAGNOSIS — D64.9 ANEMIA, UNSPECIFIED: ICD-10-CM

## 2025-09-05 DIAGNOSIS — Z87.898 PERSONAL HISTORY OF OTHER SPECIFIED CONDITIONS: ICD-10-CM

## 2025-09-05 DIAGNOSIS — C34.92 MALIGNANT NEOPLASM OF UNSPECIFIED PART OF LEFT BRONCHUS OR LUNG: ICD-10-CM

## 2025-09-05 DIAGNOSIS — C77.1 SECONDARY AND UNSPECIFIED MALIGNANT NEOPLASM OF INTRATHORACIC LYMPH NODES: ICD-10-CM

## 2025-09-05 PROCEDURE — G2211 COMPLEX E/M VISIT ADD ON: CPT

## 2025-09-05 PROCEDURE — 99205 OFFICE O/P NEW HI 60 MIN: CPT

## 2025-09-06 ENCOUNTER — NON-APPOINTMENT (OUTPATIENT)
Age: 85
End: 2025-09-06

## 2025-09-06 DIAGNOSIS — D50.9 IRON DEFICIENCY ANEMIA, UNSPECIFIED: ICD-10-CM

## 2025-09-06 DIAGNOSIS — E53.8 DEFICIENCY OF OTHER SPECIFIED B GROUP VITAMINS: ICD-10-CM

## 2025-09-06 LAB
ALBUMIN SERPL ELPH-MCNC: 3.6 G/DL
ALP BLD-CCNC: 70 U/L
ALT SERPL-CCNC: 27 U/L
ANION GAP SERPL CALC-SCNC: 14 MMOL/L
AST SERPL-CCNC: 23 U/L
BILIRUB SERPL-MCNC: 0.5 MG/DL
BUN SERPL-MCNC: 16 MG/DL
CALCIUM SERPL-MCNC: 9.8 MG/DL
CHLORIDE SERPL-SCNC: 111 MMOL/L
CO2 SERPL-SCNC: 20 MMOL/L
CREAT SERPL-MCNC: 1.02 MG/DL
EGFRCR SERPLBLD CKD-EPI 2021: 72 ML/MIN/1.73M2
FERRITIN SERPL-MCNC: 607 NG/ML
FOLATE SERPL-MCNC: 4.7 NG/ML
GLUCOSE SERPL-MCNC: 95 MG/DL
HBV CORE IGG+IGM SER QL: NONREACTIVE
HBV SURFACE AB SER QL: NONREACTIVE
HBV SURFACE AG SER QL: NONREACTIVE
HCV AB SER QL: NONREACTIVE
HCV S/CO RATIO: 0.14 S/CO
IRON SATN MFR SERPL: 12 %
IRON SERPL-MCNC: 25 UG/DL
LDH SERPL-CCNC: 204 U/L
POTASSIUM SERPL-SCNC: 4.5 MMOL/L
PROT SERPL-MCNC: 6.8 G/DL
SODIUM SERPL-SCNC: 145 MMOL/L
TIBC SERPL-MCNC: 215 UG/DL
TSH SERPL-ACNC: 0.73 UIU/ML
UIBC SERPL-MCNC: 190 UG/DL
VIT B12 SERPL-MCNC: 263 PG/ML

## 2025-09-08 ENCOUNTER — NON-APPOINTMENT (OUTPATIENT)
Age: 85
End: 2025-09-08

## 2025-09-10 LAB
CULTURE RESULTS: SIGNIFICANT CHANGE UP
SPECIMEN SOURCE: SIGNIFICANT CHANGE UP

## 2025-09-11 ENCOUNTER — NON-APPOINTMENT (OUTPATIENT)
Age: 85
End: 2025-09-11

## 2025-09-11 RX ORDER — METOCLOPRAMIDE 10 MG/1
10 TABLET ORAL
Qty: 60 | Refills: 5 | Status: ACTIVE | COMMUNITY
Start: 2025-09-05 | End: 1900-01-01

## 2025-09-11 RX ORDER — CHLORHEXIDINE GLUCONATE 4 %
325 (65 FE) LIQUID (ML) TOPICAL
Qty: 90 | Refills: 1 | Status: ACTIVE | COMMUNITY
Start: 2025-09-06 | End: 1900-01-01

## 2025-09-12 ENCOUNTER — APPOINTMENT (OUTPATIENT)
Dept: HEMATOLOGY ONCOLOGY | Facility: CLINIC | Age: 85
End: 2025-09-12

## 2025-09-12 ENCOUNTER — APPOINTMENT (OUTPATIENT)
Dept: INFUSION THERAPY | Facility: HOSPITAL | Age: 85
End: 2025-09-12

## 2025-09-12 ENCOUNTER — NON-APPOINTMENT (OUTPATIENT)
Age: 85
End: 2025-09-12

## 2025-09-15 ENCOUNTER — APPOINTMENT (OUTPATIENT)
Dept: RADIATION ONCOLOGY | Facility: CLINIC | Age: 85
End: 2025-09-15

## 2025-09-15 ENCOUNTER — NON-APPOINTMENT (OUTPATIENT)
Age: 85
End: 2025-09-15

## 2025-09-16 ENCOUNTER — APPOINTMENT (OUTPATIENT)
Dept: HEMATOLOGY ONCOLOGY | Facility: CLINIC | Age: 85
End: 2025-09-16

## 2025-09-17 ENCOUNTER — APPOINTMENT (OUTPATIENT)
Dept: VASCULAR SURGERY | Facility: CLINIC | Age: 85
End: 2025-09-17
Payer: MEDICARE

## 2025-09-17 PROCEDURE — 99215 OFFICE O/P EST HI 40 MIN: CPT

## 2025-09-17 PROCEDURE — 93925 LOWER EXTREMITY STUDY: CPT

## 2025-09-19 ENCOUNTER — APPOINTMENT (OUTPATIENT)
Dept: INFUSION THERAPY | Facility: HOSPITAL | Age: 85
End: 2025-09-19

## 2025-09-19 ENCOUNTER — RESULT REVIEW (OUTPATIENT)
Age: 85
End: 2025-09-19

## 2025-09-19 ENCOUNTER — APPOINTMENT (OUTPATIENT)
Dept: HEMATOLOGY ONCOLOGY | Facility: CLINIC | Age: 85
End: 2025-09-19
Payer: MEDICARE

## 2025-09-19 ENCOUNTER — NON-APPOINTMENT (OUTPATIENT)
Age: 85
End: 2025-09-19

## 2025-09-19 DIAGNOSIS — D64.9 ANEMIA, UNSPECIFIED: ICD-10-CM

## 2025-09-19 PROCEDURE — 99214 OFFICE O/P EST MOD 30 MIN: CPT

## 2025-09-20 ENCOUNTER — APPOINTMENT (OUTPATIENT)
Dept: MRI IMAGING | Facility: IMAGING CENTER | Age: 85
End: 2025-09-20
Payer: MEDICARE

## 2025-09-20 PROCEDURE — 70553 MRI BRAIN STEM W/O & W/DYE: CPT | Mod: 26

## (undated) DEVICE — SUT PROLENE 6-0 4-30" BV-1

## (undated) DEVICE — TUBING CANNULA SALTER LABS NASAL ADULT 7FT

## (undated) DEVICE — DRSG OPSITE 13.75 X 4"

## (undated) DEVICE — FORCEP BIOPSY 1.8MM JAW X 100CM DISP

## (undated) DEVICE — STAPLER SKIN VISI-STAT 35 WIDE

## (undated) DEVICE — DRAPE MAYO STAND 30"

## (undated) DEVICE — STOPCOCK 4-WAY W SWIVEL MALE LUER LOCK NON VENTED RED CAP

## (undated) DEVICE — DRAPE 1/2 SHEET 40X57"

## (undated) DEVICE — SUT SOFSILK 3-0 30" TIES

## (undated) DEVICE — PACK BRONCHOSCOPY

## (undated) DEVICE — ELCTR BVI ACCU-TEMP CAUTERY SHAFT FINE TIP 1/2"

## (undated) DEVICE — SUCTION YANKAUER NO CONTROL VENT

## (undated) DEVICE — DRAIN JACKSON PRATT 7MM FLAT FULL NO TROCAR

## (undated) DEVICE — DRSG TEGADERM 6 X 8"

## (undated) DEVICE — CLAMP BULLDOG MAXI 45 DEGREE (ORANGE) DISP

## (undated) DEVICE — DRAPE IOBAN 23" X 23"

## (undated) DEVICE — SOL INJ NS 0.9% 500ML 1-PORT

## (undated) DEVICE — SUT PROLENE 7-0 4-24" BV-1

## (undated) DEVICE — TUNNELER SHEATH GREEN LARGE

## (undated) DEVICE — DRAPE MAGNETIC INSTRUMENT MEDIUM

## (undated) DEVICE — FORCEP BIOPSY BRONCHOSCOPE DISP

## (undated) DEVICE — SUT POLYSORB 2-0 30" GS-21 UNDYED

## (undated) DEVICE — SOL IRR POUR H2O 250ML

## (undated) DEVICE — DRSG COMBINE 5 X 9"

## (undated) DEVICE — DRAPE INSTRUMENT POUCH 6.75" X 11"

## (undated) DEVICE — SUT BIOSYN 4-0 18" P-12

## (undated) DEVICE — DRAIN RESERVOIR FOR JACKSON PRATT 100CC CARDINAL

## (undated) DEVICE — NDL ASPIRATION VIZISHOT2 22G

## (undated) DEVICE — VALVE SUCTION EVIS 160/200/240

## (undated) DEVICE — SPECIMEN CONTAINER 100ML

## (undated) DEVICE — PREP CHLORAPREP HI-LITE ORANGE 26ML

## (undated) DEVICE — GLV 7.5 PROTEXIS (WHITE)

## (undated) DEVICE — SYR ASEPTO

## (undated) DEVICE — DRSG TEGADERM 8 X 12"

## (undated) DEVICE — PACK FEM/POP

## (undated) DEVICE — SOL IRR POUR NS 0.9% 500ML

## (undated) DEVICE — WARMING BLANKET UPPER ADULT

## (undated) DEVICE — BULLDOG SPRING CLIP 6MM SOFT/SOFT

## (undated) DEVICE — DRAIN JACKSON PRATT 10MM FLAT FULL NO TROCAR

## (undated) DEVICE — SOL IRR BAG NS 0.9% 1000ML

## (undated) DEVICE — SYR CONTRAST 10ML

## (undated) DEVICE — DRSG KLING 6"

## (undated) DEVICE — BRUSH CYTO DISP

## (undated) DEVICE — ADAPTER FIBEROPTIC BRONCHOSCOPE DUAL AXIS SWIVEL

## (undated) DEVICE — DRSG STERISTRIPS 0.5 X 4"

## (undated) DEVICE — DRAPE SPLIT SHEET 77" X 108"

## (undated) DEVICE — VENODYNE/SCD SLEEVE CALF MEDIUM

## (undated) DEVICE — DRAPE 3/4 SHEET W REINFORCEMENT 56X77"

## (undated) DEVICE — DRAPE TOWEL BLUE 17" X 24"

## (undated) DEVICE — SYR TB 1CC 25G X 5/8 (BLUE)

## (undated) DEVICE — MEDICATION LABELS W MARKER

## (undated) DEVICE — VALVE BIOPSY BRONCHOVIDEOSCOPE

## (undated) DEVICE — DRSG CURITY GAUZE SPONGE 4 X 4" 12-PLY

## (undated) DEVICE — POSITIONER STRAP ARMBOARD VELCRO TS-30

## (undated) DEVICE — LABELS BLANK W PEN

## (undated) DEVICE — SUTURE REMOVAL KIT

## (undated) DEVICE — GOWN XL

## (undated) DEVICE — POSITIONER FOAM EGG CRATE ULNAR 2PCS (PINK)

## (undated) DEVICE — SYR LUER SLIP TIP 30CC

## (undated) DEVICE — TRAP SPECIMEN SPUTUM 40CC

## (undated) DEVICE — MASK SURGICAL WITH EYESHIELD ANTIFOG (ORANGE)

## (undated) DEVICE — SUT SOFSILK 2-0 18" TIES

## (undated) DEVICE — SUT SOFSILK 0 18" TIES

## (undated) DEVICE — SUT PROLENE 6-0 4-30" C-1

## (undated) DEVICE — FOLEY TRAY 16FR 5CC LTX UMETER CLOSED

## (undated) DEVICE — SUT PROLENE 5-0 30" RB-2

## (undated) DEVICE — SUT SOFSILK 4-0 18" TIES